# Patient Record
Sex: FEMALE | Race: BLACK OR AFRICAN AMERICAN
[De-identification: names, ages, dates, MRNs, and addresses within clinical notes are randomized per-mention and may not be internally consistent; named-entity substitution may affect disease eponyms.]

---

## 2017-01-05 ENCOUNTER — HOSPITAL ENCOUNTER (OUTPATIENT)
Dept: HOSPITAL 54 - CSC | Age: 77
Discharge: HOME | End: 2017-01-05
Attending: INTERNAL MEDICINE
Payer: MEDICARE

## 2017-01-05 VITALS — BODY MASS INDEX: 25.49 KG/M2 | WEIGHT: 153 LBS | HEIGHT: 65 IN

## 2017-01-05 VITALS — DIASTOLIC BLOOD PRESSURE: 96 MMHG | SYSTOLIC BLOOD PRESSURE: 176 MMHG

## 2017-01-05 DIAGNOSIS — E11.9: ICD-10-CM

## 2017-01-05 DIAGNOSIS — M19.042: ICD-10-CM

## 2017-01-05 DIAGNOSIS — K59.09: ICD-10-CM

## 2017-01-05 DIAGNOSIS — Z79.84: ICD-10-CM

## 2017-01-05 DIAGNOSIS — E03.9: ICD-10-CM

## 2017-01-05 DIAGNOSIS — T44.7X6A: ICD-10-CM

## 2017-01-05 DIAGNOSIS — I10: ICD-10-CM

## 2017-01-05 DIAGNOSIS — F01.50: Primary | ICD-10-CM

## 2017-01-05 DIAGNOSIS — N39.41: ICD-10-CM

## 2017-01-05 DIAGNOSIS — E83.52: ICD-10-CM

## 2017-01-05 DIAGNOSIS — M19.041: ICD-10-CM
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
## 2017-01-05 DIAGNOSIS — E78.5: ICD-10-CM

## 2017-01-05 DIAGNOSIS — F32.9: ICD-10-CM

## 2017-01-05 DIAGNOSIS — R45.3: ICD-10-CM

## 2017-01-05 PROCEDURE — G0463 HOSPITAL OUTPT CLINIC VISIT: HCPCS

## 2017-09-10 ENCOUNTER — HOSPITAL ENCOUNTER (INPATIENT)
Dept: HOSPITAL 10 - E/R | Age: 77
LOS: 9 days | Discharge: NURSING, REHAB OR LONG-TERM CARE | DRG: 57 | End: 2017-09-19
Payer: MEDICARE

## 2017-09-10 VITALS
WEIGHT: 136.47 LBS | BODY MASS INDEX: 21.93 KG/M2 | HEIGHT: 66 IN | HEIGHT: 66 IN | WEIGHT: 136.47 LBS | BODY MASS INDEX: 21.93 KG/M2

## 2017-09-10 VITALS — RESPIRATION RATE: 17 BRPM | DIASTOLIC BLOOD PRESSURE: 90 MMHG | SYSTOLIC BLOOD PRESSURE: 201 MMHG

## 2017-09-10 VITALS — HEART RATE: 58 BPM

## 2017-09-10 DIAGNOSIS — F90.9: ICD-10-CM

## 2017-09-10 DIAGNOSIS — F17.210: ICD-10-CM

## 2017-09-10 DIAGNOSIS — F03.90: ICD-10-CM

## 2017-09-10 DIAGNOSIS — E11.8: ICD-10-CM

## 2017-09-10 DIAGNOSIS — E78.00: ICD-10-CM

## 2017-09-10 DIAGNOSIS — R32: ICD-10-CM

## 2017-09-10 DIAGNOSIS — E88.81: ICD-10-CM

## 2017-09-10 DIAGNOSIS — E78.5: ICD-10-CM

## 2017-09-10 DIAGNOSIS — G91.2: Primary | ICD-10-CM

## 2017-09-10 DIAGNOSIS — F32.9: ICD-10-CM

## 2017-09-10 DIAGNOSIS — G47.33: ICD-10-CM

## 2017-09-10 LAB
ADD UMIC: NO
ANION GAP SERPL CALC-SCNC: 14 MMOL/L (ref 8–16)
APTT BLD: 29.1 SEC (ref 25–35)
BASOPHILS # BLD AUTO: 0 10^3/UL (ref 0–0.1)
BASOPHILS NFR BLD: 0.6 % (ref 0–2)
BUN SERPL-MCNC: 15 MG/DL (ref 7–20)
CALCIUM SERPL-MCNC: 11.1 MG/DL (ref 8.4–10.2)
CHLORIDE SERPL-SCNC: 102 MMOL/L (ref 97–110)
CO2 SERPL-SCNC: 29 MMOL/L (ref 21–31)
COLOR UR: YELLOW
CREAT SERPL-MCNC: 1.06 MG/DL (ref 0.44–1)
EOSINOPHIL # BLD: 0 10^3/UL (ref 0–0.5)
EOSINOPHIL NFR BLD: 0.8 % (ref 0–7)
ERYTHROCYTE [DISTWIDTH] IN BLOOD BY AUTOMATED COUNT: 13.6 % (ref 11.5–14.5)
GLUCOSE SERPL-MCNC: 138 MG/DL (ref 70–220)
GLUCOSE UR STRIP-MCNC: NEGATIVE MG/DL
HCT VFR BLD CALC: 43.6 % (ref 37–47)
HGB BLD-MCNC: 14.3 G/DL (ref 12–16)
INR PPP: 0.91
KETONES UR STRIP.AUTO-MCNC: (no result) MG/DL
LYMPHOCYTES # BLD AUTO: 1 10^3/UL (ref 0.8–2.9)
LYMPHOCYTES NFR BLD AUTO: 27.9 % (ref 15–51)
MCH RBC QN AUTO: 26.4 PG (ref 29–33)
MCHC RBC AUTO-ENTMCNC: 32.8 G/DL (ref 32–37)
MCV RBC AUTO: 80.6 FL (ref 82–101)
MONOCYTES # BLD: 0.3 10^3/UL (ref 0.3–0.9)
MONOCYTES NFR BLD: 8.4 % (ref 0–11)
NEUTROPHILS NFR BLD AUTO: 62.3 % (ref 39–77)
NITRITE UR QL STRIP.AUTO: NEGATIVE MG/DL
NRBC # BLD MANUAL: 0 10^3/UL (ref 0–0)
NRBC BLD AUTO-RTO: 0 /100WBC (ref 0–0)
PLATELET # BLD: 156 10^3/UL (ref 140–415)
PMV BLD AUTO: 11.5 FL (ref 7.4–10.4)
POTASSIUM SERPL-SCNC: 4.1 MMOL/L (ref 3.5–5.1)
PROTHROMBIN TIME: 12.3 SEC (ref 12.2–14.2)
PT RATIO: 1
RBC # BLD AUTO: 5.41 10^6/UL (ref 4.2–5.4)
RBC # UR AUTO: NEGATIVE MG/DL
SODIUM SERPL-SCNC: 141 MMOL/L (ref 135–144)
TROPONIN I SERPL-MCNC: < 0.012 NG/ML (ref 0–0.12)
UR ASCORBIC ACID: NEGATIVE MG/DL
UR BILIRUBIN (DIP): NEGATIVE MG/DL
UR CLARITY: CLEAR
UR PH (DIP): 7 (ref 5–9)
UR SPECIFIC GRAVITY (DIP): 1.01 (ref 1–1.03)
UR TOTAL PROTEIN (DIP): NEGATIVE MG/DL
UROBILINOGEN UR STRIP-ACNC: (no result) MG/DL
WBC # BLD AUTO: 3.6 10^3/UL (ref 4.8–10.8)
WBC # UR STRIP: NEGATIVE LEU/UL

## 2017-09-10 PROCEDURE — 82042 OTHER SOURCE ALBUMIN QUAN EA: CPT

## 2017-09-10 PROCEDURE — 85610 PROTHROMBIN TIME: CPT

## 2017-09-10 PROCEDURE — 82945 GLUCOSE OTHER FLUID: CPT

## 2017-09-10 PROCEDURE — 70549 MR ANGIOGRAPH NECK W/O&W/DYE: CPT

## 2017-09-10 PROCEDURE — 92523 SPEECH SOUND LANG COMPREHEN: CPT

## 2017-09-10 PROCEDURE — 97530 THERAPEUTIC ACTIVITIES: CPT

## 2017-09-10 PROCEDURE — 80061 LIPID PANEL: CPT

## 2017-09-10 PROCEDURE — 93306 TTE W/DOPPLER COMPLETE: CPT

## 2017-09-10 PROCEDURE — P9612 CATHETERIZE FOR URINE SPEC: HCPCS

## 2017-09-10 PROCEDURE — 87529 HSV DNA AMP PROBE: CPT

## 2017-09-10 PROCEDURE — 83036 HEMOGLOBIN GLYCOSYLATED A1C: CPT

## 2017-09-10 PROCEDURE — 70450 CT HEAD/BRAIN W/O DYE: CPT

## 2017-09-10 PROCEDURE — 97162 PT EVAL MOD COMPLEX 30 MIN: CPT

## 2017-09-10 PROCEDURE — 85730 THROMBOPLASTIN TIME PARTIAL: CPT

## 2017-09-10 PROCEDURE — 86592 SYPHILIS TEST NON-TREP QUAL: CPT

## 2017-09-10 PROCEDURE — 92507 TX SP LANG VOICE COMM INDIV: CPT

## 2017-09-10 PROCEDURE — 82962 GLUCOSE BLOOD TEST: CPT

## 2017-09-10 PROCEDURE — 84157 ASSAY OF PROTEIN OTHER: CPT

## 2017-09-10 PROCEDURE — 92610 EVALUATE SWALLOWING FUNCTION: CPT

## 2017-09-10 PROCEDURE — 85025 COMPLETE CBC W/AUTO DIFF WBC: CPT

## 2017-09-10 PROCEDURE — 70544 MR ANGIOGRAPHY HEAD W/O DYE: CPT

## 2017-09-10 PROCEDURE — 97167 OT EVAL HIGH COMPLEX 60 MIN: CPT

## 2017-09-10 PROCEDURE — 81003 URINALYSIS AUTO W/O SCOPE: CPT

## 2017-09-10 PROCEDURE — 82040 ASSAY OF SERUM ALBUMIN: CPT

## 2017-09-10 PROCEDURE — 84443 ASSAY THYROID STIM HORMONE: CPT

## 2017-09-10 PROCEDURE — 82784 ASSAY IGA/IGD/IGG/IGM EACH: CPT

## 2017-09-10 PROCEDURE — 87070 CULTURE OTHR SPECIMN AEROBIC: CPT

## 2017-09-10 PROCEDURE — 71010: CPT

## 2017-09-10 PROCEDURE — 89051 BODY FLUID CELL COUNT: CPT

## 2017-09-10 PROCEDURE — 70551 MRI BRAIN STEM W/O DYE: CPT

## 2017-09-10 PROCEDURE — 80053 COMPREHEN METABOLIC PANEL: CPT

## 2017-09-10 PROCEDURE — 84484 ASSAY OF TROPONIN QUANT: CPT

## 2017-09-10 PROCEDURE — 80048 BASIC METABOLIC PNL TOTAL CA: CPT

## 2017-09-10 PROCEDURE — 93005 ELECTROCARDIOGRAM TRACING: CPT

## 2017-09-10 PROCEDURE — 97116 GAIT TRAINING THERAPY: CPT

## 2017-09-10 PROCEDURE — C9113 INJ PANTOPRAZOLE SODIUM, VIA: HCPCS

## 2017-09-10 RX ADMIN — FOLIC ACID SCH MLS/HR: 5 INJECTION, SOLUTION INTRAMUSCULAR; INTRAVENOUS; SUBCUTANEOUS at 22:54

## 2017-09-10 NOTE — RADRPT
PROCEDURE:   XR Chest. 

 

CLINICAL INDICATION:   Dyspnea 

 

TECHNIQUE:   Single frontal chest x-ray. 

 

COMPARISON:   None. 

 

FINDINGS:

 

No acute infiltrate, pleural effusion or pneumothorax is identified.  Cardiomediastinal silhouette i
s within normal limits.  Aortic atherosclerotic calcification is noted. The osseous structures are r
emarkable for degenerative enthesopathy of the spine.   

 

IMPRESSION:

 

1.  No evidence of acute cardiopulmonary process.

2.  Aortic atherosclerosis.  

 

RPTAT: PP

_____________________________________________ 

.Camacho Hackett MD, MD           Date    Time 

Electronically viewed and signed by .Camacho Hackett MD, MD on 09/10/2017 15:52 

 

D:  09/10/2017 15:52  T:  09/10/2017 15:52

.R/

## 2017-09-10 NOTE — HP
Date/Time of Note


Date/Time of Note


DATE: 9/10/17 


TIME: 21:05





Assessment/Plan


VTE Prophylaxis


VTE Prophylaxis Intervention:  SCD's





Lines/Catheters


IV Catheter Type (from [facility]):  Saline Lock





Assessment/Plan


Chief Complaint/Hosp Course


This is a 77-year-old female being admitted to the telemetry floor for:





#1 left leg weakness: Rule out CVA/TIA.  CAT scan does not show any acute signs 

of hemorrhage or infarction.  Patient continues to have unsteadiness with her 

gait.  Will order MRI of the brain as well as MRA of the brain and neck without 

contrast.  Will check an echocardiogram with bubble study.  Will get a 

neurology consult.  Will check lipid level, and A1c.  PT OT and speech 

evaluation, bedside swallow eval.





#2 hypertension: We will allow for permissive hypertension for the first 24 

hours.  Then resume patient's home medication.





#3 diabetes mellitus: Once patient passes swallow study will resume diabetic 

diet, check hemoglobin A1c, insulin sliding scale





#4 dementia: Stable.





#5 DVT GI prophylaxis: SCDs, acid blocker





Further treatment strategy will be implemented as per the clinical course


Problems:  





HPI/ROS


Admit Date/Time


Admit Date/Time








Hx of Present Illness


cc: leg weakness, confusion





This 77-year-old female is brought in by her daughter via EMS for acute left 

leg weakness at the same time that she developed confusion.  Patient states she 

was walking while does happen and that she continue to try to walk with her 

daughter she could not do it because of left leg weakness.   She has had no 

fevers or chills and denies chest pain shortness of breath.  Patient was 

unsteady in her gait when the RNs were helping her to the bathroom.  The 

weakness of the left leg was felt at approximately 1315 on 9/10/1017.





allegies: nkda





meds: see mar





ROS


Const: As per HPI


Eyes : No pain discharge or redness or change in visual acuity


ENT: No pain, sore throat, congestion, congestion,  dysphagia or discharge


Respiratory: No shortness of breath, cough, sputum, wheezing, or pleuritic pain


Cardiovascular: No chest pain, palpitation, PND, or edema


GI : no change in appetite, abdominal pain, nausea, vomiting, diarrhea, 

constipation, or change in the color his stool 


Genitourinary: No dysuria, hematuria, flank pain ,  discharge or CVA tenderness


Musculoskeletal: No joint pain, back pain, neck pain, restricted range of 

motion in neck or joints


Skin: No rash, bruising or hives 


Neuro: As per HPI


Endocrine: No polyuria, polydipsia, temperature intolerance


Psych: No hallucination, depression, anxiety or suicidal ideation





PMH/Family/Social


Past Medical History


Diabetes mellitus, hypertension, dementia





Past Surgical History


Hysterectomy





Family History


Significant Family History:  no pertinent family hx





Social History


Alcohol Use:  none


Smoking Status:  Former smoker (Half pack a day 5 year, she quit many years ago

)


Drug Use:  none





Exam/Review of Systems


Vital Signs


Vitals





 Vital Signs








  Date Time  Temp Pulse Resp B/P Pulse Ox O2 Delivery O2 Flow Rate FiO2


 


9/10/17 20:05  61 13 161/64 98 Room Air  


 


9/10/17 14:49       2 


 


9/10/17 14:30 98.5       











Exam


Exam





General: Patient is well-developed well-nourished


The patient is alert oriented -3 lying comfortably in bed.


HEENT: Atraumatic, normocephalic. The pupils are equal, round and reactive. 

Extraocular motor are intact


Neck: Supple with full range of motion. No rigidity or meningismus


Chest: Nontender


Lungs: Clear to auscultation bilaterally no crackles rales or wheezing


Heart: Normal S1-S2, Regular rhythm and rate.  No overt murmurs appreciated


Abdomen: Soft , nontender,  nondistended , bowel sounds are present. No 

guarding no rebound tenderness , No masses or organomegaly. No costovertebral 

temporal angle mass


Extremities: Normal to inspection, no edema no cyanosis


Neurologic: Alert and oriented 3, cranial nerves II through XII intact, 

strength appears to be equal of the bilateral lower extremities and equal to 

bilateral upper extremities.  Did not attempt gait as patient earlier try to go 

to the bathroom with the nurse and she was unsteady.


Additional Comments


PROCEDURE:   CT Brain without contrast. 


 


CLINICAL INDICATION: Weakness and nonacute stroke


 


TECHNIQUE:   A CT of the brain was performed on a [employer] 64-slice CT 

scanner utilizing axial imaging from the skull base through the vertex without 

IV contrast.  Multiplanar reformatted images were made.  Images were reviewed 

on a PACS workstation.  The CTDIvol is 45.01 mGy and the DLP is 720.23 mGycm.  


 


One of the following 3 does reduction techniques were used during this CT 

examination:


1)  Automated exposure control


2)  Adjustment of the mA +/- kV according to patient size or


3)  Use of iterative reconstruction technique


 


COMPARISON:   None available 


 


FINDINGS:


 


There is no intracranial hemorrhage, mass effect, or midline shift.  No extra-

axial fluid collection is seen. The ventricles and sulci are age appropriate. 

Moderate diffuse volume loss is present. Decreased attenuation is present in 

the bilateral centrum semiovale and periventricular white matter compatible 

with moderate chronic microvascular ischemic disease. Mild vascular 

calcifications are present of the intracranial internal carotid arteries and 

the vertebral arteries.


 


The visualized scalp and calvarium are normal. The bilateral orbits are normal. 

The bilateral paranasal sinuses, mastoid air cells and middle ear cavities are 

clear.


 


IMPRESSION:


 


1.  No evidence of acute intracranial hemorrhage, infarcts, or acute 

intracranial pathology.


2.  Moderate chronic microvascular ischemic disease and diffuse volume loss.


3.  Mild atherosclerotic vascular disease


 


 


 


 


RPTAT: HDC


_____________________________________________ 


.Belle Rodriguez MD, MD           Date    Time 


Electronically viewed and signed by .Belle Rodriguez MD, MD on 09/10/2017 15:

43 


 


D:  09/10/2017 15:43  T:  09/10/2017 15:43


.C/





CC: JOSE LEONARD DO


PROCEDURE:   XR Chest. 


 


CLINICAL INDICATION:   Dyspnea 


 


TECHNIQUE:   Single frontal chest x-ray. 


 


COMPARISON:   None. 


 


FINDINGS:


 


No acute infiltrate, pleural effusion or pneumothorax is identified.  

Cardiomediastinal silhouette is within normal limits.  Aortic atherosclerotic 

calcification is noted. The osseous structures are remarkable for degenerative 

enthesopathy of the spine.   


 


IMPRESSION:


 


1.  No evidence of acute cardiopulmonary process.


2.  Aortic atherosclerosis.  


 


RPTAT: PP


_____________________________________________ 


.Camacho Hackett MD, MD           Date    Time 


Electronically viewed and signed by .Camacho Hackett MD, MD on 09/10/2017 15:

52 


 


D:  09/10/2017 15:52  T:  09/10/2017 15:52


.R/





CC: JOSE LEONARD DOPROCEDURE:   CT Brain without contrast. 


 


EKG interpretation: Normal sinus rhythm rate 82, normal axis, no ST or T-wave 

changes concerning for acute ischemia, normal intervals.  


As per ED physician documentation





Labs


Result Diagram:  


9/10/17 1500                                                                   

             9/10/17 1500














ALVIN LOCK Sep 10, 2017 21:15

## 2017-09-10 NOTE — RADRPT
PROCEDURE:   CT Brain without contrast. 

 

CLINICAL INDICATION: Weakness and nonacute stroke

 

TECHNIQUE:   A CT of the brain was performed on a [employer] 64-slice CT scanner utilizing axial 
imaging from the skull base through the vertex without IV contrast.  Multiplanar reformatted images 
were made.  Images were reviewed on a PACS workstation.  The CTDIvol is 45.01 mGy and the DLP is 720
.23 mGycm.  

 

One of the following 3 does reduction techniques were used during this CT examination:

1)  Automated exposure control

2)  Adjustment of the mA +/- kV according to patient size or

3)  Use of iterative reconstruction technique

 

COMPARISON:   None available 

 

FINDINGS:

 

There is no intracranial hemorrhage, mass effect, or midline shift.  No extra-axial fluid collection
 is seen. The ventricles and sulci are age appropriate. Moderate diffuse volume loss is present. Dec
reased attenuation is present in the bilateral centrum semiovale and periventricular white matter co
mpatible with moderate chronic microvascular ischemic disease. Mild vascular calcifications are pres
ent of the intracranial internal carotid arteries and the vertebral arteries.

 

The visualized scalp and calvarium are normal. The bilateral orbits are normal. The bilateral parana
sal sinuses, mastoid air cells and middle ear cavities are clear.

 

IMPRESSION:

 

1.  No evidence of acute intracranial hemorrhage, infarcts, or acute intracranial pathology.

2.  Moderate chronic microvascular ischemic disease and diffuse volume loss.

3.  Mild atherosclerotic vascular disease

 

 

 

 

RPTAT: HDC

_____________________________________________ 

.Belle Rodriguez MD, MD           Date    Time 

Electronically viewed and signed by .Belle Rodriguez MD, MD on 09/10/2017 15:43 

 

D:  09/10/2017 15:43  T:  09/10/2017 15:43

.C/

## 2017-09-10 NOTE — ERA
ER Documentation


Chief Complaint


Date/Time


DATE: 9/10/17 


TIME: 19:18


Chief Complaint


weakness to left leg  at 1315





HPI


This 77-year-old female is brought in by her daughter via EMS for acute left 

leg weakness at the same time that she developed confusion.  Daughter tried to 

get her to walk and she could not walk because of the left leg weakness.  She 

feels that his sensory resolved with the daughter still states that she appears 

somewhat confused.  She has had no fevers or chills and denies chest pain 

shortness of breath.





ROS


All systems reviewed and are negative except as per history of present illness.





Medications


Home Meds


Reported Medications


Olmesartan Medoxomil (Benicar) 40 Mg Tablet, 40 MG PO DAILY, #30 TAB


   9/10/17


Metformin Hcl* (Metformin Hcl*) 500 Mg Tablet, 500 MG PO DAILY, #30 TAB


   9/10/17





Allergies


Allergies:  


Coded Allergies:  


     No Known Allergy (Unverified , 9/10/17)





PMhx/Soc


History of Surgery:  No


Anesthesia Reaction:  No


Hx Neurological Disorder:  No


Hx Respiratory Disorders:  No


Hx Cardiac Disorders:  Yes (HTN)


Hx Psychiatric Problems:  No


Hx Miscellaneous Medical Probl:  Yes (DM)


Hx Alcohol Use:  No


Hx Substance Use:  No


Hx Tobacco Use:  No


Smoking Status:  Never smoker





Physical Exam


Vitals





Vital Signs








  Date Time  Temp Pulse Resp B/P Pulse Ox O2 Delivery O2 Flow Rate FiO2


 


9/10/17 14:49      Nasal Cannula 2 


 


9/10/17 14:30 98.5 98 20 163/78 95   








Physical Exam


Const:  []            No acute distress.


Head:   Atraumatic 


Eyes:    Normal Conjunctiva


ENT:    Normal External Ears, Nose and Mouth.


Neck:               Full range of motion..~ No meningismus.


Resp:    Clear to auscultation bilaterally


Cardio:    Regular rate and rhythm, no murmurs


Abd:    Soft, non tender, non distended. Normal bowel sounds


Skin:    No petechiae or rashes


Back:    No midline or flank tenderness


Ext:    No cyanosis, or edema, Distal pulses intact all 4 extremities


Neur:    Awake and alert and oriented 3, cranial nerves II through XII intact, 

no cerebellar deficits, normal mental status with ability to remember 3 objects

, 5 out of 5 strength all extremities with a NIH stroke scale of 0.


Psych:    Normal Mood and Affect


Result Diagram:  


9/10/17 1500                                                                   

             9/10/17 1500





Results 24 hrs





 Laboratory Tests








Test


  9/10/17


15:00


 


White Blood Count 3.610^3/ul 


 


Red Blood Count 5.4110^6/ul 


 


Hemoglobin 14.3g/dl 


 


Hematocrit 43.6% 


 


Mean Corpuscular Volume 80.6fl 


 


Mean Corpuscular Hemoglobin 26.4pg 


 


Mean Corpuscular Hemoglobin


Concent 32.8g/dl 


 


 


Red Cell Distribution Width 13.6% 


 


Platelet Count 57501^3/UL 


 


Mean Platelet Volume 11.5fl 


 


Neutrophils % 62.3% 


 


Lymphocytes % 27.9% 


 


Monocytes % 8.4% 


 


Eosinophils % 0.8% 


 


Basophils % 0.6% 


 


Nucleated Red Blood Cells % 0.0/100WBC 


 


Neutrophils # (Manual) 2.210^3/ul 


 


Lymphocytes # 1.010^3/ul 


 


Monocytes # 0.310^3/ul 


 


Eosinophils # 0.010^3/ul 


 


Basophils # 0.010^3/ul 


 


Nucleated Red Blood Cells # 0.010^3/ul 


 


Prothrombin Time 12.3Sec 


 


Prothrombin Time Ratio 1.0 


 


INR International Normalized


Ratio 0.91 


 


 


Activated Partial


Thromboplast Time 29.1Sec 


 


 


Sodium Level 141mmol/L 


 


Potassium Level 4.1mmol/L 


 


Chloride Level 102mmol/L 


 


Carbon Dioxide Level 29mmol/L 


 


Anion Gap 14 


 


Blood Urea Nitrogen 15mg/dl 


 


Creatinine 1.06mg/dl 


 


Glucose Level 138mg/dl 


 


Hemoglobin A1c 6.4% 


 


Calcium Level 11.1mg/dl 


 


Troponin I < 0.012ng/ml 








 Current Medications








 Medications


  (Trade)  Dose


 Ordered  Sig/Ramone


 Route


 PRN Reason  Start Time


 Stop Time Status Last Admin


Dose Admin


 


 Ondansetron HCl


  (Zofran Inj)  4 mg  ER BRIDGE PRN


 IV


 NAUSEA AND/OR VOMITING  9/10/17 19:30


 9/11/17 19:29   


 


 


 Acetaminophen


  (Tylenol Tab)  650 mg  ER BRIDGE PRN


 PO


 MILD PAIN/FEVER  9/10/17 19:30


 9/11/17 19:29   


 











Procedures/MDM


  Patient describing symptoms consistent with a TIA.  She is stable in the 

emergency room.  Urine is still pending and should be treated for urinary tract 

infection she has 1 otherwise is currently stable.  She will need to be 

admitted for further neurological workup including MRI which I have already 

ordered for signs of acute ischemia.  Initial CT is negative and the patient 

was given 325 mg asp.  Dr. Oneill will be admitting to telemetry further 

monitoring and workup .





EKG interpretation: Normal sinus rhythm rate 82, normal axis, no ST or T-wave 

changes concerning for acute ischemia, normal intervals.  Normal EKG except for 

low voltage


Cardiac monitor interpretation: Normal sinus rhythm without arrhythmia


Chest x-ray interpretation:I see no acute process.  I see no widened mediastinum

, pneumothorax, no infiltrates, no pulmonary edema, no fractures.


CT head interpretation: I see no acute process, see no acute hemorrhage, no mass

-effect or midline shift no skull fracture peer





Departure


Diagnosis:  


 Primary Impression:  


 TIA (transient ischemic attack)


Condition:  Stable











JOSE LEONARD DO Sep 10, 2017 19:28

## 2017-09-11 VITALS — HEART RATE: 66 BPM

## 2017-09-11 VITALS — RESPIRATION RATE: 20 BRPM | DIASTOLIC BLOOD PRESSURE: 77 MMHG | HEART RATE: 65 BPM | SYSTOLIC BLOOD PRESSURE: 165 MMHG

## 2017-09-11 VITALS — SYSTOLIC BLOOD PRESSURE: 145 MMHG | RESPIRATION RATE: 18 BRPM | DIASTOLIC BLOOD PRESSURE: 75 MMHG

## 2017-09-11 VITALS — HEART RATE: 97 BPM

## 2017-09-11 VITALS — DIASTOLIC BLOOD PRESSURE: 75 MMHG | RESPIRATION RATE: 20 BRPM | SYSTOLIC BLOOD PRESSURE: 157 MMHG

## 2017-09-11 VITALS — SYSTOLIC BLOOD PRESSURE: 179 MMHG | RESPIRATION RATE: 18 BRPM | DIASTOLIC BLOOD PRESSURE: 79 MMHG | HEART RATE: 57 BPM

## 2017-09-11 VITALS — SYSTOLIC BLOOD PRESSURE: 175 MMHG | RESPIRATION RATE: 17 BRPM | DIASTOLIC BLOOD PRESSURE: 95 MMHG

## 2017-09-11 VITALS — HEART RATE: 63 BPM

## 2017-09-11 VITALS — HEART RATE: 40 BPM

## 2017-09-11 VITALS — HEART RATE: 95 BPM

## 2017-09-11 VITALS — HEART RATE: 89 BPM

## 2017-09-11 LAB
ALBUMIN SERPL-MCNC: 3.4 G/DL (ref 3.3–4.9)
ALBUMIN/GLOB SERPL: 1.25 {RATIO}
ALP SERPL-CCNC: 50 IU/L (ref 42–121)
ALT SERPL-CCNC: 24 IU/L (ref 13–69)
ANION GAP SERPL CALC-SCNC: 8 MMOL/L (ref 8–16)
AST SERPL-CCNC: 20 IU/L (ref 15–46)
BASOPHILS # BLD AUTO: 0 10^3/UL (ref 0–0.1)
BASOPHILS NFR BLD: 0.7 % (ref 0–2)
BILIRUB DIRECT SERPL-MCNC: 0 MG/DL (ref 0–0.2)
BILIRUB SERPL-MCNC: 0.7 MG/DL (ref 0.2–1.3)
BUN SERPL-MCNC: 14 MG/DL (ref 7–20)
CALCIUM SERPL-MCNC: 10.2 MG/DL (ref 8.4–10.2)
CHLORIDE SERPL-SCNC: 104 MMOL/L (ref 97–110)
CHOLEST SERPL-MCNC: 199 MG/DL (ref 100–200)
CHOLEST/HDLC SERPL: 4.6 RATIO
CO2 SERPL-SCNC: 31 MMOL/L (ref 21–31)
CREAT SERPL-MCNC: 0.91 MG/DL (ref 0.44–1)
EOSINOPHIL # BLD: 0.1 10^3/UL (ref 0–0.5)
EOSINOPHIL NFR BLD: 3 % (ref 0–7)
ERYTHROCYTE [DISTWIDTH] IN BLOOD BY AUTOMATED COUNT: 13.5 % (ref 11.5–14.5)
GLOBULIN SER-MCNC: 2.7 G/DL (ref 1.3–3.2)
GLUCOSE SERPL-MCNC: 95 MG/DL (ref 70–220)
HCT VFR BLD CALC: 38.7 % (ref 37–47)
HDLC SERPL-MCNC: 43 MG/DL (ref 33–92)
HGB BLD-MCNC: 12.7 G/DL (ref 12–16)
LYMPHOCYTES # BLD AUTO: 1.3 10^3/UL (ref 0.8–2.9)
LYMPHOCYTES NFR BLD AUTO: 43.6 % (ref 15–51)
MCH RBC QN AUTO: 26.5 PG (ref 29–33)
MCHC RBC AUTO-ENTMCNC: 32.8 G/DL (ref 32–37)
MCV RBC AUTO: 80.6 FL (ref 82–101)
MONOCYTES # BLD: 0.3 10^3/UL (ref 0.3–0.9)
MONOCYTES NFR BLD: 8.9 % (ref 0–11)
NEUTROPHILS NFR BLD AUTO: 43.5 % (ref 39–77)
NRBC # BLD MANUAL: 0 10^3/UL (ref 0–0)
NRBC BLD AUTO-RTO: 0 /100WBC (ref 0–0)
PLATELET # BLD: 145 10^3/UL (ref 140–415)
PMV BLD AUTO: 11.5 FL (ref 7.4–10.4)
POTASSIUM SERPL-SCNC: 3.5 MMOL/L (ref 3.5–5.1)
PROT SERPL-MCNC: 6.1 G/DL (ref 6.1–8.1)
RBC # BLD AUTO: 4.8 10^6/UL (ref 4.2–5.4)
SODIUM SERPL-SCNC: 139 MMOL/L (ref 135–144)
TRIGL SERPL-MCNC: 92 MG/DL (ref 0–149)
TSH SERPL-ACNC: 1.5 MIU/L (ref 0.47–4.68)
WBC # BLD AUTO: 3.1 10^3/UL (ref 4.8–10.8)

## 2017-09-11 RX ADMIN — HYDRALAZINE HYDROCHLORIDE PRN MG: 20 INJECTION INTRAMUSCULAR; INTRAVENOUS at 15:53

## 2017-09-11 RX ADMIN — DOCUSATE SODIUM SCH MG: 100 CAPSULE, LIQUID FILLED ORAL at 21:00

## 2017-09-11 RX ADMIN — LISINOPRIL SCH MG: 10 TABLET ORAL at 16:30

## 2017-09-11 RX ADMIN — DOCUSATE SODIUM SCH MG: 100 CAPSULE, LIQUID FILLED ORAL at 08:36

## 2017-09-11 RX ADMIN — ATORVASTATIN CALCIUM SCH MG: 10 TABLET, FILM COATED ORAL at 21:59

## 2017-09-11 RX ADMIN — FOLIC ACID SCH MLS/HR: 5 INJECTION, SOLUTION INTRAMUSCULAR; INTRAVENOUS; SUBCUTANEOUS at 15:53

## 2017-09-11 NOTE — RADRPT
PROCEDURE:   MRA of the Brain. 

 

CLINICAL INDICATION:   Neurologic deficit

 

TECHNIQUE:   An MRI of the brain was performed on a 1.5 dilshad scanner utilizing the following sequen
arminda:  3-D time-of-flight images through the cerebrovascular vasculature were obtained.  Images were 
reviewed on a high-resolution PACS workstation. 

 

COMPARISON:   No prior studies are available for comparison. 

 

FINDINGS:

MRA of the BRAIN:

Exam is limited secondary to motion artifact.  The bilateral internal carotid arteries, anterior and
 middle cerebral arteries are otherwise unremarkable in course and caliber.  The anterior communicat
ing artery is intact.  Fetal origin of the right posterior cerebral artery is seen which is a normal
 variant. The distal vertebral arteries, basilar artery, basilar tip, and bilateral posterior cerebr
al arteries are otherwise unremarkable.  No evidence of an aneurysm or vascular malformation is seen
.  No hemodynamically significant stenosis or occlusion is seen. 

 

 

IMPRESSION:

 

1.  Limited examination secondary to motion artifact.

2.  Otherwise, no definite hemodynamically significant stenosis. 

 

 

RPTAT: HPNM

_____________________________________________ 

Physician Estefany           Date    Time 

Electronically viewed and signed by Physician Estefany on 09/11/2017 18:28 

 

D:  09/11/2017 18:28  T:  09/11/2017 18:28

PM/

## 2017-09-11 NOTE — RADRPT
PROCEDURE:   MRA Neck without contrast. 

 

CLINICAL INDICATION:   TIA 

 

TECHNIQUE:   An MRA of the major cervical arteries was performed on the 1.5 dilshad scanner utilizing 
axial 2D time of flight. No IV contrast was given as ordered.  Source and MIPPED images were reviewe
d. Carotid stenosis is calculated in accordance with NASCET criteria guidelines with direct measurem
ents of the narrowest segment of stenosis compared with distal luminal diameter of the normal distal
 extracranial internal carotid artery.

 

COMPARISON:   No prior studies are available for comparison. 

 

FINDINGS:

The common carotid arteries are patent and normal in caliber. The right carotid bulb appears grossly
 normal in caliber . Short 6 mm segment of irregularity along the proximal left internal carotid art
kevin bulb likely reflecting atherosclerotic plaque without evidence of a hemodynamically significant 
narrowing or stenosis. The distal internal carotid arteries appear patent and normal in caliber. The
 external carotid arteries and their branches are patent and normal in caliber. Dominant right verte
bral artery. The left vertebral artery is hypoplastic but otherwise patent. There is no evidence of 
vascular stenosis or occlusion.

 

IMPRESSION:

 

1.  No right carotid stenosis.

2.  Short segment of irregularity compatible with atherosclerotic plaque along the proximal left int
ernal carotid artery bulb without hemodynamically significant stenosis.

3.  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

 

RPTAT:AAJJ

_____________________________________________ 

Physician Sherlyn           Date    Time 

Electronically viewed and signed by Physician Sherlyn on 09/11/2017 18:46 

 

D:  09/11/2017 18:46  T:  09/11/2017 18:46

GIOVANNI/

## 2017-09-11 NOTE — CONS
Date/Time of Note


Date/Time of Note


DATE: 9/11/17 


TIME: 11:43





Assessment/Plan


Assessment/Plan


Chief Complaint/Hosp Course


78 yo female with history of HTN, DM, cognitive decline, urinary incontinence, p

/w increased LLE weakness.


Exam concerning for left arm weakness as well, further studies pending to rule 

out acute CVA.





Recommendations;


MRI Brain without contrast


MRA Head/Neck without contrast


ASA 81 mg daily


SBP <140/90


Check FLP and HBA1C


ECHO


DVT ppx


PT/OT/Speech





resume home medications


discussed w daughter at bedside- if MRI imaging concerning for NPH and no acute 

CVA


will pursue further NPH work up as inpatient


Problems:  





Consultation Date/Type/Reason


Admit Date/Time


9/11/17


Date of Consultation:  Sep 11, 2017


Type of Consultation:  Neurology


Reason for Consultation


eval for poss CVA


Referring Provider:  ALVIN LOCK





Hx of Present Illness


77 year old female with history of hypercalcemia, DM, fall a couple years ago 

with residual issues with her LLE, recent cognitive decline diagnosed with MARK, 

ADHD, major depressive disorder p/w left leg weakness and fall after coming out 

of the bathroom. Per daughter she has also had urinary incontinence over the 

past 2 months. She was seen by an outpatient neurologist an MRI Brain was 

ordered- suggestive of enlarged ventricles however clinically not felt to be 

consistent with NPH consultation was November 2016.


EEG was also done: normal.





Social History


Alcohol Use:  none


Smoking Status:  Former smoker (Half pack a day 5 year, she quit many years ago

)


Drug Use:  none





Exam/Review of Systems


Vital Signs


Vitals





 Vital Signs








  Date Time  Temp Pulse Resp B/P Pulse Ox O2 Delivery O2 Flow Rate FiO2


 


9/11/17 08:14  65      


 


9/11/17 08:14 98.1  20 165/77 98   


 


9/11/17 06:52      Room Air  


 


9/10/17 14:49       2 














 Intake and Output   


 


 9/10/17 9/10/17 9/11/17





 14:59 22:59 06:59


 


Intake Total   250 ml


 


Balance   250 ml











Exam


awake and alert


NAD


oriented to self daughter 


date says its February 9 2017


able to re orient 


masked facies flat affect





CN: PATEL, VFF EOMI no nystagmus palate upgoing uvula midline scm/trap intact


Motor: right UE and Right LE 5/5


Left UE 4/5 strength in  LLE 5-/5 





Sensory intact throughout





Coordination: right FTN intact


left is slower than right no ataxia





Reflexes 1+ throughout





Results


Result Diagram:  


9/11/17 0704                                                                   

             9/11/17 0704





Results 24 hrs





Laboratory Tests








Test


  9/10/17


15:00 9/10/17


19:15 9/11/17


07:04 9/11/17


07:50


 


White Blood Count 3.6  L  3.1  L 


 


Red Blood Count 5.41  H  4.80   


 


Hemoglobin 14.3    12.7   


 


Hematocrit 43.6    38.7   


 


Mean Corpuscular Volume 80.6  L  80.6  L 


 


Mean Corpuscular Hemoglobin 26.4  L  26.5  L 


 


Mean Corpuscular Hemoglobin


Concent 32.8  


  


  32.8  


  


 


 


Red Cell Distribution Width 13.6    13.5   


 


Platelet Count 156    145   


 


Mean Platelet Volume 11.5  H  11.5  H 


 


Neutrophils % 62.3    43.5   


 


Lymphocytes % 27.9    43.6   


 


Monocytes % 8.4    8.9   


 


Eosinophils % 0.8    3.0   


 


Basophils % 0.6    0.7   


 


Nucleated Red Blood Cells % 0.0    0.0   


 


Neutrophils # (Manual) 2.2    1.3  L 


 


Lymphocytes # 1.0    1.3   


 


Monocytes # 0.3    0.3   


 


Eosinophils # 0.0    0.1   


 


Basophils # 0.0    0.0   


 


Nucleated Red Blood Cells # 0.0    0.0   


 


Prothrombin Time 12.3     


 


Prothrombin Time Ratio 1.0     


 


INR International Normalized


Ratio 0.91  


  


  


  


 


 


Activated Partial


Thromboplast Time 29.1  


  


  


  


 


 


Sodium Level 141    139   


 


Potassium Level 4.1    3.5   


 


Chloride Level 102    104   


 


Carbon Dioxide Level 29    31   


 


Anion Gap 14    8   


 


Blood Urea Nitrogen 15    14   


 


Creatinine 1.06  H  0.91   


 


Glucose Level 138    95  # 


 


Hemoglobin A1c 6.4  H  6.5  H 


 


Calcium Level 11.1  H  10.2   


 


Troponin I < 0.012     


 


Urine Color  YELLOW    


 


Urine Clarity  CLEAR    


 


Urine pH  7.0    


 


Urine Specific Gravity  1.015    


 


Urine Ketones  1+  H  


 


Urine Nitrite  NEGATIVE    


 


Urine Bilirubin  NEGATIVE    


 


Urine Urobilinogen  2+  H  


 


Urine Leukocyte Esterase  NEGATIVE    


 


Urine Hemoglobin  NEGATIVE    


 


Urine Glucose  NEGATIVE    


 


Urine Total Protein  NEGATIVE    


 


Total Bilirubin   0.7   


 


Direct Bilirubin   0.00   


 


Indirect Bilirubin   0.7   


 


Aspartate Amino Transf


(AST/SGOT) 


  


  20  


  


 


 


Alanine Aminotransferase


(ALT/SGPT) 


  


  24  


  


 


 


Alkaline Phosphatase   50   


 


Total Protein   6.1   


 


Albumin   3.4   


 


Globulin   2.70   


 


Albumin/Globulin Ratio   1.25   


 


Triglycerides Level   92   


 


Cholesterol Level   199   


 


LDL Cholesterol, Calculated   138   


 


HDL Cholesterol   43   


 


Cholesterol/HDL Ratio   4.6   


 


Thyroid Stimulating Hormone


(TSH) 


  


  1.500  


  


 


 


Bedside Glucose    104  











Medications


Medications





 Current Medications


Sodium Chloride (NS) 1,000 ml @  60 mls/hr [id number] IV  Last administered on 9/10

/17at 22:54; Admin Dose 60 MLS/HR;  Start 9/10/17 at 21:00


Ondansetron HCl (Zofran Inj) 4 mg Q6H  PRN IV NAUSEA AND/OR VOMITING;  Start 9/

10/17 at 21:00


Acetaminophen (Tylenol Tab) 650 mg Q6H  PRN PO PAIN LEVEL 1-3 OR FEVER;  Start 9

/10/17 at 21:00


Pantoprazole (Protonix Iv) 40 mg DAILY@06 IV  Last administered on 9/11/17at 06:

09; Admin Dose 40 MG;  Start 9/11/17 at 06:00


Hydralazine HCl (Apresoline) 10 mg Q4H  PRN IV ELEVATED BLOOD PRESSURE;  Start 9 /11/17 at 01:00


Docusate Sodium (Colace) 100 mg BID PO  Last administered on 9/11/17at 08:36; 

Admin Dose 100 MG;  Start 9/11/17 at 09:00


Magnesium Hydroxide (Milk Of Mag) 30 ml DAILY  PRN PO CONSTIPATION Last 

administered on 9/11/17at 06:09; Admin Dose 30 ML;  Start 9/11/17 at 01:30


Diagnostic Test (Pha) (Accu-Chek) 1 ea 02 XX ;  Start 9/11/17 at 02:00


Diagnostic Test (Pha) (Accu-Chek) 1 ea 02 XX ;  Start 9/11/17 at 02:00


Miscellaneous Information 1 ea NOTE XX ;  Start 9/11/17 at 02:30


Glucose (Glutose) 15 gm Q15M  PRN PO DECREASED GLUCOSE;  Start 9/11/17 at 02:30


Glucose (Glutose) 22.5 gm Q15M  PRN PO DECREASED GLUCOSE;  Start 9/11/17 at 02:

30


Dextrose (D50w Syringe) 25 ml Q15M  PRN IV DECREASED GLUCOSE;  Start 9/11/17 at 

02:30


Dextrose (D50w Syringe) 50 ml Q15M  PRN IV DECREASED GLUCOSE;  Start 9/11/17 at 

02:30


Glucagon (Glucagen) 1 mg Q15M  PRN IM DECREASED GLUCOSE;  Start 9/11/17 at 02:30


Glucose (Glutose) 15 gm Q15M  PRN BUCCAL DECREASED GLUCOSE;  Start 9/11/17 at 02

:30











RENE CAUSEY MD Sep 11, 2017 11:48

## 2017-09-11 NOTE — RADRPT
PROCEDURE:   MR Brain  without contrast. 

 

CLINICAL INDICATION: Left leg weakness which has resolved with persistent confusion. 

 

TECHNIQUE:   An MRI of the brain was performed on a 1.5 dilshad scanner utilizing the following sequen
[place]: Sagittal T1 weighted, axial T2 weighted, axial FLAIR, coronal GRE, and axial diffusion weighted
 with ADC mapping. 

 

COMPARISON:   CT brain 09/10/2017 

 

FINDINGS:

 

No evidence of restricted diffusion to suggest acute or early subacute ischemic infarction.  There i
s no evidence of intracranial hemorrhage, mass effect, or midline shift. No extra-axial fluid collec
tions are seen.  

 

No hypointense signal abnormalities are seen on the GRE images to suggest the presence of blood degr
adation products. Scattered nonspecific FLAIR/T2 signal hyperintensity foci in the subcortical and p
eriventricular white matter compatible with sequelae of moderate chronic microvascular ischemic dise
ase.

 

Diffuse central cerebral and cerebellar volume loss with frontotemporal predominance asymmetric to t
he right . Marked ventriculomegaly asymmetric to the right.

 

The posterior fossa contents, brainstem, seventh - eighth cranial nerve complexes, pituitary axis, o
rbits, paranasal sinuses, and mastoid air cells are unremarkable.

 

Normal flow voids are visible in the proximal intracranial arteries and dural sinuses, indicating pa
tency. 

 

IMPRESSION:

 

 

1. No acute or early subacute ischemic infarction or intracranial hemorrhage.

2. Moderate chronic microvascular ischemic disease with marked diffuse central cerebral and cerebell
ar volume loss.

 

 

 

RPTAT:AAJJ

_____________________________________________ 

Physician Sherlyn           Date    Time 

Electronically viewed and signed by Physician Sherlyn on 09/11/2017 18:38 

 

D:  09/11/2017 18:38  T:  09/11/2017 18:38

GIOVANNI/

## 2017-09-11 NOTE — PN
Date/Time of Note


Date/Time of Note


DATE: 9/11/17 


TIME: 16:07





Assessment/Plan


VTE Prophylaxis


VTE Prophylaxis Intervention:  LMWH





Lines/Catheters


IV Catheter Type (from [facility]):  Peripheral IV


Urinary Cath still in place:  No





Assessment/Plan


Assessment/Plan


1. left leg weakness: follow up with MRI/MRA, on aspirin and lipitor


2 . hypertension: start lisinopril


3.  diabetes mellitus:  insulin sliding scale


4.  dementia: Stable.


5. DVT prophylaxis lovenox





Subjective


24 Hr Interval Summary


Free Text/Dictation


no weakness today





Exam/Review of Systems


Vital Signs


Vitals





 Vital Signs








  Date Time  Temp Pulse Resp B/P Pulse Ox O2 Delivery O2 Flow Rate FiO2


 


9/11/17 12:15  89      


 


9/11/17 12:09 98.3  20 157/75 96   


 


9/11/17 06:52      Room Air  


 


9/10/17 14:49       2 














 Intake and Output   


 


 9/10/17 9/10/17 9/11/17





 15:00 23:00 07:00


 


Intake Total   250 ml


 


Balance   250 ml











Exam


Constitutional:  alert, oriented, well developed


Psych:  nl mood/affect, no complaints


Head:  atraumatic, normocephalic


Eyes:  EOMI, PERRL, nl conjunctiva, nl lids


ENMT:  nl external ears & nose, nl lips & teeth, nl nasal mucosa & septum


Neck:  non-tender, supple


Respiratory:  clear to auscultation, normal air movement, 


   No congested cough, No crackles/rales, No diminished breath sounds, No 

intercostal retraction, No labored breathing, No other, No respirations, No 

tactile fremitus, No wheezing


Cardiovascular:  nl pulses, regular rate and rhythm, 


   No S3, No S4, No bruits, No diastolic murmur, No edema, No gallop, No 

irregular rhythm, No jugular venous distention (JVD), No murmurs/extra sounds, 

No other, No rub, No systolic murmur


Gastrointestinal:  nl liver, spleen, non-tender, soft, 


   No ascites, No bowel sounds, No distended, No firm, No hepatomegaly, No mass

, No other, No rebound or guarding, No splenomegaly, No surgical scars, No 

tender


Musculoskeletal:  nl extremities to inspection


Extremities:  normal pulses, 


   No calf tenderness, No clubbing, No cyanosis, No edema, No other, No 

palpable cord, No pitting pedal edema, No tenderness


Neurological:  CNS II-XII intact, nl mental status, nl speech, nl strength





Results


Result Diagram:  


9/11/17 0704                                                                   

             9/11/17 0704





Results 24 hrs





Laboratory Tests








Test


  9/10/17


19:15 9/11/17


07:04 9/11/17


07:50 9/11/17


11:51


 


Urine Color YELLOW     


 


Urine Clarity CLEAR     


 


Urine pH 7.0     


 


Urine Specific Gravity 1.015     


 


Urine Ketones 1+  H   


 


Urine Nitrite NEGATIVE     


 


Urine Bilirubin NEGATIVE     


 


Urine Urobilinogen 2+  H   


 


Urine Leukocyte Esterase NEGATIVE     


 


Urine Hemoglobin NEGATIVE     


 


Urine Glucose NEGATIVE     


 


Urine Total Protein NEGATIVE     


 


White Blood Count  3.1  L  


 


Red Blood Count  4.80    


 


Hemoglobin  12.7    


 


Hematocrit  38.7    


 


Mean Corpuscular Volume  80.6  L  


 


Mean Corpuscular Hemoglobin  26.5  L  


 


Mean Corpuscular Hemoglobin


Concent 


  32.8  


  


  


 


 


Red Cell Distribution Width  13.5    


 


Platelet Count  145    


 


Mean Platelet Volume  11.5  H  


 


Neutrophils %  43.5    


 


Lymphocytes %  43.6    


 


Monocytes %  8.9    


 


Eosinophils %  3.0    


 


Basophils %  0.7    


 


Nucleated Red Blood Cells %  0.0    


 


Neutrophils # (Manual)  1.3  L  


 


Lymphocytes #  1.3    


 


Monocytes #  0.3    


 


Eosinophils #  0.1    


 


Basophils #  0.0    


 


Nucleated Red Blood Cells #  0.0    


 


Sodium Level  139    


 


Potassium Level  3.5    


 


Chloride Level  104    


 


Carbon Dioxide Level  31    


 


Anion Gap  8    


 


Blood Urea Nitrogen  14    


 


Creatinine  0.91    


 


Glucose Level  95  #  


 


Hemoglobin A1c  6.5  H  


 


Calcium Level  10.2    


 


Total Bilirubin  0.7    


 


Direct Bilirubin  0.00    


 


Indirect Bilirubin  0.7    


 


Aspartate Amino Transf


(AST/SGOT) 


  20  


  


  


 


 


Alanine Aminotransferase


(ALT/SGPT) 


  24  


  


  


 


 


Alkaline Phosphatase  50    


 


Total Protein  6.1    


 


Albumin  3.4    


 


Globulin  2.70    


 


Albumin/Globulin Ratio  1.25    


 


Triglycerides Level  92    


 


Cholesterol Level  199    


 


LDL Cholesterol, Calculated  138    


 


HDL Cholesterol  43    


 


Cholesterol/HDL Ratio  4.6    


 


Thyroid Stimulating Hormone


(TSH) 


  1.500  


  


  


 


 


Bedside Glucose   104   112  











Medications


Medications





 Current Medications


Sodium Chloride (NS) 1,000 ml @  60 mls/hr [id number] IV  Last administered on 9/11 /17at 15:53; Admin Dose 60 MLS/HR;  Start 9/10/17 at 21:00


Ondansetron HCl (Zofran Inj) 4 mg Q6H  PRN IV NAUSEA AND/OR VOMITING;  Start 9/

10/17 at 21:00


Acetaminophen (Tylenol Tab) 650 mg Q6H  PRN PO PAIN LEVEL 1-3 OR FEVER;  Start 9

/10/17 at 21:00


Hydralazine HCl (Apresoline) 10 mg Q4H  PRN IV ELEVATED BLOOD PRESSURE Last 

administered on 9/11/17at 15:53; Admin Dose 10 MG;  Start 9/11/17 at 01:00


Docusate Sodium (Colace) 100 mg BID PO  Last administered on 9/11/17at 08:36; 

Admin Dose 100 MG;  Start 9/11/17 at 09:00


Magnesium Hydroxide (Milk Of Mag) 30 ml DAILY  PRN PO CONSTIPATION Last 

administered on 9/11/17at 06:09; Admin Dose 30 ML;  Start 9/11/17 at 01:30


Diagnostic Test (Pha) (Accu-Chek) 1 ea 02 XX ;  Start 9/11/17 at 02:00


Diagnostic Test (Pha) (Accu-Chek) 1 ea 02 XX ;  Start 9/11/17 at 02:00


Miscellaneous Information 1 ea NOTE XX ;  Start 9/11/17 at 02:30


Glucose (Glutose) 15 gm Q15M  PRN PO DECREASED GLUCOSE;  Start 9/11/17 at 02:30


Glucose (Glutose) 22.5 gm Q15M  PRN PO DECREASED GLUCOSE;  Start 9/11/17 at 02:

30


Dextrose (D50w Syringe) 25 ml Q15M  PRN IV DECREASED GLUCOSE;  Start 9/11/17 at 

02:30


Dextrose (D50w Syringe) 50 ml Q15M  PRN IV DECREASED GLUCOSE;  Start 9/11/17 at 

02:30


Glucagon (Glucagen) 1 mg Q15M  PRN IM DECREASED GLUCOSE;  Start 9/11/17 at 02:30


Glucose (Glutose) 15 gm Q15M  PRN BUCCAL DECREASED GLUCOSE;  Start 9/11/17 at 02

:30


Pantoprazole (Protonix Tab) 40 mg DAILY@06 PO ;  Start 9/12/17 at 06:00











PIA MADRIGAL MD Sep 11, 2017 16:14

## 2017-09-12 VITALS — HEART RATE: 69 BPM | SYSTOLIC BLOOD PRESSURE: 158 MMHG | DIASTOLIC BLOOD PRESSURE: 76 MMHG | RESPIRATION RATE: 17 BRPM

## 2017-09-12 VITALS — SYSTOLIC BLOOD PRESSURE: 161 MMHG | RESPIRATION RATE: 18 BRPM | DIASTOLIC BLOOD PRESSURE: 70 MMHG

## 2017-09-12 VITALS — SYSTOLIC BLOOD PRESSURE: 144 MMHG | RESPIRATION RATE: 18 BRPM | DIASTOLIC BLOOD PRESSURE: 77 MMHG

## 2017-09-12 VITALS — SYSTOLIC BLOOD PRESSURE: 157 MMHG | DIASTOLIC BLOOD PRESSURE: 74 MMHG | RESPIRATION RATE: 17 BRPM

## 2017-09-12 VITALS — RESPIRATION RATE: 18 BRPM | DIASTOLIC BLOOD PRESSURE: 79 MMHG | SYSTOLIC BLOOD PRESSURE: 192 MMHG

## 2017-09-12 VITALS — DIASTOLIC BLOOD PRESSURE: 85 MMHG | RESPIRATION RATE: 18 BRPM | SYSTOLIC BLOOD PRESSURE: 191 MMHG

## 2017-09-12 VITALS — HEART RATE: 75 BPM

## 2017-09-12 VITALS — HEART RATE: 80 BPM

## 2017-09-12 VITALS — HEART RATE: 52 BPM

## 2017-09-12 RX ADMIN — DOCUSATE SODIUM SCH MG: 100 CAPSULE, LIQUID FILLED ORAL at 09:00

## 2017-09-12 RX ADMIN — PANTOPRAZOLE SODIUM SCH MG: 40 TABLET, DELAYED RELEASE ORAL at 06:07

## 2017-09-12 RX ADMIN — FOLIC ACID SCH MLS/HR: 5 INJECTION, SOLUTION INTRAMUSCULAR; INTRAVENOUS; SUBCUTANEOUS at 06:20

## 2017-09-12 RX ADMIN — FOLIC ACID SCH MLS/HR: 5 INJECTION, SOLUTION INTRAMUSCULAR; INTRAVENOUS; SUBCUTANEOUS at 14:39

## 2017-09-12 RX ADMIN — ATORVASTATIN CALCIUM SCH MG: 10 TABLET, FILM COATED ORAL at 21:00

## 2017-09-12 RX ADMIN — LISINOPRIL SCH MG: 10 TABLET ORAL at 08:57

## 2017-09-12 RX ADMIN — DOCUSATE SODIUM SCH MG: 100 CAPSULE, LIQUID FILLED ORAL at 21:00

## 2017-09-12 NOTE — RADRPT
Echocardiogram Report

 

Patient Name:  NAYELY LONG     Gender:       Female

MRN:           [id number]          Accession #:  [id number]

Birth Date:    1940      Study Date:   11-Sep-2017

Sonographer:   [facility]  Location:     [id number]

 

Ref. Physician: ALVIN LOCK

Quality: Adequate

 

Procedures: Transthoracic echocardiogram with complete 2D, M-Mode, and 

doppler examination.

Indications: Rule out cva/tia.

 

2D/M Mode                          Doppler

Measurement  Value  Normal Ranges  Measurement    Value  Normal Ranges

LVIDd 2D     3.0    3.5 - 5.6 cm   AV Peak Jozef    1.2    m/sec

LVIDs 2D     1.9    2.1 - 4.1 cm   AV Peak PG     6.0    mmHg

FS 2D        38.0   %              LVOT Peak Jozef  0.7    m/sec

LVPWd 2D     1.7    0.6 - 1.1 cm   LVOT Peak PG   2.0    mmHg

IVSd 2D      1.6    0.6 - 1.1 cm   MV E Peak Jozef  0.5    m/sec

IVS/LVPW 2D  1.0                   MV A Peak Jozef  0.8    m/sec

AoR Diam 2D  2.6    2.0 - 3.7 cm   MV E/A         0.6

LA/Ao 2D     1      0 - 1          MV Decel Time  236    msec

EDV 2D       27.8   cm3            MV E/A         0.6

ESV 2D       6.6    cm3

LA Dimen 2D  2.5    2.3 - 4.0 cm

 

Findings

Left Ventricle: Normal left ventricular systolic function.  Normal left 

ventricular cavity size.  Moderate to severe concentric left ventricular 

hypertrophy.  Ejection fraction is visually estimated at 65 %.  Tissue 

Doppler/Mitral Doppler indices are consistent with impaired relaxation 

(Stage I diastolic dysfunction).

Right Ventricle: Normal right ventricular size.  Normal right 

ventricular systolic function.

Left Atrium: The left atrium is normal in size.

Right Atrium: The right atrium is normal in size.

Atrial Septum: Bubble study was performed indicating no evidence of 

intra atrial shunt.

Mitral Valve: Mitral valve leaflets appear mildly thickened.  Mild 

mitral annular calcification.  Trace mitral regurgitation.

Aortic Valve: Normal appearance of the aortic valve.  No significant 

aortic stenosis or insufficiency.

Tricuspid Valve: Normal appearance of the tricuspid valve.  Unable to 

obtain RVSP due to minimal presence of tricuspid regurgitation.  There 

is trace tricuspid regurgitation.

Pulmonic Valve: Pulmonic valve not well visualized.  There is trace 

pulmonic regurgitation.

Pericardium: Normal pericardium with no significant pericardial effusion.

Aorta: Normal aortic root.

IVC: Normal size and normal respiratory collapse consistent with normal 

right atrial pressure.

 

Conclusions

1.Normal left ventricular systolic function.  Normal left ventricular 

cavity size.  Moderate to severe concentric left ventricular 

hypertrophy.  Ejection fraction is visually estimated at 65 %.  Tissue 

Doppler/Mitral Doppler indices are consistent with impaired relaxation 

(Stage I diastolic dysfunction).

2.Normal right ventricular size.  Normal right ventricular systolic 

function.

3.The left atrium is normal in size.

4.The right atrium is normal in size.

5.No significant valvular stenosis or regurgitation seen.

6.Bubble study was performed indicating no evidence of intra atrial 

shunt.

7.Normal pericardium with no significant pericardial effusion.

 

Electronically Signed By:

Greyson Dale

12-Sep-2017 17:05:05  -0700

 

Patient Name: NAYELY LONG

MRN: [id number]

Study Date: 11-Sep-2017

 

[id number]

## 2017-09-12 NOTE — PN
Date/Time of Note


Date/Time of Note


DATE: 9/12/17 


TIME: 11:38





Assessment/Plan


VTE Prophylaxis


VTE Prophylaxis Intervention:  LMWH





Lines/Catheters


IV Catheter Type (from [facility]):  Peripheral IV


Urinary Cath still in place:  No





Assessment/Plan


Assessment/Plan


1. left leg weakness, no acute changes on MRI, on aspirin and lipitor


2 . hypertension: start lisinopril


3.  diabetes mellitus:  insulin sliding scale


4.  dementia: Stable.


5. DVT prophylaxis lovenox


6. R/o normal pressure hydrocephalus, discussed with neurologist today, talked 

with the daughter, will try LP to release 30 cc CSF





Subjective


24 Hr Interval Summary


Free Text/Dictation


weakness generally





Exam/Review of Systems


Vital Signs


Vitals





 Vital Signs








  Date Time  Temp Pulse Resp B/P Pulse Ox O2 Delivery O2 Flow Rate FiO2


 


9/12/17 11:35 98.7 79 18 161/70 97   


 


9/11/17 06:52      Room Air  


 


9/10/17 14:49       2 














 Intake and Output   


 


 9/11/17 9/11/17 9/12/17





 15:00 23:00 07:00


 


Intake Total 480 ml 400 ml 500 ml


 


Balance 480 ml 400 ml 500 ml











Exam


Constitutional:  alert, oriented, well developed


Psych:  nl mood/affect, no complaints


Head:  atraumatic, normocephalic


Eyes:  EOMI, PERRL, nl conjunctiva, nl lids


ENMT:  nl external ears & nose, nl lips & teeth, nl nasal mucosa & septum


Neck:  non-tender, supple


Respiratory:  clear to auscultation, normal air movement, 


   No congested cough, No crackles/rales, No diminished breath sounds, No 

intercostal retraction, No labored breathing, No other, No respirations, No 

tactile fremitus, No wheezing


Cardiovascular:  nl pulses, regular rate and rhythm, 


   No S3, No S4, No bruits, No diastolic murmur, No edema, No gallop, No 

irregular rhythm, No jugular venous distention (JVD), No murmurs/extra sounds, 

No other, No rub, No systolic murmur


Gastrointestinal:  nl liver, spleen, non-tender, soft, 


   No ascites, No bowel sounds, No distended, No firm, No hepatomegaly, No mass

, No other, No rebound or guarding, No splenomegaly, No surgical scars, No 

tender


Musculoskeletal:  nl extremities to inspection


Extremities:  normal pulses, 


   No calf tenderness, No clubbing, No cyanosis, No edema, No other, No 

palpable cord, No pitting pedal edema, No tenderness


Neurological:  CNS II-XII intact, nl mental status, nl speech


Skin:  nl turgor





Results


Result Diagram:  


9/11/17 0704                                                                   

             9/11/17 0704





Results 24 hrs





Laboratory Tests








Test


  9/11/17


11:51 9/11/17


18:56 9/11/17


21:58 9/12/17


08:41


 


Bedside Glucose 112   138   153   126  











Medications


Medications





 Current Medications


Sodium Chloride (NS) 1,000 ml @  60 mls/hr [id number] IV  Last administered on 9/11 /17at 15:53; Admin Dose 60 MLS/HR;  Start 9/10/17 at 21:00


Ondansetron HCl (Zofran Inj) 4 mg Q6H  PRN IV NAUSEA AND/OR VOMITING;  Start 9/

10/17 at 21:00


Acetaminophen (Tylenol Tab) 650 mg Q6H  PRN PO PAIN LEVEL 1-3 OR FEVER;  Start 9

/10/17 at 21:00


Hydralazine HCl (Apresoline) 10 mg Q4H  PRN IV ELEVATED BLOOD PRESSURE Last 

administered on 9/11/17at 15:53; Admin Dose 10 MG;  Start 9/11/17 at 01:00


Docusate Sodium (Colace) 100 mg BID PO  Last administered on 9/11/17at 08:36; 

Admin Dose 100 MG;  Start 9/11/17 at 09:00


Magnesium Hydroxide (Milk Of Mag) 30 ml DAILY  PRN PO CONSTIPATION Last 

administered on 9/11/17at 06:09; Admin Dose 30 ML;  Start 9/11/17 at 01:30


Diagnostic Test (Pha) (Accu-Chek) 1 ea 02 XX ;  Start 9/11/17 at 02:00


Diagnostic Test (Pha) (Accu-Chek) 1 ea 02 XX ;  Start 9/11/17 at 02:00


Miscellaneous Information 1 ea NOTE XX ;  Start 9/11/17 at 02:30


Glucose (Glutose) 15 gm Q15M  PRN PO DECREASED GLUCOSE;  Start 9/11/17 at 02:30


Glucose (Glutose) 22.5 gm Q15M  PRN PO DECREASED GLUCOSE;  Start 9/11/17 at 02:

30


Dextrose (D50w Syringe) 25 ml Q15M  PRN IV DECREASED GLUCOSE;  Start 9/11/17 at 

02:30


Dextrose (D50w Syringe) 50 ml Q15M  PRN IV DECREASED GLUCOSE;  Start 9/11/17 at 

02:30


Glucagon (Glucagen) 1 mg Q15M  PRN IM DECREASED GLUCOSE;  Start 9/11/17 at 02:30


Glucose (Glutose) 15 gm Q15M  PRN BUCCAL DECREASED GLUCOSE;  Start 9/11/17 at 02

:30


Pantoprazole (Protonix Tab) 40 mg DAILY@06 PO  Last administered on 9/12/17at 06

:07; Admin Dose 40 MG;  Start 9/12/17 at 06:00


Atorvastatin Calcium (Lipitor) 10 mg HS PO  Last administered on 9/11/17at 21:59

; Admin Dose 10 MG;  Start 9/11/17 at 21:00


Lisinopril (Zestril) 10 mg DAILY PO  Last administered on 9/12/17at 08:57; 

Admin Dose 10 MG;  Start 9/11/17 at 16:30











PIA MADRIGAL MD Sep 12, 2017 11:42

## 2017-09-12 NOTE — CONS
Date/Time of Note


Date/Time of Note


DATE: 9/12/17 


TIME: 11:51





Consult Date/Type/Reason


Admit Date/Time


Sep 10, 2017 at 19:04


Initial Consult Date


9/11/17


Type of Consultation:  Neurology


Reason for Consultation


r/o CVA, NPH


Ordering Provider:  ALVIN LOCK





Subjective


remains stable overnight


MRI done shoes no evidence of acute infarct





Objective





 Vital Signs








  Date Time  Temp Pulse Resp B/P Pulse Ox O2 Delivery O2 Flow Rate FiO2


 


9/12/17 11:35 98.7 79 18 161/70 97   


 


9/11/17 06:52      Room Air  


 


9/10/17 14:49       2 














 Intake and Output   


 


 9/11/17 9/11/17 9/12/17





 15:00 23:00 07:00


 


Intake Total 480 ml 400 ml 500 ml


 


Balance 480 ml 400 ml 500 ml








Exam





awake and alert


NAD


oriented to self daughter 


date says its February 9 2017


able to re orient 


masked facies flat affect





CN: PATEL, VFF EOMI no nystagmus palate upgoing uvula midline scm/trap intact


Motor: right UE and Right LE 5/5


Left UE 4/5 strength in  LLE 5-/5 





Sensory intact throughout





Coordination: right FTN intact


left is slower than right no ataxia





Reflexes 1+ throughout





Results/Medications


Result Diagram:  


9/11/17 0704                                                                   

             9/11/17 0704





Results 24 hrs





Laboratory Tests








Test


  9/11/17


18:56 9/11/17


21:58 9/12/17


08:41 9/12/17


11:46


 


Bedside Glucose 138   153   126   124  








Medications





 Current Medications


Sodium Chloride (NS) 1,000 ml @  60 mls/hr [id number] IV  Last administered on 9/11 /17at 15:53; Admin Dose 60 MLS/HR;  Start 9/10/17 at 21:00


Ondansetron HCl (Zofran Inj) 4 mg Q6H  PRN IV NAUSEA AND/OR VOMITING;  Start 9/

10/17 at 21:00


Acetaminophen (Tylenol Tab) 650 mg Q6H  PRN PO PAIN LEVEL 1-3 OR FEVER;  Start 9

/10/17 at 21:00


Hydralazine HCl (Apresoline) 10 mg Q4H  PRN IV ELEVATED BLOOD PRESSURE Last 

administered on 9/11/17at 15:53; Admin Dose 10 MG;  Start 9/11/17 at 01:00


Docusate Sodium (Colace) 100 mg BID PO  Last administered on 9/11/17at 08:36; 

Admin Dose 100 MG;  Start 9/11/17 at 09:00


Magnesium Hydroxide (Milk Of Mag) 30 ml DAILY  PRN PO CONSTIPATION Last 

administered on 9/11/17at 06:09; Admin Dose 30 ML;  Start 9/11/17 at 01:30


Diagnostic Test (Pha) (Accu-Chek) 1 ea 02 XX ;  Start 9/11/17 at 02:00


Diagnostic Test (Pha) (Accu-Chek) 1 ea 02 XX ;  Start 9/11/17 at 02:00


Miscellaneous Information 1 ea NOTE XX ;  Start 9/11/17 at 02:30


Glucose (Glutose) 15 gm Q15M  PRN PO DECREASED GLUCOSE;  Start 9/11/17 at 02:30


Glucose (Glutose) 22.5 gm Q15M  PRN PO DECREASED GLUCOSE;  Start 9/11/17 at 02:

30


Dextrose (D50w Syringe) 25 ml Q15M  PRN IV DECREASED GLUCOSE;  Start 9/11/17 at 

02:30


Dextrose (D50w Syringe) 50 ml Q15M  PRN IV DECREASED GLUCOSE;  Start 9/11/17 at 

02:30


Glucagon (Glucagen) 1 mg Q15M  PRN IM DECREASED GLUCOSE;  Start 9/11/17 at 02:30


Glucose (Glutose) 15 gm Q15M  PRN BUCCAL DECREASED GLUCOSE;  Start 9/11/17 at 02

:30


Pantoprazole (Protonix Tab) 40 mg DAILY@06 PO  Last administered on 9/12/17at 06

:07; Admin Dose 40 MG;  Start 9/12/17 at 06:00


Atorvastatin Calcium (Lipitor) 10 mg HS PO  Last administered on 9/11/17at 21:59

; Admin Dose 10 MG;  Start 9/11/17 at 21:00


Lisinopril (Zestril) 20 mg DAILY PO ;  Start 9/13/17 at 09:00





Assessment/Plan


Chief Complaint/Hosp Course


78 yo female with history of HTN, DM, cognitive decline, urinary incontinence, p

/w increased LLE weakness.


Exam concerning for left arm weakness as well.





MRI Brain: Diffuse central cerebral and cerebellar volume loss with 

frontotemporal predominance asymmetric to the right . Marked ventriculomegaly 

asymmetric to the right.


 


Recommendations;


Would recommend Diagnostic LP to evaluate for NPH- recommend LP may send Cell 

Count, Cultures, Basic labs Protein Glucose


Recommend checking pressure as well as removal of atleast 25-30 cc of fluid





ASA 81 mg daily


SBP <140/90


LDL: 138, HBA1C: 6.5% optimize risk factors for secondary stroke prevention, c/

w statin on low dose Lipitor 


DVT ppx


PT/OT/Speech





resume home medications


discussed w daughter at bedside- if MRI imaging concerning for NPH and no acute 

CVA


will pursue further NPH work up as inpatient


Problems:  











RENE CAUSEY MD Sep 12, 2017 11:55

## 2017-09-13 VITALS — SYSTOLIC BLOOD PRESSURE: 180 MMHG | DIASTOLIC BLOOD PRESSURE: 104 MMHG | RESPIRATION RATE: 17 BRPM | HEART RATE: 62 BPM

## 2017-09-13 VITALS — DIASTOLIC BLOOD PRESSURE: 89 MMHG | SYSTOLIC BLOOD PRESSURE: 182 MMHG | RESPIRATION RATE: 17 BRPM

## 2017-09-13 VITALS — HEART RATE: 46 BPM

## 2017-09-13 VITALS — RESPIRATION RATE: 18 BRPM | DIASTOLIC BLOOD PRESSURE: 79 MMHG | SYSTOLIC BLOOD PRESSURE: 189 MMHG

## 2017-09-13 VITALS — RESPIRATION RATE: 20 BRPM | DIASTOLIC BLOOD PRESSURE: 95 MMHG | SYSTOLIC BLOOD PRESSURE: 181 MMHG

## 2017-09-13 VITALS — SYSTOLIC BLOOD PRESSURE: 190 MMHG | DIASTOLIC BLOOD PRESSURE: 84 MMHG | RESPIRATION RATE: 19 BRPM

## 2017-09-13 VITALS — HEART RATE: 70 BPM

## 2017-09-13 VITALS — HEART RATE: 71 BPM

## 2017-09-13 VITALS — RESPIRATION RATE: 18 BRPM | DIASTOLIC BLOOD PRESSURE: 86 MMHG | SYSTOLIC BLOOD PRESSURE: 203 MMHG

## 2017-09-13 VITALS — HEART RATE: 55 BPM

## 2017-09-13 VITALS — SYSTOLIC BLOOD PRESSURE: 181 MMHG | RESPIRATION RATE: 18 BRPM | DIASTOLIC BLOOD PRESSURE: 81 MMHG

## 2017-09-13 VITALS — HEART RATE: 54 BPM

## 2017-09-13 RX ADMIN — PANTOPRAZOLE SODIUM SCH MG: 40 TABLET, DELAYED RELEASE ORAL at 07:05

## 2017-09-13 RX ADMIN — DOCUSATE SODIUM SCH MG: 100 CAPSULE, LIQUID FILLED ORAL at 09:57

## 2017-09-13 RX ADMIN — DOCUSATE SODIUM SCH MG: 100 CAPSULE, LIQUID FILLED ORAL at 20:55

## 2017-09-13 RX ADMIN — LISINOPRIL SCH MG: 20 TABLET ORAL at 09:57

## 2017-09-13 RX ADMIN — FOLIC ACID SCH MLS/HR: 5 INJECTION, SOLUTION INTRAMUSCULAR; INTRAVENOUS; SUBCUTANEOUS at 15:40

## 2017-09-13 RX ADMIN — ATORVASTATIN CALCIUM SCH MG: 10 TABLET, FILM COATED ORAL at 20:55

## 2017-09-13 NOTE — PN
Date/Time of Note


Date/Time of Note


DATE: 9/13/17 


TIME: 12:07





Assessment/Plan


VTE Prophylaxis


VTE Prophylaxis Intervention:  LMWH





Lines/Catheters


IV Catheter Type (from [facility]):  Peripheral IV


Urinary Cath still in place:  No





Assessment/Plan


Assessment/Plan


1. TIA with left leg weakness, no acute changes on MRI, on aspirin and lipitor


2 . hypertension: increase  lisinopril


3.  diabetes mellitus:  insulin sliding scale


4.  dementia: Stable.


5. DVT prophylaxis lovenox


6. R/o normal pressure hydrocephalus, discussed with neurologist today, talked 

with the daughter, will try LP to release 30 cc CSF





Subjective


24 Hr Interval Summary


Free Text/Dictation


no event. awaiting for LP





Exam/Review of Systems


Vital Signs


Vitals





 Vital Signs








  Date Time  Temp Pulse Resp B/P Pulse Ox O2 Delivery O2 Flow Rate FiO2


 


9/13/17 11:30 97.7 56 18 189/79 99   


 


9/11/17 06:52      Room Air  


 


9/10/17 14:49       2 














 Intake and Output   


 


 9/12/17 9/12/17 9/13/17





 15:00 23:00 07:00


 


Intake Total  300 ml 200 ml


 


Balance  300 ml 200 ml











Exam


Constitutional:  alert, well developed


Psych:  nl mood/affect, no complaints


Head:  atraumatic, normocephalic


Eyes:  EOMI, PERRL, nl conjunctiva, nl lids


ENMT:  nl external ears & nose, nl lips & teeth, nl nasal mucosa & septum


Neck:  non-tender, supple


Respiratory:  clear to auscultation, normal air movement


Cardiovascular:  nl pulses, regular rate and rhythm, 


   No S3, No S4, No bruits, No diastolic murmur, No edema, No gallop, No 

irregular rhythm, No jugular venous distention (JVD), No murmurs/extra sounds, 

No other, No rub, No systolic murmur


Gastrointestinal:  nl liver, spleen, non-tender, soft, 


   No ascites, No bowel sounds, No distended, No firm, No hepatomegaly, No mass

, No other, No rebound or guarding, No splenomegaly, No surgical scars, No 

tender


Musculoskeletal:  nl extremities to inspection


Extremities:  normal pulses, 


   No calf tenderness, No clubbing, No cyanosis, No edema, No other, No 

palpable cord, No pitting pedal edema, No tenderness


Neurological:  CNS II-XII intact, nl speech, nl strength





Results


Result Diagram:  


9/11/17 0704                                                                   

             9/11/17 0704





Results 24 hrs





Laboratory Tests








Test


  9/12/17


17:40 9/12/17


21:11 9/13/17


08:26


 


Bedside Glucose 107   152   110  











Medications


Medications





 Current Medications


Sodium Chloride (NS) 1,000 ml @  60 mls/hr [id number] IV  Last administered on 9/12 /17at 14:39; Admin Dose 60 MLS/HR;  Start 9/10/17 at 21:00


Ondansetron HCl (Zofran Inj) 4 mg Q6H  PRN IV NAUSEA AND/OR VOMITING;  Start 9/

10/17 at 21:00


Acetaminophen (Tylenol Tab) 650 mg Q6H  PRN PO PAIN LEVEL 1-3 OR FEVER;  Start 9

/10/17 at 21:00


Hydralazine HCl (Apresoline) 10 mg Q4H  PRN IV ELEVATED BLOOD PRESSURE Last 

administered on 9/11/17at 15:53; Admin Dose 10 MG;  Start 9/11/17 at 01:00


Docusate Sodium (Colace) 100 mg BID PO  Last administered on 9/13/17at 09:57; 

Admin Dose 100 MG;  Start 9/11/17 at 09:00


Magnesium Hydroxide (Milk Of Mag) 30 ml DAILY  PRN PO CONSTIPATION Last 

administered on 9/11/17at 06:09; Admin Dose 30 ML;  Start 9/11/17 at 01:30


Diagnostic Test (Pha) (Accu-Chek) 1 ea 02 XX  Last administered on 9/13/17at 02:

00; Admin Dose 1 EA;  Start 9/11/17 at 02:00


Diagnostic Test (Pha) (Accu-Chek) 1 ea 02 XX ;  Start 9/11/17 at 02:00


Miscellaneous Information 1 ea NOTE XX ;  Start 9/11/17 at 02:30


Glucose (Glutose) 15 gm Q15M  PRN PO DECREASED GLUCOSE;  Start 9/11/17 at 02:30


Glucose (Glutose) 22.5 gm Q15M  PRN PO DECREASED GLUCOSE;  Start 9/11/17 at 02:

30


Dextrose (D50w Syringe) 25 ml Q15M  PRN IV DECREASED GLUCOSE;  Start 9/11/17 at 

02:30


Dextrose (D50w Syringe) 50 ml Q15M  PRN IV DECREASED GLUCOSE;  Start 9/11/17 at 

02:30


Glucagon (Glucagen) 1 mg Q15M  PRN IM DECREASED GLUCOSE;  Start 9/11/17 at 02:30


Glucose (Glutose) 15 gm Q15M  PRN BUCCAL DECREASED GLUCOSE;  Start 9/11/17 at 02

:30


Pantoprazole (Protonix Tab) 40 mg DAILY@06 PO  Last administered on 9/13/17at 07

:05; Admin Dose 40 MG;  Start 9/12/17 at 06:00


Atorvastatin Calcium (Lipitor) 10 mg HS PO  Last administered on 9/11/17at 21:59

; Admin Dose 10 MG;  Start 9/11/17 at 21:00


Lisinopril (Zestril) 20 mg DAILY PO  Last administered on 9/13/17at 09:57; 

Admin Dose 20 MG;  Start 9/13/17 at 09:00


Alprazolam (Xanax) 0.25 mg ONCE PO ;  Start 9/13/17 at 09:30;  Stop 9/14/17 at 

09:29











PIA MADRIGAL MD Sep 13, 2017 12:11

## 2017-09-13 NOTE — CONS
Date/Time of Note


Date/Time of Note


DATE: 9/13/17 


TIME: 11:05





Consult Date/Type/Reason


Admit Date/Time


Sep 10, 2017 at 19:04


Initial Consult Date


9/11/17


Type of Consultation:  Neurology


Reason for Consultation


left sided weakness rigidity


Ordering Provider:  ALVIN LOCK





Subjective


remains stable 


fatigued after working with PT





Objective





 Vital Signs








  Date Time  Temp Pulse Resp B/P Pulse Ox O2 Delivery O2 Flow Rate FiO2


 


9/13/17 09:11  46      


 


9/13/17 07:50 97.8  18 203/86 98   


 


9/11/17 06:52      Room Air  


 


9/10/17 14:49       2 














 Intake and Output   


 


 9/12/17 9/12/17 9/13/17





 15:00 23:00 07:00


 


Intake Total  300 ml 200 ml


 


Balance  300 ml 200 ml








Exam





awake and alert


NAD


oriented to self daughter 


date says its February 9 2017


able to re orient 


masked facies flat affect





CN: PATEL, VFF EOMI no nystagmus palate upgoing uvula midline scm/trap intact


Motor: right UE and Right LE 5/5


Left UE 4/5 strength in  LLE 5-/5 


increase tone with rigidity in LUE and LLE





Sensory intact throughout





Coordination: right FTN intact


left is slower than right no ataxia





Reflexes 1+ throughout





Results/Medications


Result Diagram:  


9/11/17 0704 9/11/17 0704





Results 24 hrs





Laboratory Tests








Test


  9/12/17


11:46 9/12/17


17:40 9/12/17


21:11 9/13/17


08:26


 


Bedside Glucose 124   107   152   110  








Medications





 Current Medications


Sodium Chloride (NS) 1,000 ml @  60 mls/hr [id number] IV  Last administered on 9/12 /17at 14:39; Admin Dose 60 MLS/HR;  Start 9/10/17 at 21:00


Ondansetron HCl (Zofran Inj) 4 mg Q6H  PRN IV NAUSEA AND/OR VOMITING;  Start 9/

10/17 at 21:00


Acetaminophen (Tylenol Tab) 650 mg Q6H  PRN PO PAIN LEVEL 1-3 OR FEVER;  Start 9

/10/17 at 21:00


Hydralazine HCl (Apresoline) 10 mg Q4H  PRN IV ELEVATED BLOOD PRESSURE Last 

administered on 9/11/17at 15:53; Admin Dose 10 MG;  Start 9/11/17 at 01:00


Docusate Sodium (Colace) 100 mg BID PO  Last administered on 9/13/17at 09:57; 

Admin Dose 100 MG;  Start 9/11/17 at 09:00


Magnesium Hydroxide (Milk Of Mag) 30 ml DAILY  PRN PO CONSTIPATION Last 

administered on 9/11/17at 06:09; Admin Dose 30 ML;  Start 9/11/17 at 01:30


Diagnostic Test (Pha) (Accu-Chek) 1 ea 02 XX  Last administered on 9/13/17at 02:

00; Admin Dose 1 EA;  Start 9/11/17 at 02:00


Diagnostic Test (Pha) (Accu-Chek) 1 ea 02 XX ;  Start 9/11/17 at 02:00


Miscellaneous Information 1 ea NOTE XX ;  Start 9/11/17 at 02:30


Glucose (Glutose) 15 gm Q15M  PRN PO DECREASED GLUCOSE;  Start 9/11/17 at 02:30


Glucose (Glutose) 22.5 gm Q15M  PRN PO DECREASED GLUCOSE;  Start 9/11/17 at 02:

30


Dextrose (D50w Syringe) 25 ml Q15M  PRN IV DECREASED GLUCOSE;  Start 9/11/17 at 

02:30


Dextrose (D50w Syringe) 50 ml Q15M  PRN IV DECREASED GLUCOSE;  Start 9/11/17 at 

02:30


Glucagon (Glucagen) 1 mg Q15M  PRN IM DECREASED GLUCOSE;  Start 9/11/17 at 02:30


Glucose (Glutose) 15 gm Q15M  PRN BUCCAL DECREASED GLUCOSE;  Start 9/11/17 at 02

:30


Pantoprazole (Protonix Tab) 40 mg DAILY@06 PO  Last administered on 9/13/17at 07

:05; Admin Dose 40 MG;  Start 9/12/17 at 06:00


Atorvastatin Calcium (Lipitor) 10 mg HS PO  Last administered on 9/11/17at 21:59

; Admin Dose 10 MG;  Start 9/11/17 at 21:00


Lisinopril (Zestril) 20 mg DAILY PO  Last administered on 9/13/17at 09:57; 

Admin Dose 20 MG;  Start 9/13/17 at 09:00


Alprazolam (Xanax) 0.25 mg ONCE PO ;  Start 9/13/17 at 09:30;  Stop 9/14/17 at 

09:29





Assessment/Plan


Chief Complaint/Hosp Course


76 yo female with history of HTN, DM, cognitive decline, urinary incontinence, p

/w increased LLE weakness.


Exam concerning for left arm weakness as well.





MRI Brain: Diffuse central cerebral and cerebellar volume loss with 

frontotemporal predominance asymmetric to the right . Marked ventriculomegaly 

asymmetric to the right.


 


Recommendations;





Would recommend Diagnostic LP to evaluate for NPH- recommend LP may send Cell 

Count, Cultures, Basic labs Protein Glucose


Recommend checking pressure as well as removal of atleast 25-30 cc of fluid





ASA 81 mg daily


SBP <140/90


LDL: 138, HBA1C: 6.5% optimize risk factors for secondary stroke prevention, c/

w statin on low dose Lipitor 


DVT ppx


Continue PT/OT/Speech evaluation for rehab





LP is currently pending to evaluate for possible NPH suggest PT work with 

patient after LP to evaluate for gait improvement


however another possible differential given her exam findings and MRI findings 

of asymmetry could also be suggestive of another neurodegenerative process 

called Corticobasilar Degeneration CBG further would would involve obtaining an 

MRI PET Scan possible FDOPA PET this can be arranged as outpatient with her 

neurologist


Problems:  











RENE CAUSEY MD Sep 13, 2017 11:11

## 2017-09-14 VITALS — SYSTOLIC BLOOD PRESSURE: 167 MMHG | DIASTOLIC BLOOD PRESSURE: 103 MMHG | RESPIRATION RATE: 18 BRPM

## 2017-09-14 VITALS — SYSTOLIC BLOOD PRESSURE: 173 MMHG | DIASTOLIC BLOOD PRESSURE: 89 MMHG | RESPIRATION RATE: 18 BRPM

## 2017-09-14 VITALS — DIASTOLIC BLOOD PRESSURE: 74 MMHG | SYSTOLIC BLOOD PRESSURE: 181 MMHG | RESPIRATION RATE: 19 BRPM

## 2017-09-14 VITALS — HEART RATE: 54 BPM

## 2017-09-14 VITALS — HEART RATE: 46 BPM

## 2017-09-14 VITALS — RESPIRATION RATE: 19 BRPM | SYSTOLIC BLOOD PRESSURE: 195 MMHG | DIASTOLIC BLOOD PRESSURE: 88 MMHG

## 2017-09-14 VITALS — RESPIRATION RATE: 19 BRPM | DIASTOLIC BLOOD PRESSURE: 76 MMHG | SYSTOLIC BLOOD PRESSURE: 178 MMHG

## 2017-09-14 VITALS — SYSTOLIC BLOOD PRESSURE: 167 MMHG | DIASTOLIC BLOOD PRESSURE: 72 MMHG

## 2017-09-14 VITALS — HEART RATE: 45 BPM

## 2017-09-14 VITALS — HEART RATE: 76 BPM

## 2017-09-14 VITALS — HEART RATE: 63 BPM

## 2017-09-14 LAB
COLOR CSF: COLORLESS
CSF MN%: 100 %
CSF PMN%: 0 %
CSF#TUBE COUNT: (no result)
CSF#TUBES REC'D: 3
GLUCOSE CSF-MCNC: 69 MG/DL (ref 50–80)
SPECIMEN VOL CSF: 21 ML

## 2017-09-14 PROCEDURE — B01BZZZ FLUOROSCOPY OF SPINAL CORD: ICD-10-PCS | Performed by: RADIOLOGY

## 2017-09-14 PROCEDURE — 009U3ZX DRAINAGE OF SPINAL CANAL, PERCUTANEOUS APPROACH, DIAGNOSTIC: ICD-10-PCS | Performed by: RADIOLOGY

## 2017-09-14 RX ADMIN — ATORVASTATIN CALCIUM SCH MG: 10 TABLET, FILM COATED ORAL at 21:56

## 2017-09-14 RX ADMIN — AMLODIPINE BESYLATE SCH MG: 5 TABLET ORAL at 15:58

## 2017-09-14 RX ADMIN — DOCUSATE SODIUM SCH MG: 100 CAPSULE, LIQUID FILLED ORAL at 21:56

## 2017-09-14 RX ADMIN — DOCUSATE SODIUM SCH MG: 100 CAPSULE, LIQUID FILLED ORAL at 09:36

## 2017-09-14 RX ADMIN — FOLIC ACID SCH MLS/HR: 5 INJECTION, SOLUTION INTRAMUSCULAR; INTRAVENOUS; SUBCUTANEOUS at 07:47

## 2017-09-14 RX ADMIN — LISINOPRIL SCH MG: 20 TABLET ORAL at 09:37

## 2017-09-14 RX ADMIN — PANTOPRAZOLE SODIUM SCH MG: 40 TABLET, DELAYED RELEASE ORAL at 06:05

## 2017-09-14 NOTE — PN
Date/Time of Note


Date/Time of Note


DATE: 9/14/17 


TIME: 14:46





Assessment/Plan


VTE Prophylaxis


VTE Prophylaxis Intervention:  LMWH





Lines/Catheters


IV Catheter Type (from [facility]):  Saline Lock


Urinary Cath still in place:  No





Assessment/Plan


Assessment/Plan


1. TIA with left leg weakness, no acute changes on MRI, on aspirin and lipitor


2 . hypertension: increase  lisinopril


3.  diabetes mellitus:  insulin sliding scale


4.  dementia: Stable.


5. DVT prophylaxis lovenox


6. R/o normal pressure hydrocephalus, s/p LP today





Subjective


24 Hr Interval Summary


Free Text/Dictation


demented, alert and calm, talked to the daughter





Exam/Review of Systems


Vital Signs


Vitals





 Vital Signs








  Date Time  Temp Pulse Resp B/P Pulse Ox O2 Delivery O2 Flow Rate FiO2


 


9/14/17 13:10  45      


 


9/14/17 11:56 98.5   167/72 100   


 


9/14/17 07:58   18     


 


9/11/17 06:52      Room Air  


 


9/10/17 14:49       2 














 Intake and Output   


 


 9/13/17 9/13/17 9/14/17





 15:00 23:00 07:00


 


Intake Total  1440 ml 


 


Balance  1440 ml 











Exam


Constitutional:  alert, well developed


Head:  atraumatic, normocephalic


Eyes:  EOMI, PERRL, nl conjunctiva, nl lids, nl sclera


ENMT:  nl external ears & nose, nl lips & teeth, nl nasal mucosa & septum


Neck:  non-tender, supple


Respiratory:  clear to auscultation, normal air movement, 


   No congested cough, No crackles/rales, No diminished breath sounds, No 

intercostal retraction, No labored breathing, No other, No respirations, No 

tactile fremitus, No wheezing


Cardiovascular:  nl pulses, regular rate and rhythm, 


   No S3, No S4, No bruits, No diastolic murmur, No edema, No gallop, No 

irregular rhythm, No jugular venous distention (JVD), No murmurs/extra sounds, 

No other, No rub, No systolic murmur


Gastrointestinal:  nl liver, spleen, non-tender, soft, 


   No ascites, No bowel sounds, No distended, No firm, No hepatomegaly, No mass

, No other, No rebound or guarding, No splenomegaly, No surgical scars, No 

tender


Musculoskeletal:  nl extremities to inspection


Extremities:  normal pulses, 


   No calf tenderness, No clubbing, No cyanosis, No edema, No other, No 

palpable cord, No pitting pedal edema, No tenderness


Neurological:  CNS II-XII intact, confused


Skin:  nl turgor





Results


Result Diagram:  


9/11/17 0704                                                                   

             9/11/17 0704





Results 24 hrs





Laboratory Tests








Test


  9/13/17


17:50 9/13/17


20:41 9/14/17


02:29 9/14/17


08:04


 


Bedside Glucose 114   113   113   98  














Test


  9/14/17


11:50 


  


  


 


 


Bedside Glucose 98     











Medications


Medications





 Current Medications


Sodium Chloride (NS) 1,000 ml @  60 mls/hr [id number] IV  Last administered on 9/12 /17at 14:39; Admin Dose 60 MLS/HR;  Start 9/10/17 at 21:00


Ondansetron HCl (Zofran Inj) 4 mg Q6H  PRN IV NAUSEA AND/OR VOMITING;  Start 9/

10/17 at 21:00


Acetaminophen (Tylenol Tab) 650 mg Q6H  PRN PO PAIN LEVEL 1-3 OR FEVER;  Start 9

/10/17 at 21:00


Hydralazine HCl (Apresoline) 10 mg Q4H  PRN IV ELEVATED BLOOD PRESSURE Last 

administered on 9/11/17at 15:53; Admin Dose 10 MG;  Start 9/11/17 at 01:00


Docusate Sodium (Colace) 100 mg BID PO  Last administered on 9/14/17at 09:36; 

Admin Dose 100 MG;  Start 9/11/17 at 09:00


Magnesium Hydroxide (Milk Of Mag) 30 ml DAILY  PRN PO CONSTIPATION Last 

administered on 9/11/17at 06:09; Admin Dose 30 ML;  Start 9/11/17 at 01:30


Diagnostic Test (Pha) (Accu-Chek) 1 ea 02 XX  Last administered on 9/14/17at 02:

00; Admin Dose 1 EA;  Start 9/11/17 at 02:00


Diagnostic Test (Pha) (Accu-Chek) 1 ea 02 XX  Last administered on 9/14/17at 02:

00; Admin Dose 1 EA;  Start 9/11/17 at 02:00


Miscellaneous Information 1 ea NOTE XX ;  Start 9/11/17 at 02:30


Glucose (Glutose) 15 gm Q15M  PRN PO DECREASED GLUCOSE;  Start 9/11/17 at 02:30


Glucose (Glutose) 22.5 gm Q15M  PRN PO DECREASED GLUCOSE;  Start 9/11/17 at 02:

30


Dextrose (D50w Syringe) 25 ml Q15M  PRN IV DECREASED GLUCOSE;  Start 9/11/17 at 

02:30


Dextrose (D50w Syringe) 50 ml Q15M  PRN IV DECREASED GLUCOSE;  Start 9/11/17 at 

02:30


Glucagon (Glucagen) 1 mg Q15M  PRN IM DECREASED GLUCOSE;  Start 9/11/17 at 02:30


Glucose (Glutose) 15 gm Q15M  PRN BUCCAL DECREASED GLUCOSE;  Start 9/11/17 at 02

:30


Pantoprazole (Protonix Tab) 40 mg DAILY@06 PO  Last administered on 9/14/17at 06

:05; Admin Dose 40 MG;  Start 9/12/17 at 06:00


Atorvastatin Calcium (Lipitor) 10 mg HS PO  Last administered on 9/13/17at 20:55

; Admin Dose 10 MG;  Start 9/11/17 at 21:00


Lisinopril (Zestril) 20 mg DAILY PO  Last administered on 9/14/17at 09:37; 

Admin Dose 20 MG;  Start 9/13/17 at 09:00


Alprazolam (Xanax) 0.25 mg ONCE PO  Last administered on 9/14/17at 11:44; Admin 

Dose 0.25 MG;  Start 9/14/17 at 11:40;  Stop 9/14/17 at 15:00


Amlodipine Besylate (Norvasc) 5 mg DAILY PO ;  Start 9/14/17 at 14:30











PIA MADRIGAL MD Sep 14, 2017 14:47

## 2017-09-14 NOTE — CONS
Date/Time of Note


Date/Time of Note


DATE: 9/14/17 


TIME: 11:42





Consult Date/Type/Reason


Admit Date/Time


Sep 10, 2017 at 19:04


Initial Consult Date


9/11/17


Type of Consultation:  Neurology


Reason for Consultation


evaluation for CVA


r/o NPH


Ordering Provider:  ALVIN LOCK





Subjective


able to ambulate more today with assistance and PT


LP still pending to eval for NPH





Objective





 Vital Signs








  Date Time  Temp Pulse Resp B/P Pulse Ox O2 Delivery O2 Flow Rate FiO2


 


9/14/17 08:42  46      


 


9/14/17 07:58 98.3  18 173/89 96   


 


9/11/17 06:52      Room Air  


 


9/10/17 14:49       2 














 Intake and Output   


 


 9/13/17 9/13/17 9/14/17





 15:00 23:00 07:00


 


Intake Total  1440 ml 


 


Balance  1440 ml 








Exam





awake and alert


NAD


oriented to self daughter 


date says its February 9 2017


able to re orient 


masked facies flat affect





CN: PATEL, VFF EOMI no nystagmus palate upgoing uvula midline scm/trap intact


Motor: right UE and Right LE 5/5


Left UE 4/5 strength in  LLE 5-/5 


increase tone with rigidity in LUE and LLE





Sensory intact throughout





Coordination: right FTN intact


left is slower than right no ataxia





Reflexes 1+ throughout





Results/Medications


Result Diagram:  


9/11/17 0704                                                                   

             9/11/17 0704





Results 24 hrs





Laboratory Tests








Test


  9/13/17


12:32 9/13/17


17:50 9/13/17


20:41 9/14/17


02:29


 


Bedside Glucose 105   114   113   113  














Test


  9/14/17


08:04 


  


  


 


 


Bedside Glucose 98     








Medications





 Current Medications


Sodium Chloride (NS) 1,000 ml @  60 mls/hr [id number] IV  Last administered on 9/12 /17at 14:39; Admin Dose 60 MLS/HR;  Start 9/10/17 at 21:00


Ondansetron HCl (Zofran Inj) 4 mg Q6H  PRN IV NAUSEA AND/OR VOMITING;  Start 9/

10/17 at 21:00


Acetaminophen (Tylenol Tab) 650 mg Q6H  PRN PO PAIN LEVEL 1-3 OR FEVER;  Start 9

/10/17 at 21:00


Hydralazine HCl (Apresoline) 10 mg Q4H  PRN IV ELEVATED BLOOD PRESSURE Last 

administered on 9/11/17at 15:53; Admin Dose 10 MG;  Start 9/11/17 at 01:00


Docusate Sodium (Colace) 100 mg BID PO  Last administered on 9/14/17at 09:36; 

Admin Dose 100 MG;  Start 9/11/17 at 09:00


Magnesium Hydroxide (Milk Of Mag) 30 ml DAILY  PRN PO CONSTIPATION Last 

administered on 9/11/17at 06:09; Admin Dose 30 ML;  Start 9/11/17 at 01:30


Diagnostic Test (Pha) (Accu-Chek) 1 ea 02 XX  Last administered on 9/14/17at 02:

00; Admin Dose 1 EA;  Start 9/11/17 at 02:00


Diagnostic Test (Pha) (Accu-Chek) 1 ea 02 XX  Last administered on 9/14/17at 02:

00; Admin Dose 1 EA;  Start 9/11/17 at 02:00


Miscellaneous Information 1 ea NOTE XX ;  Start 9/11/17 at 02:30


Glucose (Glutose) 15 gm Q15M  PRN PO DECREASED GLUCOSE;  Start 9/11/17 at 02:30


Glucose (Glutose) 22.5 gm Q15M  PRN PO DECREASED GLUCOSE;  Start 9/11/17 at 02:

30


Dextrose (D50w Syringe) 25 ml Q15M  PRN IV DECREASED GLUCOSE;  Start 9/11/17 at 

02:30


Dextrose (D50w Syringe) 50 ml Q15M  PRN IV DECREASED GLUCOSE;  Start 9/11/17 at 

02:30


Glucagon (Glucagen) 1 mg Q15M  PRN IM DECREASED GLUCOSE;  Start 9/11/17 at 02:30


Glucose (Glutose) 15 gm Q15M  PRN BUCCAL DECREASED GLUCOSE;  Start 9/11/17 at 02

:30


Pantoprazole (Protonix Tab) 40 mg DAILY@06 PO  Last administered on 9/14/17at 06

:05; Admin Dose 40 MG;  Start 9/12/17 at 06:00


Atorvastatin Calcium (Lipitor) 10 mg HS PO  Last administered on 9/13/17at 20:55

; Admin Dose 10 MG;  Start 9/11/17 at 21:00


Lisinopril (Zestril) 20 mg DAILY PO  Last administered on 9/14/17at 09:37; 

Admin Dose 20 MG;  Start 9/13/17 at 09:00


Alprazolam (Xanax) 0.25 mg ONCE PO ;  Start 9/14/17 at 11:40;  Stop 9/14/17 at 

15:00





Assessment/Plan


Chief Complaint/Hosp Course


76 yo female with history of HTN, DM, cognitive decline, urinary incontinence, p

/w increased LLE weakness.


Exam concerning for left arm weakness as well.





MRI Brain: Diffuse central cerebral and cerebellar volume loss with 

frontotemporal predominance asymmetric to the right . Marked ventriculomegaly 

asymmetric to the right.


 


Recommendations;





Would recommend Diagnostic LP to evaluate for NPH- recommend LP may send Cell 

Count, Cultures, Basic labs Protein Glucose


Recommend checking pressure as well as removal of atleast 25-30 cc of fluid





ASA 81 mg daily


SBP <140/90


LDL: 138, HBA1C: 6.5% optimize risk factors for secondary stroke prevention, c/

w statin on low dose Lipitor 


DVT ppx


Continue PT/OT/Speech evaluation for rehab





LP is currently pending to evaluate for possible NPH suggest PT work with 

patient after LP to evaluate for gait improvement





however another possible differential given her exam findings and MRI findings 

of asymmetry could also be suggestive of another neurodegenerative process 

called Cortico basilar Degeneration CBG further would would involve obtaining 

an MRI PET Scan possible FDOPA PET this can be arranged as outpatient with her 

neurologist Dr. Bass in [place] I have contacted him to discuss the 

case


Problems:  











RENE CAUSEY MD Sep 14, 2017 11:44

## 2017-09-14 NOTE — RADRPT
PROCEDURE:   Fluoroscopic guided lumbar puncture. 

 

CLINICAL INDICATION:   Dizziness. There is any clinical concern for normal pressure hydrocephalus. 

 

TECHNIQUE:   Prior to the procedure, informed consent was obtained.  Risks including bleeding and in
fection were explained to the patient.  The patient understood and was willing to proceed.  A proced
ural pause was performed.  The patient's name, date of birth, and procedure to be performed were jennifer
ified. 

Using local anesthetic, sterile technique, and fluoroscopic guidance, a 22-gauge spinal needle was a
dvanced into the thecal sac at the L5-S1 level.  Opening pressure was 11 cm of water.  20 mL of josias
r cerebrospinal fluid was aspirated and sent for laboratory analysis.  The needle was removed.  A dr
essing was applied.  The patient tolerated the procedure well.

A total of 0.1 minutes of fluoroscopy time was used. 3 images were obtained with image intensifier.

 

COMPARISON:   MRI of the brain dated 09/11/2017. 

 

FINDINGS:

Images demonstrate the needle at the L5-S1 level in the thecal sac.

 

IMPRESSION:

Satisfactory fluoroscopic guided lumbar puncture.

The opening pressure was 11 cm of water.

 

RPTAT: QQ

_____________________________________________ 

.Froilan Bolden MD, MD           Date    Time 

Electronically viewed and signed by .Froilan Bolden MD, MD on 09/14/2017 15:10 

 

D:  09/14/2017 15:10  T:  09/14/2017 15:10

.R/

## 2017-09-15 VITALS — HEART RATE: 62 BPM

## 2017-09-15 VITALS — DIASTOLIC BLOOD PRESSURE: 66 MMHG | SYSTOLIC BLOOD PRESSURE: 156 MMHG

## 2017-09-15 VITALS — HEART RATE: 71 BPM

## 2017-09-15 VITALS — RESPIRATION RATE: 19 BRPM | SYSTOLIC BLOOD PRESSURE: 135 MMHG | DIASTOLIC BLOOD PRESSURE: 63 MMHG

## 2017-09-15 VITALS — RESPIRATION RATE: 16 BRPM | SYSTOLIC BLOOD PRESSURE: 176 MMHG | DIASTOLIC BLOOD PRESSURE: 77 MMHG

## 2017-09-15 VITALS — HEART RATE: 56 BPM

## 2017-09-15 VITALS — SYSTOLIC BLOOD PRESSURE: 173 MMHG | RESPIRATION RATE: 18 BRPM | DIASTOLIC BLOOD PRESSURE: 79 MMHG

## 2017-09-15 VITALS — HEART RATE: 68 BPM

## 2017-09-15 VITALS — HEART RATE: 63 BPM

## 2017-09-15 VITALS — DIASTOLIC BLOOD PRESSURE: 74 MMHG | RESPIRATION RATE: 20 BRPM | SYSTOLIC BLOOD PRESSURE: 175 MMHG

## 2017-09-15 RX ADMIN — LISINOPRIL SCH MG: 20 TABLET ORAL at 08:24

## 2017-09-15 RX ADMIN — DOCUSATE SODIUM SCH MG: 100 CAPSULE, LIQUID FILLED ORAL at 21:04

## 2017-09-15 RX ADMIN — FOLIC ACID SCH MLS/HR: 5 INJECTION, SOLUTION INTRAMUSCULAR; INTRAVENOUS; SUBCUTANEOUS at 01:00

## 2017-09-15 RX ADMIN — ATORVASTATIN CALCIUM SCH MG: 10 TABLET, FILM COATED ORAL at 21:04

## 2017-09-15 RX ADMIN — AMLODIPINE BESYLATE SCH MG: 5 TABLET ORAL at 08:24

## 2017-09-15 RX ADMIN — DOCUSATE SODIUM SCH MG: 100 CAPSULE, LIQUID FILLED ORAL at 08:24

## 2017-09-15 RX ADMIN — FOLIC ACID SCH MLS/HR: 5 INJECTION, SOLUTION INTRAMUSCULAR; INTRAVENOUS; SUBCUTANEOUS at 08:25

## 2017-09-15 RX ADMIN — PANTOPRAZOLE SODIUM SCH MG: 40 TABLET, DELAYED RELEASE ORAL at 05:36

## 2017-09-15 NOTE — PN
Date/Time of Note


Date/Time of Note


DATE: 9/15/17 


TIME: 13:40





Assessment/Plan


VTE Prophylaxis


VTE Prophylaxis Intervention:  LMWH





Lines/Catheters


IV Catheter Type (from Nrs):  Peripheral IV


Urinary Cath still in place:  No





Assessment/Plan


Assessment/Plan


1. 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 

response to LP, follow up with neurology


2. TIA with left leg weakness, no acute changes on MRI, on aspirin and lipitor


3.  diabetes mellitus:  insulin sliding scale


4. hypertension: on lisinopril


5. DVT prophylaxis lovenox





Subjective


24 Hr Interval Summary


Free Text/Dictation


better balance and metal status





Exam/Review of Systems


Vital Signs


Vitals





 Vital Signs








  Date Time  Temp Pulse Resp B/P Pulse Ox O2 Delivery O2 Flow Rate FiO2


 


9/15/17 13:19  63      


 


9/15/17 07:44 98.2  18 173/79 98   














 Intake and Output   


 


 9/14/17 9/14/17 9/15/17





 15:00 23:00 07:00


 


Intake Total   520 ml


 


Balance   520 ml











Exam


Constitutional:  alert, well developed


Head:  atraumatic, normocephalic


Eyes:  EOMI, nl conjunctiva, nl lids


ENMT:  nl external ears & nose, nl lips & teeth, nl nasal mucosa & septum


Neck:  jvd, non-tender, supple


Respiratory:  clear to auscultation, normal air movement, 


   No congested cough, No crackles/rales, No diminished breath sounds, No 

intercostal retraction, No labored breathing, No other, No respirations, No 

tactile fremitus, No wheezing


Cardiovascular:  nl pulses, regular rate and rhythm, 


   No S3, No S4, No bruits, No diastolic murmur, No edema, No gallop, No 

irregular rhythm, No jugular venous distention (JVD), No murmurs/extra sounds, 

No other, No rub, No systolic murmur


Gastrointestinal:  nl liver, spleen, non-tender, soft, 


   No ascites, No bowel sounds, No distended, No firm, No hepatomegaly, No mass

, No other, No rebound or guarding, No splenomegaly, No surgical scars, No 

tender


Musculoskeletal:  nl extremities to inspection


Extremities:  normal pulses, 


   No calf tenderness, No clubbing, No cyanosis, No edema, No other, No 

palpable cord, No pitting pedal edema, No tenderness


Neurological:  CNS II-XII intact, other (demented)





Results


Result Diagram:  


9/11/17 0704                                                                   

             9/11/17 0704





Results 24 hrs





Laboratory Tests








Test


  9/14/17


15:57 9/14/17


21:54 9/15/17


08:21 9/15/17


12:40


 


Bedside Glucose 108   147   95   125  











Medications


Medications





 Current Medications


Sodium Chloride (NS) 1,000 ml @  60 mls/hr [id number] IV  Last administered on 9/15

/17at 08:25; Admin Dose 60 MLS/HR;  Start 9/10/17 at 21:00


Ondansetron HCl (Zofran Inj) 4 mg Q6H  PRN IV NAUSEA AND/OR VOMITING;  Start 9/

10/17 at 21:00


Acetaminophen (Tylenol Tab) 650 mg Q6H  PRN PO PAIN LEVEL 1-3 OR FEVER;  Start 9

/10/17 at 21:00


Hydralazine HCl (Apresoline) 10 mg Q4H  PRN IV ELEVATED BLOOD PRESSURE Last 

administered on 9/11/17at 15:53; Admin Dose 10 MG;  Start 9/11/17 at 01:00


Docusate Sodium (Colace) 100 mg BID PO  Last administered on 9/15/17at 08:24; 

Admin Dose 100 MG;  Start 9/11/17 at 09:00


Magnesium Hydroxide (Milk Of Mag) 30 ml DAILY  PRN PO CONSTIPATION Last 

administered on 9/11/17at 06:09; Admin Dose 30 ML;  Start 9/11/17 at 01:30


Diagnostic Test (Pha) (Accu-Chek) 1 ea 02 XX  Last administered on 9/14/17at 02:

00; Admin Dose 1 EA;  Start 9/11/17 at 02:00


Diagnostic Test (Pha) (Accu-Chek) 1 ea 02 XX  Last administered on 9/14/17at 02:

00; Admin Dose 1 EA;  Start 9/11/17 at 02:00


Miscellaneous Information 1 ea NOTE XX ;  Start 9/11/17 at 02:30


Glucose (Glutose) 15 gm Q15M  PRN PO DECREASED GLUCOSE;  Start 9/11/17 at 02:30


Glucose (Glutose) 22.5 gm Q15M  PRN PO DECREASED GLUCOSE;  Start 9/11/17 at 02:

30


Dextrose (D50w Syringe) 25 ml Q15M  PRN IV DECREASED GLUCOSE;  Start 9/11/17 at 

02:30


Dextrose (D50w Syringe) 50 ml Q15M  PRN IV DECREASED GLUCOSE;  Start 9/11/17 at 

02:30


Glucagon (Glucagen) 1 mg Q15M  PRN IM DECREASED GLUCOSE;  Start 9/11/17 at 02:30


Glucose (Glutose) 15 gm Q15M  PRN BUCCAL DECREASED GLUCOSE;  Start 9/11/17 at 02

:30


Pantoprazole (Protonix Tab) 40 mg DAILY@06 PO  Last administered on 9/15/17at 05

:36; Admin Dose 40 MG;  Start 9/12/17 at 06:00


Atorvastatin Calcium (Lipitor) 10 mg HS PO  Last administered on 9/14/17at 21:56

; Admin Dose 10 MG;  Start 9/11/17 at 21:00


Lisinopril (Zestril) 20 mg DAILY PO  Last administered on 9/15/17at 08:24; 

Admin Dose 20 MG;  Start 9/13/17 at 09:00


Amlodipine Besylate (Norvasc) 5 mg DAILY PO  Last administered on 9/15/17at 08:

24; Admin Dose 5 MG;  Start 9/14/17 at 14:30











PIA MADRIGAL MD Sep 15, 2017 13:43

## 2017-09-15 NOTE — CONS
Date/Time of Note


Date/Time of Note


DATE: 9/15/17 


TIME: 14:20





Consult Date/Type/Reason


Admit Date/Time


Sep 10, 2017 at 19:04


Initial Consult Date


9/11/17


Type of Consultation:  Neurology


Ordering Provider:  ALVIN LOCK





Subjective


s/p LP yesterday, OP WNL, removed 20 ml, per daughter after the procedure pt 

started waling normally, became more lucid, staretd feeling when  has to go to 

the bathroom .





Objective





 Vital Signs








  Date Time  Temp Pulse Resp B/P Pulse Ox O2 Delivery O2 Flow Rate FiO2


 


9/15/17 13:19  63      


 


9/15/17 07:44 98.2  18 173/79 98   














 Intake and Output   


 


 9/14/17 9/14/17 9/15/17





 15:00 23:00 07:00


 


Intake Total   520 ml


 


Balance   520 ml











Results/Medications


Result Diagram:  


9/11/17 0704                                                                   

             9/11/17 0704





Results 24 hrs





Laboratory Tests








Test


  9/14/17


15:57 9/14/17


21:54 9/15/17


08:21 9/15/17


12:40


 


Bedside Glucose 108   147   95   125  








Medications





 Current Medications


Sodium Chloride (NS) 1,000 ml @  60 mls/hr [id number] IV  Last administered on 9/15

/17at 08:25; Admin Dose 60 MLS/HR;  Start 9/10/17 at 21:00


Ondansetron HCl (Zofran Inj) 4 mg Q6H  PRN IV NAUSEA AND/OR VOMITING;  Start 9/

10/17 at 21:00


Acetaminophen (Tylenol Tab) 650 mg Q6H  PRN PO PAIN LEVEL 1-3 OR FEVER;  Start 9

/10/17 at 21:00


Hydralazine HCl (Apresoline) 10 mg Q4H  PRN IV ELEVATED BLOOD PRESSURE Last 

administered on 9/11/17at 15:53; Admin Dose 10 MG;  Start 9/11/17 at 01:00


Docusate Sodium (Colace) 100 mg BID PO  Last administered on 9/15/17at 08:24; 

Admin Dose 100 MG;  Start 9/11/17 at 09:00


Magnesium Hydroxide (Milk Of Mag) 30 ml DAILY  PRN PO CONSTIPATION Last 

administered on 9/11/17at 06:09; Admin Dose 30 ML;  Start 9/11/17 at 01:30


Diagnostic Test (Pha) (Accu-Chek) 1 ea 02 XX  Last administered on 9/14/17at 02:

00; Admin Dose 1 EA;  Start 9/11/17 at 02:00


Diagnostic Test (Pha) (Accu-Chek) 1 ea 02 XX  Last administered on 9/14/17at 02:

00; Admin Dose 1 EA;  Start 9/11/17 at 02:00


Miscellaneous Information 1 ea NOTE XX ;  Start 9/11/17 at 02:30


Glucose (Glutose) 15 gm Q15M  PRN PO DECREASED GLUCOSE;  Start 9/11/17 at 02:30


Glucose (Glutose) 22.5 gm Q15M  PRN PO DECREASED GLUCOSE;  Start 9/11/17 at 02:

30


Dextrose (D50w Syringe) 25 ml Q15M  PRN IV DECREASED GLUCOSE;  Start 9/11/17 at 

02:30


Dextrose (D50w Syringe) 50 ml Q15M  PRN IV DECREASED GLUCOSE;  Start 9/11/17 at 

02:30


Glucagon (Glucagen) 1 mg Q15M  PRN IM DECREASED GLUCOSE;  Start 9/11/17 at 02:30


Glucose (Glutose) 15 gm Q15M  PRN BUCCAL DECREASED GLUCOSE;  Start 9/11/17 at 02

:30


Pantoprazole (Protonix Tab) 40 mg DAILY@06 PO  Last administered on 9/15/17at 05

:36; Admin Dose 40 MG;  Start 9/12/17 at 06:00


Atorvastatin Calcium (Lipitor) 10 mg HS PO  Last administered on 9/14/17at 21:56

; Admin Dose 10 MG;  Start 9/11/17 at 21:00


Lisinopril (Zestril) 20 mg DAILY PO  Last administered on 9/15/17at 08:24; 

Admin Dose 20 MG;  Start 9/13/17 at 09:00


Amlodipine Besylate (Norvasc) 5 mg DAILY PO  Last administered on 9/15/17at 08:

24; Admin Dose 5 MG;  Start 9/14/17 at 14:30





Assessment/Plan


Chief Complaint/Hosp Course


Exam





awake and alert


NAD


oriented x2


fluent speech


CN: PATEL, VFF EOMI no nystagmus palate upgoing uvula midline scm/trap intact


Motor:  5/5


Cogwheeling in the left UE


Sensory intact throughout


Coordination: OK


Gait was not assessed.





A/P: Probable NPH given response to LP. 


Daughter asks for rehab prior to d/c home, as multiple steps at home, falls by 

hx


Will need NS eval for possible shunt placement, in or outpt.


Problems:  











LUKE ESCALONA MD Sep 15, 2017 14:26

## 2017-09-16 VITALS — SYSTOLIC BLOOD PRESSURE: 154 MMHG | RESPIRATION RATE: 20 BRPM | DIASTOLIC BLOOD PRESSURE: 67 MMHG

## 2017-09-16 VITALS — DIASTOLIC BLOOD PRESSURE: 78 MMHG | RESPIRATION RATE: 20 BRPM | SYSTOLIC BLOOD PRESSURE: 174 MMHG

## 2017-09-16 VITALS — HEART RATE: 75 BPM

## 2017-09-16 VITALS — DIASTOLIC BLOOD PRESSURE: 79 MMHG | SYSTOLIC BLOOD PRESSURE: 173 MMHG | RESPIRATION RATE: 20 BRPM

## 2017-09-16 VITALS — RESPIRATION RATE: 20 BRPM | SYSTOLIC BLOOD PRESSURE: 170 MMHG | DIASTOLIC BLOOD PRESSURE: 74 MMHG

## 2017-09-16 VITALS — SYSTOLIC BLOOD PRESSURE: 143 MMHG | RESPIRATION RATE: 19 BRPM | DIASTOLIC BLOOD PRESSURE: 81 MMHG

## 2017-09-16 VITALS — HEART RATE: 65 BPM

## 2017-09-16 VITALS — HEART RATE: 103 BPM

## 2017-09-16 VITALS — HEART RATE: 57 BPM

## 2017-09-16 VITALS — RESPIRATION RATE: 20 BRPM | SYSTOLIC BLOOD PRESSURE: 168 MMHG | DIASTOLIC BLOOD PRESSURE: 81 MMHG

## 2017-09-16 VITALS — HEART RATE: 64 BPM

## 2017-09-16 VITALS — HEART RATE: 93 BPM

## 2017-09-16 RX ADMIN — AMLODIPINE BESYLATE SCH MG: 5 TABLET ORAL at 08:35

## 2017-09-16 RX ADMIN — PANTOPRAZOLE SODIUM SCH MG: 40 TABLET, DELAYED RELEASE ORAL at 05:41

## 2017-09-16 RX ADMIN — DOCUSATE SODIUM SCH MG: 100 CAPSULE, LIQUID FILLED ORAL at 20:30

## 2017-09-16 RX ADMIN — ATORVASTATIN CALCIUM SCH MG: 10 TABLET, FILM COATED ORAL at 20:30

## 2017-09-16 RX ADMIN — LISINOPRIL SCH MG: 20 TABLET ORAL at 08:35

## 2017-09-16 RX ADMIN — DOCUSATE SODIUM SCH MG: 100 CAPSULE, LIQUID FILLED ORAL at 08:35

## 2017-09-16 RX ADMIN — FOLIC ACID SCH MLS/HR: 5 INJECTION, SOLUTION INTRAMUSCULAR; INTRAVENOUS; SUBCUTANEOUS at 01:39

## 2017-09-16 NOTE — PN
Date/Time of Note


Date/Time of Note


DATE: 9/16/17 


TIME: 13:09





Assessment/Plan


VTE Prophylaxis


VTE Prophylaxis Intervention:  contraindicated, SCD's





Lines/Catheters


IV Catheter Type (from [facility]):  Peripheral IV


Urinary Cath still in place:  No





Assessment/Plan


Problems:  


(1) Normal pressure hydrocephalus syndrome


Status:  Chronic


Comment:  Is a new diagnosis.  Please see the notes from the neurologist on the 

response to therapeutics.  Given the dementia I will go ahead and complete the 

dementia workup even though it is extremely low likelihood that the RPR VDRL 

will add anything to what were doing here.  Neurosurgery has been called now by 

myself to come and evaluate the patient to see if we can get the process 

started for definitive treatment.





(2) Metabolic syndrome


Status:  Chronic


Comment:  Noted.  No indication for aggressive therapeutics





(3) Essential hypertension


Status:  Chronic


Comment:  I believe the patient would do better with less amlodipine and more 

ACE inhibitor.  I have taken care of writing the orders





(4) Hyperlipidemia


Status:  Chronic


Comment:  Continue with statin therapy


Qualifiers:  


   Hyperlipidemia type:  pure hypercholesterolemia  Qualified Code:  E78.00 - 

Pure hypercholesterolemia





Subjective


24 Hr Interval Summary


Free Text/Dictation


She reports she is feeling well.  She was unaware that she had had any changes 

after the spinal tap.


Constitutional:  no complaints


Respiratory:  no complaints


Cardiovascular:  no complaints





Exam/Review of Systems


Vital Signs


Vitals





 Vital Signs








  Date Time  Temp Pulse Resp B/P Pulse Ox O2 Delivery O2 Flow Rate FiO2


 


9/16/17 12:11  93      


 


9/16/17 11:57 97.7  20 154/67 100   














 Intake and Output   


 


 9/15/17 9/15/17 9/16/17





 15:00 23:00 07:00


 


Intake Total  720 ml 1060 ml


 


Balance  720 ml 1060 ml











Exam


Oriented times person, modestly about place


Constitutional:  alert


Neck:  non-tender, supple


Respiratory:  clear to auscultation, normal air movement


Cardiovascular:  nl pulses, regular rate and rhythm





Results


Results 24 hrs





Laboratory Tests








Test


  9/15/17


17:36 9/15/17


21:03 9/16/17


08:34


 


Bedside Glucose 99   110   120  











Medications


Medications





 Current Medications


Sodium Chloride (NS) 1,000 ml @  60 mls/hr [id number] IV  Last administered on 9/16 /17at 01:39; Admin Dose 60 MLS/HR;  Start 9/10/17 at 21:00


Ondansetron HCl (Zofran Inj) 4 mg Q6H  PRN IV NAUSEA AND/OR VOMITING;  Start 9/

10/17 at 21:00


Acetaminophen (Tylenol Tab) 650 mg Q6H  PRN PO PAIN LEVEL 1-3 OR FEVER;  Start 9

/10/17 at 21:00


Hydralazine HCl (Apresoline) 10 mg Q4H  PRN IV ELEVATED BLOOD PRESSURE Last 

administered on 9/11/17at 15:53; Admin Dose 10 MG;  Start 9/11/17 at 01:00


Docusate Sodium (Colace) 100 mg BID PO  Last administered on 9/16/17at 08:35; 

Admin Dose 100 MG;  Start 9/11/17 at 09:00


Magnesium Hydroxide (Milk Of Mag) 30 ml DAILY  PRN PO CONSTIPATION Last 

administered on 9/11/17at 06:09; Admin Dose 30 ML;  Start 9/11/17 at 01:30


Diagnostic Test (Pha) (Accu-Chek) 1 ea 02 XX  Last administered on 9/14/17at 02:

00; Admin Dose 1 EA;  Start 9/11/17 at 02:00


Diagnostic Test (Pha) (Accu-Chek) 1 ea 02 XX  Last administered on 9/14/17at 02:

00; Admin Dose 1 EA;  Start 9/11/17 at 02:00


Miscellaneous Information 1 ea NOTE XX ;  Start 9/11/17 at 02:30


Glucose (Glutose) 15 gm Q15M  PRN PO DECREASED GLUCOSE;  Start 9/11/17 at 02:30


Glucose (Glutose) 22.5 gm Q15M  PRN PO DECREASED GLUCOSE;  Start 9/11/17 at 02:

30


Dextrose (D50w Syringe) 25 ml Q15M  PRN IV DECREASED GLUCOSE;  Start 9/11/17 at 

02:30


Dextrose (D50w Syringe) 50 ml Q15M  PRN IV DECREASED GLUCOSE;  Start 9/11/17 at 

02:30


Glucagon (Glucagen) 1 mg Q15M  PRN IM DECREASED GLUCOSE;  Start 9/11/17 at 02:30


Glucose (Glutose) 15 gm Q15M  PRN BUCCAL DECREASED GLUCOSE;  Start 9/11/17 at 02

:30


Pantoprazole (Protonix Tab) 40 mg DAILY@06 PO  Last administered on 9/16/17at 05

:41; Admin Dose 40 MG;  Start 9/12/17 at 06:00


Atorvastatin Calcium (Lipitor) 10 mg HS PO  Last administered on 9/15/17at 21:04

; Admin Dose 10 MG;  Start 9/11/17 at 21:00


Lisinopril (Zestril) 20 mg DAILY PO  Last administered on 9/16/17at 08:35; 

Admin Dose 20 MG;  Start 9/13/17 at 09:00


Amlodipine Besylate (Norvasc) 5 mg DAILY PO  Last administered on 9/16/17at 08:

35; Admin Dose 5 MG;  Start 9/14/17 at 14:30











JOSE PELAYO MD Sep 16, 2017 13:16

## 2017-09-17 VITALS — HEART RATE: 69 BPM

## 2017-09-17 VITALS — RESPIRATION RATE: 20 BRPM | SYSTOLIC BLOOD PRESSURE: 171 MMHG | DIASTOLIC BLOOD PRESSURE: 76 MMHG

## 2017-09-17 VITALS — DIASTOLIC BLOOD PRESSURE: 79 MMHG | RESPIRATION RATE: 20 BRPM | SYSTOLIC BLOOD PRESSURE: 172 MMHG

## 2017-09-17 VITALS — RESPIRATION RATE: 19 BRPM | SYSTOLIC BLOOD PRESSURE: 145 MMHG | DIASTOLIC BLOOD PRESSURE: 70 MMHG

## 2017-09-17 VITALS — HEART RATE: 77 BPM

## 2017-09-17 VITALS — HEART RATE: 72 BPM

## 2017-09-17 VITALS — DIASTOLIC BLOOD PRESSURE: 80 MMHG | SYSTOLIC BLOOD PRESSURE: 166 MMHG | RESPIRATION RATE: 20 BRPM

## 2017-09-17 VITALS — DIASTOLIC BLOOD PRESSURE: 67 MMHG | RESPIRATION RATE: 19 BRPM | SYSTOLIC BLOOD PRESSURE: 146 MMHG

## 2017-09-17 VITALS — HEART RATE: 64 BPM

## 2017-09-17 VITALS — SYSTOLIC BLOOD PRESSURE: 118 MMHG | DIASTOLIC BLOOD PRESSURE: 63 MMHG | RESPIRATION RATE: 18 BRPM

## 2017-09-17 VITALS — HEART RATE: 61 BPM

## 2017-09-17 VITALS — HEART RATE: 78 BPM

## 2017-09-17 RX ADMIN — ATORVASTATIN CALCIUM SCH MG: 10 TABLET, FILM COATED ORAL at 21:03

## 2017-09-17 RX ADMIN — FOLIC ACID SCH MLS/HR: 5 INJECTION, SOLUTION INTRAMUSCULAR; INTRAVENOUS; SUBCUTANEOUS at 18:55

## 2017-09-17 RX ADMIN — LISINOPRIL SCH MG: 20 TABLET ORAL at 08:48

## 2017-09-17 RX ADMIN — DOCUSATE SODIUM SCH MG: 100 CAPSULE, LIQUID FILLED ORAL at 21:03

## 2017-09-17 RX ADMIN — PANTOPRAZOLE SODIUM SCH MG: 40 TABLET, DELAYED RELEASE ORAL at 06:09

## 2017-09-17 RX ADMIN — FOLIC ACID SCH MLS/HR: 5 INJECTION, SOLUTION INTRAMUSCULAR; INTRAVENOUS; SUBCUTANEOUS at 03:30

## 2017-09-17 RX ADMIN — DOCUSATE SODIUM SCH MG: 100 CAPSULE, LIQUID FILLED ORAL at 08:48

## 2017-09-17 RX ADMIN — AMLODIPINE BESYLATE SCH MG: 2.5 TABLET ORAL at 08:48

## 2017-09-17 NOTE — PN
Date/Time of Note


Date/Time of Note


DATE: 9/17/17 


TIME: 11:10





Assessment/Plan


VTE Prophylaxis


VTE Prophylaxis Intervention:  heparin





Lines/Catheters


IV Catheter Type (from [facility]):  Peripheral IV


Urinary Cath still in place:  No





Assessment/Plan


Problems:  


(1) Normal pressure hydrocephalus syndrome


Status:  Chronic


Comment:  Patient was seen by Dr. Watkins today although the notes are not yet 

in the chart.  Ultimately what needs to be done as formulate a plan about when 

to offer her treatment.  Once that is done the patient can be discharged home 

with outpatient follow-up unless we are going to go ahead and remove 

expeditiously now





(2) Metabolic syndrome


Status:  Chronic


Comment:  Adequate control





(3) Essential hypertension


Status:  Chronic


Comment:  Adequate control





(4) Hyperlipidemia


Status:  Chronic


Comment:  Adequate control


Qualifiers:  


   Hyperlipidemia type:  pure hypercholesterolemia  Qualified Code:  E78.00 - 

Pure hypercholesterolemia





Subjective


24 Hr Interval Summary


Free Text/Dictation


Patient reports she is feeling better today.


Constitutional:  no complaints


Respiratory:  no complaints


Cardiovascular:  no complaints


Gastrointestinal:  no complaints





Exam/Review of Systems


Vital Signs


Vitals





 Vital Signs








  Date Time  Temp Pulse Resp B/P Pulse Ox O2 Delivery O2 Flow Rate FiO2


 


9/17/17 08:16  78      


 


9/17/17 07:39 98.2  20 166/80 98   














 Intake and Output   


 


 9/16/17 9/16/17 9/17/17





 15:00 23:00 07:00


 


Intake Total  1350 ml 530 ml


 


Balance  1350 ml 530 ml











Exam


More alert and vibrant today.  Nursing staff reports she is actually been up 

and ambulating


Constitutional:  alert


Neck:  non-tender, supple


Respiratory:  clear to auscultation, normal air movement


Cardiovascular:  nl pulses, regular rate and rhythm


Gastrointestinal:  nl liver, spleen, non-tender, soft





Results


Results 24 hrs





Laboratory Tests








Test


  9/16/17


12:58 9/16/17


17:28 9/16/17


20:27 9/17/17


08:29


 


Bedside Glucose 140   147   105   102  











Medications


Medications





 Current Medications


Sodium Chloride (NS) 1,000 ml @  60 mls/hr [id number] IV  Last administered on 9/17 /17at 03:30; Admin Dose 60 MLS/HR;  Start 9/10/17 at 21:00


Ondansetron HCl (Zofran Inj) 4 mg Q6H  PRN IV NAUSEA AND/OR VOMITING;  Start 9/

10/17 at 21:00


Acetaminophen (Tylenol Tab) 650 mg Q6H  PRN PO PAIN LEVEL 1-3 OR FEVER;  Start 9

/10/17 at 21:00


Hydralazine HCl (Apresoline) 10 mg Q4H  PRN IV ELEVATED BLOOD PRESSURE Last 

administered on 9/11/17at 15:53; Admin Dose 10 MG;  Start 9/11/17 at 01:00


Docusate Sodium (Colace) 100 mg BID PO  Last administered on 9/17/17at 08:48; 

Admin Dose 100 MG;  Start 9/11/17 at 09:00


Magnesium Hydroxide (Milk Of Mag) 30 ml DAILY  PRN PO CONSTIPATION Last 

administered on 9/11/17at 06:09; Admin Dose 30 ML;  Start 9/11/17 at 01:30


Diagnostic Test (Pha) (Accu-Chek) 1 ea 02 XX  Last administered on 9/14/17at 02:

00; Admin Dose 1 EA;  Start 9/11/17 at 02:00


Diagnostic Test (Pha) (Accu-Chek) 1 ea 02 XX  Last administered on 9/14/17at 02:

00; Admin Dose 1 EA;  Start 9/11/17 at 02:00


Miscellaneous Information 1 ea NOTE XX ;  Start 9/11/17 at 02:30


Glucose (Glutose) 15 gm Q15M  PRN PO DECREASED GLUCOSE;  Start 9/11/17 at 02:30


Glucose (Glutose) 22.5 gm Q15M  PRN PO DECREASED GLUCOSE;  Start 9/11/17 at 02:

30


Dextrose (D50w Syringe) 25 ml Q15M  PRN IV DECREASED GLUCOSE;  Start 9/11/17 at 

02:30


Dextrose (D50w Syringe) 50 ml Q15M  PRN IV DECREASED GLUCOSE;  Start 9/11/17 at 

02:30


Glucagon (Glucagen) 1 mg Q15M  PRN IM DECREASED GLUCOSE;  Start 9/11/17 at 02:30


Glucose (Glutose) 15 gm Q15M  PRN BUCCAL DECREASED GLUCOSE;  Start 9/11/17 at 02

:30


Pantoprazole (Protonix Tab) 40 mg DAILY@06 PO  Last administered on 9/17/17at 06

:09; Admin Dose 40 MG;  Start 9/12/17 at 06:00


Atorvastatin Calcium (Lipitor) 10 mg HS PO  Last administered on 9/16/17at 20:30

; Admin Dose 10 MG;  Start 9/11/17 at 21:00


Amlodipine Besylate (Norvasc) 2.5 mg DAILY PO  Last administered on 9/17/17at 08

:48; Admin Dose 2.5 MG;  Start 9/17/17 at 09:00


Lisinopril (Zestril) 40 mg DAILY PO  Last administered on 9/17/17at 08:48; 

Admin Dose 40 MG;  Start 9/17/17 at 09:00











JOSE PELAYO MD Sep 17, 2017 11:13

## 2017-09-17 NOTE — CONS
DATE OF ADMISSION:  09/10/2017

 

DATE OF CONSULTATION:  09/17/2017

 

REQUESTING PHYSICIAN:  Jose Martin Cooper MD

 

REASON FOR CONSULTATION:  Assessment for normal-pressure

hydrocephalus.

 

HISTORY OF PRESENT ILLNESS:  The patient is a 77-year-old, right-

handed female with a history of hypertension, diabetes,

hypercalcemia, who was admitted to the hospital a week ago for

reported increased left lower extremity weakness.  The patient

reportedly also had a history of cognitive decline and urinary

incontinence per medical records.  There was some concern by the

evaluating neurologist, Dr. Rula Hannah.  The patient also had

some left upper extremity weakness.  The patient was evaluated

with an MRI of the brain without contrast as well as MRA of the

head and neck without contrast, and these studies showed no

evidence of acute infarct or intracranial hemorrhage.  There was

noted moderate chronic microvascular ischemic changes and marked

diffuse central cerebral and cerebellar volume loss.  The MRA of

the neck showed no definite hemodynamically significant stenosis.

The patient also had a CT of the brain, which again showed no

evidence of acute intracranial hemorrhage, infarction, or acute

intracranial pathology, but microvascular ischemic changes and

diffuse volume loss.  The patient ultimately underwent a high-

volume lumbar puncture, and there is reported improvement of the

patient's ambulation and cognition per the patient's family.  No

family is present at bedside, and the patient's history is

obtained from the patient, who states that she does feel that she

is somewhat cognitively improved after the lumbar puncture and

also stated that she had some improvement in ambulation.  Per the

patient, she only feels that her walking difficulties began about

3 weeks ago.  She also states that she had urinary incontinence,

but this also began 3 weeks ago.  She denies any headache,

nausea, vomiting, or dizziness.  The patient cannot give specific

details regarding her prior evaluation or workup.

 

PAST MEDICAL HISTORY:  Is significant for hypertension, diabetes,

and reported dementia.

 

PAST SURGICAL HISTORY:  Significant for hysterectomy.

 

ALLERGIES:  NO KNOWN DRUG ALLERGIES.

 

MEDICATION:  Reviewed.  See MAR.

 

FAMILY HISTORY:  The patient denies any history of easy bruising

or bleeding.

 

REVIEW OF SYSTEMS:  A 12-point review of systems performed.

Pertinent positives and negatives listed in History of Present

Illness.

CONSTITUTIONAL:  Patient denies any recent fevers, chills, or

weight loss.

HEMATOLOGIC:  Denies any history of easy bruising or bleeding.

MUSCULOSKELETAL:  Denies any neck or back pain.

 

SOCIAL HISTORY:  Patient is nonsmoker, nondrinker.  She states

she lives with her daughter.  She is a former smoker, but quit

many years ago.

 

PHYSICAL EXAMINATION:

VITAL SIGNS:  The patient's temperature 98.2, pulse 78,

respirations 20, blood pressure 166/80, saturating 98 percent on

room air.

GENERAL:  The patient is a thin elderly female, lying in a

hospital bed, eating her breakfast.

HEAD AND NECK:  The patient is normocephalic, atraumatic.  Her

neck is supple, nontender.  She has no Spurling or Lhermitte

sign.  Carotid: The patient has 2+ carotid pulses with no bruit.

CARDIAC:  Regular rate and rhythm.

CHEST:  Clear to auscultation bilaterally.

ABDOMEN:  Nontender, nondistended, soft.

EXTREMITIES:  No clubbing, cyanosis, edema.

NEUROLOGIC:  The patient is awake, alert.  She is oriented to the

place, year, month, and stated it was either the 14th or 15th,

which is only 2 days off from the actual date.  She has fluent

speech and appears to follow most commands readily and

appropriately, though she had some difficulty with more complex

exam tests.  Cranial nerves 2-12 are serially tested and are

intact.  Her motor exam is 5/5 bilaterally, upper and lower

extremities, with no pronator drift.  Her left upper extremity

appeared to have some slight decreased range of motion, and there

was some perhaps slight possible increased tone, though I did not

find any hyperreflexia or Janis sign.  The patient's lower

extremity exam appeared normal for manual testing; however, when

I attempted to have the patient stand, she moved very slowly and

had difficulty standing independently, though she stated that

felt uncomfortable putting weight on her left lower extremity.

She had no true ataxia or dysmetria with finger-to-nose or finger-

to-finger testing, though she was slow at performing these

procedures and appeared to have to think somewhat about

performing her finger-to-finger testing.

 

LABORATORY AND DIAGNOSTIC DATA:  The patient's white count on

09/11/2017 was 3.1, hemoglobin 12.9, platelets 145,000.  Her

coagulation panel is normal.  PT is 2.3, INR 0.91, APTT 29.1.

The patient had a normal UA.  Her LP, 21 mL had been removed with

a glucose of 69, slightly elevated protein of 94.  There was only

1 white cell.  Review of radiographic results: I reviewed the

patient's MRI and CT of the brain as well as the MRI/MRA of the

neck as reported in the History of Present Illness.

 

ASSESSMENT AND PLAN:  A 77-year-old female with possible normal-

pressure hydrocephalus.  I discussed the patient's signs,

symptoms, physical exam, radiographic findings with her.  I was

unable to reach the patient's daughter.  The patient may have

normal-pressure hydrocephalus.  She does have significant central

volume loss, though this appears to be rather diffuse, rather

than supratentorial, also involves intracranial posterior fossa.

A better history of the patient's symptoms is also necessary

because typically patients do not develop acute normal-pressure

hydrocephalus.  It would appear the patient has been having some

cognitive decline and symptoms with ambulation that may precede

simply the last 3 weeks.  Reportedly, the patient has been

followed as an outpatient for normal-pressure hydrocephalus since

November 2016 by a neurologist who reportedly felt that the

patient did not have normal-pressure hydrocephalus.  A high-

volume tap may be useful in assessing whether not the patient has

normal-pressure hydrocephalus, given that the patient appears to

have had a positive result, though typically objective controlled

neurologic studies pre-testing and post testing are recommended

rather than simply the observations of the family, as these may

have some bias.  Nevertheless, the patient herself admits that

she feels more lucid.  As I have not examined her prior to her

tap, I cannot state whether not the patient has actually had

significant improvement.

 

In general, normal-pressure hydrocephalus is not an emergent

issue and requires thoughtful consideration prior to treatment,

given the risk of the surgical treatment.  I believe the patient

should follow up as an outpatient and further information

obtained to assess if the patient has continued neurologic

deterioration as the effects of the lumbar puncture should wear

off over time, if there was improvement.  As well, it would

appear unusual for the patient to have a unilateral weakness from

normal-pressure hydrocephalus, and it may be the patient had some

type of transient ischemic issue.  The patient would need to be

off aspirin prior to any type of surgical procedure and otherwise

be appropriate medically.  Her low white count is concerning, and

this should also be evaluated.  Therefore, I believe the patient

should simply follow up as an outpatient in 2 weeks to assess her

condition and see if she has continued improvement duration as

well as evaluation of her overall medical status.  At this time,

I could have a thorough discussion with the patient and family

regarding the risks, benefits, and alternatives of the procedure,

as well as some continuity in assessment of the benefit of her

lumbar puncture.

 

Thank you for allowing me to assist in the care of this patient.

 

 

 

Dictated By:  Ian Noel MD

 

/fawad/noelle

Job#:  [id number]/Document#:  [id number]

D:  09/17/2017 09:12

T:  09/17/2017 10:50

JOSAFAT

## 2017-09-17 NOTE — CONS
Date/Time of Note


Date/Time of Note


DATE: 9/17/17 


TIME: 16:46





Consult Date/Type/Reason


Admit Date/Time


Sep 10, 2017 at 19:04


Initial Consult Date


9/11/17


Type of Consultation:  Neurology


Ordering Provider:  ALVIN LOCK





Subjective


no events





Objective





 Vital Signs








  Date Time  Temp Pulse Resp B/P Pulse Ox O2 Delivery O2 Flow Rate FiO2


 


9/17/17 16:10  61      


 


9/17/17 12:07 98.3  20 171/76 98   














 Intake and Output   


 


 9/16/17 9/16/17 9/17/17





 15:00 23:00 07:00


 


Intake Total  1350 ml 530 ml


 


Balance  1350 ml 530 ml











Results/Medications


Results 24 hrs





Laboratory Tests








Test


  9/16/17


17:28 9/16/17


20:27 9/17/17


08:29 9/17/17


12:23


 


Bedside Glucose 147   105   102   128  








Medications





 Current Medications


Sodium Chloride (NS) 1,000 ml @  60 mls/hr [id number] IV  Last administered on 9/17 /17at 03:30; Admin Dose 60 MLS/HR;  Start 9/10/17 at 21:00


Ondansetron HCl (Zofran Inj) 4 mg Q6H  PRN IV NAUSEA AND/OR VOMITING;  Start 9/

10/17 at 21:00


Acetaminophen (Tylenol Tab) 650 mg Q6H  PRN PO PAIN LEVEL 1-3 OR FEVER;  Start 9

/10/17 at 21:00


Hydralazine HCl (Apresoline) 10 mg Q4H  PRN IV ELEVATED BLOOD PRESSURE Last 

administered on 9/11/17at 15:53; Admin Dose 10 MG;  Start 9/11/17 at 01:00


Docusate Sodium (Colace) 100 mg BID PO  Last administered on 9/17/17at 08:48; 

Admin Dose 100 MG;  Start 9/11/17 at 09:00


Magnesium Hydroxide (Milk Of Mag) 30 ml DAILY  PRN PO CONSTIPATION Last 

administered on 9/11/17at 06:09; Admin Dose 30 ML;  Start 9/11/17 at 01:30


Diagnostic Test (Pha) (Accu-Chek) 1 ea 02 XX  Last administered on 9/14/17at 02:

00; Admin Dose 1 EA;  Start 9/11/17 at 02:00


Diagnostic Test (Pha) (Accu-Chek) 1 ea 02 XX  Last administered on 9/14/17at 02:

00; Admin Dose 1 EA;  Start 9/11/17 at 02:00


Miscellaneous Information 1 ea NOTE XX ;  Start 9/11/17 at 02:30


Glucose (Glutose) 15 gm Q15M  PRN PO DECREASED GLUCOSE;  Start 9/11/17 at 02:30


Glucose (Glutose) 22.5 gm Q15M  PRN PO DECREASED GLUCOSE;  Start 9/11/17 at 02:

30


Dextrose (D50w Syringe) 25 ml Q15M  PRN IV DECREASED GLUCOSE;  Start 9/11/17 at 

02:30


Dextrose (D50w Syringe) 50 ml Q15M  PRN IV DECREASED GLUCOSE;  Start 9/11/17 at 

02:30


Glucagon (Glucagen) 1 mg Q15M  PRN IM DECREASED GLUCOSE;  Start 9/11/17 at 02:30


Glucose (Glutose) 15 gm Q15M  PRN BUCCAL DECREASED GLUCOSE;  Start 9/11/17 at 02

:30


Pantoprazole (Protonix Tab) 40 mg DAILY@06 PO  Last administered on 9/17/17at 06

:09; Admin Dose 40 MG;  Start 9/12/17 at 06:00


Atorvastatin Calcium (Lipitor) 10 mg HS PO  Last administered on 9/16/17at 20:30

; Admin Dose 10 MG;  Start 9/11/17 at 21:00


Amlodipine Besylate (Norvasc) 2.5 mg DAILY PO  Last administered on 9/17/17at 08

:48; Admin Dose 2.5 MG;  Start 9/17/17 at 09:00


Lisinopril (Zestril) 40 mg DAILY PO  Last administered on 9/17/17at 08:48; 

Admin Dose 40 MG;  Start 9/17/17 at 09:00





Assessment/Plan


Chief Complaint/Hosp Course


Exam





awake and alert


NAD


oriented x3


fluent speech


CN: PATEL, VFF EOMI no nystagmus palate upgoing uvula midline scm/trap intact


Motor:  5/5


Cogwheeling in the left UE


Sensory intact throughout


Coordination: OK


Gait was not assessed.





A/P: Probable NPH given response to LP. Per daughter pt is still much better 

today


Daughter asks for rehab prior to d/c home, as multiple steps at home, falls by 

hx


NS saw pt, rec outpt followup.


Problems:  











VERPUKHOVSKIY,LUKE MD Sep 17, 2017 16:48

## 2017-09-18 VITALS — HEART RATE: 62 BPM

## 2017-09-18 VITALS — DIASTOLIC BLOOD PRESSURE: 76 MMHG | SYSTOLIC BLOOD PRESSURE: 157 MMHG | RESPIRATION RATE: 18 BRPM

## 2017-09-18 VITALS — HEART RATE: 66 BPM

## 2017-09-18 VITALS — HEART RATE: 100 BPM

## 2017-09-18 VITALS — DIASTOLIC BLOOD PRESSURE: 75 MMHG | RESPIRATION RATE: 19 BRPM | SYSTOLIC BLOOD PRESSURE: 158 MMHG

## 2017-09-18 VITALS — DIASTOLIC BLOOD PRESSURE: 75 MMHG | RESPIRATION RATE: 20 BRPM | SYSTOLIC BLOOD PRESSURE: 150 MMHG

## 2017-09-18 VITALS — DIASTOLIC BLOOD PRESSURE: 81 MMHG | SYSTOLIC BLOOD PRESSURE: 171 MMHG | RESPIRATION RATE: 20 BRPM

## 2017-09-18 VITALS — SYSTOLIC BLOOD PRESSURE: 162 MMHG | DIASTOLIC BLOOD PRESSURE: 76 MMHG | RESPIRATION RATE: 19 BRPM

## 2017-09-18 VITALS — SYSTOLIC BLOOD PRESSURE: 163 MMHG | DIASTOLIC BLOOD PRESSURE: 79 MMHG | RESPIRATION RATE: 20 BRPM

## 2017-09-18 VITALS — HEART RATE: 69 BPM

## 2017-09-18 VITALS — HEART RATE: 78 BPM

## 2017-09-18 VITALS — SYSTOLIC BLOOD PRESSURE: 140 MMHG | DIASTOLIC BLOOD PRESSURE: 78 MMHG | RESPIRATION RATE: 18 BRPM

## 2017-09-18 VITALS — HEART RATE: 63 BPM

## 2017-09-18 RX ADMIN — AMLODIPINE BESYLATE SCH MG: 2.5 TABLET ORAL at 08:08

## 2017-09-18 RX ADMIN — LISINOPRIL SCH MG: 20 TABLET ORAL at 08:08

## 2017-09-18 RX ADMIN — FOLIC ACID SCH MLS/HR: 5 INJECTION, SOLUTION INTRAMUSCULAR; INTRAVENOUS; SUBCUTANEOUS at 21:52

## 2017-09-18 RX ADMIN — ATORVASTATIN CALCIUM SCH MG: 10 TABLET, FILM COATED ORAL at 21:25

## 2017-09-18 RX ADMIN — FOLIC ACID SCH MLS/HR: 5 INJECTION, SOLUTION INTRAMUSCULAR; INTRAVENOUS; SUBCUTANEOUS at 00:00

## 2017-09-18 RX ADMIN — DOCUSATE SODIUM SCH MG: 100 CAPSULE, LIQUID FILLED ORAL at 08:07

## 2017-09-18 RX ADMIN — DOCUSATE SODIUM SCH MG: 100 CAPSULE, LIQUID FILLED ORAL at 21:25

## 2017-09-18 RX ADMIN — PANTOPRAZOLE SODIUM SCH MG: 40 TABLET, DELAYED RELEASE ORAL at 05:21

## 2017-09-18 NOTE — DS
Date/Time of Note


Date/Time of Note


DATE: 9/18/17 


TIME: 13:47





Discharge Summary


Admission/Discharge Info


Admit Date/Time


Sep 10, 2017 at 19:04


Discharge Date/Time


September 18, 2017


Discharge Diagnosis


1.  Debility possibly secondary to normal pressure hydrocephalus-DC to rehab


Patient's condition improved after LP


Neurosurgery consultation appreciated, no immediate plans for surgery at this 

time, neurosurgery can follow at rehab


Neurology consultation appreciated





2.  Hypertension


Continue home meds





3.  Diabetes


Controlled on metformin


A1c at 6.5





4.  Hyperlipidemia


Continue statin


Patient Condition:  Fair


Hospital Course


Patient is a 77-year-old female brought in by her daughter for acute left leg 

weakness and confusion.  Patient does have an outpatient neurologist that she 

had been seeing for her confusion, patient had been getting progressively worse 

and recently developed unstable gait and urinary incontinence.  She was seen by 

neurology in-house and after should received an LP condition did improve 

prompting a neurosurgical evaluation for NPH.  Neurosurgery recommendation was 

to monitor for outpatient follow-up with neurosurgery.  Of note brain CT and 

MRI did not show any hydrocephalus, neck and brain MRA showed no significant 

findings.  Patient was felt to be appropriate for acute rehab and on the day of 

discharge patient's vitals, labs and physical exam were stable.


Home Meds


Reported Medications


Olmesartan Medoxomil (Benicar) 40 Mg Tablet, 40 MG PO DAILY, #30 TAB


   9/10/17


Metformin Hcl* (Metformin Hcl*) 500 Mg Tablet, 500 MG PO DAILY, #30 TAB


   9/10/17


Follow-up Plan


Follow-up with physicians at inpatient rehab


Primary Care Provider


Lewis Sandoval


Time spent on discharge:   > 30 minutes











KATIE PARADA Sep 18, 2017 14:13

## 2017-09-19 ENCOUNTER — HOSPITAL ENCOUNTER (INPATIENT)
Dept: HOSPITAL 10 - VRC | Age: 77
LOS: 11 days | Discharge: HOME HEALTH | DRG: 56 | End: 2017-09-30
Admitting: PHYSICAL MEDICINE & REHABILITATION
Payer: MEDICARE

## 2017-09-19 VITALS — DIASTOLIC BLOOD PRESSURE: 82 MMHG | RESPIRATION RATE: 19 BRPM | SYSTOLIC BLOOD PRESSURE: 179 MMHG

## 2017-09-19 VITALS — RESPIRATION RATE: 16 BRPM | DIASTOLIC BLOOD PRESSURE: 79 MMHG | SYSTOLIC BLOOD PRESSURE: 184 MMHG

## 2017-09-19 VITALS — BODY MASS INDEX: 21.93 KG/M2 | HEIGHT: 66 IN | WEIGHT: 136.47 LBS

## 2017-09-19 VITALS — RESPIRATION RATE: 16 BRPM | SYSTOLIC BLOOD PRESSURE: 151 MMHG | DIASTOLIC BLOOD PRESSURE: 69 MMHG

## 2017-09-19 VITALS — RESPIRATION RATE: 16 BRPM | SYSTOLIC BLOOD PRESSURE: 149 MMHG | DIASTOLIC BLOOD PRESSURE: 69 MMHG

## 2017-09-19 VITALS — HEART RATE: 76 BPM | DIASTOLIC BLOOD PRESSURE: 78 MMHG | SYSTOLIC BLOOD PRESSURE: 172 MMHG

## 2017-09-19 VITALS — DIASTOLIC BLOOD PRESSURE: 69 MMHG | SYSTOLIC BLOOD PRESSURE: 147 MMHG | RESPIRATION RATE: 18 BRPM

## 2017-09-19 VITALS — SYSTOLIC BLOOD PRESSURE: 140 MMHG | DIASTOLIC BLOOD PRESSURE: 57 MMHG

## 2017-09-19 VITALS — HEART RATE: 78 BPM

## 2017-09-19 VITALS — HEART RATE: 81 BPM

## 2017-09-19 VITALS — HEART RATE: 72 BPM

## 2017-09-19 VITALS — HEART RATE: 58 BPM

## 2017-09-19 VITALS — HEART RATE: 69 BPM

## 2017-09-19 DIAGNOSIS — R32: ICD-10-CM

## 2017-09-19 DIAGNOSIS — D70.9: ICD-10-CM

## 2017-09-19 DIAGNOSIS — E78.5: ICD-10-CM

## 2017-09-19 DIAGNOSIS — G31.84: Primary | ICD-10-CM

## 2017-09-19 DIAGNOSIS — F06.31: ICD-10-CM

## 2017-09-19 DIAGNOSIS — E11.9: ICD-10-CM

## 2017-09-19 DIAGNOSIS — G93.40: ICD-10-CM

## 2017-09-19 DIAGNOSIS — Z74.09: ICD-10-CM

## 2017-09-19 DIAGNOSIS — G91.2: ICD-10-CM

## 2017-09-19 DIAGNOSIS — Z79.84: ICD-10-CM

## 2017-09-19 DIAGNOSIS — I10: ICD-10-CM

## 2017-09-19 PROCEDURE — 97110 THERAPEUTIC EXERCISES: CPT

## 2017-09-19 PROCEDURE — 85025 COMPLETE CBC W/AUTO DIFF WBC: CPT

## 2017-09-19 PROCEDURE — 97163 PT EVAL HIGH COMPLEX 45 MIN: CPT

## 2017-09-19 PROCEDURE — 92507 TX SP LANG VOICE COMM INDIV: CPT

## 2017-09-19 PROCEDURE — 97116 GAIT TRAINING THERAPY: CPT

## 2017-09-19 PROCEDURE — 81003 URINALYSIS AUTO W/O SCOPE: CPT

## 2017-09-19 PROCEDURE — 87081 CULTURE SCREEN ONLY: CPT

## 2017-09-19 PROCEDURE — 97530 THERAPEUTIC ACTIVITIES: CPT

## 2017-09-19 PROCEDURE — 92610 EVALUATE SWALLOWING FUNCTION: CPT

## 2017-09-19 PROCEDURE — 97535 SELF CARE MNGMENT TRAINING: CPT

## 2017-09-19 PROCEDURE — 82962 GLUCOSE BLOOD TEST: CPT

## 2017-09-19 PROCEDURE — 97150 GROUP THERAPEUTIC PROCEDURES: CPT

## 2017-09-19 PROCEDURE — 80053 COMPREHEN METABOLIC PANEL: CPT

## 2017-09-19 PROCEDURE — A4310 INSERT TRAY W/O BAG/CATH: HCPCS

## 2017-09-19 PROCEDURE — 92523 SPEECH SOUND LANG COMPREHEN: CPT

## 2017-09-19 PROCEDURE — 97112 NEUROMUSCULAR REEDUCATION: CPT

## 2017-09-19 PROCEDURE — 87086 URINE CULTURE/COLONY COUNT: CPT

## 2017-09-19 RX ADMIN — AMLODIPINE BESYLATE SCH MG: 2.5 TABLET ORAL at 08:07

## 2017-09-19 RX ADMIN — PANTOPRAZOLE SODIUM SCH MG: 40 TABLET, DELAYED RELEASE ORAL at 06:06

## 2017-09-19 RX ADMIN — DOCUSATE SODIUM SCH MG: 100 CAPSULE, LIQUID FILLED ORAL at 20:57

## 2017-09-19 RX ADMIN — DOCUSATE SODIUM SCH MG: 100 CAPSULE, LIQUID FILLED ORAL at 08:07

## 2017-09-19 RX ADMIN — LISINOPRIL SCH MG: 20 TABLET ORAL at 08:07

## 2017-09-19 RX ADMIN — HYDRALAZINE HYDROCHLORIDE PRN MG: 20 INJECTION INTRAMUSCULAR; INTRAVENOUS at 08:08

## 2017-09-19 RX ADMIN — ATORVASTATIN CALCIUM SCH MG: 10 TABLET, FILM COATED ORAL at 20:57

## 2017-09-20 VITALS — DIASTOLIC BLOOD PRESSURE: 74 MMHG | SYSTOLIC BLOOD PRESSURE: 163 MMHG | RESPIRATION RATE: 18 BRPM

## 2017-09-20 VITALS — RESPIRATION RATE: 18 BRPM | DIASTOLIC BLOOD PRESSURE: 71 MMHG | SYSTOLIC BLOOD PRESSURE: 145 MMHG

## 2017-09-20 VITALS — DIASTOLIC BLOOD PRESSURE: 68 MMHG | SYSTOLIC BLOOD PRESSURE: 138 MMHG | RESPIRATION RATE: 18 BRPM

## 2017-09-20 LAB
ADD UMIC: NO
ALBUMIN SERPL-MCNC: 3.7 G/DL (ref 3.3–4.9)
ALBUMIN/GLOB SERPL: 1.19 {RATIO}
ALP SERPL-CCNC: 54 IU/L (ref 42–121)
ALT SERPL-CCNC: 24 IU/L (ref 13–69)
ANION GAP SERPL CALC-SCNC: 11 MMOL/L (ref 8–16)
AST SERPL-CCNC: 22 IU/L (ref 15–46)
BASOPHILS # BLD AUTO: 0 10^3/UL (ref 0–0.1)
BASOPHILS NFR BLD: 1 % (ref 0–2)
BILIRUB DIRECT SERPL-MCNC: 0 MG/DL (ref 0–0.2)
BILIRUB SERPL-MCNC: 0.4 MG/DL (ref 0.2–1.3)
BUN SERPL-MCNC: 14 MG/DL (ref 7–20)
CALCIUM SERPL-MCNC: 10.5 MG/DL (ref 8.4–10.2)
CHLORIDE SERPL-SCNC: 105 MMOL/L (ref 97–110)
CO2 SERPL-SCNC: 28 MMOL/L (ref 21–31)
COLOR UR: YELLOW
CREAT SERPL-MCNC: 0.88 MG/DL (ref 0.44–1)
EOSINOPHIL # BLD: 0.1 10^3/UL (ref 0–0.5)
EOSINOPHIL NFR BLD: 1.3 % (ref 0–7)
ERYTHROCYTE [DISTWIDTH] IN BLOOD BY AUTOMATED COUNT: 13.7 % (ref 11.5–14.5)
GLOBULIN SER-MCNC: 3.1 G/DL (ref 1.3–3.2)
GLUCOSE SERPL-MCNC: 113 MG/DL (ref 70–220)
GLUCOSE UR STRIP-MCNC: NEGATIVE MG/DL
HCT VFR BLD CALC: 40.5 % (ref 37–47)
HGB BLD-MCNC: 12.9 G/DL (ref 12–16)
KETONES UR STRIP.AUTO-MCNC: NEGATIVE MG/DL
LYMPHOCYTES # BLD AUTO: 1.4 10^3/UL (ref 0.8–2.9)
LYMPHOCYTES NFR BLD AUTO: 37.2 % (ref 15–51)
MCH RBC QN AUTO: 26.3 PG (ref 29–33)
MCHC RBC AUTO-ENTMCNC: 31.9 G/DL (ref 32–37)
MCV RBC AUTO: 82.7 FL (ref 82–101)
MONOCYTES # BLD: 0.3 10^3/UL (ref 0.3–0.9)
MONOCYTES NFR BLD: 7.6 % (ref 0–11)
NEUTROPHILS # BLD: 2 10^3/UL (ref 1.6–7.5)
NEUTROPHILS NFR BLD AUTO: 52.6 % (ref 39–77)
NITRITE UR QL STRIP.AUTO: NEGATIVE MG/DL
NRBC # BLD MANUAL: 0 10^3/UL (ref 0–0)
NRBC BLD AUTO-RTO: 0 /100WBC (ref 0–0)
PLATELET # BLD: 157 10^3/UL (ref 140–415)
PMV BLD AUTO: 12.2 FL (ref 7.4–10.4)
POTASSIUM SERPL-SCNC: 3.7 MMOL/L (ref 3.5–5.1)
PROT SERPL-MCNC: 6.8 G/DL (ref 6.1–8.1)
RBC # BLD AUTO: 4.9 10^6/UL (ref 4.2–5.4)
RBC # UR AUTO: NEGATIVE MG/DL
REAGIN AB CSF QL: (no result)
SODIUM SERPL-SCNC: 140 MMOL/L (ref 135–144)
UR ASCORBIC ACID: NEGATIVE MG/DL
UR BILIRUBIN (DIP): NEGATIVE MG/DL
UR CLARITY: CLEAR
UR PH (DIP): 5 (ref 5–9)
UR SPECIFIC GRAVITY (DIP): 1.01 (ref 1–1.03)
UR TOTAL PROTEIN (DIP): NEGATIVE MG/DL
UROBILINOGEN UR STRIP-ACNC: NEGATIVE MG/DL
WBC # BLD AUTO: 3.8 10^3/UL (ref 4.8–10.8)
WBC # UR STRIP: NEGATIVE LEU/UL

## 2017-09-20 RX ADMIN — AMLODIPINE BESYLATE SCH MG: 5 TABLET ORAL at 09:03

## 2017-09-20 RX ADMIN — ATORVASTATIN CALCIUM SCH MG: 10 TABLET, FILM COATED ORAL at 20:57

## 2017-09-20 RX ADMIN — DOCUSATE SODIUM SCH MG: 100 CAPSULE, LIQUID FILLED ORAL at 09:03

## 2017-09-20 RX ADMIN — SENNOSIDES SCH TAB: 8.6 TABLET, FILM COATED ORAL at 20:57

## 2017-09-20 RX ADMIN — LISINOPRIL SCH MG: 20 TABLET ORAL at 09:02

## 2017-09-20 RX ADMIN — DOCUSATE SODIUM SCH MG: 100 CAPSULE, LIQUID FILLED ORAL at 20:57

## 2017-09-20 NOTE — CONS
DATE OF ADMISSION:  09/19/2017

 

DATE OF CONSULTATION:  09/20/2017

 

REHABILITATION POST ADMISSION PHYSICIAN EVALUATION:

 

REHABILITATION IMPAIRMENT CATEGORY:  Normal-pressure

hydrocephalus, encephalopathy.

 

ACTIVE COMORBIDITIES:

1.   Hypertension.

2.   Diabetes mellitus.

3.   Hyperlipidemia.

4.   Impairments in self-care, mobility and cognition.

 

HISTORY OF PRESENT ILLNESS:  The patient is a pleasant 77-year-

old female with a history of diabetes mellitus, hypertension and

hyperlipidemia, who was admitted to [facility]

with left-sided weakness in addition to confusion and

incontinence.  The patient reportedly had been seen by a

neurologist as an outpatient and the patient's symptoms were

noted to progressively will worsen.  Head CT was performed and

was negative for bleed.  The patient did receive a lumbar

puncture by the neurologist and felt to likely have normal-

pressure hydrocephalus.  The patient has been noted to have

significant impairments in self-care, mobility, as compared to

baseline and has been cleared to transfer to the rehabilitation

unit for comprehensive interdisciplinary rehab care.

 

FUNCTIONAL HISTORY:  Prior to recent admission, patient was

independent in self-care tasks and mobility.  Currently, patient

requires moderate assist for self-care and mobility tasks with

cues for safety.

 

I have reviewed the preadmission screen and patient's current

functional status is consistent with the preadmission screen.

 

SOCIAL HISTORY:  Patient lives at home in a home with a 22 stair

steps to entry way.  She does have family support and she hopes

to return there upon discharge.

 

PAST MEDICAL HISTORY:

1.   Hypertension.

2.   Diabetes mellitus.

3.   Hyperlipidemia.

 

CURRENT MEDICATION:

1.   Tylenol p.r.n.

2.   Lipitor 10 mg p.o. at bedtime.

3.   Colace 100 mg p.o. b.i.d.

4.   Zestril 40 mg p.o. daily.

5.   Milk of Magnesia p.r.n.

6.   Metformin 500 mg p.o. b.i.d.

7.   Norvasc 5 mg p.o. daily.

8.   Lisinopril 40 mg p.o. daily.

9.   Clonidine p.r.n.

 

ALLERGIES:  PATIENT WITH NO KNOWN DRUG ALLERGIES.

 

PHYSICAL EXAMINATION:

GENERAL APPEARANCE:  Patient is currently afebrile with stable

vital signs.

HEENT:  Extraocular motion intact.  Oropharynx clear.

NECK:  Supple.

LUNGS:  Clear anteriorly.

HEART:  S1, S2.

ABDOMEN:  Soft, nontender.  Positive bowel sounds.

NEUROLOGIC:  She is awake and alert.  She is oriented to person

and hospital.  She does have impaired short-term memory with one

out of three object recall at 5 minutes.  She can follow simple

one-step commands.  She demonstrates antigravity strength in

bilateral upper extremity and lower extremity.  She does have

impaired dynamic balance.

 

PLAN:  The patient has been admitted for comprehensive

interdisciplinary acute rehab and is anticipated to tolerate 3

hours of daily therapy in divided doses for at least 5 out of 7

days a week.  The treatment plan will include:

 

1.   Physical therapy to focus on bed mobility, transfers and

  household ambulation with goal of having patient reach a standby

  assist level.  Will also have the goal of patient being able to

  ascend and descend the 22 stair steps safely.

2.   Occupational therapy to focus on hygiene, grooming,

dressing, bathing and toileting activities with goal of having

patient reach standby assist level.

3.   Rehabilitation nursing for carry over therapeutic

interventions, the goal of continent to bowel and bladder, and

the goal of patient and family education with regards to the

aforementioned issues.

4.   Rehabilitation nursing for carry over therapeutic

interventions.

5.   Rehabilitation speech therapy with the goal of full

cognitive assessment and retraining in addition to assessment for

dysphagia with goal of having patient return to baseline

cognition.

 

ESTIMATED LENGTH OF STAY:  14 days.

 

DISPOSITION GOAL:  Home



Rehabilitation Barrier: Cognition

Intervention For Barier: Speech Therapy

 

I acknowledge I performed a full physical examination on this

patient within 24 hours of admission to the rehabilitation unit

and believe the patient is a good candidate for comprehensive

interdisciplinary rehab care and is anticipated to make

reasonable goals in a reasonable period of time as outlined

above.

 

 

 

Dictated By:  Jw Nina MD

 

/fawad/mony

Job#:  [id number]/Document#:  [id number]

D:  09/20/2017 11:23

T:  09/20/2017 12:55

JOSAFAT

## 2017-09-20 NOTE — CONS
DATE OF ADMISSION:  09/19/2017

 

DATE OF CONSULTATION:  09/20/2017

 

TYPE OF CONSULTATION:  Psychological.

 

REFERRING PHYSICIAN:  Jw Nina MD

 

CONSULTING PSYCHOLOGIST:  Simeon Gamble, PhD

 

REASON FOR CONSULTATION:  This consultation was requested by Dr. Huber Nina in order to evaluate the cognitive and emotional

function of this patient related to her present medical

condition.

 

HISTORY OF PRESENT ILLNESS:  The patient is a 77-year-old female.

The patient does have a history of essential hypertension, type 2

diabetes, hyperlipidemia, and was admitted to [facility] secondary to acute left leg weakness and confusion.  The

patient did have a neurological evaluation and it was felt that

she has a probable normal pressure hydrocephalus.  Patient was

cleared medically and then transferred to the acute

rehabilitation unit for acute multidisciplinary rehabilitation.

The patient is motivated to get better and does want to return to

her previous level of functioning.

 

SOCIAL HISTORY:  Patient lives in a home in [place] with her

daughter.  The patient does want to return there after discharge.

 

MEDICATION:  The patient is currently not on any psychotropic

medication.

 

SUBSTANCE USE:  The patient reports that she does not smoke.  The

patient reports that she does have an occasional glass of wine.

 

MENTAL STATUS EXAMINATION:

APPEARANCE:  The patient was seen in bed.  She is of average

height and weight.  The patient is right handed.

BEHAVIOR:  The patient was cooperative during the consultation.

The patient did attempt to answer all questions presented to by

the interviewer.

MOOD AND AFFECT:  The patient's mood appears to be somewhat

depressed.  Affect does appear to be slightly anxious.

PERCEPTION:  The patient reports no hallucinations or delusions.

The patient was of average height and weight.  The patient was

alert to person, place, situation, and time.

MEMORY AND COGNITION:  The patient's memory and cognition appear

to be slightly impaired.  She was able to say the name of the

hospital.  She was able to say the month and the year.  The

patient was able to say who the governor of the state [facility] is and who the mayor of [place] is but did say the

President of [place] was Lou and then when asked again did

come up with Lou again as the answer.  The patient was unable to

spell world backwards, and she was able to do only 1 serial 7

subtraction from 100 and not go any further.  It is likely that

this is some dysfunction that relates to her normal-pressure

hydrocephalus and probably will clear as she gets better.

INTELLIGENCE:  Intelligence appears to fall in the average range

when she is functioning well.

INSIGHT:  Fair.

JUDGMENT:  Fair.

THOUGHT CONTENT:  The patient is concerned about her present

medical condition.  The patient does want to return home and be

as independent and functional as she can be.

 

DISCUSSION:  The patient can likely benefit from some

cognitive/behavioral psychotherapy while she is on the unit.

Psychotherapy would focus on her underlying level of depression

related to all her medical problems.

 

DIAGNOSTIC IMPRESSION:

1.   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

  depressive features.

2.   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

 

Thank you very much, Dr. Huber Nina, for referring this

individual.  Please do not hesitate to call if you have

additional questions.

 

 

 

Dictated By:  Simeon Gamble, PHD

 

/fawad/bernardo

Job#:  [id number]/Document#:  [id number]

D:  09/20/2017 18:06

T:  09/20/2017 19:55

MTDD

## 2017-09-20 NOTE — CONS
Date/Time of Note


Date/Time of Note


DATE: 9/20/17 


TIME: 11:05





Assessment/Plan


Assessment/Plan


Chief Complaint/Hosp Course





78 yo female with NPH admitted to ARU for further rehabilitation 2/2 debility.





1.  Debility secondary to possible  normal pressure hydrocephalus. Status post 

LP


-Neurology/Neurosurgery consultation appreciated, no immediate plans for 

surgery at this time-Needs outpt NS follow-up.


--PT/OT eval & treatment





2.  Hypertension


-Continue Lisinopril/Amlodipine.





3.  DMII-Controlled.


-Continue metformin





4.  Hyperlipidemia


-Continue statin





5. Neutropenia,Mild- Stable


-Monitor 





Patient was seen in collaboration with .


Problems:  





Consultation Date/Type/Reason


Admit Date/Time


Sep 19, 2017 at 23:18


Reason for Consultation


Internal Medicine





Hx of Present Illness


This is a 77-year-old female with a past medical history of essential 

hypertension, type 2 diabetes, hyperlipidemia, who was admitted at [facility] secondary to progressive left leg weakness and confusion. 

She had neurology evaluation and had LP with probable normal pressure 

hydrocephalus.  Patient was evaluated by by neurosurgery and recommendation was 

outpatient follow-up.  However, patient was found with high risk for fall and 

with progressive debility requiring further rehabilitation.  Therefore, patient 

was accepted to acute rehab for further complex interdisciplinary 

rehabilitation.





Currently, patient denies any chest pain, palpitation, shortness of breath, 

nausea, vomiting, abdominal pain, confusion, dizziness or any focal deficit.  

Her labs are grossly unremarkable except for mild neutropenia.  Vital signs 

within acceptable range.


A 1 2point ROS was assessed and is negative other than what is mentioned in the 

HPI.





Past Medical History


See HPI





Past Surgical History


See HPI.





Social History


No history of alcohol/smoking or illicit drug use.


Smoking Status:  Never smoker





Exam/Review of Systems


Vital Signs


Vitals





 Vital Signs








  Date Time  Temp Pulse Resp B/P Pulse Ox O2 Delivery O2 Flow Rate FiO2


 


9/20/17 02:00 98.7 74 18 138/68 95   














 Intake and Output   


 


 9/19/17 9/19/17 9/20/17





 14:59 22:59 06:59


 


Intake Total   500 ml


 


Balance   500 ml











Exam


General:Elderly thin looking female, not in any acute distress .


HEENT: Normocephalic, Atraumatic, No laceration or hematoma; Eyes: PEERL, 

Conjunctiva clear, Anicteric sclera Neck: Supple without any lymphadenopathy, 

nontender, no JVD, no carotid bruits, trachea midline, no thyromegaly


Cardiac: S1, S2 auscultated, regular rhythm and rate, no mumurs or gallop


Pulmonary: Normal respiratory effort. Chest clear to auscultation bilaterally, 

no adventitious breath sounds


GI: Abdomen normal to inspection. Soft,  non- distended, no masses, no rebound 

tenderness or guarding. Bowel sounds active on all four quadrants


Genitourinary: Deferred


Extremities:Weakness LLE+ No cyanosis, clubbing, or edema. Pulses [2+] 

bilaterally. Full ROM on all four extremities. No focal weakness appreciated.


Neurologic: Mild cognitive impairment+. Alert to person, place, time, and 

situation. Affect appropriate, intact sensation.


Skin: Clean,dry, and intact. No ecchymosis, no rashes, or lesions





Results


Result Diagram:  


9/20/17 0649                                                                   

             9/20/17 0649





Results 24 hrs





Laboratory Tests








Test


  9/20/17


06:20 9/20/17


06:49


 


Urine Color YELLOW   


 


Urine Clarity CLEAR   


 


Urine pH 5.0   


 


Urine Specific Gravity 1.015   


 


Urine Ketones NEGATIVE   


 


Urine Nitrite NEGATIVE   


 


Urine Bilirubin NEGATIVE   


 


Urine Urobilinogen NEGATIVE   


 


Urine Leukocyte Esterase NEGATIVE   


 


Urine Hemoglobin NEGATIVE   


 


Urine Glucose NEGATIVE   


 


Urine Total Protein NEGATIVE   


 


White Blood Count  3.8  #L


 


Red Blood Count  4.90  


 


Hemoglobin  12.9  


 


Hematocrit  40.5  


 


Mean Corpuscular Volume  82.7  


 


Mean Corpuscular Hemoglobin  26.3  L


 


Mean Corpuscular Hemoglobin


Concent 


  31.9  L


 


 


Red Cell Distribution Width  13.7  


 


Platelet Count  157  


 


Mean Platelet Volume  12.2  H


 


Neutrophils %  52.6  


 


Lymphocytes %  37.2  


 


Monocytes %  7.6  


 


Eosinophils %  1.3  


 


Basophils %  1.0  


 


Nucleated Red Blood Cells %  0.0  


 


Neutrophils #  2.0  


 


Lymphocytes #  1.4  


 


Monocytes #  0.3  


 


Eosinophils #  0.1  


 


Basophils #  0.0  


 


Nucleated Red Blood Cells #  0.0  


 


Sodium Level  140  


 


Potassium Level  3.7  


 


Chloride Level  105  


 


Carbon Dioxide Level  28  


 


Anion Gap  11  


 


Blood Urea Nitrogen  14  


 


Creatinine  0.88  


 


Glucose Level  113  


 


Calcium Level  10.5  H


 


Total Bilirubin  0.4  


 


Direct Bilirubin  0.00  


 


Indirect Bilirubin  0.4  


 


Aspartate Amino Transf


(AST/SGOT) 


  22  


 


 


Alanine Aminotransferase


(ALT/SGPT) 


  24  


 


 


Alkaline Phosphatase  54  


 


Total Protein  6.8  


 


Albumin  3.7  


 


Globulin  3.10  


 


Albumin/Globulin Ratio  1.19  











Medications


Medications





 Current Medications


Amlodipine Besylate (Norvasc) 5 mg DAILY PO  Last administered on 9/20/17at 09:

03; Admin Dose 5 MG;  Start 9/20/17 at 09:00


Lisinopril (Zestril) 40 mg DAILY PO  Last administered on 9/20/17at 09:02; 

Admin Dose 40 MG;  Start 9/20/17 at 09:00


Clonidine (Catapres) 0.2 mg Q8H  PRN PO SBP ABOVE 170mmHg;  Start 9/19/17 at 23:

45


Magnesium Hydroxide (Milk Of Mag) 30 ml DAILY  PRN PO CONSTIPATION;  Start 9/19/ 17 at 23:45


Miscellaneous Information 1 ea NOTE XX ;  Start 9/19/17 at 23:45


Glucose (Glutose) 15 gm Q15M  PRN PO DECREASED GLUCOSE;  Start 9/19/17 at 23:45


Glucose (Glutose) 22.5 gm Q15M  PRN PO DECREASED GLUCOSE;  Start 9/19/17 at 23:

45


Dextrose (D50w Syringe) 25 ml Q15M  PRN IV DECREASED GLUCOSE;  Start 9/19/17 at 

23:45


Dextrose (D50w Syringe) 50 ml Q15M  PRN IV DECREASED GLUCOSE;  Start 9/19/17 at 

23:45


Glucagon (Glucagen) 1 mg Q15M  PRN IM DECREASED GLUCOSE;  Start 9/19/17 at 23:45


Glucose (Glutose) 15 gm Q15M  PRN BUCCAL DECREASED GLUCOSE;  Start 9/19/17 at 23

:45


Acetaminophen (Tylenol Tab) 650 mg Q6H  PRN PO PAIN AND OR ELEVATED TEMP;  

Start 9/19/17 at 23:45


Atorvastatin Calcium (Lipitor) 10 mg DAILY@21 PO ;  Start 9/20/17 at 21:00


Docusate Sodium (Colace) 100 mg BID PO  Last administered on 9/20/17at 09:03; 

Admin Dose 100 MG;  Start 9/20/17 at 09:00


Senna (Senokot) 1 tab HS PO ;  Start 9/20/17 at 21:00


Magnesium Hydroxide (Milk Of Mag) 30 ml BID  PRN PO CONSTIPATION;  Start 9/20/ 17 at 00:00


Lactulose (Enulose) 20 gm DAILY  PRN PO CONSTIPATION;  Start 9/20/17 at 00:00


Bisacodyl (Dulcolax Supp) 10 mg DAILY  PRN [place] CONSTIPATION;  Start 9/20/17 at 00

:00


Acetaminophen (Tylenol Tab) 650 mg Q4H  PRN PO PAIN;  Start 9/20/17 at 00:00











JESS BARONE NP Sep 20, 2017 11:11

## 2017-09-21 VITALS — SYSTOLIC BLOOD PRESSURE: 143 MMHG | DIASTOLIC BLOOD PRESSURE: 78 MMHG | RESPIRATION RATE: 18 BRPM

## 2017-09-21 RX ADMIN — LISINOPRIL SCH MG: 20 TABLET ORAL at 09:25

## 2017-09-21 RX ADMIN — DOCUSATE SODIUM SCH MG: 100 CAPSULE, LIQUID FILLED ORAL at 09:27

## 2017-09-21 RX ADMIN — ATORVASTATIN CALCIUM SCH MG: 10 TABLET, FILM COATED ORAL at 20:40

## 2017-09-21 RX ADMIN — SENNOSIDES SCH TAB: 8.6 TABLET, FILM COATED ORAL at 20:40

## 2017-09-21 RX ADMIN — DOCUSATE SODIUM SCH MG: 100 CAPSULE, LIQUID FILLED ORAL at 20:40

## 2017-09-21 RX ADMIN — AMLODIPINE BESYLATE SCH MG: 5 TABLET ORAL at 09:27

## 2017-09-21 NOTE — CONS
Date/Time of Note


Date/Time of Note


DATE: 9/21/17 


TIME: 12:30





Consult Date/Type/Reason


Admit Date/Time


Sep 19, 2017 at 23:18


Initial Consult Date








Subjective


Comfortable





Objective


pulm-cta


mod/min


 Vital Signs








  Date Time  Temp Pulse Resp B/P Pulse Ox O2 Delivery O2 Flow Rate FiO2


 


9/21/17 07:30 98.3 70 18 143/78 96   














 Intake and Output   


 


 9/20/17 9/20/17 9/21/17





 15:00 23:00 07:00


 


Intake Total 240 ml 800 ml 360 ml


 


Output Total  600 ml 


 


Balance 240 ml 200 ml 360 ml











Results/Medications


Result Diagram:  


9/20/17 0649                                                                   

             9/20/17 0649





Results 24 hrs





Laboratory Tests








Test


  9/21/17


08:33 9/21/17


12:24


 


Bedside Glucose 116   89  








Medications





 Current Medications


Amlodipine Besylate (Norvasc) 5 mg DAILY PO  Last administered on 9/21/17at 09:

27; Admin Dose 5 MG;  Start 9/20/17 at 09:00


Lisinopril (Zestril) 40 mg DAILY PO  Last administered on 9/21/17at 09:25; 

Admin Dose 40 MG;  Start 9/20/17 at 09:00


Clonidine (Catapres) 0.2 mg Q8H  PRN PO SBP ABOVE 170mmHg;  Start 9/19/17 at 23:

45


Magnesium Hydroxide (Milk Of Mag) 30 ml DAILY  PRN PO CONSTIPATION;  Start 9/19/ 17 at 23:45


Miscellaneous Information 1 ea NOTE XX ;  Start 9/19/17 at 23:45


Glucose (Glutose) 15 gm Q15M  PRN PO DECREASED GLUCOSE;  Start 9/19/17 at 23:45


Glucose (Glutose) 22.5 gm Q15M  PRN PO DECREASED GLUCOSE;  Start 9/19/17 at 23:

45


Dextrose (D50w Syringe) 25 ml Q15M  PRN IV DECREASED GLUCOSE;  Start 9/19/17 at 

23:45


Dextrose (D50w Syringe) 50 ml Q15M  PRN IV DECREASED GLUCOSE;  Start 9/19/17 at 

23:45


Glucagon (Glucagen) 1 mg Q15M  PRN IM DECREASED GLUCOSE;  Start 9/19/17 at 23:45


Glucose (Glutose) 15 gm Q15M  PRN BUCCAL DECREASED GLUCOSE;  Start 9/19/17 at 23

:45


Acetaminophen (Tylenol Tab) 650 mg Q6H  PRN PO PAIN AND OR ELEVATED TEMP;  

Start 9/19/17 at 23:45


Atorvastatin Calcium (Lipitor) 10 mg DAILY@21 PO  Last administered on 9/20/ 17at 20:57; Admin Dose 10 MG;  Start 9/20/17 at 21:00


Docusate Sodium (Colace) 100 mg BID PO  Last administered on 9/21/17at 09:27; 

Admin Dose 100 MG;  Start 9/20/17 at 09:00


Senna (Senokot) 1 tab HS PO  Last administered on 9/20/17at 20:57; Admin Dose 1 

TAB;  Start 9/20/17 at 21:00


Magnesium Hydroxide (Milk Of Mag) 30 ml BID  PRN PO CONSTIPATION;  Start 9/20/ 17 at 00:00


Lactulose (Enulose) 20 gm DAILY  PRN PO CONSTIPATION;  Start 9/20/17 at 00:00


Bisacodyl (Dulcolax Supp) 10 mg DAILY  PRN [place] CONSTIPATION;  Start 9/20/17 at 00

:00


Acetaminophen (Tylenol Tab) 650 mg Q4H  PRN PO PAIN;  Start 9/20/17 at 00:00





Assessment/Plan


Additional Assessment/Plan


Rehab- Normal-pressure hydrocephalus, encephalopathy.


    Tolerating rehab program well


Hypertension.


Diabetes mellitus.


Hyperlipidemia.











ANTHONY ZHANG MD Sep 21, 2017 12:32

## 2017-09-21 NOTE — CONS
Date/Time of Note


Date/Time of Note


DATE: 9/21/17 


TIME: 13:09





Assessment/Plan


Assessment/Plan


Chief Complaint/Hosp Course





76 yo female with NPH admitted to ARU for further rehabilitation 2/2 debility.





1.  Debility secondary to possible  normal pressure hydrocephalus. Status post 

LP


-Neurology/Neurosurgery consultation appreciated, no immediate plans for 

surgery at this time-Needs outpt NS follow-up.


--PT/OT eval & treatment





2.  Hypertension


-On Lisinopril/Amlodipine.





3.  DMII-Controlled.


-On metformin





4.  Hyperlipidemia


-On statin





5. Neutropenia,Mild- Stable


-Monitor 





Patient was seen in collaboration with .


Problems:  





Consultation Date/Type/Reason


Admit Date/Time


Sep 19, 2017 at 23:18


Initial Consult Date








24 HR Interval Summary


Free Text/Dictation


No acute distress. participates in PT.





Exam/Review of Systems


Vital Signs


Vitals





 Vital Signs








  Date Time  Temp Pulse Resp B/P Pulse Ox O2 Delivery O2 Flow Rate FiO2


 


9/21/17 07:30 98.3 70 18 143/78 96   














 Intake and Output   


 


 9/20/17 9/20/17 9/21/17





 15:00 23:00 07:00


 


Intake Total 240 ml 800 ml 360 ml


 


Output Total  600 ml 


 


Balance 240 ml 200 ml 360 ml











Exam


General:Elderly thin looking female, not in any acute distress .


HEENT: Normocephalic, Atraumatic, No laceration or hematoma; Eyes: PEERL, 

Conjunctiva clear, Anicteric sclera Neck: Supple without any lymphadenopathy, 

nontender, no JVD, no carotid bruits, trachea midline, no thyromegaly


Cardiac: S1, S2 auscultated, regular rhythm and rate, no mumurs or gallop


Pulmonary: Normal respiratory effort. Chest clear to auscultation bilaterally, 

no adventitious breath sounds


GI: Abdomen normal to inspection. Soft,  non- distended, no masses, no rebound 

tenderness or guarding. Bowel sounds active on all four quadrants


Genitourinary: Deferred


Extremities:Weakness LLE+ No cyanosis, clubbing, or edema. Pulses [2+] 

bilaterally. Full ROM on all four extremities. No focal weakness appreciated.


Neurologic: Mild cognitive impairment+. Alert to person, place, time, and 

situation. Affect appropriate, intact sensation.


Skin: Clean,dry, and intact. No ecchymosis, no rashes, or lesions





Results


Result Diagram:  


9/20/17 0649                                                                   

             9/20/17 0649





Results 24 hrs





Laboratory Tests








Test


  9/21/17


08:33 9/21/17


12:24


 


Bedside Glucose 116   89  











Medications


Medications





 Current Medications


Amlodipine Besylate (Norvasc) 5 mg DAILY PO  Last administered on 9/21/17at 09:

27; Admin Dose 5 MG;  Start 9/20/17 at 09:00


Lisinopril (Zestril) 40 mg DAILY PO  Last administered on 9/21/17at 09:25; 

Admin Dose 40 MG;  Start 9/20/17 at 09:00


Clonidine (Catapres) 0.2 mg Q8H  PRN PO SBP ABOVE 170mmHg;  Start 9/19/17 at 23:

45


Magnesium Hydroxide (Milk Of Mag) 30 ml DAILY  PRN PO CONSTIPATION;  Start 9/19/ 17 at 23:45


Miscellaneous Information 1 ea NOTE XX ;  Start 9/19/17 at 23:45


Glucose (Glutose) 15 gm Q15M  PRN PO DECREASED GLUCOSE;  Start 9/19/17 at 23:45


Glucose (Glutose) 22.5 gm Q15M  PRN PO DECREASED GLUCOSE;  Start 9/19/17 at 23:

45


Dextrose (D50w Syringe) 25 ml Q15M  PRN IV DECREASED GLUCOSE;  Start 9/19/17 at 

23:45


Dextrose (D50w Syringe) 50 ml Q15M  PRN IV DECREASED GLUCOSE;  Start 9/19/17 at 

23:45


Glucagon (Glucagen) 1 mg Q15M  PRN IM DECREASED GLUCOSE;  Start 9/19/17 at 23:45


Glucose (Glutose) 15 gm Q15M  PRN BUCCAL DECREASED GLUCOSE;  Start 9/19/17 at 23

:45


Acetaminophen (Tylenol Tab) 650 mg Q6H  PRN PO PAIN AND OR ELEVATED TEMP;  

Start 9/19/17 at 23:45


Atorvastatin Calcium (Lipitor) 10 mg DAILY@21 PO  Last administered on 9/20/ 17at 20:57; Admin Dose 10 MG;  Start 9/20/17 at 21:00


Docusate Sodium (Colace) 100 mg BID PO  Last administered on 9/21/17at 09:27; 

Admin Dose 100 MG;  Start 9/20/17 at 09:00


Senna (Senokot) 1 tab HS PO  Last administered on 9/20/17at 20:57; Admin Dose 1 

TAB;  Start 9/20/17 at 21:00


Magnesium Hydroxide (Milk Of Mag) 30 ml BID  PRN PO CONSTIPATION;  Start 9/20/ 17 at 00:00


Lactulose (Enulose) 20 gm DAILY  PRN PO CONSTIPATION;  Start 9/20/17 at 00:00


Bisacodyl (Dulcolax Supp) 10 mg DAILY  PRN [place] CONSTIPATION;  Start 9/20/17 at 00

:00


Acetaminophen (Tylenol Tab) 650 mg Q4H  PRN PO PAIN;  Start 9/20/17 at 00:00











JESS BARONE NP Sep 21, 2017 13:10

## 2017-09-22 VITALS — RESPIRATION RATE: 18 BRPM | SYSTOLIC BLOOD PRESSURE: 168 MMHG | DIASTOLIC BLOOD PRESSURE: 77 MMHG

## 2017-09-22 VITALS — DIASTOLIC BLOOD PRESSURE: 75 MMHG | RESPIRATION RATE: 18 BRPM | SYSTOLIC BLOOD PRESSURE: 161 MMHG

## 2017-09-22 VITALS — RESPIRATION RATE: 18 BRPM | DIASTOLIC BLOOD PRESSURE: 62 MMHG | SYSTOLIC BLOOD PRESSURE: 138 MMHG

## 2017-09-22 LAB
HERPES SIMPLEX 1 DNA: NOT DETECTED
HERPES SIMPLEX 2 DNA: NOT DETECTED
HERPES SIMPLEX PCR SOURCE: (no result)

## 2017-09-22 RX ADMIN — LACTULOSE PRN GM: 10 SOLUTION ORAL at 09:01

## 2017-09-22 RX ADMIN — SENNOSIDES SCH TAB: 8.6 TABLET, FILM COATED ORAL at 20:34

## 2017-09-22 RX ADMIN — DOCUSATE SODIUM SCH MG: 100 CAPSULE, LIQUID FILLED ORAL at 20:34

## 2017-09-22 RX ADMIN — LISINOPRIL SCH MG: 20 TABLET ORAL at 09:01

## 2017-09-22 RX ADMIN — DOCUSATE SODIUM SCH MG: 100 CAPSULE, LIQUID FILLED ORAL at 09:01

## 2017-09-22 RX ADMIN — ATORVASTATIN CALCIUM SCH MG: 10 TABLET, FILM COATED ORAL at 20:34

## 2017-09-22 RX ADMIN — AMLODIPINE BESYLATE SCH MG: 5 TABLET ORAL at 09:01

## 2017-09-22 NOTE — CONS
Date/Time of Note


Date/Time of Note


DATE: 9/22/17 


TIME: 12:09





Consult Date/Type/Reason


Admit Date/Time


Sep 19, 2017 at 23:18





Subjective


Comfortable





Objective


pulm-cta


min assist transfer and ambulation


 Vital Signs








  Date Time  Temp Pulse Resp B/P Pulse Ox O2 Delivery O2 Flow Rate FiO2


 


9/22/17 02:22 98.5 73 18 138/62 93   














 Intake and Output   


 


 9/21/17 9/21/17 9/22/17





 15:00 23:00 07:00


 


Intake Total  720 ml 


 


Balance  720 ml 











Results/Medications


Result Diagram:  


9/20/17 0649                                                                   

             9/20/17 0649





Results 24 hrs





Laboratory Tests








Test


  9/21/17


12:24 9/22/17


07:57


 


Bedside Glucose 89   113  








Medications





 Current Medications


Amlodipine Besylate (Norvasc) 5 mg DAILY PO  Last administered on 9/22/17at 09:

01; Admin Dose 5 MG;  Start 9/20/17 at 09:00


Lisinopril (Zestril) 40 mg DAILY PO  Last administered on 9/22/17at 09:01; 

Admin Dose 40 MG;  Start 9/20/17 at 09:00


Clonidine (Catapres) 0.2 mg Q8H  PRN PO SBP ABOVE 170mmHg;  Start 9/19/17 at 23:

45


Magnesium Hydroxide (Milk Of Mag) 30 ml DAILY  PRN PO CONSTIPATION;  Start 9/19/ 17 at 23:45


Miscellaneous Information 1 ea NOTE XX ;  Start 9/19/17 at 23:45


Glucose (Glutose) 15 gm Q15M  PRN PO DECREASED GLUCOSE;  Start 9/19/17 at 23:45


Glucose (Glutose) 22.5 gm Q15M  PRN PO DECREASED GLUCOSE;  Start 9/19/17 at 23:

45


Dextrose (D50w Syringe) 25 ml Q15M  PRN IV DECREASED GLUCOSE;  Start 9/19/17 at 

23:45


Dextrose (D50w Syringe) 50 ml Q15M  PRN IV DECREASED GLUCOSE;  Start 9/19/17 at 

23:45


Glucagon (Glucagen) 1 mg Q15M  PRN IM DECREASED GLUCOSE;  Start 9/19/17 at 23:45


Glucose (Glutose) 15 gm Q15M  PRN BUCCAL DECREASED GLUCOSE;  Start 9/19/17 at 23

:45


Acetaminophen (Tylenol Tab) 650 mg Q6H  PRN PO PAIN AND OR ELEVATED TEMP;  

Start 9/19/17 at 23:45


Atorvastatin Calcium (Lipitor) 10 mg DAILY@21 PO  Last administered on 9/21/ 17at 20:40; Admin Dose 10 MG;  Start 9/20/17 at 21:00


Docusate Sodium (Colace) 100 mg BID PO  Last administered on 9/22/17at 09:01; 

Admin Dose 100 MG;  Start 9/20/17 at 09:00


Senna (Senokot) 1 tab HS PO  Last administered on 9/21/17at 20:40; Admin Dose 1 

TAB;  Start 9/20/17 at 21:00


Magnesium Hydroxide (Milk Of Mag) 30 ml BID  PRN PO CONSTIPATION;  Start 9/20/ 17 at 00:00


Lactulose (Enulose) 20 gm DAILY  PRN PO CONSTIPATION Last administered on 9/22/ 17at 09:01; Admin Dose 20 GM;  Start 9/20/17 at 00:00


Bisacodyl (Dulcolax Supp) 10 mg DAILY  PRN [place] CONSTIPATION;  Start 9/20/17 at 00

:00


Acetaminophen (Tylenol Tab) 650 mg Q4H  PRN PO PAIN;  Start 9/20/17 at 00:00





Assessment/Plan


Additional Assessment/Plan


Rehab- Normal-pressure hydrocephalus, encephalopathy.


    Continue rehab program


Hypertension.


Diabetes mellitus.


Hyperlipidemia.











ANTHONY ZHANG MD Sep 22, 2017 12:09

## 2017-09-22 NOTE — CONS
Date/Time of Note


Date/Time of Note


DATE: 9/22/17 


TIME: 11:17





Assessment/Plan


Assessment/Plan


Chief Complaint/Hosp Course





76 yo female with NPH admitted to ARU for further rehabilitation 2/2 debility.





1.  Debility secondary to possible  normal pressure hydrocephalus. Status post 

LP


-Post Neurology/Neurosurgery eval, no immediate plans for surgery at this time-

Needs outpt NS follow-up.


--PT/OT eval & treatment





2.  Hypertension


-On Lisinopril/Amlodipine.





3.  DMII-Controlled.


-On metformin





4.  Hyperlipidemia


-On statin





5. Neutropenia,Mild- Stable


-Monitor 





Patient was seen in collaboration with .


Problems:  





Consultation Date/Type/Reason


Admit Date/Time


Sep 19, 2017 at 23:18





24 HR Interval Summary


Free Text/Dictation


No acute changes.





Exam/Review of Systems


Vital Signs


Vitals





 Vital Signs








  Date Time  Temp Pulse Resp B/P Pulse Ox O2 Delivery O2 Flow Rate FiO2


 


9/22/17 02:22 98.5 73 18 138/62 93   














 Intake and Output   


 


 9/21/17 9/21/17 9/22/17





 15:00 23:00 07:00


 


Intake Total  720 ml 


 


Balance  720 ml 











Exam


General:Elderly thin looking female, not in any acute distress .


HEENT: Normocephalic, Atraumatic, No laceration or hematoma; Eyes: PEERL, 

Conjunctiva clear, Anicteric sclera Neck: Supple without any lymphadenopathy, 

nontender, no JVD, no carotid bruits, trachea midline, no thyromegaly


Cardiac: S1, S2 auscultated, regular rhythm and rate, no mumurs or gallop


Pulmonary: Normal respiratory effort. Chest clear to auscultation bilaterally, 

no adventitious breath sounds


GI: Abdomen normal to inspection. Soft,  non- distended, no masses, no rebound 

tenderness or guarding. Bowel sounds active on all four quadrants


Genitourinary: Deferred


Extremities:Weakness LLE+ No cyanosis, clubbing, or edema. Pulses [2+] 

bilaterally. Full ROM on all four extremities. No focal weakness appreciated.


Neurologic: Mild cognitive impairment+. Alert to person, place, time, and 

situation. Affect appropriate, intact sensation.


Skin: Clean,dry, and intact. No ecchymosis, no rashes, or lesions





Results


Result Diagram:  


9/20/17 0649                                                                   

             9/20/17 0649





Results 24 hrs





Laboratory Tests








Test


  9/21/17


12:24 9/22/17


07:57


 


Bedside Glucose 89   113  











Medications


Medications





 Current Medications


Amlodipine Besylate (Norvasc) 5 mg DAILY PO  Last administered on 9/22/17at 09:

01; Admin Dose 5 MG;  Start 9/20/17 at 09:00


Lisinopril (Zestril) 40 mg DAILY PO  Last administered on 9/22/17at 09:01; 

Admin Dose 40 MG;  Start 9/20/17 at 09:00


Clonidine (Catapres) 0.2 mg Q8H  PRN PO SBP ABOVE 170mmHg;  Start 9/19/17 at 23:

45


Magnesium Hydroxide (Milk Of Mag) 30 ml DAILY  PRN PO CONSTIPATION;  Start 9/19/ 17 at 23:45


Miscellaneous Information 1 ea NOTE XX ;  Start 9/19/17 at 23:45


Glucose (Glutose) 15 gm Q15M  PRN PO DECREASED GLUCOSE;  Start 9/19/17 at 23:45


Glucose (Glutose) 22.5 gm Q15M  PRN PO DECREASED GLUCOSE;  Start 9/19/17 at 23:

45


Dextrose (D50w Syringe) 25 ml Q15M  PRN IV DECREASED GLUCOSE;  Start 9/19/17 at 

23:45


Dextrose (D50w Syringe) 50 ml Q15M  PRN IV DECREASED GLUCOSE;  Start 9/19/17 at 

23:45


Glucagon (Glucagen) 1 mg Q15M  PRN IM DECREASED GLUCOSE;  Start 9/19/17 at 23:45


Glucose (Glutose) 15 gm Q15M  PRN BUCCAL DECREASED GLUCOSE;  Start 9/19/17 at 23

:45


Acetaminophen (Tylenol Tab) 650 mg Q6H  PRN PO PAIN AND OR ELEVATED TEMP;  

Start 9/19/17 at 23:45


Atorvastatin Calcium (Lipitor) 10 mg DAILY@21 PO  Last administered on 9/21/ 17at 20:40; Admin Dose 10 MG;  Start 9/20/17 at 21:00


Docusate Sodium (Colace) 100 mg BID PO  Last administered on 9/22/17at 09:01; 

Admin Dose 100 MG;  Start 9/20/17 at 09:00


Senna (Senokot) 1 tab HS PO  Last administered on 9/21/17at 20:40; Admin Dose 1 

TAB;  Start 9/20/17 at 21:00


Magnesium Hydroxide (Milk Of Mag) 30 ml BID  PRN PO CONSTIPATION;  Start 9/20/ 17 at 00:00


Lactulose (Enulose) 20 gm DAILY  PRN PO CONSTIPATION Last administered on 9/22/ 17at 09:01; Admin Dose 20 GM;  Start 9/20/17 at 00:00


Bisacodyl (Dulcolax Supp) 10 mg DAILY  PRN [place] CONSTIPATION;  Start 9/20/17 at 00

:00


Acetaminophen (Tylenol Tab) 650 mg Q4H  PRN PO PAIN;  Start 9/20/17 at 00:00











JESS BARONE NP Sep 22, 2017 11:18

## 2017-09-23 VITALS — DIASTOLIC BLOOD PRESSURE: 73 MMHG | RESPIRATION RATE: 18 BRPM | SYSTOLIC BLOOD PRESSURE: 140 MMHG

## 2017-09-23 VITALS — RESPIRATION RATE: 18 BRPM | SYSTOLIC BLOOD PRESSURE: 147 MMHG | DIASTOLIC BLOOD PRESSURE: 69 MMHG

## 2017-09-23 VITALS — SYSTOLIC BLOOD PRESSURE: 151 MMHG | DIASTOLIC BLOOD PRESSURE: 70 MMHG | RESPIRATION RATE: 20 BRPM

## 2017-09-23 RX ADMIN — DOCUSATE SODIUM SCH MG: 100 CAPSULE, LIQUID FILLED ORAL at 20:41

## 2017-09-23 RX ADMIN — SENNOSIDES SCH TAB: 8.6 TABLET, FILM COATED ORAL at 20:41

## 2017-09-23 RX ADMIN — AMLODIPINE BESYLATE SCH MG: 5 TABLET ORAL at 08:45

## 2017-09-23 RX ADMIN — ATORVASTATIN CALCIUM SCH MG: 10 TABLET, FILM COATED ORAL at 20:40

## 2017-09-23 RX ADMIN — LISINOPRIL SCH MG: 20 TABLET ORAL at 08:45

## 2017-09-23 RX ADMIN — DOCUSATE SODIUM SCH MG: 100 CAPSULE, LIQUID FILLED ORAL at 08:45

## 2017-09-23 NOTE — CONS
Date/Time of Note


Date/Time of Note


DATE: 9/23/17 


TIME: 13:19





Assessment/Plan


Assessment/Plan


Additional Assessment/Plan


Assessment and plan.


1.  Patient admitted for rehab after undergoing lumbar puncture for normal 

pressure hydrocephalus doing very well.  





Continue rehab.





Consultation Date/Type/Reason


Admit Date/Time


Sep 19, 2017 at 23:18


Initial Consult Date





Type of Consultation:  Internal medicine





24 HR Interval Summary


Free Text/Dictation


Patient condition stable.  Remains awake alert.  Has remained hemodynamically 

stable.  





General exam; elderly woman, awake alert, currently in no distress.





Exam/Review of Systems


Vital Signs


Vitals





 Vital Signs








  Date Time  Temp Pulse Resp B/P Pulse Ox O2 Delivery O2 Flow Rate FiO2


 


9/23/17 02:00 98.3 71 18 140/73 97   














 Intake and Output   


 


 9/22/17 9/22/17 9/23/17





 14:59 22:59 06:59


 


Intake Total  600 ml 550 ml


 


Output Total  200 ml 


 


Balance  400 ml 550 ml











Exam


HEENT exam; supple neck, no JVD.  No lymphadenopathy.  Midline trachea.  No 

thyromegaly.





Chest exam; clear to auscultation.  S1-S2 audible, no murmurs.  Regular rhythm.





Abdomen exam; soft, nontender.  No organomegaly.  Bowel sounds audible.





Extremity exam; no edema.





CNS exam; no deficit.





Results


Result Diagram:  


9/20/17 0649                                                                   

             9/20/17 0649





Results 24 hrs





Laboratory Tests








Test


  9/23/17


12:02


 


Bedside Glucose 93  











Medications


Medications





 Current Medications


Amlodipine Besylate (Norvasc) 5 mg DAILY PO  Last administered on 9/23/17at 08:

45; Admin Dose 5 MG;  Start 9/20/17 at 09:00


Lisinopril (Zestril) 40 mg DAILY PO  Last administered on 9/23/17at 08:45; 

Admin Dose 40 MG;  Start 9/20/17 at 09:00


Clonidine (Catapres) 0.2 mg Q8H  PRN PO SBP ABOVE 170mmHg;  Start 9/19/17 at 23:

45


Magnesium Hydroxide (Milk Of Mag) 30 ml DAILY  PRN PO CONSTIPATION;  Start 9/19/ 17 at 23:45


Miscellaneous Information 1 ea NOTE XX ;  Start 9/19/17 at 23:45


Glucose (Glutose) 15 gm Q15M  PRN PO DECREASED GLUCOSE;  Start 9/19/17 at 23:45


Glucose (Glutose) 22.5 gm Q15M  PRN PO DECREASED GLUCOSE;  Start 9/19/17 at 23:

45


Dextrose (D50w Syringe) 25 ml Q15M  PRN IV DECREASED GLUCOSE;  Start 9/19/17 at 

23:45


Dextrose (D50w Syringe) 50 ml Q15M  PRN IV DECREASED GLUCOSE;  Start 9/19/17 at 

23:45


Glucagon (Glucagen) 1 mg Q15M  PRN IM DECREASED GLUCOSE;  Start 9/19/17 at 23:45


Glucose (Glutose) 15 gm Q15M  PRN BUCCAL DECREASED GLUCOSE;  Start 9/19/17 at 23

:45


Acetaminophen (Tylenol Tab) 650 mg Q6H  PRN PO PAIN AND OR ELEVATED TEMP;  

Start 9/19/17 at 23:45


Atorvastatin Calcium (Lipitor) 10 mg DAILY@21 PO  Last administered on 9/22/ 17at 20:34; Admin Dose 10 MG;  Start 9/20/17 at 21:00


Docusate Sodium (Colace) 100 mg BID PO  Last administered on 9/23/17at 08:45; 

Admin Dose 100 MG;  Start 9/20/17 at 09:00


Senna (Senokot) 1 tab HS PO  Last administered on 9/22/17at 20:34; Admin Dose 1 

TAB;  Start 9/20/17 at 21:00


Magnesium Hydroxide (Milk Of Mag) 30 ml BID  PRN PO CONSTIPATION;  Start 9/20/ 17 at 00:00


Lactulose (Enulose) 20 gm DAILY  PRN PO CONSTIPATION Last administered on 9/22/ 17at 09:01; Admin Dose 20 GM;  Start 9/20/17 at 00:00


Bisacodyl (Dulcolax Supp) 10 mg DAILY  PRN [place] CONSTIPATION;  Start 9/20/17 at 00

:00


Acetaminophen (Tylenol Tab) 650 mg Q4H  PRN PO PAIN;  Start 9/20/17 at 00:00











MEENA CASTILLO Sep 23, 2017 13:21

## 2017-09-23 NOTE — CONS
Date/Time of Note


Date/Time of Note


DATE: 9/23/17 


TIME: 11:30





Consult Date/Type/Reason


Admit Date/Time


Sep 19, 2017 at 23:18





Subjective


Comfortable





Objective


pulm-cta


min assist 30 feet


 Vital Signs








  Date Time  Temp Pulse Resp B/P Pulse Ox O2 Delivery O2 Flow Rate FiO2


 


9/23/17 02:00 98.3 71 18 140/73 97   














 Intake and Output   


 


 9/22/17 9/22/17 9/23/17





 15:00 23:00 07:00


 


Intake Total  600 ml 550 ml


 


Output Total  200 ml 


 


Balance  400 ml 550 ml











Results/Medications


Result Diagram:  


9/20/17 0649                                                                   

             9/20/17 0649





Medications





 Current Medications


Amlodipine Besylate (Norvasc) 5 mg DAILY PO  Last administered on 9/23/17at 08:

45; Admin Dose 5 MG;  Start 9/20/17 at 09:00


Lisinopril (Zestril) 40 mg DAILY PO  Last administered on 9/23/17at 08:45; 

Admin Dose 40 MG;  Start 9/20/17 at 09:00


Clonidine (Catapres) 0.2 mg Q8H  PRN PO SBP ABOVE 170mmHg;  Start 9/19/17 at 23:

45


Magnesium Hydroxide (Milk Of Mag) 30 ml DAILY  PRN PO CONSTIPATION;  Start 9/19/ 17 at 23:45


Miscellaneous Information 1 ea NOTE XX ;  Start 9/19/17 at 23:45


Glucose (Glutose) 15 gm Q15M  PRN PO DECREASED GLUCOSE;  Start 9/19/17 at 23:45


Glucose (Glutose) 22.5 gm Q15M  PRN PO DECREASED GLUCOSE;  Start 9/19/17 at 23:

45


Dextrose (D50w Syringe) 25 ml Q15M  PRN IV DECREASED GLUCOSE;  Start 9/19/17 at 

23:45


Dextrose (D50w Syringe) 50 ml Q15M  PRN IV DECREASED GLUCOSE;  Start 9/19/17 at 

23:45


Glucagon (Glucagen) 1 mg Q15M  PRN IM DECREASED GLUCOSE;  Start 9/19/17 at 23:45


Glucose (Glutose) 15 gm Q15M  PRN BUCCAL DECREASED GLUCOSE;  Start 9/19/17 at 23

:45


Acetaminophen (Tylenol Tab) 650 mg Q6H  PRN PO PAIN AND OR ELEVATED TEMP;  

Start 9/19/17 at 23:45


Atorvastatin Calcium (Lipitor) 10 mg DAILY@21 PO  Last administered on 9/22/ 17at 20:34; Admin Dose 10 MG;  Start 9/20/17 at 21:00


Docusate Sodium (Colace) 100 mg BID PO  Last administered on 9/23/17at 08:45; 

Admin Dose 100 MG;  Start 9/20/17 at 09:00


Senna (Senokot) 1 tab HS PO  Last administered on 9/22/17at 20:34; Admin Dose 1 

TAB;  Start 9/20/17 at 21:00


Magnesium Hydroxide (Milk Of Mag) 30 ml BID  PRN PO CONSTIPATION;  Start 9/20/ 17 at 00:00


Lactulose (Enulose) 20 gm DAILY  PRN PO CONSTIPATION Last administered on 9/22/ 17at 09:01; Admin Dose 20 GM;  Start 9/20/17 at 00:00


Bisacodyl (Dulcolax Supp) 10 mg DAILY  PRN [place] CONSTIPATION;  Start 9/20/17 at 00

:00


Acetaminophen (Tylenol Tab) 650 mg Q4H  PRN PO PAIN;  Start 9/20/17 at 00:00





Assessment/Plan


Additional Assessment/Plan


Rehab- Normal-pressure hydrocephalus, encephalopathy.


    Continue rehab plan


Hypertension.


Diabetes mellitus.


Hyperlipidemia.











ANTHONY ZHANG MD Sep 23, 2017 11:31

## 2017-09-24 VITALS — HEART RATE: 55 BPM | SYSTOLIC BLOOD PRESSURE: 184 MMHG | DIASTOLIC BLOOD PRESSURE: 74 MMHG | RESPIRATION RATE: 18 BRPM

## 2017-09-24 VITALS — SYSTOLIC BLOOD PRESSURE: 145 MMHG | RESPIRATION RATE: 18 BRPM | DIASTOLIC BLOOD PRESSURE: 70 MMHG

## 2017-09-24 VITALS — SYSTOLIC BLOOD PRESSURE: 140 MMHG | DIASTOLIC BLOOD PRESSURE: 80 MMHG

## 2017-09-24 VITALS — DIASTOLIC BLOOD PRESSURE: 72 MMHG | SYSTOLIC BLOOD PRESSURE: 140 MMHG | HEART RATE: 68 BPM | RESPIRATION RATE: 16 BRPM

## 2017-09-24 VITALS — SYSTOLIC BLOOD PRESSURE: 184 MMHG | DIASTOLIC BLOOD PRESSURE: 74 MMHG | RESPIRATION RATE: 18 BRPM

## 2017-09-24 RX ADMIN — LISINOPRIL SCH MG: 20 TABLET ORAL at 09:39

## 2017-09-24 RX ADMIN — ATORVASTATIN CALCIUM SCH MG: 10 TABLET, FILM COATED ORAL at 20:41

## 2017-09-24 RX ADMIN — DOCUSATE SODIUM SCH MG: 100 CAPSULE, LIQUID FILLED ORAL at 09:39

## 2017-09-24 RX ADMIN — LACTULOSE PRN GM: 10 SOLUTION ORAL at 14:48

## 2017-09-24 RX ADMIN — SENNOSIDES SCH TAB: 8.6 TABLET, FILM COATED ORAL at 20:41

## 2017-09-24 RX ADMIN — AMLODIPINE BESYLATE SCH MG: 5 TABLET ORAL at 09:38

## 2017-09-24 RX ADMIN — DOCUSATE SODIUM SCH MG: 100 CAPSULE, LIQUID FILLED ORAL at 20:41

## 2017-09-25 VITALS — SYSTOLIC BLOOD PRESSURE: 154 MMHG | RESPIRATION RATE: 18 BRPM | DIASTOLIC BLOOD PRESSURE: 67 MMHG

## 2017-09-25 VITALS — DIASTOLIC BLOOD PRESSURE: 71 MMHG | RESPIRATION RATE: 20 BRPM | SYSTOLIC BLOOD PRESSURE: 148 MMHG

## 2017-09-25 VITALS — DIASTOLIC BLOOD PRESSURE: 73 MMHG | SYSTOLIC BLOOD PRESSURE: 135 MMHG | RESPIRATION RATE: 18 BRPM

## 2017-09-25 RX ADMIN — ATORVASTATIN CALCIUM SCH MG: 10 TABLET, FILM COATED ORAL at 20:56

## 2017-09-25 RX ADMIN — SENNOSIDES SCH TAB: 8.6 TABLET, FILM COATED ORAL at 20:56

## 2017-09-25 RX ADMIN — DOCUSATE SODIUM SCH MG: 100 CAPSULE, LIQUID FILLED ORAL at 09:29

## 2017-09-25 RX ADMIN — AMLODIPINE BESYLATE SCH MG: 5 TABLET ORAL at 09:29

## 2017-09-25 RX ADMIN — DOCUSATE SODIUM SCH MG: 100 CAPSULE, LIQUID FILLED ORAL at 20:56

## 2017-09-25 RX ADMIN — LISINOPRIL SCH MG: 20 TABLET ORAL at 09:30

## 2017-09-25 NOTE — CONS
Date/Time of Note


Date/Time of Note


DATE: 9/25/17 


TIME: 09:37





Consult Date/Type/Reason


Admit Date/Time


Sep 19, 2017 at 23:18


Type of Consultation:  Internal medicine





Objective





 Vital Signs








  Date Time  Temp Pulse Resp B/P Pulse Ox O2 Delivery O2 Flow Rate FiO2


 


9/25/17 02:00 98.4 65 18 135/73 95   


 


9/24/17 07:30      Room Air  














 Intake and Output   


 


 9/24/17 9/24/17 9/25/17





 15:00 23:00 07:00


 


Intake Total  800 ml 


 


Output Total  600 ml 


 


Balance  200 ml 





INTERDISCIPLINARY TEAM CONFERENCE





BOWEL- Cont


BLADDER-Cont 


SKIN- intact











OT-     DRESSING-mod


   BATHING-mod


   TOILETING-mod





PT-      BED MOBILITY-min


   TRANSFERS-min


   AMBULATION-min 70 








SPEECH- 


COGNITION-mod


   DYPHAGIA








A/P-


Interdisciplinary team conference held today. Please see interdisciplinary 

sheet.  Working toward d.c. on 9/29 with post discharge follow up of physical 

therapy, occupational therapy.





Results/Medications


Results 24 hrs





Laboratory Tests








Test


  9/25/17


07:55


 


Bedside Glucose 101  








Medications





 Current Medications


Amlodipine Besylate (Norvasc) 5 mg DAILY PO  Last administered on 9/25/17at 09:

29; Admin Dose 5 MG;  Start 9/20/17 at 09:00


Lisinopril (Zestril) 40 mg DAILY PO  Last administered on 9/25/17at 09:30; 

Admin Dose 40 MG;  Start 9/20/17 at 09:00


Clonidine (Catapres) 0.2 mg Q8H  PRN PO SBP ABOVE 170mmHg;  Start 9/19/17 at 23:

45


Magnesium Hydroxide (Milk Of Mag) 30 ml DAILY  PRN PO CONSTIPATION Last 

administered on 9/23/17at 17:17; Admin Dose 30 ML;  Start 9/19/17 at 23:45


Miscellaneous Information 1 ea NOTE XX ;  Start 9/19/17 at 23:45


Glucose (Glutose) 15 gm Q15M  PRN PO DECREASED GLUCOSE;  Start 9/19/17 at 23:45


Glucose (Glutose) 22.5 gm Q15M  PRN PO DECREASED GLUCOSE;  Start 9/19/17 at 23:

45


Dextrose (D50w Syringe) 25 ml Q15M  PRN IV DECREASED GLUCOSE;  Start 9/19/17 at 

23:45


Dextrose (D50w Syringe) 50 ml Q15M  PRN IV DECREASED GLUCOSE;  Start 9/19/17 at 

23:45


Glucagon (Glucagen) 1 mg Q15M  PRN IM DECREASED GLUCOSE;  Start 9/19/17 at 23:45


Glucose (Glutose) 15 gm Q15M  PRN BUCCAL DECREASED GLUCOSE;  Start 9/19/17 at 23

:45


Acetaminophen (Tylenol Tab) 650 mg Q6H  PRN PO PAIN AND OR ELEVATED TEMP;  

Start 9/19/17 at 23:45


Atorvastatin Calcium (Lipitor) 10 mg DAILY@21 PO  Last administered on 9/24/ 17at 20:41; Admin Dose 10 MG;  Start 9/20/17 at 21:00


Docusate Sodium (Colace) 100 mg BID PO  Last administered on 9/25/17at 09:29; 

Admin Dose 100 MG;  Start 9/20/17 at 09:00


Senna (Senokot) 1 tab HS PO  Last administered on 9/24/17at 20:41; Admin Dose 1 

TAB;  Start 9/20/17 at 21:00


Magnesium Hydroxide (Milk Of Mag) 30 ml BID  PRN PO CONSTIPATION;  Start 9/20/ 17 at 00:00


Lactulose (Enulose) 20 gm DAILY  PRN PO CONSTIPATION Last administered on 9/24/ 17at 14:48; Admin Dose 20 GM;  Start 9/20/17 at 00:00


Bisacodyl (Dulcolax Supp) 10 mg DAILY  PRN [place] CONSTIPATION;  Start 9/20/17 at 00

:00


Acetaminophen (Tylenol Tab) 650 mg Q4H  PRN PO PAIN;  Start 9/20/17 at 00:00











ANTHONY ZHANG MD Sep 25, 2017 09:38

## 2017-09-25 NOTE — CONS
Date/Time of Note


Date/Time of Note


DATE: 9/25/17 


TIME: 16:48





Consult Date/Type/Reason


Admit Date/Time


Sep 19, 2017 at 23:18


Initial Consult Date





Type of Consultation:  Neurology





Subjective


received LP for possible NPH


reported improvement in her gait previously


no new changes





Objective





 Vital Signs








  Date Time  Temp Pulse Resp B/P Pulse Ox O2 Delivery O2 Flow Rate FiO2


 


9/25/17 07:30 98.0 56 20 148/71 98   


 


9/24/17 07:30      Room Air  














 Intake and Output   


 


 9/24/17 9/24/17 9/25/17





 15:00 23:00 07:00


 


Intake Total  800 ml 


 


Output Total  600 ml 


 


Balance  200 ml 








Exam


awake and alert


oriented to self hospital


recognizes examiner 


follows commands 


masked facies





CN: II-XII intact


Motor: mild slowing on left compared to right FTN testing 5-/5 strength on left


Reflexes 1+ throughout toes down


Coord no ataxia





Results/Medications


Results 24 hrs





Laboratory Tests








Test


  9/25/17


07:55


 


Bedside Glucose 101  








Medications





 Current Medications


Amlodipine Besylate (Norvasc) 5 mg DAILY PO  Last administered on 9/25/17at 09:

29; Admin Dose 5 MG;  Start 9/20/17 at 09:00


Lisinopril (Zestril) 40 mg DAILY PO  Last administered on 9/25/17at 09:30; 

Admin Dose 40 MG;  Start 9/20/17 at 09:00


Clonidine (Catapres) 0.2 mg Q8H  PRN PO SBP ABOVE 170mmHg;  Start 9/19/17 at 23:

45


Magnesium Hydroxide (Milk Of Mag) 30 ml DAILY  PRN PO CONSTIPATION Last 

administered on 9/23/17at 17:17; Admin Dose 30 ML;  Start 9/19/17 at 23:45


Miscellaneous Information 1 ea NOTE XX ;  Start 9/19/17 at 23:45


Glucose (Glutose) 15 gm Q15M  PRN PO DECREASED GLUCOSE;  Start 9/19/17 at 23:45


Glucose (Glutose) 22.5 gm Q15M  PRN PO DECREASED GLUCOSE;  Start 9/19/17 at 23:

45


Dextrose (D50w Syringe) 25 ml Q15M  PRN IV DECREASED GLUCOSE;  Start 9/19/17 at 

23:45


Dextrose (D50w Syringe) 50 ml Q15M  PRN IV DECREASED GLUCOSE;  Start 9/19/17 at 

23:45


Glucagon (Glucagen) 1 mg Q15M  PRN IM DECREASED GLUCOSE;  Start 9/19/17 at 23:45


Glucose (Glutose) 15 gm Q15M  PRN BUCCAL DECREASED GLUCOSE;  Start 9/19/17 at 23

:45


Acetaminophen (Tylenol Tab) 650 mg Q6H  PRN PO PAIN AND OR ELEVATED TEMP;  

Start 9/19/17 at 23:45


Atorvastatin Calcium (Lipitor) 10 mg DAILY@21 PO  Last administered on 9/24/ 17at 20:41; Admin Dose 10 MG;  Start 9/20/17 at 21:00


Docusate Sodium (Colace) 100 mg BID PO  Last administered on 9/25/17at 09:29; 

Admin Dose 100 MG;  Start 9/20/17 at 09:00


Senna (Senokot) 1 tab HS PO  Last administered on 9/24/17at 20:41; Admin Dose 1 

TAB;  Start 9/20/17 at 21:00


Magnesium Hydroxide (Milk Of Mag) 30 ml BID  PRN PO CONSTIPATION;  Start 9/20/ 17 at 00:00


Lactulose (Enulose) 20 gm DAILY  PRN PO CONSTIPATION Last administered on 9/24/ 17at 14:48; Admin Dose 20 GM;  Start 9/20/17 at 00:00


Bisacodyl (Dulcolax Supp) 10 mg DAILY  PRN [place] CONSTIPATION;  Start 9/20/17 at 00

:00


Acetaminophen (Tylenol Tab) 650 mg Q4H  PRN PO PAIN;  Start 9/20/17 at 00:00





Assessment/Plan


Chief Complaint/Hosp Course


77 year old female with cognitive decline seen during hospitalization for left 

sided weakness with enlarged ventricles received large volume tap with reported 

gait improvement.


symptoms are now stable no further worsening admitted to AR.





Recommend:


follow up outpatient with neurology neurosurgery to consider shunt placement


further outpatient cognitive w/u


will contact daughter to discuss


Problems:  











RENE CAUSEY MD Sep 25, 2017 16:52

## 2017-09-25 NOTE — PN
Date/Time of Note


Date/Time of Note


DATE: 9/25/17 


TIME: 15:37





Assessment/Plan


VTE Prophylaxis


VTE Prophylaxis Intervention:  ambulation





Lines/Catheters


IV Catheter Type (from [facility]):  Saline Lock


Urinary Cath still in place:  No





Assessment/Plan


Chief Complaint/Hosp Course





78 yo female with NPH admitted to ARU for further rehabilitation 2/2 debility.





1.  Debility secondary to possible  normal pressure hydrocephalus. Status post 

LP


-Post Neurology/Neurosurgery eval, no immediate plans for surgery at this time-

Needs outpt NS follow-up.


--PT/OT eval & treatment





2.  Hypertension


-On Lisinopril/Amlodipine.





3.  DMII-Controlled.


-On metformin





4.  Hyperlipidemia


-On statin





5. Neutropenia,Mild- Stable


-Monitor 





Patient was seen in collaboration with .


Problems:  





Subjective


24 Hr Interval Summary


Free Text/Dictation


No acute distress. Doing well,participates with PT.





Exam/Review of Systems


Vital Signs


Vitals





 Vital Signs








  Date Time  Temp Pulse Resp B/P Pulse Ox O2 Delivery O2 Flow Rate FiO2


 


9/25/17 07:30 98.0 56 20 148/71 98   


 


9/24/17 07:30      Room Air  














 Intake and Output   


 


 9/24/17 9/24/17 9/25/17





 15:00 23:00 07:00


 


Intake Total  800 ml 


 


Output Total  600 ml 


 


Balance  200 ml 











Exam


General:Elderly thin looking female, not in any acute distress .


HEENT: Normocephalic, Atraumatic, No laceration or hematoma; Eyes: PEERL, 

Conjunctiva clear, Anicteric sclera Neck: Supple without any lymphadenopathy, 

nontender, no JVD, no carotid bruits, trachea midline, no thyromegaly


Cardiac: S1, S2 auscultated, regular rhythm and rate, no mumurs or gallop


Pulmonary: Normal respiratory effort. Chest clear to auscultation bilaterally, 

no adventitious breath sounds


GI: Abdomen normal to inspection. Soft,  non- distended, no masses, no rebound 

tenderness or guarding. Bowel sounds active on all four quadrants


Genitourinary: Deferred


Extremities:Weakness LLE+ No cyanosis, clubbing, or edema. Pulses [2+] 

bilaterally. Full ROM on all four extremities. No focal weakness appreciated.


Neurologic: Mild cognitive impairment+. Alert to person, place, time, and 

situation. Affect appropriate, intact sensation.


Skin: Clean,dry, and intact. No ecchymosis, no rashes, or lesions





Results


Results 24 hrs





Laboratory Tests








Test


  9/25/17


07:55


 


Bedside Glucose 101  











Medications


Medications





 Current Medications


Amlodipine Besylate (Norvasc) 5 mg DAILY PO  Last administered on 9/25/17at 09:

29; Admin Dose 5 MG;  Start 9/20/17 at 09:00


Lisinopril (Zestril) 40 mg DAILY PO  Last administered on 9/25/17at 09:30; 

Admin Dose 40 MG;  Start 9/20/17 at 09:00


Clonidine (Catapres) 0.2 mg Q8H  PRN PO SBP ABOVE 170mmHg;  Start 9/19/17 at 23:

45


Magnesium Hydroxide (Milk Of Mag) 30 ml DAILY  PRN PO CONSTIPATION Last 

administered on 9/23/17at 17:17; Admin Dose 30 ML;  Start 9/19/17 at 23:45


Miscellaneous Information 1 ea NOTE XX ;  Start 9/19/17 at 23:45


Glucose (Glutose) 15 gm Q15M  PRN PO DECREASED GLUCOSE;  Start 9/19/17 at 23:45


Glucose (Glutose) 22.5 gm Q15M  PRN PO DECREASED GLUCOSE;  Start 9/19/17 at 23:

45


Dextrose (D50w Syringe) 25 ml Q15M  PRN IV DECREASED GLUCOSE;  Start 9/19/17 at 

23:45


Dextrose (D50w Syringe) 50 ml Q15M  PRN IV DECREASED GLUCOSE;  Start 9/19/17 at 

23:45


Glucagon (Glucagen) 1 mg Q15M  PRN IM DECREASED GLUCOSE;  Start 9/19/17 at 23:45


Glucose (Glutose) 15 gm Q15M  PRN BUCCAL DECREASED GLUCOSE;  Start 9/19/17 at 23

:45


Acetaminophen (Tylenol Tab) 650 mg Q6H  PRN PO PAIN AND OR ELEVATED TEMP;  

Start 9/19/17 at 23:45


Atorvastatin Calcium (Lipitor) 10 mg DAILY@21 PO  Last administered on 9/24/ 17at 20:41; Admin Dose 10 MG;  Start 9/20/17 at 21:00


Docusate Sodium (Colace) 100 mg BID PO  Last administered on 9/25/17at 09:29; 

Admin Dose 100 MG;  Start 9/20/17 at 09:00


Senna (Senokot) 1 tab HS PO  Last administered on 9/24/17at 20:41; Admin Dose 1 

TAB;  Start 9/20/17 at 21:00


Magnesium Hydroxide (Milk Of Mag) 30 ml BID  PRN PO CONSTIPATION;  Start 9/20/ 17 at 00:00


Lactulose (Enulose) 20 gm DAILY  PRN PO CONSTIPATION Last administered on 9/24/ 17at 14:48; Admin Dose 20 GM;  Start 9/20/17 at 00:00


Bisacodyl (Dulcolax Supp) 10 mg DAILY  PRN [place] CONSTIPATION;  Start 9/20/17 at 00

:00


Acetaminophen (Tylenol Tab) 650 mg Q4H  PRN PO PAIN;  Start 9/20/17 at 00:00











JESS BARONE NP Sep 25, 2017 15:38

## 2017-09-26 VITALS — SYSTOLIC BLOOD PRESSURE: 162 MMHG | RESPIRATION RATE: 18 BRPM | DIASTOLIC BLOOD PRESSURE: 74 MMHG

## 2017-09-26 VITALS — DIASTOLIC BLOOD PRESSURE: 70 MMHG | SYSTOLIC BLOOD PRESSURE: 149 MMHG | RESPIRATION RATE: 18 BRPM

## 2017-09-26 VITALS — SYSTOLIC BLOOD PRESSURE: 136 MMHG | DIASTOLIC BLOOD PRESSURE: 73 MMHG | RESPIRATION RATE: 18 BRPM

## 2017-09-26 VITALS — SYSTOLIC BLOOD PRESSURE: 149 MMHG | DIASTOLIC BLOOD PRESSURE: 70 MMHG | HEART RATE: 85 BPM | RESPIRATION RATE: 20 BRPM

## 2017-09-26 RX ADMIN — ATORVASTATIN CALCIUM SCH MG: 10 TABLET, FILM COATED ORAL at 20:13

## 2017-09-26 RX ADMIN — DOCUSATE SODIUM SCH MG: 100 CAPSULE, LIQUID FILLED ORAL at 20:13

## 2017-09-26 RX ADMIN — DOCUSATE SODIUM SCH MG: 100 CAPSULE, LIQUID FILLED ORAL at 08:34

## 2017-09-26 RX ADMIN — LISINOPRIL SCH MG: 20 TABLET ORAL at 08:35

## 2017-09-26 RX ADMIN — SENNOSIDES SCH TAB: 8.6 TABLET, FILM COATED ORAL at 20:13

## 2017-09-26 RX ADMIN — AMLODIPINE BESYLATE SCH MG: 5 TABLET ORAL at 08:44

## 2017-09-26 NOTE — CONS
Date/Time of Note


Date/Time of Note


DATE: 9/26/17 


TIME: 14:35





Assessment/Plan


Assessment/Plan


Chief Complaint/Hosp Course





78 yo female with NPH admitted to ARU for further rehabilitation 2/2 debility.





1.  Debility secondary to possible  normal pressure hydrocephalus. Status post 

LP


-Post Neurology/Neurosurgery eval, no immediate plans for surgery at this time-

Needs outpt NS follow-up.


--PT/OT eval & treatment





2.  Hypertension


-On Lisinopril/Amlodipine.





3.  DMII-Controlled on diet.  A1c 6.5.


-Recommend outpatient repeat A1c in 2 months.





4.  Hyperlipidemia


-On statin





5. Neutropenia,Mild- Stable


-Monitor 





Patient was seen in collaboration with .


Problems:  





Consultation Date/Type/Reason


Admit Date/Time


Sep 19, 2017 at 23:18


Type of Consultation:  Neurology





24 HR Interval Summary


Free Text/Dictation


No acute distress. Sitting up in wheelchair.





Exam/Review of Systems


Vital Signs


Vitals





 Vital Signs








  Date Time  Temp Pulse Resp B/P Pulse Ox O2 Delivery O2 Flow Rate FiO2


 


9/26/17 08:00 98.3 85 20 149/70 96 Room Air  











Exam


General:Elderly thin looking female, not in any acute distress .


HEENT: Normocephalic, Atraumatic, No laceration or hematoma; Eyes: PEERL, 

Conjunctiva clear, Anicteric sclera Neck: Supple without any lymphadenopathy, 

nontender, no JVD, no carotid bruits, trachea midline, no thyromegaly


Cardiac: S1, S2 auscultated, regular rhythm and rate, no mumurs or gallop


Pulmonary: Normal respiratory effort. Chest clear to auscultation bilaterally, 

no adventitious breath sounds


GI: Abdomen normal to inspection. Soft,  non- distended, no masses, no rebound 

tenderness or guarding. Bowel sounds active on all four quadrants


Genitourinary: Deferred


Extremities:Weakness LLE+ No cyanosis, clubbing, or edema. Pulses [2+] 

bilaterally. Full ROM on all four extremities. No focal weakness appreciated.


Neurologic: Mild cognitive impairment+. Alert to person, place, time, and 

situation. Affect appropriate, intact sensation.


Skin: Clean,dry, and intact. No ecchymosis, no rashes, or lesions





Medications


Medications





 Current Medications


Amlodipine Besylate (Norvasc) 5 mg DAILY PO  Last administered on 9/26/17at 08:

44; Admin Dose 5 MG;  Start 9/20/17 at 09:00


Lisinopril (Zestril) 40 mg DAILY PO  Last administered on 9/26/17at 08:35; 

Admin Dose 40 MG;  Start 9/20/17 at 09:00


Clonidine (Catapres) 0.2 mg Q8H  PRN PO SBP ABOVE 170mmHg;  Start 9/19/17 at 23:

45


Magnesium Hydroxide (Milk Of Mag) 30 ml DAILY  PRN PO CONSTIPATION Last 

administered on 9/23/17at 17:17; Admin Dose 30 ML;  Start 9/19/17 at 23:45


Miscellaneous Information 1 ea NOTE XX ;  Start 9/19/17 at 23:45


Glucose (Glutose) 15 gm Q15M  PRN PO DECREASED GLUCOSE;  Start 9/19/17 at 23:45


Glucose (Glutose) 22.5 gm Q15M  PRN PO DECREASED GLUCOSE;  Start 9/19/17 at 23:

45


Dextrose (D50w Syringe) 25 ml Q15M  PRN IV DECREASED GLUCOSE;  Start 9/19/17 at 

23:45


Dextrose (D50w Syringe) 50 ml Q15M  PRN IV DECREASED GLUCOSE;  Start 9/19/17 at 

23:45


Glucagon (Glucagen) 1 mg Q15M  PRN IM DECREASED GLUCOSE;  Start 9/19/17 at 23:45


Glucose (Glutose) 15 gm Q15M  PRN BUCCAL DECREASED GLUCOSE;  Start 9/19/17 at 23

:45


Acetaminophen (Tylenol Tab) 650 mg Q6H  PRN PO PAIN AND OR ELEVATED TEMP;  

Start 9/19/17 at 23:45


Atorvastatin Calcium (Lipitor) 10 mg DAILY@21 PO  Last administered on 9/25/ 17at 20:56; Admin Dose 10 MG;  Start 9/20/17 at 21:00


Docusate Sodium (Colace) 100 mg BID PO  Last administered on 9/26/17at 08:34; 

Admin Dose 100 MG;  Start 9/20/17 at 09:00


Senna (Senokot) 1 tab HS PO  Last administered on 9/25/17at 20:56; Admin Dose 1 

TAB;  Start 9/20/17 at 21:00


Magnesium Hydroxide (Milk Of Mag) 30 ml BID  PRN PO CONSTIPATION;  Start 9/20/ 17 at 00:00


Lactulose (Enulose) 20 gm DAILY  PRN PO CONSTIPATION Last administered on 9/24/ 17at 14:48; Admin Dose 20 GM;  Start 9/20/17 at 00:00


Bisacodyl (Dulcolax Supp) 10 mg DAILY  PRN [place] CONSTIPATION;  Start 9/20/17 at 00

:00


Acetaminophen (Tylenol Tab) 650 mg Q4H  PRN PO PAIN;  Start 9/20/17 at 00:00











JESS BARONE NP Sep 26, 2017 14:37


JESS BARONE NP Sep 26, 2017 14:37

## 2017-09-26 NOTE — CONS
Date/Time of Note


Date/Time of Note


DATE: 9/26/17 


TIME: 11:20





Consult Date/Type/Reason


Admit Date/Time


Sep 19, 2017 at 23:18


Type of Consultation:  Neurology





Subjective


Comfortable





Objective


[id number]


min 80 feet





 Vital Signs








  Date Time  Temp Pulse Resp B/P Pulse Ox O2 Delivery O2 Flow Rate FiO2


 


9/26/17 08:00 98.3 85 20 149/70 96 Room Air  











Results/Medications


Medications





 Current Medications


Amlodipine Besylate (Norvasc) 5 mg DAILY PO  Last administered on 9/26/17at 08:

44; Admin Dose 5 MG;  Start 9/20/17 at 09:00


Lisinopril (Zestril) 40 mg DAILY PO  Last administered on 9/26/17at 08:35; 

Admin Dose 40 MG;  Start 9/20/17 at 09:00


Clonidine (Catapres) 0.2 mg Q8H  PRN PO SBP ABOVE 170mmHg;  Start 9/19/17 at 23:

45


Magnesium Hydroxide (Milk Of Mag) 30 ml DAILY  PRN PO CONSTIPATION Last 

administered on 9/23/17at 17:17; Admin Dose 30 ML;  Start 9/19/17 at 23:45


Miscellaneous Information 1 ea NOTE XX ;  Start 9/19/17 at 23:45


Glucose (Glutose) 15 gm Q15M  PRN PO DECREASED GLUCOSE;  Start 9/19/17 at 23:45


Glucose (Glutose) 22.5 gm Q15M  PRN PO DECREASED GLUCOSE;  Start 9/19/17 at 23:

45


Dextrose (D50w Syringe) 25 ml Q15M  PRN IV DECREASED GLUCOSE;  Start 9/19/17 at 

23:45


Dextrose (D50w Syringe) 50 ml Q15M  PRN IV DECREASED GLUCOSE;  Start 9/19/17 at 

23:45


Glucagon (Glucagen) 1 mg Q15M  PRN IM DECREASED GLUCOSE;  Start 9/19/17 at 23:45


Glucose (Glutose) 15 gm Q15M  PRN BUCCAL DECREASED GLUCOSE;  Start 9/19/17 at 23

:45


Acetaminophen (Tylenol Tab) 650 mg Q6H  PRN PO PAIN AND OR ELEVATED TEMP;  

Start 9/19/17 at 23:45


Atorvastatin Calcium (Lipitor) 10 mg DAILY@21 PO  Last administered on 9/25/ 17at 20:56; Admin Dose 10 MG;  Start 9/20/17 at 21:00


Docusate Sodium (Colace) 100 mg BID PO  Last administered on 9/26/17at 08:34; 

Admin Dose 100 MG;  Start 9/20/17 at 09:00


Senna (Senokot) 1 tab HS PO  Last administered on 9/25/17at 20:56; Admin Dose 1 

TAB;  Start 9/20/17 at 21:00


Magnesium Hydroxide (Milk Of Mag) 30 ml BID  PRN PO CONSTIPATION;  Start 9/20/ 17 at 00:00


Lactulose (Enulose) 20 gm DAILY  PRN PO CONSTIPATION Last administered on 9/24/ 17at 14:48; Admin Dose 20 GM;  Start 9/20/17 at 00:00


Bisacodyl (Dulcolax Supp) 10 mg DAILY  PRN [place] CONSTIPATION;  Start 9/20/17 at 00

:00


Acetaminophen (Tylenol Tab) 650 mg Q4H  PRN PO PAIN;  Start 9/20/17 at 00:00





Assessment/Plan


Additional Assessment/Plan


Rehab- Normal-pressure hydrocephalus, encephalopathy.


    Continue increased rehab activities as tolerated


Hypertension.


Diabetes mellitus.


Hyperlipidemia.











ANTHONY ZHANG MD Sep 26, 2017 11:23

## 2017-09-27 VITALS — SYSTOLIC BLOOD PRESSURE: 150 MMHG | RESPIRATION RATE: 18 BRPM | DIASTOLIC BLOOD PRESSURE: 74 MMHG

## 2017-09-27 VITALS — RESPIRATION RATE: 18 BRPM | SYSTOLIC BLOOD PRESSURE: 157 MMHG | DIASTOLIC BLOOD PRESSURE: 81 MMHG

## 2017-09-27 RX ADMIN — LISINOPRIL SCH MG: 20 TABLET ORAL at 09:36

## 2017-09-27 RX ADMIN — DOCUSATE SODIUM SCH MG: 100 CAPSULE, LIQUID FILLED ORAL at 20:28

## 2017-09-27 RX ADMIN — SENNOSIDES SCH TAB: 8.6 TABLET, FILM COATED ORAL at 20:28

## 2017-09-27 RX ADMIN — DOCUSATE SODIUM SCH MG: 100 CAPSULE, LIQUID FILLED ORAL at 09:35

## 2017-09-27 RX ADMIN — ATORVASTATIN CALCIUM SCH MG: 10 TABLET, FILM COATED ORAL at 20:28

## 2017-09-27 RX ADMIN — AMLODIPINE BESYLATE SCH MG: 5 TABLET ORAL at 09:37

## 2017-09-27 NOTE — PN
DATE:  09/27/2017

 

PSYCHOLOGY -- INDIVIDUAL SESSION -- [id number]:  This is a followup on

a patient who was seen last week.  The patient was seen in bed.

The patient still is very withdrawn and depressed.  The patient

does feel lethargic and gets tired very easily.  The patient is

having difficulty coping but does feel like she is making

progress while she is in the program.  Worked with the patient to

encourage her to continue to work on her physical and emotional

health issues.

 

 

 

Dictated By:  Simeon Gamble, PHD

 

/[email or web address]

Job#:  [id number]/Document#:  [id number]

D:  09/27/2017 17:05

T:  09/27/2017 18:48

## 2017-09-28 VITALS — RESPIRATION RATE: 18 BRPM | DIASTOLIC BLOOD PRESSURE: 66 MMHG | SYSTOLIC BLOOD PRESSURE: 139 MMHG

## 2017-09-28 VITALS — SYSTOLIC BLOOD PRESSURE: 158 MMHG | DIASTOLIC BLOOD PRESSURE: 71 MMHG | RESPIRATION RATE: 18 BRPM

## 2017-09-28 VITALS — DIASTOLIC BLOOD PRESSURE: 69 MMHG | SYSTOLIC BLOOD PRESSURE: 140 MMHG | RESPIRATION RATE: 18 BRPM

## 2017-09-28 VITALS — RESPIRATION RATE: 18 BRPM | DIASTOLIC BLOOD PRESSURE: 70 MMHG | SYSTOLIC BLOOD PRESSURE: 138 MMHG

## 2017-09-28 RX ADMIN — AMLODIPINE BESYLATE SCH MG: 5 TABLET ORAL at 09:09

## 2017-09-28 RX ADMIN — DOCUSATE SODIUM SCH MG: 100 CAPSULE, LIQUID FILLED ORAL at 09:09

## 2017-09-28 RX ADMIN — DOCUSATE SODIUM SCH MG: 100 CAPSULE, LIQUID FILLED ORAL at 20:51

## 2017-09-28 RX ADMIN — LISINOPRIL SCH MG: 20 TABLET ORAL at 09:09

## 2017-09-28 RX ADMIN — SENNOSIDES SCH TAB: 8.6 TABLET, FILM COATED ORAL at 20:51

## 2017-09-28 RX ADMIN — ATORVASTATIN CALCIUM SCH MG: 10 TABLET, FILM COATED ORAL at 20:51

## 2017-09-28 NOTE — CONS
Date/Time of Note


Date/Time of Note


DATE: 9/28/17 


TIME: 13:22





Assessment/Plan


Assessment/Plan


Chief Complaint/Hosp Course





76 yo female with NPH admitted to ARU for further rehabilitation 2/2 debility.





1.  Debility secondary to possible  normal pressure hydrocephalus. Status post 

LP


-Post Neurology/Neurosurgery eval, no immediate plans for surgery at this time-

Needs outpt NS follow-up.


--PT/OT eval & treatment





2.  Hypertension


-On Lisinopril/Amlodipine.





3.  DMII-Controlled on diet.  A1c 6.5.


-Recommend outpatient repeat A1c in 2 months.





4.  Hyperlipidemia


-On statin





5. Neutropenia,Mild- Stable


-Monitor 





Patient was seen in collaboration with .


Problems:  





Consultation Date/Type/Reason


Admit Date/Time


Sep 19, 2017 at 23:18


Type of Consultation:  Neurology





24 HR Interval Summary


Free Text/Dictation


no acute changes.Participates with PT





Exam/Review of Systems


Vital Signs


Vitals





 Vital Signs








  Date Time  Temp Pulse Resp B/P Pulse Ox O2 Delivery O2 Flow Rate FiO2


 


9/28/17 07:30 97.8 63 18 140/69 96   


 


9/26/17 08:00      Room Air  














 Intake and Output   


 


 9/27/17 9/27/17 9/28/17





 15:00 23:00 07:00


 


Intake Total  360 ml 350 ml


 


Balance  360 ml 350 ml











Exam


General:Elderly thin looking female, not in any acute distress .


HEENT: Normocephalic, Atraumatic, No laceration or hematoma; Eyes: PEERL, 

Conjunctiva clear, Anicteric sclera Neck: Supple without any lymphadenopathy, 

nontender, no JVD, no carotid bruits, trachea midline, no thyromegaly


Cardiac: S1, S2 auscultated, regular rhythm and rate, no mumurs or gallop


Pulmonary: Normal respiratory effort. Chest clear to auscultation bilaterally, 

no adventitious breath sounds


GI: Abdomen normal to inspection. Soft,  non- distended, no masses, no rebound 

tenderness or guarding. Bowel sounds active on all four quadrants


Genitourinary: Deferred


Extremities:Weakness LLE+ No cyanosis, clubbing, or edema. Pulses [2+] 

bilaterally. Full ROM on all four extremities. No focal weakness appreciated.


Neurologic: Mild cognitive impairment+. Alert to person, place, time, and 

situation. Affect appropriate, intact sensation.


Skin: Clean,dry, and intact. No ecchymosis, no rashes, or lesions





Medications


Medications





 Current Medications


Amlodipine Besylate (Norvasc) 5 mg DAILY PO  Last administered on 9/28/17at 09:

09; Admin Dose 5 MG;  Start 9/20/17 at 09:00


Lisinopril (Zestril) 40 mg DAILY PO  Last administered on 9/28/17at 09:09; 

Admin Dose 40 MG;  Start 9/20/17 at 09:00


Clonidine (Catapres) 0.2 mg Q8H  PRN PO SBP ABOVE 170mmHg;  Start 9/19/17 at 23:

45


Magnesium Hydroxide (Milk Of Mag) 30 ml DAILY  PRN PO CONSTIPATION Last 

administered on 9/23/17at 17:17; Admin Dose 30 ML;  Start 9/19/17 at 23:45


Miscellaneous Information 1 ea NOTE XX ;  Start 9/19/17 at 23:45


Glucose (Glutose) 15 gm Q15M  PRN PO DECREASED GLUCOSE;  Start 9/19/17 at 23:45


Glucose (Glutose) 22.5 gm Q15M  PRN PO DECREASED GLUCOSE;  Start 9/19/17 at 23:

45


Dextrose (D50w Syringe) 25 ml Q15M  PRN IV DECREASED GLUCOSE;  Start 9/19/17 at 

23:45


Dextrose (D50w Syringe) 50 ml Q15M  PRN IV DECREASED GLUCOSE;  Start 9/19/17 at 

23:45


Glucagon (Glucagen) 1 mg Q15M  PRN IM DECREASED GLUCOSE;  Start 9/19/17 at 23:45


Glucose (Glutose) 15 gm Q15M  PRN BUCCAL DECREASED GLUCOSE;  Start 9/19/17 at 23

:45


Acetaminophen (Tylenol Tab) 650 mg Q6H  PRN PO PAIN AND OR ELEVATED TEMP;  

Start 9/19/17 at 23:45


Atorvastatin Calcium (Lipitor) 10 mg DAILY@21 PO  Last administered on 9/27/ 17at 20:28; Admin Dose 10 MG;  Start 9/20/17 at 21:00


Docusate Sodium (Colace) 100 mg BID PO  Last administered on 9/28/17at 09:09; 

Admin Dose 100 MG;  Start 9/20/17 at 09:00


Senna (Senokot) 1 tab HS PO  Last administered on 9/27/17at 20:28; Admin Dose 1 

TAB;  Start 9/20/17 at 21:00


Magnesium Hydroxide (Milk Of Mag) 30 ml BID  PRN PO CONSTIPATION;  Start 9/20/ 17 at 00:00


Lactulose (Enulose) 20 gm DAILY  PRN PO CONSTIPATION Last administered on 9/24/ 17at 14:48; Admin Dose 20 GM;  Start 9/20/17 at 00:00


Bisacodyl (Dulcolax Supp) 10 mg DAILY  PRN [place] CONSTIPATION;  Start 9/20/17 at 00

:00


Acetaminophen (Tylenol Tab) 650 mg Q4H  PRN PO PAIN;  Start 9/20/17 at 00:00











JESS BARONE NP Sep 28, 2017 13:23

## 2017-09-28 NOTE — CONS
Date/Time of Note


Date/Time of Note


DATE: 9/28/17 


TIME: 11:40





Consult Date/Type/Reason


Admit Date/Time


Sep 19, 2017 at 23:18


Type of Consultation:  Neurology





Subjective


Comfortable





Objective


pulm-cta


abd-soft


cga ambulation


 Vital Signs








  Date Time  Temp Pulse Resp B/P Pulse Ox O2 Delivery O2 Flow Rate FiO2


 


9/28/17 07:30 97.8 63 18 140/69 96   


 


9/26/17 08:00      Room Air  














 Intake and Output   


 


 9/27/17 9/27/17 9/28/17





 15:00 23:00 07:00


 


Intake Total  360 ml 350 ml


 


Balance  360 ml 350 ml











Results/Medications


Medications





 Current Medications


Amlodipine Besylate (Norvasc) 5 mg DAILY PO  Last administered on 9/28/17at 09:

09; Admin Dose 5 MG;  Start 9/20/17 at 09:00


Lisinopril (Zestril) 40 mg DAILY PO  Last administered on 9/28/17at 09:09; 

Admin Dose 40 MG;  Start 9/20/17 at 09:00


Clonidine (Catapres) 0.2 mg Q8H  PRN PO SBP ABOVE 170mmHg;  Start 9/19/17 at 23:

45


Magnesium Hydroxide (Milk Of Mag) 30 ml DAILY  PRN PO CONSTIPATION Last 

administered on 9/23/17at 17:17; Admin Dose 30 ML;  Start 9/19/17 at 23:45


Miscellaneous Information 1 ea NOTE XX ;  Start 9/19/17 at 23:45


Glucose (Glutose) 15 gm Q15M  PRN PO DECREASED GLUCOSE;  Start 9/19/17 at 23:45


Glucose (Glutose) 22.5 gm Q15M  PRN PO DECREASED GLUCOSE;  Start 9/19/17 at 23:

45


Dextrose (D50w Syringe) 25 ml Q15M  PRN IV DECREASED GLUCOSE;  Start 9/19/17 at 

23:45


Dextrose (D50w Syringe) 50 ml Q15M  PRN IV DECREASED GLUCOSE;  Start 9/19/17 at 

23:45


Glucagon (Glucagen) 1 mg Q15M  PRN IM DECREASED GLUCOSE;  Start 9/19/17 at 23:45


Glucose (Glutose) 15 gm Q15M  PRN BUCCAL DECREASED GLUCOSE;  Start 9/19/17 at 23

:45


Acetaminophen (Tylenol Tab) 650 mg Q6H  PRN PO PAIN AND OR ELEVATED TEMP;  

Start 9/19/17 at 23:45


Atorvastatin Calcium (Lipitor) 10 mg DAILY@21 PO  Last administered on 9/27/ 17at 20:28; Admin Dose 10 MG;  Start 9/20/17 at 21:00


Docusate Sodium (Colace) 100 mg BID PO  Last administered on 9/28/17at 09:09; 

Admin Dose 100 MG;  Start 9/20/17 at 09:00


Senna (Senokot) 1 tab HS PO  Last administered on 9/27/17at 20:28; Admin Dose 1 

TAB;  Start 9/20/17 at 21:00


Magnesium Hydroxide (Milk Of Mag) 30 ml BID  PRN PO CONSTIPATION;  Start 9/20/ 17 at 00:00


Lactulose (Enulose) 20 gm DAILY  PRN PO CONSTIPATION Last administered on 9/24/ 17at 14:48; Admin Dose 20 GM;  Start 9/20/17 at 00:00


Bisacodyl (Dulcolax Supp) 10 mg DAILY  PRN [place] CONSTIPATION;  Start 9/20/17 at 00

:00


Acetaminophen (Tylenol Tab) 650 mg Q4H  PRN PO PAIN;  Start 9/20/17 at 00:00





Assessment/Plan


Additional Assessment/Plan


Rehab- Normal-pressure hydrocephalus, encephalopathy.


    Continue rehab activities


Hypertension.


Diabetes mellitus.


Hyperlipidemia.











ANTHONY ZHANG MD Sep 28, 2017 11:45

## 2017-09-29 VITALS — DIASTOLIC BLOOD PRESSURE: 69 MMHG | RESPIRATION RATE: 18 BRPM | SYSTOLIC BLOOD PRESSURE: 114 MMHG

## 2017-09-29 VITALS — SYSTOLIC BLOOD PRESSURE: 128 MMHG | RESPIRATION RATE: 18 BRPM | DIASTOLIC BLOOD PRESSURE: 67 MMHG

## 2017-09-29 VITALS — RESPIRATION RATE: 18 BRPM | DIASTOLIC BLOOD PRESSURE: 65 MMHG | SYSTOLIC BLOOD PRESSURE: 129 MMHG

## 2017-09-29 RX ADMIN — LISINOPRIL SCH MG: 20 TABLET ORAL at 09:04

## 2017-09-29 RX ADMIN — SENNOSIDES SCH TAB: 8.6 TABLET, FILM COATED ORAL at 20:41

## 2017-09-29 RX ADMIN — DOCUSATE SODIUM SCH MG: 100 CAPSULE, LIQUID FILLED ORAL at 20:41

## 2017-09-29 RX ADMIN — LACTULOSE PRN GM: 10 SOLUTION ORAL at 06:26

## 2017-09-29 RX ADMIN — AMLODIPINE BESYLATE SCH MG: 5 TABLET ORAL at 09:04

## 2017-09-29 RX ADMIN — ATORVASTATIN CALCIUM SCH MG: 10 TABLET, FILM COATED ORAL at 20:40

## 2017-09-29 RX ADMIN — DOCUSATE SODIUM SCH MG: 100 CAPSULE, LIQUID FILLED ORAL at 09:03

## 2017-09-29 NOTE — CONS
Date/Time of Note


Date/Time of Note


DATE: 9/29/17 


TIME: 14:09





Assessment/Plan


Assessment/Plan


Chief Complaint/Hosp Course


78 yo female with NPH admitted to ARU for further rehabilitation 2/2 debility.





1.  Debility secondary to possible  normal pressure hydrocephalus. Status post 

LP


-Post Neurology/Neurosurgery eval, no immediate plans for surgery at this time-

Needs outpt NS follow-up.


--PT/OT eval & treatment





2.  Hypertension


-On Lisinopril/Amlodipine.





3.  DMII-Controlled on diet.  A1c 6.5.


-Recommend outpatient repeat A1c in 2 months.





4.  Hyperlipidemia


-On statin





5. Neutropenia,Mild- Stable


-Monitor 





Patient was seen in collaboration with .


Problems:  





Consultation Date/Type/Reason


Admit Date/Time


Sep 19, 2017 at 23:18


Type of Consultation:  Neurology





24 HR Interval Summary


Free Text/Dictation


No acute distress. Doing well.





Exam/Review of Systems


Vital Signs


Vitals





 Vital Signs








  Date Time  Temp Pulse Resp B/P Pulse Ox O2 Delivery O2 Flow Rate FiO2


 


9/29/17 07:54 97.9 66 18 129/65 96   


 


9/26/17 08:00      Room Air  














 Intake and Output   


 


 9/28/17 9/28/17 9/29/17





 15:00 23:00 07:00


 


Intake Total  1100 ml 


 


Balance  1100 ml 











Exam





General:Elderly thin looking female, not in any acute distress .


HEENT: Normocephalic, Atraumatic, No laceration or hematoma; Eyes: PEERL, 

Conjunctiva clear, Anicteric sclera Neck: Supple without any lymphadenopathy, 

nontender, no JVD, no carotid bruits, trachea midline, no thyromegaly


Cardiac: S1, S2 auscultated, regular rhythm and rate, no mumurs or gallop


Pulmonary: Normal respiratory effort. Chest clear to auscultation bilaterally, 

no adventitious breath sounds


GI: Abdomen normal to inspection. Soft,  non- distended, no masses, no rebound 

tenderness or guarding. Bowel sounds active on all four quadrants


Genitourinary: Deferred


Extremities:Weakness LLE+ No cyanosis, clubbing, or edema. Pulses [2+] 

bilaterally. Full ROM on all four extremities. No focal weakness appreciated.


Neurologic: Mild cognitive impairment+. Alert to person, place, time, and 

situation. Affect appropriate, intact sensation.


Skin: Clean,dry, and intact. No ecchymosis, no rashes, or lesions





Medications


Medications





 Current Medications


Amlodipine Besylate (Norvasc) 5 mg DAILY PO  Last administered on 9/29/17at 09:

04; Admin Dose 5 MG;  Start 9/20/17 at 09:00


Lisinopril (Zestril) 40 mg DAILY PO  Last administered on 9/29/17at 09:04; 

Admin Dose 40 MG;  Start 9/20/17 at 09:00


Clonidine (Catapres) 0.2 mg Q8H  PRN PO SBP ABOVE 170mmHg;  Start 9/19/17 at 23:

45


Magnesium Hydroxide (Milk Of Mag) 30 ml DAILY  PRN PO CONSTIPATION Last 

administered on 9/29/17at 09:04; Admin Dose 30 ML;  Start 9/19/17 at 23:45


Miscellaneous Information 1 ea NOTE XX ;  Start 9/19/17 at 23:45


Glucose (Glutose) 15 gm Q15M  PRN PO DECREASED GLUCOSE;  Start 9/19/17 at 23:45


Glucose (Glutose) 22.5 gm Q15M  PRN PO DECREASED GLUCOSE;  Start 9/19/17 at 23:

45


Dextrose (D50w Syringe) 25 ml Q15M  PRN IV DECREASED GLUCOSE;  Start 9/19/17 at 

23:45


Dextrose (D50w Syringe) 50 ml Q15M  PRN IV DECREASED GLUCOSE;  Start 9/19/17 at 

23:45


Glucagon (Glucagen) 1 mg Q15M  PRN IM DECREASED GLUCOSE;  Start 9/19/17 at 23:45


Glucose (Glutose) 15 gm Q15M  PRN BUCCAL DECREASED GLUCOSE;  Start 9/19/17 at 23

:45


Acetaminophen (Tylenol Tab) 650 mg Q6H  PRN PO PAIN AND OR ELEVATED TEMP;  

Start 9/19/17 at 23:45


Atorvastatin Calcium (Lipitor) 10 mg DAILY@21 PO  Last administered on 9/28/ 17at 20:51; Admin Dose 10 MG;  Start 9/20/17 at 21:00


Docusate Sodium (Colace) 100 mg BID PO  Last administered on 9/29/17at 09:03; 

Admin Dose 100 MG;  Start 9/20/17 at 09:00


Senna (Senokot) 1 tab HS PO  Last administered on 9/28/17at 20:51; Admin Dose 1 

TAB;  Start 9/20/17 at 21:00


Magnesium Hydroxide (Milk Of Mag) 30 ml BID  PRN PO CONSTIPATION;  Start 9/20/ 17 at 00:00


Lactulose (Enulose) 20 gm DAILY  PRN PO CONSTIPATION Last administered on 9/29/ 17at 06:26; Admin Dose 20 GM;  Start 9/20/17 at 00:00


Bisacodyl (Dulcolax Supp) 10 mg DAILY  PRN [place] CONSTIPATION;  Start 9/20/17 at 00

:00


Acetaminophen (Tylenol Tab) 650 mg Q4H  PRN PO PAIN;  Start 9/20/17 at 00:00











JESS BARONE NP Sep 29, 2017 14:10

## 2017-09-29 NOTE — CONS
Date/Time of Note


Date/Time of Note


DATE: 9/29/17 


TIME: 13:41





Consult Date/Type/Reason


Admit Date/Time


Sep 19, 2017 at 23:18


Type of Consultation:  Neurology





Subjective


Comfortable





Objective


cga ambulation


 Vital Signs








  Date Time  Temp Pulse Resp B/P Pulse Ox O2 Delivery O2 Flow Rate FiO2


 


9/29/17 07:54 97.9 66 18 129/65 96   


 


9/26/17 08:00      Room Air  














 Intake and Output   


 


 9/28/17 9/28/17 9/29/17





 15:00 23:00 07:00


 


Intake Total  1100 ml 


 


Balance  1100 ml 











Results/Medications


Medications





 Current Medications


Amlodipine Besylate (Norvasc) 5 mg DAILY PO  Last administered on 9/29/17at 09:

04; Admin Dose 5 MG;  Start 9/20/17 at 09:00


Lisinopril (Zestril) 40 mg DAILY PO  Last administered on 9/29/17at 09:04; 

Admin Dose 40 MG;  Start 9/20/17 at 09:00


Clonidine (Catapres) 0.2 mg Q8H  PRN PO SBP ABOVE 170mmHg;  Start 9/19/17 at 23:

45


Magnesium Hydroxide (Milk Of Mag) 30 ml DAILY  PRN PO CONSTIPATION Last 

administered on 9/29/17at 09:04; Admin Dose 30 ML;  Start 9/19/17 at 23:45


Miscellaneous Information 1 ea NOTE XX ;  Start 9/19/17 at 23:45


Glucose (Glutose) 15 gm Q15M  PRN PO DECREASED GLUCOSE;  Start 9/19/17 at 23:45


Glucose (Glutose) 22.5 gm Q15M  PRN PO DECREASED GLUCOSE;  Start 9/19/17 at 23:

45


Dextrose (D50w Syringe) 25 ml Q15M  PRN IV DECREASED GLUCOSE;  Start 9/19/17 at 

23:45


Dextrose (D50w Syringe) 50 ml Q15M  PRN IV DECREASED GLUCOSE;  Start 9/19/17 at 

23:45


Glucagon (Glucagen) 1 mg Q15M  PRN IM DECREASED GLUCOSE;  Start 9/19/17 at 23:45


Glucose (Glutose) 15 gm Q15M  PRN BUCCAL DECREASED GLUCOSE;  Start 9/19/17 at 23

:45


Acetaminophen (Tylenol Tab) 650 mg Q6H  PRN PO PAIN AND OR ELEVATED TEMP;  

Start 9/19/17 at 23:45


Atorvastatin Calcium (Lipitor) 10 mg DAILY@21 PO  Last administered on 9/28/ 17at 20:51; Admin Dose 10 MG;  Start 9/20/17 at 21:00


Docusate Sodium (Colace) 100 mg BID PO  Last administered on 9/29/17at 09:03; 

Admin Dose 100 MG;  Start 9/20/17 at 09:00


Senna (Senokot) 1 tab HS PO  Last administered on 9/28/17at 20:51; Admin Dose 1 

TAB;  Start 9/20/17 at 21:00


Magnesium Hydroxide (Milk Of Mag) 30 ml BID  PRN PO CONSTIPATION;  Start 9/20/ 17 at 00:00


Lactulose (Enulose) 20 gm DAILY  PRN PO CONSTIPATION Last administered on 9/29/ 17at 06:26; Admin Dose 20 GM;  Start 9/20/17 at 00:00


Bisacodyl (Dulcolax Supp) 10 mg DAILY  PRN [place] CONSTIPATION;  Start 9/20/17 at 00

:00


Acetaminophen (Tylenol Tab) 650 mg Q4H  PRN PO PAIN;  Start 9/20/17 at 00:00





Assessment/Plan


Additional Assessment/Plan


Rehab- Normal-pressure hydrocephalus, encephalopathy.


    Continue rehab program


Hypertension.


Diabetes mellitus.


Hyperlipidemia.











ANTHONY ZHANG MD Sep 29, 2017 13:42

## 2017-09-30 VITALS — SYSTOLIC BLOOD PRESSURE: 154 MMHG | DIASTOLIC BLOOD PRESSURE: 69 MMHG | RESPIRATION RATE: 20 BRPM

## 2017-09-30 VITALS — SYSTOLIC BLOOD PRESSURE: 120 MMHG | RESPIRATION RATE: 18 BRPM | DIASTOLIC BLOOD PRESSURE: 73 MMHG

## 2017-09-30 RX ADMIN — DOCUSATE SODIUM SCH MG: 100 CAPSULE, LIQUID FILLED ORAL at 09:44

## 2017-09-30 RX ADMIN — AMLODIPINE BESYLATE SCH MG: 5 TABLET ORAL at 09:45

## 2017-09-30 RX ADMIN — LISINOPRIL SCH MG: 20 TABLET ORAL at 09:45

## 2017-09-30 NOTE — DS
Date/Time of Note


Date/Time of Note


DATE: 9/30/17 


TIME: 09:49





Discharge Summary


Admission/Discharge Info


Admit Date/Time


Sep 19, 2017 at 23:18


Discharge Date/Time





Discharge Diagnosis


1. Normal-pressure hydrocephalus, encephalopathy.


2.   Diabetes mellitus.


3.   Hyperlipidemia.


4. Hypertension


5.   Improvements in self-care, mobility and cognition.


Patient Condition:  Good


Hospital Course





DISCHARGE


Patient was admitted for comprehensive interdisciplinary acute rehabilitation. 

Patient made steady functional gains and improved from a mod level to a SBA 

level for self care and mobility, including ambulating over 150 feet with the 

use of a front wheeled walker.  She did require cues for safety


Patient is being discharged home with recommendations for home health PT, OT 

and speech follow up. 


DME recommendations: [facility]; [facility]; Shower [facility]


Patient will follow up with PMD upon DC.


Home Meds


Reported Medications


Olmesartan Medoxomil (Benicar) 40 Mg Tablet, 40 MG PO DAILY, #30 TAB


   9/10/17


Metformin Hcl* (Metformin Hcl*) 500 Mg Tablet, 500 MG PO DAILY, #30 TAB


   9/10/17


Primary Care Provider


ANTHONY Anderson MD Sep 30, 2017 09:50











ANTHONY ZHANG MD Sep 30, 2017 09:50

## 2019-04-01 ENCOUNTER — HOSPITAL ENCOUNTER (INPATIENT)
Dept: HOSPITAL 10 - E/R | Age: 79
LOS: 8 days | Discharge: NURSING, REHAB OR LONG-TERM CARE | DRG: 71 | End: 2019-04-09
Attending: INTERNAL MEDICINE
Payer: MEDICARE

## 2019-04-01 ENCOUNTER — HOSPITAL ENCOUNTER (INPATIENT)
Dept: HOSPITAL 91 - E/R | Age: 79
LOS: 8 days | Discharge: NURSING, REHAB OR LONG-TERM CARE | DRG: 71 | End: 2019-04-09
Payer: MEDICARE

## 2019-04-01 VITALS
BODY MASS INDEX: 19.77 KG/M2 | WEIGHT: 123.02 LBS | BODY MASS INDEX: 19.77 KG/M2 | WEIGHT: 123.02 LBS | HEIGHT: 66 IN | HEIGHT: 66 IN

## 2019-04-01 DIAGNOSIS — Z86.73: ICD-10-CM

## 2019-04-01 DIAGNOSIS — E44.0: ICD-10-CM

## 2019-04-01 DIAGNOSIS — G91.2: ICD-10-CM

## 2019-04-01 DIAGNOSIS — E86.0: ICD-10-CM

## 2019-04-01 DIAGNOSIS — E87.3: ICD-10-CM

## 2019-04-01 DIAGNOSIS — E07.81: ICD-10-CM

## 2019-04-01 DIAGNOSIS — G93.49: Primary | ICD-10-CM

## 2019-04-01 DIAGNOSIS — G91.9: ICD-10-CM

## 2019-04-01 DIAGNOSIS — Z98.2: ICD-10-CM

## 2019-04-01 DIAGNOSIS — E87.2: ICD-10-CM

## 2019-04-01 DIAGNOSIS — E87.6: ICD-10-CM

## 2019-04-01 DIAGNOSIS — E11.65: ICD-10-CM

## 2019-04-01 DIAGNOSIS — G81.94: ICD-10-CM

## 2019-04-01 DIAGNOSIS — F03.90: ICD-10-CM

## 2019-04-01 DIAGNOSIS — I10: ICD-10-CM

## 2019-04-01 PROCEDURE — 80061 LIPID PANEL: CPT

## 2019-04-01 PROCEDURE — 97165 OT EVAL LOW COMPLEX 30 MIN: CPT

## 2019-04-01 PROCEDURE — 70450 CT HEAD/BRAIN W/O DYE: CPT

## 2019-04-01 PROCEDURE — 80307 DRUG TEST PRSMV CHEM ANLYZR: CPT

## 2019-04-01 PROCEDURE — 84481 FREE ASSAY (FT-3): CPT

## 2019-04-01 PROCEDURE — 84484 ASSAY OF TROPONIN QUANT: CPT

## 2019-04-01 PROCEDURE — 36600 WITHDRAWAL OF ARTERIAL BLOOD: CPT

## 2019-04-01 PROCEDURE — 82140 ASSAY OF AMMONIA: CPT

## 2019-04-01 PROCEDURE — 84443 ASSAY THYROID STIM HORMONE: CPT

## 2019-04-01 PROCEDURE — 85730 THROMBOPLASTIN TIME PARTIAL: CPT

## 2019-04-01 PROCEDURE — 97535 SELF CARE MNGMENT TRAINING: CPT

## 2019-04-01 PROCEDURE — 97110 THERAPEUTIC EXERCISES: CPT

## 2019-04-01 PROCEDURE — 36415 COLL VENOUS BLD VENIPUNCTURE: CPT

## 2019-04-01 PROCEDURE — 82803 BLOOD GASES ANY COMBINATION: CPT

## 2019-04-01 PROCEDURE — 84436 ASSAY OF TOTAL THYROXINE: CPT

## 2019-04-01 PROCEDURE — 97530 THERAPEUTIC ACTIVITIES: CPT

## 2019-04-01 PROCEDURE — 70260 X-RAY EXAM OF SKULL: CPT

## 2019-04-01 PROCEDURE — 71045 X-RAY EXAM CHEST 1 VIEW: CPT

## 2019-04-01 PROCEDURE — 82962 GLUCOSE BLOOD TEST: CPT

## 2019-04-01 PROCEDURE — 83036 HEMOGLOBIN GLYCOSYLATED A1C: CPT

## 2019-04-01 PROCEDURE — 82533 TOTAL CORTISOL: CPT

## 2019-04-01 PROCEDURE — 85610 PROTHROMBIN TIME: CPT

## 2019-04-01 PROCEDURE — 80053 COMPREHEN METABOLIC PANEL: CPT

## 2019-04-01 PROCEDURE — 70250 X-RAY EXAM OF SKULL: CPT

## 2019-04-01 PROCEDURE — 87086 URINE CULTURE/COLONY COUNT: CPT

## 2019-04-01 PROCEDURE — 95819 EEG AWAKE AND ASLEEP: CPT

## 2019-04-01 PROCEDURE — 96374 THER/PROPH/DIAG INJ IV PUSH: CPT

## 2019-04-01 PROCEDURE — 92526 ORAL FUNCTION THERAPY: CPT

## 2019-04-01 PROCEDURE — 80048 BASIC METABOLIC PNL TOTAL CA: CPT

## 2019-04-01 PROCEDURE — 87040 BLOOD CULTURE FOR BACTERIA: CPT

## 2019-04-01 PROCEDURE — 84100 ASSAY OF PHOSPHORUS: CPT

## 2019-04-01 PROCEDURE — 83735 ASSAY OF MAGNESIUM: CPT

## 2019-04-01 PROCEDURE — 81003 URINALYSIS AUTO W/O SCOPE: CPT

## 2019-04-01 PROCEDURE — 82607 VITAMIN B-12: CPT

## 2019-04-01 PROCEDURE — 97162 PT EVAL MOD COMPLEX 30 MIN: CPT

## 2019-04-01 PROCEDURE — 93971 EXTREMITY STUDY: CPT

## 2019-04-01 PROCEDURE — 85025 COMPLETE CBC W/AUTO DIFF WBC: CPT

## 2019-04-01 PROCEDURE — 84480 ASSAY TRIIODOTHYRONINE (T3): CPT

## 2019-04-01 PROCEDURE — 99285 EMERGENCY DEPT VISIT HI MDM: CPT

## 2019-04-01 PROCEDURE — 92610 EVALUATE SWALLOWING FUNCTION: CPT

## 2019-04-01 PROCEDURE — 83605 ASSAY OF LACTIC ACID: CPT

## 2019-04-01 PROCEDURE — 81001 URINALYSIS AUTO W/SCOPE: CPT

## 2019-04-01 PROCEDURE — 93005 ELECTROCARDIOGRAM TRACING: CPT

## 2019-04-01 PROCEDURE — 76856 US EXAM PELVIC COMPLETE: CPT

## 2019-04-01 PROCEDURE — 84439 ASSAY OF FREE THYROXINE: CPT

## 2019-04-01 PROCEDURE — 70553 MRI BRAIN STEM W/O & W/DYE: CPT

## 2019-04-02 VITALS — SYSTOLIC BLOOD PRESSURE: 180 MMHG | DIASTOLIC BLOOD PRESSURE: 81 MMHG | HEART RATE: 68 BPM | RESPIRATION RATE: 17 BRPM

## 2019-04-02 VITALS — HEART RATE: 62 BPM

## 2019-04-02 VITALS — HEART RATE: 58 BPM

## 2019-04-02 VITALS — SYSTOLIC BLOOD PRESSURE: 175 MMHG | RESPIRATION RATE: 17 BRPM | HEART RATE: 78 BPM | DIASTOLIC BLOOD PRESSURE: 77 MMHG

## 2019-04-02 VITALS — HEART RATE: 87 BPM

## 2019-04-02 VITALS — DIASTOLIC BLOOD PRESSURE: 76 MMHG | HEART RATE: 98 BPM | RESPIRATION RATE: 18 BRPM | SYSTOLIC BLOOD PRESSURE: 168 MMHG

## 2019-04-02 VITALS — RESPIRATION RATE: 17 BRPM | HEART RATE: 96 BPM | DIASTOLIC BLOOD PRESSURE: 66 MMHG | SYSTOLIC BLOOD PRESSURE: 152 MMHG

## 2019-04-02 VITALS — HEART RATE: 83 BPM

## 2019-04-02 LAB
ABNORMAL IP MESSAGE: 1
ACETAMINOPHEN: < 10 UG/ML (ref 10–30)
ADD MAN DIFF?: NO
ADD UMIC: YES
ALANINE AMINOTRANSFERASE: 37 IU/L (ref 13–69)
ALANINE AMINOTRANSFERASE: 42 IU/L (ref 13–69)
ALBUMIN/GLOBULIN RATIO: 1.33
ALBUMIN/GLOBULIN RATIO: 1.38
ALBUMIN: 2.9 G/DL (ref 3.3–4.9)
ALBUMIN: 4 G/DL (ref 3.3–4.9)
ALKALINE PHOSPHATASE: 102 IU/L (ref 42–121)
ALKALINE PHOSPHATASE: 71 IU/L (ref 42–121)
ALLEN TEST: (no result)
AMMONIA: 12 UMOL/L (ref 9–30)
ANION GAP: 3 (ref 5–13)
ANION GAP: 6 (ref 5–13)
ANION GAP: 8 (ref 5–13)
ARTERIAL BASE EXCESS: 13.2 MMOL/L (ref -3–3)
ARTERIAL BLOOD GAS OXYGEN SAT: 94.8 MMHG (ref 95–100)
ARTERIAL COHB: 0.2 % (ref 0–3)
ARTERIAL FRACTION OF OXYHGB: 94.3 % (ref 93–99)
ARTERIAL HCO3: 38.4 MMOL/L (ref 22–26)
ARTERIAL METHB: 0.3 % (ref 0–1.5)
ARTERIAL PCO2: 50.8 MMHG (ref 35–45)
ASPARTATE AMINO TRANSFERASE: 11 IU/L (ref 15–46)
ASPARTATE AMINO TRANSFERASE: 16 IU/L (ref 15–46)
BASOPHIL #: 0 10^3/UL (ref 0–0.1)
BASOPHILS %: 0.3 % (ref 0–2)
BILIRUBIN,DIRECT: 0 MG/DL (ref 0–0.2)
BILIRUBIN,DIRECT: 0 MG/DL (ref 0–0.2)
BILIRUBIN,TOTAL: 0.4 MG/DL (ref 0.2–1.3)
BILIRUBIN,TOTAL: 0.5 MG/DL (ref 0.2–1.3)
BLOOD UREA NITROGEN: 22 MG/DL (ref 7–20)
BLOOD UREA NITROGEN: 22 MG/DL (ref 7–20)
BLOOD UREA NITROGEN: 25 MG/DL (ref 7–20)
CALCIUM: 10.9 MG/DL (ref 8.4–10.2)
CALCIUM: 9.6 MG/DL (ref 8.4–10.2)
CALCIUM: 9.7 MG/DL (ref 8.4–10.2)
CARBON DIOXIDE: 36 MMOL/L (ref 21–31)
CARBON DIOXIDE: 43 MMOL/L (ref 21–31)
CARBON DIOXIDE: 45 MMOL/L (ref 21–31)
CHLORIDE: 100 MMOL/L (ref 97–110)
CHLORIDE: 100 MMOL/L (ref 97–110)
CHLORIDE: 92 MMOL/L (ref 97–110)
CREATININE: 0.49 MG/DL (ref 0.44–1)
CREATININE: 0.51 MG/DL (ref 0.44–1)
CREATININE: 0.6 MG/DL (ref 0.44–1)
EOSINOPHILS #: 0 10^3/UL (ref 0–0.5)
EOSINOPHILS %: 0.2 % (ref 0–7)
ETHANOL: < 10 MG/DL (ref 0–0)
FIO2: 21 %
GLOBULIN: 2.1 G/DL (ref 1.3–3.2)
GLOBULIN: 3 G/DL (ref 1.3–3.2)
GLUCOSE: 275 MG/DL (ref 70–220)
GLUCOSE: 305 MG/DL (ref 70–220)
GLUCOSE: 352 MG/DL (ref 70–220)
HEMATOCRIT: 47.6 % (ref 37–47)
HEMOGLOBIN A1C: 9.1 % (ref 0–5.9)
HEMOGLOBIN: 15.3 G/DL (ref 12–16)
IMMATURE GRANS #M: 0.09 10^3/UL (ref 0–0.03)
IMMATURE GRANS % (M): 1.5 % (ref 0–0.43)
INR: 0.85
INR: 0.97
LACTIC ACID: 0.9 MMOL/L (ref 0.5–2)
LACTIC ACID: 1.4 MMOL/L (ref 0.5–2)
LACTIC ACID: 2.2 MMOL/L (ref 0.5–2)
LYMPHOCYTES #: 0.4 10^3/UL (ref 0.8–2.9)
LYMPHOCYTES %: 5.8 % (ref 15–51)
MAGNESIUM: 1.8 MG/DL (ref 1.7–2.5)
MEAN CORPUSCULAR HEMOGLOBIN: 27.5 PG (ref 29–33)
MEAN CORPUSCULAR HGB CONC: 32.1 G/DL (ref 32–37)
MEAN CORPUSCULAR VOLUME: 85.5 FL (ref 82–101)
MEAN PLATELET VOLUME: 11.3 FL (ref 7.4–10.4)
MODE: (no result)
MONOCYTE #: 0.3 10^3/UL (ref 0.3–0.9)
MONOCYTES %: 4.5 % (ref 0–11)
NEUTROPHIL #: 5.4 10^3/UL (ref 1.6–7.5)
NEUTROPHILS %: 87.7 % (ref 39–77)
NUCLEATED RED BLOOD CELLS #: 0 10^3/UL (ref 0–0)
NUCLEATED RED BLOOD CELLS%: 0 /100WBC (ref 0–0)
O2 A-A PPRESDIFF RESPIRATORY: 15.9 MMHG (ref 7–24)
PARTIAL THROMBOPLASTIN TIME: 22.2 SEC (ref 23–35)
PARTIAL THROMBOPLASTIN TIME: 24.1 SEC (ref 23–35)
PHOSPHORUS: 2.3 MG/DL (ref 2.5–4.9)
PLATELET COUNT: 140 10^3/UL (ref 140–415)
POSITIVE DIFF: (no result)
POTASSIUM: 2.5 MMOL/L (ref 3.5–5.1)
POTASSIUM: 3.1 MMOL/L (ref 3.5–5.1)
POTASSIUM: 3.5 MMOL/L (ref 3.5–5.1)
PROTIME: 11.7 SEC (ref 11.9–14.9)
PROTIME: 13 SEC (ref 11.9–14.9)
PT RATIO: 0.9
PT RATIO: 1
RED BLOOD COUNT: 5.57 10^6/UL (ref 4.2–5.4)
RED CELL DISTRIBUTION WIDTH: 13.7 % (ref 11.5–14.5)
SALICYLATE: < 1 MG/DL (ref 5–30)
SODIUM: 142 MMOL/L (ref 135–144)
SODIUM: 145 MMOL/L (ref 135–144)
SODIUM: 146 MMOL/L (ref 135–144)
THYROID STIMULATING HORMONE: 0.3 MIU/L (ref 0.47–4.68)
TOTAL PROTEIN: 5 G/DL (ref 6.1–8.1)
TOTAL PROTEIN: 7 G/DL (ref 6.1–8.1)
TROPONIN-I: 0.03 NG/ML (ref 0–0.12)
UR ASCORBIC ACID: NEGATIVE MG/DL
UR BACTERIA: (no result) /HPF
UR BILIRUBIN (DIP): NEGATIVE MG/DL
UR BLOOD (DIP): NEGATIVE MG/DL
UR CLARITY: (no result)
UR COLOR: YELLOW
UR GLUCOSE (DIP): (no result) MG/DL
UR KETONES (DIP): NEGATIVE MG/DL
UR LEUKOCYTE ESTERASE (DIP): NEGATIVE LEU/UL
UR NITRITE (DIP): NEGATIVE MG/DL
UR PH (DIP): 9 (ref 5–9)
UR RBC: 1 /HPF (ref 0–5)
UR SPECIFIC GRAVITY (DIP): 1.02 (ref 1–1.03)
UR TOTAL PROTEIN (DIP): (no result) MG/DL
UR UROBILINOGEN (DIP): NEGATIVE MG/DL
UR WBC: 4 /HPF (ref 0–5)
URINE PH (DIP) POC: >=9 (ref 5–8.5)
URINE TOTAL PROTEIN POC: (no result)
VITAMIN B12: > 1000 PG/ML (ref 239–931)
WHITE BLOOD COUNT: 6.2 10^3/UL (ref 4.8–10.8)

## 2019-04-02 RX ADMIN — HYDRALAZINE HYDROCHLORIDE PRN MG: 20 INJECTION INTRAMUSCULAR; INTRAVENOUS at 15:13

## 2019-04-02 RX ADMIN — CEFEPIME HYDROCHLORIDE 1 MLS/HR: 2 INJECTION, POWDER, FOR SOLUTION INTRAVENOUS at 02:26

## 2019-04-02 RX ADMIN — ACETAMINOPHEN SCH MG: 400 TABLET ORAL at 09:00

## 2019-04-02 RX ADMIN — VANCOMYCIN HYDROCHLORIDE 1 MLS/HR: 1 INJECTION, POWDER, LYOPHILIZED, FOR SOLUTION INTRAVENOUS at 04:07

## 2019-04-02 RX ADMIN — SODIUM CHLORIDE AND POTASSIUM CHLORIDE 1 MLS/HR: 4.5; 1.49 INJECTION, SOLUTION INTRAVENOUS at 10:45

## 2019-04-02 RX ADMIN — INSULIN ASPART 1 UNIT: 100 INJECTION, SOLUTION INTRAVENOUS; SUBCUTANEOUS at 09:11

## 2019-04-02 RX ADMIN — SODIUM CHLORIDE AND POTASSIUM CHLORIDE 1 MLS/HR: 4.5; 1.49 INJECTION, SOLUTION INTRAVENOUS at 17:00

## 2019-04-02 RX ADMIN — INSULIN ASPART 1 UNIT: 100 INJECTION, SOLUTION INTRAVENOUS; SUBCUTANEOUS at 11:48

## 2019-04-02 RX ADMIN — SODIUM CHLORIDE AND POTASSIUM CHLORIDE SCH MLS/HR: 4.5; 1.49 INJECTION, SOLUTION INTRAVENOUS at 17:00

## 2019-04-02 RX ADMIN — POTASSIUM CHLORIDE SCH MLS/HR: 200 INJECTION, SOLUTION INTRAVENOUS at 15:13

## 2019-04-02 RX ADMIN — INSULIN ASPART 1 UNIT: 100 INJECTION, SOLUTION INTRAVENOUS; SUBCUTANEOUS at 20:54

## 2019-04-02 RX ADMIN — FOLIC ACID TAB 400 MCG 1 MG: 400 TAB at 09:00

## 2019-04-02 RX ADMIN — POTASSIUM CHLORIDE 1 MLS/HR: 200 INJECTION, SOLUTION INTRAVENOUS at 13:36

## 2019-04-02 RX ADMIN — LOSARTAN POTASSIUM 1 MG: 50 TABLET ORAL at 09:00

## 2019-04-02 RX ADMIN — AMLODIPINE BESYLATE 1 MG: 10 TABLET ORAL at 10:45

## 2019-04-02 RX ADMIN — POTASSIUM CHLORIDE 1 MLS/HR: 200 INJECTION, SOLUTION INTRAVENOUS at 15:13

## 2019-04-02 RX ADMIN — POTASSIUM CHLORIDE 1 MLS/HR: 200 INJECTION, SOLUTION INTRAVENOUS at 11:45

## 2019-04-02 RX ADMIN — HYDRALAZINE HYDROCHLORIDE 1 MG: 20 INJECTION INTRAMUSCULAR; INTRAVENOUS at 15:13

## 2019-04-02 RX ADMIN — THIAMINE HYDROCHLORIDE 1 ML: 100 INJECTION, SOLUTION INTRAMUSCULAR; INTRAVENOUS at 02:27

## 2019-04-02 RX ADMIN — SODIUM CHLORIDE AND POTASSIUM CHLORIDE SCH MLS/HR: 4.5; 1.49 INJECTION, SOLUTION INTRAVENOUS at 10:45

## 2019-04-02 RX ADMIN — POTASSIUM CHLORIDE SCH MLS/HR: 200 INJECTION, SOLUTION INTRAVENOUS at 13:36

## 2019-04-02 RX ADMIN — INSULIN ASPART 1 UNIT: 100 INJECTION, SOLUTION INTRAVENOUS; SUBCUTANEOUS at 17:52

## 2019-04-02 RX ADMIN — AMLODIPINE BESYLATE SCH MG: 10 TABLET ORAL at 10:45

## 2019-04-02 RX ADMIN — POTASSIUM CHLORIDE SCH MLS/HR: 200 INJECTION, SOLUTION INTRAVENOUS at 11:45

## 2019-04-02 RX ADMIN — LIDOCAINE HYDROCHLORIDE 1 MLS/HR: 10 INJECTION, SOLUTION EPIDURAL; INFILTRATION; INTRACAUDAL; PERINEURAL at 00:15

## 2019-04-02 NOTE — CONS
Assessment/Plan


Assessment/Plan


Hospital Course


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 


presents for evaluation of ams x 1 week.





The clinical picture could be consistent w/ an acute encephalopathy.





Stroke is not yet excluded.





Seizure is less likely.





Head CT is without obvious acute intracranial pathology.








P:


MRI brain for further characterization


Add FT4


Other medical management per primary


[place] as necessary


Limit sedating medications where possible


PT/OT/ST as necessary


Will follow clinically





Consultation Date/Type/Reason


Admit Date/Time


Apr 2, 2019 at 03:41


Type of Consult


Neurology


Reason for Consultation


ams


Requesting Provider:  ALVIN LOCK


Date/Time of Note


DATE: 4/2/19 


TIME: 15:52





Hx of Present Illness


The pt is unable to contribute a hx. 





It is elsewhere noted:


Hx of Present Illness


Chief complaint: Altered mental status times 1 week 





The following history was obtained from the daughter as patient was not able to 


provide history given clinical condition.





this is a 78-year-old female with a past medical history of CVA, normal pressure


hydrocephalus status post  shunt who presents today with her daughter after 


having altered mental status times 1 week.As per the daughter the patient was 


her normal self approximately 1 week ago when she started noticing that her 


mother was starting to talk less and not move around as much.  At her baseline 


the patient does use her walker and she converses normally.  She does smoke 


marijuana and uses marijuana Gummies on occasion.  Daughter reported that she 


was concerned that maybe she had a urinary tract infection so she did receive 


antibiotics from her primary care doctor and she did give her antibiotics for 


urinary tract infection however her symptoms did not resolve.  Daughter denies 


any fevers.  She did notice a vaginal discharge.  At baseline patient is able to


eat her food normally.  The daughter has also noticed that her blood pressures 


and her blood sugars have been elevated recently.


Subjective hx not possible:  pt non-verbal





Exam/Review of Systems


Exam


Vitals





Vital Signs


  Date      Temp  Pulse  Resp  B/P (MAP)   Pulse Ox  O2          O2 Flow    FiO2


Time                                                 Delivery    Rate


    4/2/19  98.6     96    17      152/66       100


     15:33                           (94)


    4/2/19                                           Room Air


     01:26





Exam


PE:


Gen Appearance: No Apparent Distress


HEENT: Normocephalic


Cardiovascular: Regular rate


Abdomen: Soft


Extremities: Dry





NE:


The patient was awake, alert, though nonverbal. Able to track. Unable to follow 


any commands. 





Cranial nerve examination was limited by mental status. Pupils were equal and 


reactive to light. There was no afferent pupillary defect. Funduscopic 


examination was limited. Face was grossly symmetric, w/ present corneal and 


cough reflexes.





Tone was increased in the BUE. Muscle bulk was diminished. I did not see 


fasciculations. The patient withdrew to noxious stimulation x 4.





Coordination and gait testing was limited by mental status.





Arm and leg reflexes were within normal limits and symmetric. Villar's sign was


absent. Plantar responses were flexor.





Results


Result Diagram:  


4/2/19 0055                                                                     


          4/2/19 0600





Results 24hrs





Laboratory Tests


Test
                   4/2/19
00:55  4/2/19
01:39  4/2/19
02:20    4/2/19
02:25


White Blood Count             6.2  #


Red Blood Count              5.57  H


Hemoglobin                    15.3


Hematocrit                   47.6  H


Mean Corpuscular              85.5


Volume


Mean Corpuscular             27.5  L


Hemoglobin


Mean Corpuscular             32.1  
  
             
             



Hemoglobin
Concen


t


Red Cell                      13.7


Distribution


Width


Platelet Count                 140


Mean Platelet                11.3  H


Volume


Immature                    1.500  H


Granulocytes %


Neutrophils %                87.7  H


Lymphocytes %                 5.8  L


Monocytes %                    4.5


Eosinophils %                  0.2


Basophils %                    0.3


Nucleated Red                  0.0


Blood Cells %


Immature                    0.090  H


Granulocytes #


Neutrophils #                  5.4


Lymphocytes #                 0.4  L


Monocytes #                    0.3


Eosinophils #                  0.0


Basophils #                    0.0


Nucleated Red                  0.0


Blood Cells #


Prothrombin Time             11.7  L


Prothrombin Time               0.9


Ratio


INR International            0.85  
  
             
             



Normalized
Ratio


Activated                   22.2  L
  
             
             



Partial
Thrombopl


ast Time


Sodium Level                  145  H


Potassium Level               3.1  L


Chloride Level                 92  L


Carbon Dioxide                45  *H


Level


Anion Gap                        8


Blood Urea                     25  H


Nitrogen


Creatinine                    0.60


Est Glomerular       
                
             
             



Filtrat


Rate
mL/min


Glucose Level                 352  H


Lactic Acid Level            2.2  *H


Calcium Level                10.9  H


Total Bilirubin                0.5


Direct Bilirubin              0.00


Indirect                       0.5


Bilirubin


Aspartate Amino                16  
  
             
             



Transf
(AST/SGOT)


Alanine                        37  
  
             
             



Aminotransferase



(ALT/SGPT)


Alkaline                       102


Phosphatase


Troponin I                   0.026


Total Protein                  7.0


Albumin                        4.0


Globulin                      3.00


Albumin/Globulin              1.33


Ratio


Salicylates Level  < 1.0  L


Acetaminophen      < 10.0  L


Level


Ethyl Alcohol      < 10.0  H


Level


Bedside Glucose                             320  H


Urine Color                                         YELLOW


Urine Clarity                                       CLOUDY  A


Urine pH                                                   9.0


Urine Specific                                           1.021


Gravity


Urine Ketones                                       NEGATIVE


Urine Nitrite                                       NEGATIVE


Urine Bilirubin                                     NEGATIVE


Urine                                               NEGATIVE


Urobilinogen


Urine Leukocyte                                     NEGATIVE


Esterase


Urine Microscopic                                            1


RBC


Urine Microscopic                                            4


WBC


Urine Bacteria                                      FEW  A


Urine Hemoglobin                                    NEGATIVE


Urine Glucose                                              3+  H


Urine Total                                                2+  H


Protein


Urine Opiates                                       Negative


Screen


Urine                                               Negative


Barbiturates


Urine                                               Negative


Amphetamines


Screen


Urine                                               Negative


Benzodiazepines


Screen


Urine Cocaine                                       Negative


Screen


Urine                                               Positive


Cannabinoids


Bedside Urine pH                                                  >=9.0


(LAB)


Bedside Urine                                                              2+  H


Protein (LAB)


Bedside Urine                                                           0.50%  H


Glucose (UA)


Bedside Urine                                                     Negative


Ketones (LAB)


Bedside Urine                                                     Trace-lysed  H


Blood


Bedside Urine                                                     Negative


Nitrite (LAB)


Bedside Urine      
                  
             
             Negative  



Leukocyte
Esteras


e (L


Test
                   4/2/19
04:01  4/2/19
06:00  4/2/19
06:02    4/2/19
08:17


Lactic Acid Level              1.4                         0.9


Sodium Level                                146  H


Potassium Level                            2.5  *L


Chloride Level                               100


Carbon Dioxide                              43  *H


Level


Anion Gap                                     3  L


Blood Urea                                   22  H


Nitrogen


Creatinine                                  0.51


Est Glomerular     
                    
           
             



Filtrat


Rate
mL/min


Glucose Level                               275  H


Calcium Level                                9.7


Total Bilirubin                              0.4


Direct Bilirubin                            0.00


Indirect                                     0.4


Bilirubin


Aspartate Amino    
                        11  L
  
             



Transf
(AST/SGOT)


Alanine            
                         42  
  
             



Aminotransferase



(ALT/SGPT)


Alkaline                                      71


Phosphatase


Total Protein                              5.0  #L


Albumin                                    2.9  #L


Globulin                                    2.10


Albumin/Globulin                            1.38


Ratio


Prothrombin Time                                          13.0


Prothrombin Time                                           1.0


Ratio


INR International  
                  
                  0.97  
  



Normalized
Ratio


Activated          
                  
                  24.1  
  



Partial
Thrombopl


ast Time


Hemoglobin A1c                                            9.1  H


Bedside Glucose                                                           255  H


Test
                   4/2/19
09:59  4/2/19
11:44  4/2/19
14:23  



Blood Gas          Blood arterial
    
             
             



Specimen Source



Arterial Blood     4/2/2019
10:14:48  
             
             



Date Drawn
                       AM


Arterial Blood pH          7.496  H
  
             
             



(Temp
corrected)


Arterial Blood              50.8  H
  
             
             



pCO2


(Temp
correct)


Arterial Blood              73.0  L
  
             
             



pO2


(Temp
corrected)


Arterial Blood               38.4  H


HCO3


Arterial Blood               13.2  H


Base Excess


Arterial Blood              94.8  L
  
             
             



Oxygen
Saturation


Andrea Test         ACCEPTAB


Arterial Blood     Left Radial  
     
             
             



Gas Puncture
Site


Arterial                      0.2  
  
             
             



Blood
Carboxyhemo


globin


Arterial Blood                 0.3


Methemoglobin


Blood Gas A-a O2              15.9


Differential


Oxyhemoglobin                 94.3


Percent


Blood Gas                     37.0


Temperature


Blood Gas          ROOM AIR


Modality


FiO2                          21.0


Blood Gas          TM


Notified Whom


Blood Gas          4/2/2019
10:25:23  
             
             



Notified Time
                    AM


Bedside Glucose                             262  H


Phosphorus Level                                          2.3  L


Magnesium Level                                            1.8


Ammonia                                                     12


Vitamin B12 Level                                   Pending


Thyroid            
                  
             Pending  
    



Stimulating


Hormone
(TSH)








Medications


Medication





Current Medications


Potassium Chloride/Sodium Chloride 1,000 ml @  80 mls/hr [id number] IV  Last 


administered on 4/2/19at 10:45; Admin Dose 80 MLS/HR;  Start 4/2/19 at 04:30;  


Stop 4/3/19 at 04:29


Diagnostic Test (Pha) (Accu-Chek) 1 ea 02 XX ;  Start 4/3/19 at 02:00


Insulin Aspart (Novolog Insulin Pen) NOVOLOG *MILD* ALGORITHM WITH MEALS  


BEDTIME SC  Last administered on 4/2/19at 11:48; Admin Dose 4 UNIT;  Start 


4/2/19 at 08:00


Hydralazine HCl (Apresoline) 10 mg Q6H  PRN IV ELEVATED BLOOD PRESSURE Last 


administered on 4/2/19at 15:13; Admin Dose 10 MG;  Start 4/2/19 at 04:30


Losartan Potassium (Cozaar) 100 mg DAILY PO ;  Start 4/2/19 at 09:00


Miscellaneous Information 1 ea NOTE XX ;  Start 4/2/19 at 04:30


Glucose (Glutose) 15 gm Q15M  PRN PO DECREASED GLUCOSE;  Start 4/2/19 at 04:30


Glucose (Glutose) 22.5 gm Q15M  PRN PO DECREASED GLUCOSE;  Start 4/2/19 at 04:30


Dextrose (D50w Syringe) 25 ml Q15M  PRN IV DECREASED GLUCOSE;  Start 4/2/19 at 


04:30


Dextrose (D50w Syringe) 50 ml Q15M  PRN IV DECREASED GLUCOSE;  Start 4/2/19 at 


04:30


Glucagon (Glucagen) 1 mg Q15M  PRN IM DECREASED GLUCOSE;  Start 4/2/19 at 04:30


Glucose (Glutose) 15 gm Q15M  PRN BUCCAL DECREASED GLUCOSE;  Start 4/2/19 at 


04:30


IV Flush (NS 3 ml) 3 ml PER PROTOCOL IV ;  Start 4/2/19 at 06:30


Acetaminophen (Tylenol Tab) 650 mg Q6H  PRN PO .PAIN 1-3 OR TEMP;  Start 4/2/19 


at 06:30


Amlodipine Besylate (Norvasc) 10 mg DAILY PO  Last administered on 4/2/19at 


10:45; Admin Dose 10 MG;  Start 4/2/19 at 09:00


Folic Acid (Folic Acid) 0.8 mg DAILY PO ;  Start 4/2/19 at 09:00


Potassium Chloride 100 ml @  50 mls/hr Q2H IVPB  Last administered on 4/2/19at 


15:13; Admin Dose 50 MLS/HR;  Start 4/2/19 at 11:00;  Stop 4/2/19 at 16:59





Past Medical History


reviewed


Home Meds


Reported Medications


Cyanocobalamin (Vitamin B12) Unknown Strength Tab, PO, TAB


   4/2/19


Cholecalciferol (Vitamin D3) (Vitamin D3) 500 Unit/5 Ml Liquid, 2000 UNIT PO 


DAILY


   4/2/19


Acetaminophen* (Acetaminophen*) 500 MG Extra Strength Tablet, 500 MG PO Q4H PRN 


for PAIN AND OR ELEVATED TEMP, TAB


   4/2/19


Multivitamins* (Theragran*) 1 Tab Tab, 1 TAB PO DAILY, TAB


   4/2/19


Folic Acid* (Folic Acid*) 0.8 Mg Tablet, 0.8 MG PO DAILY, TAB


   4/2/19


Lisinopril* (Lisinopril*) 40 Mg Tablet, 40 MG PO DAILY, #30 TAB


   4/2/19


Amlodipine Besylate* (Amlodipine Besylate*) 10 Mg Tablet, 10 MG PO DAILY, #30 


TAB


   4/2/19


Metformin Hcl* (Metformin Hcl*) 500 Mg Tablet, 500 MG PO DAILY, #30 TAB


   9/10/17


Discontinued Reported Medications


Olmesartan Medoxomil (Benicar) 40 Mg Tablet, 40 MG PO DAILY, #30 TAB


   9/10/17


Medications





Current Medications


Potassium Chloride/Sodium Chloride 1,000 ml @  80 mls/hr [id number] IV  Last 


administered on 4/2/19at 10:45; Admin Dose 80 MLS/HR;  Start 4/2/19 at 04:30;  


Stop 4/3/19 at 04:29


Diagnostic Test (Pha) (Accu-Chek) 1 ea 02 XX ;  Start 4/3/19 at 02:00


Insulin Aspart (Novolog Insulin Pen) NOVOLOG *MILD* ALGORITHM WITH MEALS  


BEDTIME SC  Last administered on 4/2/19at 11:48; Admin Dose 4 UNIT;  Start 


4/2/19 at 08:00


Hydralazine HCl (Apresoline) 10 mg Q6H  PRN IV ELEVATED BLOOD PRESSURE Last 


administered on 4/2/19at 15:13; Admin Dose 10 MG;  Start 4/2/19 at 04:30


Losartan Potassium (Cozaar) 100 mg DAILY PO ;  Start 4/2/19 at 09:00


Miscellaneous Information 1 ea NOTE XX ;  Start 4/2/19 at 04:30


Glucose (Glutose) 15 gm Q15M  PRN PO DECREASED GLUCOSE;  Start 4/2/19 at 04:30


Glucose (Glutose) 22.5 gm Q15M  PRN PO DECREASED GLUCOSE;  Start 4/2/19 at 04:30


Dextrose (D50w Syringe) 25 ml Q15M  PRN IV DECREASED GLUCOSE;  Start 4/2/19 at 


04:30


Dextrose (D50w Syringe) 50 ml Q15M  PRN IV DECREASED GLUCOSE;  Start 4/2/19 at 


04:30


Glucagon (Glucagen) 1 mg Q15M  PRN IM DECREASED GLUCOSE;  Start 4/2/19 at 04:30


Glucose (Glutose) 15 gm Q15M  PRN BUCCAL DECREASED GLUCOSE;  Start 4/2/19 at 


04:30


IV Flush (NS 3 ml) 3 ml PER PROTOCOL IV ;  Start 4/2/19 at 06:30


Acetaminophen (Tylenol Tab) 650 mg Q6H  PRN PO .PAIN 1-3 OR TEMP;  Start 4/2/19 


at 06:30


Amlodipine Besylate (Norvasc) 10 mg DAILY PO  Last administered on 4/2/19at 


10:45; Admin Dose 10 MG;  Start 4/2/19 at 09:00


Folic Acid (Folic Acid) 0.8 mg DAILY PO ;  Start 4/2/19 at 09:00


Potassium Chloride 100 ml @  50 mls/hr Q2H IVPB  Last administered on 4/2/19at 


15:13; Admin Dose 50 MLS/HR;  Start 4/2/19 at 11:00;  Stop 4/2/19 at 16:59


Allergies:  


Coded Allergies:  


     No Known Allergy (Unverified , 4/2/19)





Past Surgical History


reviewed





Social History


reviewed


Alcohol Use:  none


Smoking Status:  Never smoker


Drug Use:  marijuana











ASPEN BARCENAS NP           Apr 2, 2019 15:52


FROY MA                  Apr 2, 2019 17:15

## 2019-04-02 NOTE — ERD
ER Documentation


Chief Complaint


Chief Complaint





pt has a shunt, not acting like herself





HPI


This is a 78-year female who is not been acting normal per the daughter who 


takes care of her other daily basis.  According to the doing over the course of 


the last 3-4 weeks she has progressively talked less and less to the point where


now she does not talk at all.  There was no single event but just a progressive 


decline.  Patient has a history of shunt placed in the past.  Patient was placed


for normal pressure hydrocephalus treatment.  No fevers or chills.  Diagnosed 


with UTI a few weeks ago.  Denies any other current issues.





ROS


All systems reviewed and are negative except as per history of present illness.





Medications


Home Meds


Reported Medications


Olmesartan Medoxomil (Benicar) 40 Mg Tablet, 40 MG PO DAILY, #30 TAB


   9/10/17


Metformin Hcl* (Metformin Hcl*) 500 Mg Tablet, 500 MG PO DAILY, #30 TAB


   9/10/17





Allergies


Allergies:  


Coded Allergies:  


     No Known Allergy (Unverified , 9/10/17)





PMhx/Soc


History of Surgery:  No


Anesthesia Reaction:  No


Hx Neurological Disorder:  Yes (Dementia)


Hx Respiratory Disorders:  Yes (sleep apnea - on cpap started on June)


Hx Cardiac Disorders:  Yes (HTN)


Hx Psychiatric Problems:  No


Hx Miscellaneous Medical Probl:  Yes (DM, HTN)


Hx Alcohol Use:  No


Hx Substance Use:  No


Hx Tobacco Use:  No


Smoking Status:  Never smoker





Physical Exam


Vitals





Vital Signs


  Date      Temp  Pulse  Resp  B/P (MAP)   Pulse Ox  O2          O2 Flow    FiO2


Time                                                 Delivery    Rate


    4/2/19           69    14      163/71        96  Room Air


     01:26                          (101)


    4/1/19  98.2     86    16      176/80        98


     22:25                          (112)





Physical Exam


Const:   No acute distress


Head:   Atraumatic 


Eyes:    Normal Conjunctiva


ENT:    Normal External Ears, Nose and Mouth.


Neck:               Full range of motion. No meningismus.


Resp:   Clear to auscultation bilaterally


Cardio:   Regular rate and rhythm, no murmurs


Abd:    Soft, non tender, non distended. Normal bowel sounds


Skin:   No petechiae or rashes


Back:   No midline or flank tenderness


Ext:    No cyanosis, or edema


Neur:   Awake and alert


Psych:    Normal Mood and Affect


Result Diagram:  


4/2/19 0055                                                                     


          4/2/19 0055





Results 24 hrs





Laboratory Tests


Test
                  4/2/19
00:55  4/2/19
01:39     4/2/19
02:20  4/2/19
02:25


White Blood Count      6.2 10^3/ul


Red Blood Count       5.57 10^6/ul


Hemoglobin               15.3 g/dl


Hematocrit                  47.6 %


Mean Corpuscular           85.5 fl


Volume


Mean Corpuscular           27.5 pg


Hemoglobin


Mean Corpuscular        32.1 g/dl 
  
             
                



Hemoglobin
Concent


Red Cell                    13.7 %


Distribution Width


Platelet Count         140 10^3/UL


Mean Platelet              11.3 fl


Volume


Immature                   1.500 %


Granulocytes %


Neutrophils %               87.7 %


Lymphocytes %                5.8 %


Monocytes %                  4.5 %


Eosinophils %                0.2 %


Basophils %                  0.3 %


Nucleated Red Blood    0.0 /100WBC


Cells %


Immature             0.090 10^3/ul


Granulocytes #


Neutrophils #          5.4 10^3/ul


Lymphocytes #          0.4 10^3/ul


Monocytes #            0.3 10^3/ul


Eosinophils #          0.0 10^3/ul


Basophils #            0.0 10^3/ul


Nucleated Red Blood    0.0 10^3/ul


Cells #


Prothrombin Time          11.7 Sec


Prothrombin Time               0.9


Ratio


[employer]R [employer]            0.85 
  
             
                



Normalized
Ratio


Activated                22.2 Sec 
  
             
                



Partial
Thromboplas


t Time


Sodium Level            145 mmol/L


Potassium Level         3.1 mmol/L


Chloride Level           92 mmol/L


Carbon Dioxide           45 mmol/L


Level


Anion Gap                        8


Blood Urea Nitrogen       25 mg/dl


Creatinine              0.60 mg/dl


Est Glomerular        mL/min 
       
             
                



Filtrat Rate
mL/min


Glucose Level            352 mg/dl


Lactic Acid Level       2.2 mmol/L


Calcium Level           10.9 mg/dl


Total Bilirubin          0.5 mg/dl


Direct Bilirubin        0.00 mg/dl


Indirect Bilirubin       0.5 mg/dl


Aspartate Amino           16 IU/L 
  
             
                



Transf
(AST/SGOT)


Alanine                   37 IU/L 
  
             
                



Aminotransferase
(A


LT/SGPT)


Alkaline                  102 IU/L


Phosphatase


Troponin I             0.026 ng/ml


Total Protein             7.0 g/dl


Albumin                   4.0 g/dl


Globulin                 3.00 g/dl


Albumin/Globulin              1.33


Ratio


Salicylates Level    < 1.0 mg/dl


Acetaminophen Level  < 10.0 ug/ml


Ethyl Alcohol Level  < 10.0 mg/dl


Bedside Glucose                        320 mg/dL


Urine Color                                        YELLOW


Urine Clarity                                      CLOUDY


Urine pH                                                      9.0


Urine Specific                                              1.021


Gravity


Urine Ketones                                      NEGATIVE mg/dL


Urine Nitrite                                      NEGATIVE mg/dL


Urine Bilirubin                                    NEGATIVE mg/dL


Urine Urobilinogen                                 NEGATIVE mg/dL


Urine Leukocyte      
               
             NEGATIVE
Magdalene/ul  



Esterase



Urine Microscopic                                          1 /HPF


RBC


Urine Microscopic                                          4 /HPF


WBC


Urine Bacteria                                     FEW /HPF


Urine Hemoglobin                                   NEGATIVE mg/dL


Urine Glucose                                            3+ mg/dL


Urine Total Protein                                      2+ mg/dl


Urine Opiates                                      Negative


Screen


Urine Barbiturates                                 Negative


Urine Amphetamines                                 Negative


Screen


Urine                                              Negative


Benzodiazepines


Screen


Urine Cocaine                                      Negative


Screen


Urine Cannabinoids                                 Positive


Bedside Urine pH                                                    >=9.0


(LAB)


Bedside Urine                                                                2+


Protein (LAB)


Bedside Urine                                                             0.50%


Glucose (UA)


Bedside Urine                                                       Negative


Ketones (LAB)


Bedside Urine Blood                                                 Trace-lysed


Bedside Urine                                                       Negative


Nitrite (LAB)


Bedside Urine        
               
             
                Negative 



Leukocyte
Esterase


(L





Current Medications


 Medications
   Dose
          Sig/Ramone
       Start Time
   Status  Last


 (Trade)       Ordered        Route
 PRN     Stop Time              Admin
Dose


                              Reason                                Admin


 Sodium         500 ml @ 
     Q1H STAT
      4/2/19        DC            4/2/19


Chloride       500 mls/hr     IV
            00:23
 4/2/19                00:15



                                             01:22


 Sodium         1,800 ml       BOLUS OVER 2   4/2/19        DC            4/2/19


Chloride
                     HOURS STAT
    01:44
 4/2/19                02:27



(NS)                          IV*
           01:45


 Cefepime HCl   50 ml @ 
      ONCE  STAT
    4/2/19        DC            4/2/19


               100 mls/hr     IVPB
          01:44
 4/2/19                02:26



                                             02:13


 Vancomycin     250 ml @ 
     ONCE  ONCE
    4/2/19                 



HCl            125 mls/hr     IVPB
          02:00
 4/2/19


                                             03:59








Procedures/MDM


Patient's infectious symptoms have not stabilized and the patient is at risk of 


rapid decompensation.  The patient will be admitted for careful hydration, 


antibiotic therapy, and infectious source control.





Severe Sepsis Assessment:


Infectious Source:   Unknown


End organ damage indicated by:


[Lactate > 2.0 mmol/L








Severe Sepsis Managment: Blood Cultures X 2 before broad spectrum antibiotics 


initiated at 1:40 AM once sepsis was first recognized


   


30 ml/kg NS bolus   Completed


Initial Lactate:           2.2


Repeat Lactate        pending





Critical Care:


   Time:          45 minutes


   Treatments/Evaluations:    Emergent fluid management, while maintaining close


 respiratory support.  Immediate broad spectrum antibiotic therapy.  


Simultaneous assessment for possible sources in order to direct therapy.  


Consideration for invasive and chemical support to prevent respiratory or 


cardiac collapse.


                                                           


Septic Shock Assessment (1 hour post 30 ml/kg fluid bolus):


Hypotension (SBP < 90 or 40 mmHg drop, MAP < 65):        [No]


Lactic acid > 4.0                                                              


   [No]





Perfusion Reassessment for Septic Shock:


Temp 90.6, pulse 88, respiratory rate 14, blood pressure 117/76


Heart Exam:             [Tachycardic]


Lung Exam:              [No Crackles]


Capillary Refill:          [Delayed]


Peripheral Pulses:   [Radially present]


Skin:                         [Mottled, pale] 





Accepting Care Team:


Current data and ongoing care discussed.


Time:                      345


Primary Provider:      Dr. Oneill


Consulting:                  [XOXOXO]


Outstanding Data:       none














EKG: 


Rate/Rhythm:             [Normal Sinus Rhythm], regular rate


QRS, ST, T-waves:    [No changes consistent w/ acute ischemia], normal int


ervals, normal axis


Impression:      [No evidence of ischemia or arrhythmia]





Chest X-ray 1V Interpreted by me:


Soft Tissue:                                               No acute 


abnormalities


Bones:                                                    No acute abnormalities


Mediastinum/Cardiac Silhouette/Lungs:     [No acute abnormalities]





Departure


Diagnosis:  


   Primary Impression:  


   Altered level of consciousness


Condition:  Serious











RAMY SOUSA              Apr 2, 2019 04:02

## 2019-04-02 NOTE — PN
Date/Time of Note


Date/Time of Note


DATE: 4/2/19 


TIME: 16:34





Assessment/Plan


VTE Prophylaxis


SCD applied (from Nsg):  Yes


Pharmacological prophylaxis:  heparin





Lines/Catheters


IV Catheter Type (from Nrsg):  Peripheral IV


Urinary Cath still in place:  No





Assessment/Plan


Hospital Course


- unclear etiology of metabolic alkalosis with repsiratoyr acidosis, supporitve 


care


- DMII: basal/bolus


- obtain further collateral


- Neurology consult pending


- Replete electrolytes


Result Diagram:  


4/2/19 0055                                                                     


          4/2/19 0600





Results 24hrs





Laboratory Tests


Test
                   4/2/19
00:55  4/2/19
01:39  4/2/19
02:20    4/2/19
02:25


White Blood Count             6.2  #


Red Blood Count              5.57  H


Hemoglobin                    15.3


Hematocrit                   47.6  H


Mean Corpuscular              85.5


Volume


Mean Corpuscular             27.5  L


Hemoglobin


Mean Corpuscular             32.1  
  
             
             



Hemoglobin
Concen


t


Red Cell                      13.7


Distribution


Width


Platelet Count                 140


Mean Platelet                11.3  H


Volume


Immature                    1.500  H


Granulocytes %


Neutrophils %                87.7  H


Lymphocytes %                 5.8  L


Monocytes %                    4.5


Eosinophils %                  0.2


Basophils %                    0.3


Nucleated Red                  0.0


Blood Cells %


Immature                    0.090  H


Granulocytes #


Neutrophils #                  5.4


Lymphocytes #                 0.4  L


Monocytes #                    0.3


Eosinophils #                  0.0


Basophils #                    0.0


Nucleated Red                  0.0


Blood Cells #


Prothrombin Time             11.7  L


Prothrombin Time               0.9


Ratio


INR International            0.85  
  
             
             



Normalized
Ratio


Activated                   22.2  L
  
             
             



Partial
Thrombopl


ast Time


Sodium Level                  145  H


Potassium Level               3.1  L


Chloride Level                 92  L


Carbon Dioxide                45  *H


Level


Anion Gap                        8


Blood Urea                     25  H


Nitrogen


Creatinine                    0.60


Est Glomerular       
                
             
             



Filtrat


Rate
mL/min


Glucose Level                 352  H


Lactic Acid Level            2.2  *H


Calcium Level                10.9  H


Total Bilirubin                0.5


Direct Bilirubin              0.00


Indirect                       0.5


Bilirubin


Aspartate Amino                16  
  
             
             



Transf
(AST/SGOT)


Alanine                        37  
  
             
             



Aminotransferase



(ALT/SGPT)


Alkaline                       102


Phosphatase


Troponin I                   0.026


Total Protein                  7.0


Albumin                        4.0


Globulin                      3.00


Albumin/Globulin              1.33


Ratio


Salicylates Level  < 1.0  L


Acetaminophen      < 10.0  L


Level


Ethyl Alcohol      < 10.0  H


Level


Bedside Glucose                             320  H


Urine Color                                         YELLOW


Urine Clarity                                       CLOUDY  A


Urine pH                                                   9.0


Urine Specific                                           1.021


Gravity


Urine Ketones                                       NEGATIVE


Urine Nitrite                                       NEGATIVE


Urine Bilirubin                                     NEGATIVE


Urine                                               NEGATIVE


Urobilinogen


Urine Leukocyte                                     NEGATIVE


Esterase


Urine Microscopic                                            1


RBC


Urine Microscopic                                            4


WBC


Urine Bacteria                                      FEW  A


Urine Hemoglobin                                    NEGATIVE


Urine Glucose                                              3+  H


Urine Total                                                2+  H


Protein


Urine Opiates                                       Negative


Screen


Urine                                               Negative


Barbiturates


Urine                                               Negative


Amphetamines


Screen


Urine                                               Negative


Benzodiazepines


Screen


Urine Cocaine                                       Negative


Screen


Urine                                               Positive


Cannabinoids


Bedside Urine pH                                                  >=9.0


(LAB)


Bedside Urine                                                              2+  H


Protein (LAB)


Bedside Urine                                                           0.50%  H


Glucose (UA)


Bedside Urine                                                     Negative


Ketones (LAB)


Bedside Urine                                                     Trace-lysed  H


Blood


Bedside Urine                                                     Negative


Nitrite (LAB)


Bedside Urine      
                  
             
             Negative  



Leukocyte
Esteras


e (L


Test
                   4/2/19
04:01  4/2/19
06:00  4/2/19
06:02    4/2/19
08:17


Lactic Acid Level              1.4                         0.9


Sodium Level                                146  H


Potassium Level                            2.5  *L


Chloride Level                               100


Carbon Dioxide                              43  *H


Level


Anion Gap                                     3  L


Blood Urea                                   22  H


Nitrogen


Creatinine                                  0.51


Est Glomerular     
                    
           
             



Filtrat


Rate
mL/min


Glucose Level                               275  H


Calcium Level                                9.7


Total Bilirubin                              0.4


Direct Bilirubin                            0.00


Indirect                                     0.4


Bilirubin


Aspartate Amino    
                        11  L
  
             



Transf
(AST/SGOT)


Alanine            
                         42  
  
             



Aminotransferase



(ALT/SGPT)


Alkaline                                      71


Phosphatase


Total Protein                              5.0  #L


Albumin                                    2.9  #L


Globulin                                    2.10


Albumin/Globulin                            1.38


Ratio


Prothrombin Time                                          13.0


Prothrombin Time                                           1.0


Ratio


INR International  
                  
                  0.97  
  



Normalized
Ratio


Activated          
                  
                  24.1  
  



Partial
Thrombopl


ast Time


Hemoglobin A1c                                            9.1  H


Bedside Glucose                                                           255  H


Test
                   4/2/19
09:59  4/2/19
11:44  4/2/19
14:23  



Blood Gas          Blood arterial
    
             
             



Specimen Source



Arterial Blood     4/2/2019
10:14:48  
             
             



Date Drawn
                       AM


Arterial Blood pH          7.496  H
  
             
             



(Temp
corrected)


Arterial Blood              50.8  H
  
             
             



pCO2


(Temp
correct)


Arterial Blood              73.0  L
  
             
             



pO2


(Temp
corrected)


Arterial Blood               38.4  H


HCO3


Arterial Blood               13.2  H


Base Excess


Arterial Blood              94.8  L
  
             
             



Oxygen
Saturation


Andrea Test         ACCEPTAB


Arterial Blood     Left Radial  
     
             
             



Gas Puncture
Site


Arterial                      0.2  
  
             
             



Blood
Carboxyhemo


globin


Arterial Blood                 0.3


Methemoglobin


Blood Gas A-a O2              15.9


Differential


Oxyhemoglobin                 94.3


Percent


Blood Gas                     37.0


Temperature


Blood Gas          ROOM AIR


Modality


FiO2                          21.0


Blood Gas          TM


Notified Whom


Blood Gas          4/2/2019
10:25:23  
             
             



Notified Time
                    AM


Bedside Glucose                             262  H


Phosphorus Level                                          2.3  L


Magnesium Level                                            1.8


Ammonia                                                     12


Vitamin B12 Level                                   > 1000  H


Thyroid            
                  
                0.301  L
  



Stimulating


Hormone
(TSH)








Subjective


24 Hr Interval Summary


Free Text/Dictation


Calm comfortable


nonverbal





Exam/Review of Systems


Exam


Vitals





Vital Signs


  Date      Temp  Pulse  Resp  B/P (MAP)   Pulse Ox  O2          O2 Flow    FiO2


Time                                                 Delivery    Rate


    4/2/19  98.6     96    17      152/66       100


     15:33                           (94)


    4/2/19                                           Room Air


     01:26





Constitutional:  alert, oriented, well developed


Psych:  no complaints, nl mood/affect


Head:  normocephalic, atraumatic


Eyes:  nl conjunctiva, EOMI, nl lids, nl sclera, PERRL


ENMT:  nl external ears & nose, nl lips & teeth, nl nasal mucosa & septum


Neck:  supple, non-tender


Respiratory:  clear to auscultation, normal air movement


Cardiovascular:  regular rate and rhythm, nl pulses


Gastrointestinal:  soft, nl liver, spleen, non-tender


Musculoskeletal:  nl extremities to inspection, nl gait and stance


Extremities:  normal pulses


Neurological:  CNS II-XII intact, nl mental status, nl speech, nl strength


Skin:  nl turgor; 


   No rash or lesions


Lymph:  nl lymph nodes





Results


Results 24hrs





Laboratory Tests


Test
                   4/2/19
00:55  4/2/19
01:39  4/2/19
02:20    4/2/19
02:25


White Blood Count             6.2  #


Red Blood Count              5.57  H


Hemoglobin                    15.3


Hematocrit                   47.6  H


Mean Corpuscular              85.5


Volume


Mean Corpuscular             27.5  L


Hemoglobin


Mean Corpuscular             32.1  
  
             
             



Hemoglobin
Concen


t


Red Cell                      13.7


Distribution


Width


Platelet Count                 140


Mean Platelet                11.3  H


Volume


Immature                    1.500  H


Granulocytes %


Neutrophils %                87.7  H


Lymphocytes %                 5.8  L


Monocytes %                    4.5


Eosinophils %                  0.2


Basophils %                    0.3


Nucleated Red                  0.0


Blood Cells %


Immature                    0.090  H


Granulocytes #


Neutrophils #                  5.4


Lymphocytes #                 0.4  L


Monocytes #                    0.3


Eosinophils #                  0.0


Basophils #                    0.0


Nucleated Red                  0.0


Blood Cells #


Prothrombin Time             11.7  L


Prothrombin Time               0.9


Ratio


INR International            0.85  
  
             
             



Normalized
Ratio


Activated                   22.2  L
  
             
             



Partial
Thrombopl


ast Time


Sodium Level                  145  H


Potassium Level               3.1  L


Chloride Level                 92  L


Carbon Dioxide                45  *H


Level


Anion Gap                        8


Blood Urea                     25  H


Nitrogen


Creatinine                    0.60


Est Glomerular       
                
             
             



Filtrat


Rate
mL/min


Glucose Level                 352  H


Lactic Acid Level            2.2  *H


Calcium Level                10.9  H


Total Bilirubin                0.5


Direct Bilirubin              0.00


Indirect                       0.5


Bilirubin


Aspartate Amino                16  
  
             
             



Transf
(AST/SGOT)


Alanine                        37  
  
             
             



Aminotransferase



(ALT/SGPT)


Alkaline                       102


Phosphatase


Troponin I                   0.026


Total Protein                  7.0


Albumin                        4.0


Globulin                      3.00


Albumin/Globulin              1.33


Ratio


Salicylates Level  < 1.0  L


Acetaminophen      < 10.0  L


Level


Ethyl Alcohol      < 10.0  H


Level


Bedside Glucose                             320  H


Urine Color                                         YELLOW


Urine Clarity                                       CLOUDY  A


Urine pH                                                   9.0


Urine Specific                                           1.021


Gravity


Urine Ketones                                       NEGATIVE


Urine Nitrite                                       NEGATIVE


Urine Bilirubin                                     NEGATIVE


Urine                                               NEGATIVE


Urobilinogen


Urine Leukocyte                                     NEGATIVE


Esterase


Urine Microscopic                                            1


RBC


Urine Microscopic                                            4


WBC


Urine Bacteria                                      FEW  A


Urine Hemoglobin                                    NEGATIVE


Urine Glucose                                              3+  H


Urine Total                                                2+  H


Protein


Urine Opiates                                       Negative


Screen


Urine                                               Negative


Barbiturates


Urine                                               Negative


Amphetamines


Screen


Urine                                               Negative


Benzodiazepines


Screen


Urine Cocaine                                       Negative


Screen


Urine                                               Positive


Cannabinoids


Bedside Urine pH                                                  >=9.0


(LAB)


Bedside Urine                                                              2+  H


Protein (LAB)


Bedside Urine                                                           0.50%  H


Glucose (UA)


Bedside Urine                                                     Negative


Ketones (LAB)


Bedside Urine                                                     Trace-lysed  H


Blood


Bedside Urine                                                     Negative


Nitrite (LAB)


Bedside Urine      
                  
             
             Negative  



Leukocyte
Esteras


e (L


Test
                   4/2/19
04:01  4/2/19
06:00  4/2/19
06:02    4/2/19
08:17


Lactic Acid Level              1.4                         0.9


Sodium Level                                146  H


Potassium Level                            2.5  *L


Chloride Level                               100


Carbon Dioxide                              43  *H


Level


Anion Gap                                     3  L


Blood Urea                                   22  H


Nitrogen


Creatinine                                  0.51


Est Glomerular     
                    
           
             



Filtrat


Rate
mL/min


Glucose Level                               275  H


Calcium Level                                9.7


Total Bilirubin                              0.4


Direct Bilirubin                            0.00


Indirect                                     0.4


Bilirubin


Aspartate Amino    
                        11  L
  
             



Transf
(AST/SGOT)


Alanine            
                         42  
  
             



Aminotransferase



(ALT/SGPT)


Alkaline                                      71


Phosphatase


Total Protein                              5.0  #L


Albumin                                    2.9  #L


Globulin                                    2.10


Albumin/Globulin                            1.38


Ratio


Prothrombin Time                                          13.0


Prothrombin Time                                           1.0


Ratio


INR International  
                  
                  0.97  
  



Normalized
Ratio


Activated          
                  
                  24.1  
  



Partial
Thrombopl


ast Time


Hemoglobin A1c                                            9.1  H


Bedside Glucose                                                           255  H


Test
                   4/2/19
09:59  4/2/19
11:44  4/2/19
14:23  



Blood Gas          Blood arterial
    
             
             



Specimen Source



Arterial Blood     4/2/2019
10:14:48  
             
             



Date Drawn
                       AM


Arterial Blood pH          7.496  H
  
             
             



(Temp
corrected)


Arterial Blood              50.8  H
  
             
             



pCO2


(Temp
correct)


Arterial Blood              73.0  L
  
             
             



pO2


(Temp
corrected)


Arterial Blood               38.4  H


HCO3


Arterial Blood               13.2  H


Base Excess


Arterial Blood              94.8  L
  
             
             



Oxygen
Saturation


Andrea Test         ACCEPTAB


Arterial Blood     Left Radial  
     
             
             



Gas Puncture
Site


Arterial                      0.2  
  
             
             



Blood
Carboxyhemo


globin


Arterial Blood                 0.3


Methemoglobin


Blood Gas A-a O2              15.9


Differential


Oxyhemoglobin                 94.3


Percent


Blood Gas                     37.0


Temperature


Blood Gas          ROOM AIR


Modality


FiO2                          21.0


Blood Gas          TM


Notified Whom


Blood Gas          4/2/2019
10:25:23  
             
             



Notified Time
                    AM


Bedside Glucose                             262  H


Phosphorus Level                                          2.3  L


Magnesium Level                                            1.8


Ammonia                                                     12


Vitamin B12 Level                                   > 1000  H


Thyroid            
                  
                0.301  L
  



Stimulating


Hormone
(TSH)








Medications


Medication





Current Medications


Potassium Chloride/Sodium Chloride 1,000 ml @  80 mls/hr [id number] IV  Last 


administered on 4/2/19at 10:45; Admin Dose 80 MLS/HR;  Start 4/2/19 at 04:30;  


Stop 4/3/19 at 04:29


Diagnostic Test (Pha) (Accu-Chek) 1 ea 02 XX ;  Start 4/3/19 at 02:00


Insulin Aspart (Novolog Insulin Pen) NOVOLOG *MILD* ALGORITHM WITH MEALS  


BEDTIME SC  Last administered on 4/2/19at 11:48; Admin Dose 4 UNIT;  Start 


4/2/19 at 08:00


Hydralazine HCl (Apresoline) 10 mg Q6H  PRN IV ELEVATED BLOOD PRESSURE Last 


administered on 4/2/19at 15:13; Admin Dose 10 MG;  Start 4/2/19 at 04:30


Losartan Potassium (Cozaar) 100 mg DAILY PO ;  Start 4/2/19 at 09:00


Miscellaneous Information 1 ea NOTE XX ;  Start 4/2/19 at 04:30


Glucose (Glutose) 15 gm Q15M  PRN PO DECREASED GLUCOSE;  Start 4/2/19 at 04:30


Glucose (Glutose) 22.5 gm Q15M  PRN PO DECREASED GLUCOSE;  Start 4/2/19 at 04:30


Dextrose (D50w Syringe) 25 ml Q15M  PRN IV DECREASED GLUCOSE;  Start 4/2/19 at 


04:30


Dextrose (D50w Syringe) 50 ml Q15M  PRN IV DECREASED GLUCOSE;  Start 4/2/19 at 


04:30


Glucagon (Glucagen) 1 mg Q15M  PRN IM DECREASED GLUCOSE;  Start 4/2/19 at 04:30


Glucose (Glutose) 15 gm Q15M  PRN BUCCAL DECREASED GLUCOSE;  Start 4/2/19 at 


04:30


IV Flush (NS 3 ml) 3 ml PER PROTOCOL IV ;  Start 4/2/19 at 06:30


Acetaminophen (Tylenol Tab) 650 mg Q6H  PRN PO .PAIN 1-3 OR TEMP;  Start 4/2/19 


at 06:30


Amlodipine Besylate (Norvasc) 10 mg DAILY PO  Last administered on 4/2/19at 


10:45; Admin Dose 10 MG;  Start 4/2/19 at 09:00


Folic Acid (Folic Acid) 0.8 mg DAILY PO ;  Start 4/2/19 at 09:00


Potassium Chloride 100 ml @  50 mls/hr Q2H IVPB  Last administered on 4/2/19at 


15:13; Admin Dose 50 MLS/HR;  Start 4/2/19 at 11:00;  Stop 4/2/19 at 16:59











CHERRY JANE MD           Apr 2, 2019 16:35 K 2.8

## 2019-04-02 NOTE — HP
Date/Time of Note


Date/Time of Note


DATE: 4/2/19 


TIME: 07:58





Assessment/Plan


VTE Prophylaxis


SCD applied (from Nsg):  Yes


Pharmacological prophylaxis:  NA/contraindicated


Pharm contraindication:  low risk/ambulating





Lines/Catheters


IV Catheter Type (from Nrsg):  Saline Lock


Urinary Cath still in place:  No





Assessment/Plan


Hospital Course


  This is a 78-year-old female being admitted to the telemetry floor for:





#1 acute encephalopathy: Toxic metabolic versus neurologic versus other.  At the


current time there does not appear to be any overt signs of infection.  Patient 


also was noted to have a positive marijuana urine drug screen chest x-ray does 


not show any signs of pneumonia UA does not appear to show any signs of 


infections as well.  Patient is afebrile.  Nonetheless we will obtain blood 


cultures and urine cultures.  Will obtain an MRI of the brain to further 


evaluate.  Will consult neurology .  PT OT evaluation.  CT scan of the 


brain does not show any acute abnormalities.





#2 dehydration with contraction alkalosis: Patient does appear to be volume down


she did receive fluids in the ER.  We will repeat CBC CMP in the a.m.  And 


adjust fluids accordingly.





# 3lactic acidosis: 2.2 possibly secondary to dehydration. Afebrile. Will trend.


cultures ordered. 





#4 normal pressure hydrocephalus: Patient is status post  shunt.  Will obtain 


an MRI of the brain to further evaluate.  Consult neurology.





#5 history of CVA: Patient at baseline uses a walker and converses and eats 


normally.





#6 diabetes mellitus: Patient's blood sugars are noted to be elevated in the 


300s.  Will check hemoglobin A1c.  We will need to optimize patient's blood 


sugars.





#7 hypertension: Patient blood pressure also noted to be elevated, will need to 


optimize blood pressure regimen





#8  Dementia: will need assess baseline with daughter





#9 dvt and gi proph: scds, no gi prophylaxis indicated





further treatment strategy will be implemented as per the clinical course.


Result Diagram:  


4/2/19 0055                                                                     


          4/2/19 0055





Results 24hrs





Laboratory Tests


Test
                   4/2/19
00:55  4/2/19
01:39  4/2/19
02:20    4/2/19
02:25


White Blood Count             6.2  #


Red Blood Count              5.57  H


Hemoglobin                    15.3


Hematocrit                   47.6  H


Mean Corpuscular              85.5


Volume


Mean Corpuscular             27.5  L


Hemoglobin


Mean Corpuscular             32.1  
  
             
             



Hemoglobin
Concent


Red Cell Distribution         13.7


Width


Platelet Count                 140


Mean Platelet Volume         11.3  H


Immature Granulocytes       1.500  H


%


Neutrophils %                87.7  H


Lymphocytes %                 5.8  L


Monocytes %                    4.5


Eosinophils %                  0.2


Basophils %                    0.3


Nucleated Red Blood            0.0


Cells %


Immature Granulocytes       0.090  H


#


Neutrophils #                  5.4


Lymphocytes #                 0.4  L


Monocytes #                    0.3


Eosinophils #                  0.0


Basophils #                    0.0


Nucleated Red Blood            0.0


Cells #


Prothrombin Time             11.7  L


Prothrombin Time Ratio         0.9


INR International            0.85  
  
             
             



Normalized
Ratio


Activated                   22.2  L
  
             
             



Partial
Thromboplast


Time


Sodium Level                  145  H


Potassium Level               3.1  L


Chloride Level                 92  L


Carbon Dioxide Level          45  *H


Anion Gap                        8


Blood Urea Nitrogen            25  H


Creatinine                    0.60


Est Glomerular Filtrat    
           
             
             



Rate
mL/min


Glucose Level                 352  H


Lactic Acid Level            2.2  *H


Calcium Level                10.9  H


Total Bilirubin                0.5


Direct Bilirubin              0.00


Indirect Bilirubin             0.5


Aspartate Amino                16  
  
             
             



Transf
(AST/SGOT)


Alanine                        37  
  
             
             



Aminotransferase
(ALT/


SGPT)


Alkaline Phosphatase           102


Troponin I                   0.026


Total Protein                  7.0


Albumin                        4.0


Globulin                      3.00


Albumin/Globulin Ratio        1.33


Salicylates Level       < 1.0  L


Acetaminophen Level     < 10.0  L


Ethyl Alcohol Level     < 10.0  H


Bedside Glucose                             320  H


Urine Color                                         YELLOW


Urine Clarity                                       CLOUDY  A


Urine pH                                                   9.0


Urine Specific Gravity                                   1.021


Urine Ketones                                       NEGATIVE


Urine Nitrite                                       NEGATIVE


Urine Bilirubin                                     NEGATIVE


Urine Urobilinogen                                  NEGATIVE


Urine Leukocyte                                     NEGATIVE


Esterase


Urine Microscopic RBC                                        1


Urine Microscopic WBC                                        4


Urine Bacteria                                      FEW  A


Urine Hemoglobin                                    NEGATIVE


Urine Glucose                                              3+  H


Urine Total Protein                                        2+  H


Urine Opiates Screen                                Negative


Urine Barbiturates                                  Negative


Urine Amphetamines                                  Negative


Screen


Urine Benzodiazepines                               Negative


Screen


Urine Cocaine Screen                                Negative


Urine Cannabinoids                                  Positive


Bedside Urine pH (LAB)                                            >=9.0


Bedside Urine Protein                                                      2+  H


(LAB)


Bedside Urine Glucose                                                   0.50%  H


(UA)


Bedside Urine Ketones                                             Negative


(LAB)


Bedside Urine Blood                                               Trace-lysed  H


Bedside Urine Nitrite                                             Negative


(LAB)


Bedside Urine           
             
             
             Negative  



Leukocyte
Esterase (L


Test
                   4/2/19
04:01  4/2/19
06:02  
             



Lactic Acid Level              1.4           0.9


Prothrombin Time                            13.0


Prothrombin Time Ratio                       1.0


INR International       
                  0.97  
  
             



Normalized
Ratio


Activated               
                  24.1  
  
             



Partial
Thromboplast


Time


Hemoglobin A1c                              9.1  H








HPI/ROS


Admit Date/Time


Admit Date/Time


Apr 2, 2019 at 03:41





Hx of Present Illness


Chief complaint: Altered mental status times 1 week 





The following history was obtained from the daughter as patient was not able to 


provide history given clinical condition.





this is a 78-year-old female with a past medical history of CVA, normal pressure


hydrocephalus status post  shunt who presents today with her daughter after 


having altered mental status times 1 week.As per the daughter the patient was 


her normal self approximately 1 week ago when she started noticing that her 


mother was starting to talk less and not move around as much.  At her baseline 


the patient does use her walker and she converses normally.  She does smoke 


marijuana and uses marijuana Gummies on occasion.  Daughter reported that she 


was concerned that maybe she had a urinary tract infection so she did receive 


antibiotics from her primary care doctor and she did give her antibiotics for 


urinary tract infection however her symptoms did not resolve.  Daughter denies 


any fevers.  She did notice a vaginal discharge.  At baseline patient is able to


eat her food normally.  The daughter has also noticed that her blood pressures 


and her blood sugars have been elevated recently.





Allergies: NKDA





Medications: See MAR





ROS


Subjective hx not possible:  pt non-verbal





PMH/Family/Social


Past Medical History


Diabetes mellitus, hypertension, dementia, normal pressure hydrocephalus status 


post  shunt


Medications





Current Medications


Potassium Chloride/Sodium Chloride 1,000 ml @  80 mls/hr [id number] IV ;  Start 


4/2/19 at 04:30;  Stop 4/3/19 at 04:29


Diagnostic Test (Pha) (Accu-Chek) 1 ea 02 XX ;  Start 4/3/19 at 02:00


Insulin Aspart (Novolog Insulin Pen) NOVOLOG *MILD* ALGORITHM WITH MEALS  


BEDTIME SC ;  Start 4/2/19 at 08:00


Hydralazine HCl (Apresoline) 10 mg Q6H  PRN IV ELEVATED BLOOD PRESSURE;  Start 


4/2/19 at 04:30


Losartan Potassium (Cozaar) 100 mg DAILY PO ;  Start 4/2/19 at 09:00


Miscellaneous Information 1 ea NOTE XX ;  Start 4/2/19 at 04:30


Glucose (Glutose) 15 gm Q15M  PRN PO DECREASED GLUCOSE;  Start 4/2/19 at 04:30


Glucose (Glutose) 22.5 gm Q15M  PRN PO DECREASED GLUCOSE;  Start 4/2/19 at 04:30


Dextrose (D50w Syringe) 25 ml Q15M  PRN IV DECREASED GLUCOSE;  Start 4/2/19 at 


04:30


Dextrose (D50w Syringe) 50 ml Q15M  PRN IV DECREASED GLUCOSE;  Start 4/2/19 at 


04:30


Glucagon (Glucagen) 1 mg Q15M  PRN IM DECREASED GLUCOSE;  Start 4/2/19 at 04:30


Glucose (Glutose) 15 gm Q15M  PRN BUCCAL DECREASED GLUCOSE;  Start 4/2/19 at 


04:30


IV Flush (NS 3 ml) 3 ml PER PROTOCOL IV ;  Start 4/2/19 at 06:30


Acetaminophen (Tylenol Tab) 650 mg Q6H  PRN PO .PAIN 1-3 OR TEMP;  Start 4/2/19 


at 06:30


Coded Allergies:  


     No Known Allergy (Unverified , 4/2/19)





Past Surgical History


VPS





Family History


Significant Family History:  no pertinent family hx





Social History


Alcohol Use:  none


Smoking Status:  Never smoker


Drug Use:  marijuana





Exam/Review of Systems


Vital Signs


Vitals





Vital Signs


  Date      Temp  Pulse  Resp  B/P (MAP)   Pulse Ox  O2          O2 Flow    FiO2


Time                                                 Delivery    Rate


    4/2/19  97.4     78    17      175/77        94


     07:38                          (109)


    4/2/19                                           Room Air


     01:26








Exam


Exam





General: Patient is currently lying in bed she appears awake but she is not 


responding to questions or conversing.  She appears to have a masked facies.  He


does appear tired.


HEENT: Atraumatic, normocephalic. The pupils are equal, round and reactive. E


xtraocular motor are intact


Neck: Supple with full range of motion. No rigidity or meningismus


Chest: Nontender


Lungs: Clear to auscultation bilaterally no crackles rales or wheezing


Heart: Normal S1-S2, Regular rhythm and rate.


Abdomen: Soft , nontender,  nondistended , bowel sounds are present. No guarding


no rebound tenderness , No masses or organomegaly. No costovertebral temporal 


angle mass


Extremities: Normal to inspection, no edema no cyanosis


Neurologic: Awake and looking around but not responsive to any verbal commands 


or converted but not conversing.  Further neurological exam is limited secondary


to her clinical condition.


Additional Comments


PROCEDURE:   CT Brain without contrast. 


 


CLINICAL INDICATION:   Altered mental status


 


TECHNIQUE:   CT scan of the brain was performed on a multidetector high-


resolution CT scan. Axial imaging was obtained of the brain without contrast 


administration.  Coronal and sagittal reformatted images were obtained from the 


axial source images. Standard CT scan of the head without contrast protocols 


were performed. 


 


The total exam CTDI equals 39.64 mGy and the total exam DLP equals 713.51 mGy-


cm. 


 


One or more of the following dose reduction techniques were used:


- Automated exposure control.


- Adjustment of the mA and/or kV according to patient size.


Use of iterative reconstruction technique.


Dicom images are available


 


COMPARISON:   CT head without contrast 09/10/2017 and MRI of the brain 


09/11/2017 


 


FINDINGS:   


 


Right parietal ventriculostomy with tip terminating along the anterior aspect of


the anterior horn of the right lateral ventricle. No change in the diffuse 


ventriculomegaly without midline shift consistent with nonobstructing 


hydrocephalus. The peripheral CSF spaces are prominent consistent with moderate 


generalized cerebral and cerebellar volume loss. Moderate periventricular and 


subcortical deep white matter changes consistent with chronic microvascular 


ischemic disease. Encephalomalacia adjacent to the right ventriculostomy 


catheter. No evidence of intracranial masses hemorrhages or midline shift. The 


remainder of the gray-white matter differentiation is unremarkable. The bones of


the calvarium are otherwise unremarkable. Atherosclerotic vascular disease of 


the distal vertebral arteries and cavernous carotid arteries. Minimal chronic 


right maxillary and bilateral ethmoid sinus disease. Remainder the paranasal 


sinuses and mastoids are unremarkable.


 


IMPRESSION:


 


1.  Right parietal ventriculostomy terminating along the anterior aspect of the 


anterior horn right lateral ventricle. Encephalomalacia adjacent to the 


ventriculostomy catheter.


2.  Diffuse ventriculomegaly consistent with nonobstructing hydrocephalus.


3.  Moderate generalized cerebral and cerebellar volume loss and nonspecific 


chronic microvascular ischemic disease.


4.  No evidence of intracranial masses hemorrhages or midline shift.


 


 


RPTAT:AAJJ


_____________________________________________ 


Physician Bassem           Date    Time 


Electronically viewed and signed by Physician Bassem on 04/02/2019 03:02 


 


D:  04/02/2019 03:02  T:  04/02/2019 03:02


BM/





CC: RAMY SOUSA





[id number]


PROCEDURE:   Single view chest. 


 


CLINICAL INDICATION:  Altered mental status


 


TECHNIQUE:   Single view of the chest was obtained 


 


COMPARISON:  CR CHEST 9/10/2017


 


FINDINGS:


   


Length of tubing extending over the right thorax appears intact. The lungs are 


clear. There is no evidence of an effusion or pneumothorax. Atherosclerotic 


calcifications at the aortic arch, cardiac silhouette and mediastinal contours 


otherwise unremarkable. Degenerate marginal spurring throughout the thoracic 


spine and mild osteoarthritis of the right shoulder.


 


IMPRESSION:


 


1. No acute cardiopulmonary abnormality. 


2. Aortic atherosclerosis.


 


 


RPTAT: [id number]


_____________________________________________ 


Physician Ilda           Date    Time 


Electronically viewed and signed by Physician Ilda on 04/02/2019 


03:02 


 


D:  04/02/2019 03:02  T:  04/02/2019 03:02


xB/





CC: RAMY SOUSA





[id number]











ALVIN LOCK                  Apr 2, 2019 08:08

## 2019-04-03 VITALS — RESPIRATION RATE: 18 BRPM | DIASTOLIC BLOOD PRESSURE: 78 MMHG | HEART RATE: 77 BPM | SYSTOLIC BLOOD PRESSURE: 160 MMHG

## 2019-04-03 VITALS — DIASTOLIC BLOOD PRESSURE: 80 MMHG | HEART RATE: 72 BPM | SYSTOLIC BLOOD PRESSURE: 178 MMHG | RESPIRATION RATE: 19 BRPM

## 2019-04-03 VITALS — SYSTOLIC BLOOD PRESSURE: 177 MMHG | RESPIRATION RATE: 17 BRPM | HEART RATE: 60 BPM | DIASTOLIC BLOOD PRESSURE: 75 MMHG

## 2019-04-03 VITALS — DIASTOLIC BLOOD PRESSURE: 74 MMHG | SYSTOLIC BLOOD PRESSURE: 167 MMHG | HEART RATE: 75 BPM

## 2019-04-03 VITALS — HEART RATE: 66 BPM

## 2019-04-03 VITALS — HEART RATE: 76 BPM | DIASTOLIC BLOOD PRESSURE: 72 MMHG | RESPIRATION RATE: 17 BRPM | SYSTOLIC BLOOD PRESSURE: 161 MMHG

## 2019-04-03 VITALS — HEART RATE: 53 BPM

## 2019-04-03 VITALS — HEART RATE: 56 BPM | SYSTOLIC BLOOD PRESSURE: 166 MMHG | RESPIRATION RATE: 18 BRPM | DIASTOLIC BLOOD PRESSURE: 72 MMHG

## 2019-04-03 VITALS — HEART RATE: 70 BPM | DIASTOLIC BLOOD PRESSURE: 75 MMHG | RESPIRATION RATE: 18 BRPM | SYSTOLIC BLOOD PRESSURE: 167 MMHG

## 2019-04-03 VITALS — HEART RATE: 77 BPM

## 2019-04-03 LAB
ABNORMAL IP MESSAGE: 1
ADD MAN DIFF?: NO
ALANINE AMINOTRANSFERASE: 40 IU/L (ref 13–69)
ALBUMIN/GLOBULIN RATIO: 1.27
ALBUMIN: 2.8 G/DL (ref 3.3–4.9)
ALKALINE PHOSPHATASE: 61 IU/L (ref 42–121)
ANION GAP: 2 (ref 5–13)
ANION GAP: 4 (ref 5–13)
ANION GAP: 4 (ref 5–13)
ASPARTATE AMINO TRANSFERASE: 15 IU/L (ref 15–46)
BASOPHIL #: 0 10^3/UL (ref 0–0.1)
BASOPHILS %: 0.1 % (ref 0–2)
BILIRUBIN,DIRECT: 0 MG/DL (ref 0–0.2)
BILIRUBIN,TOTAL: 0.5 MG/DL (ref 0.2–1.3)
BLOOD UREA NITROGEN: 19 MG/DL (ref 7–20)
BLOOD UREA NITROGEN: 21 MG/DL (ref 7–20)
BLOOD UREA NITROGEN: 22 MG/DL (ref 7–20)
CALCIUM: 8.5 MG/DL (ref 8.4–10.2)
CALCIUM: 9.1 MG/DL (ref 8.4–10.2)
CALCIUM: 9.9 MG/DL (ref 8.4–10.2)
CARBON DIOXIDE: 34 MMOL/L (ref 21–31)
CARBON DIOXIDE: 37 MMOL/L (ref 21–31)
CARBON DIOXIDE: 38 MMOL/L (ref 21–31)
CHLORIDE: 100 MMOL/L (ref 97–110)
CHLORIDE: 98 MMOL/L (ref 97–110)
CHLORIDE: 99 MMOL/L (ref 97–110)
CHOL/HDL RATIO: 3.2 RATIO
CHOLESTEROL: 146 MG/DL (ref 100–200)
CREATININE: 0.41 MG/DL (ref 0.44–1)
CREATININE: 0.5 MG/DL (ref 0.44–1)
CREATININE: 0.53 MG/DL (ref 0.44–1)
EOSINOPHILS #: 0 10^3/UL (ref 0–0.5)
EOSINOPHILS %: 0 % (ref 0–7)
FREE T3: 1.83 PG/ML (ref 2.77–5.27)
FREE T4 (FREE THYROXINE): 0.54 NG/DL (ref 0.85–1.93)
GLOBULIN: 2.2 G/DL (ref 1.3–3.2)
GLUCOSE: 213 MG/DL (ref 70–220)
GLUCOSE: 225 MG/DL (ref 70–220)
GLUCOSE: 291 MG/DL (ref 70–220)
HDL CHOLESTEROL: 45 MG/DL (ref 33–92)
HEMATOCRIT: 36.6 % (ref 37–47)
HEMOGLOBIN: 11.9 G/DL (ref 12–16)
IMMATURE GRANS #M: 0.1 10^3/UL (ref 0–0.03)
IMMATURE GRANS % (M): 1.4 % (ref 0–0.43)
LDL CHOLESTEROL,CALCULATED: 73 MG/DL
LYMPHOCYTES #: 0.4 10^3/UL (ref 0.8–2.9)
LYMPHOCYTES %: 5 % (ref 15–51)
MAGNESIUM: 1.7 MG/DL (ref 1.7–2.5)
MEAN CORPUSCULAR HEMOGLOBIN: 27.2 PG (ref 29–33)
MEAN CORPUSCULAR HGB CONC: 32.5 G/DL (ref 32–37)
MEAN CORPUSCULAR VOLUME: 83.6 FL (ref 82–101)
MEAN PLATELET VOLUME: 11.3 FL (ref 7.4–10.4)
MONOCYTE #: 0.3 10^3/UL (ref 0.3–0.9)
MONOCYTES %: 4.5 % (ref 0–11)
NEUTROPHIL #: 6.5 10^3/UL (ref 1.6–7.5)
NEUTROPHILS %: 89 % (ref 39–77)
NUCLEATED RED BLOOD CELLS #: 0 10^3/UL (ref 0–0)
NUCLEATED RED BLOOD CELLS%: 0 /100WBC (ref 0–0)
PLATELET COUNT: 121 10^3/UL (ref 140–415)
POSITIVE DIFF: (no result)
POTASSIUM: 2.8 MMOL/L (ref 3.5–5.1)
POTASSIUM: 3.6 MMOL/L (ref 3.5–5.1)
POTASSIUM: 3.7 MMOL/L (ref 3.5–5.1)
RED BLOOD COUNT: 4.38 10^6/UL (ref 4.2–5.4)
RED CELL DISTRIBUTION WIDTH: 13.9 % (ref 11.5–14.5)
SODIUM: 136 MMOL/L (ref 135–144)
SODIUM: 139 MMOL/L (ref 135–144)
SODIUM: 141 MMOL/L (ref 135–144)
THYROID STIMULATING HORMONE: 0.38 MIU/L (ref 0.47–4.68)
TOTAL PROTEIN: 5 G/DL (ref 6.1–8.1)
TRIGLYCERIDES: 138 MG/DL (ref 0–149)
WHITE BLOOD COUNT: 7.3 10^3/UL (ref 4.8–10.8)

## 2019-04-03 RX ADMIN — ACETAMINOPHEN SCH MG: 400 TABLET ORAL at 08:44

## 2019-04-03 RX ADMIN — POTASSIUM CHLORIDE SCH MLS/HR: 200 INJECTION, SOLUTION INTRAVENOUS at 08:44

## 2019-04-03 RX ADMIN — POTASSIUM CHLORIDE 1 MLS/HR: 200 INJECTION, SOLUTION INTRAVENOUS at 08:44

## 2019-04-03 RX ADMIN — AMLODIPINE BESYLATE 1 MG: 10 TABLET ORAL at 08:49

## 2019-04-03 RX ADMIN — INSULIN ASPART 1 UNIT: 100 INJECTION, SOLUTION INTRAVENOUS; SUBCUTANEOUS at 12:22

## 2019-04-03 RX ADMIN — INSULIN ASPART 1 UNIT: 100 INJECTION, SOLUTION INTRAVENOUS; SUBCUTANEOUS at 17:42

## 2019-04-03 RX ADMIN — LISINOPRIL SCH MG: 20 TABLET ORAL at 08:50

## 2019-04-03 RX ADMIN — FOLIC ACID TAB 400 MCG 1 MG: 400 TAB at 08:44

## 2019-04-03 RX ADMIN — SODIUM CHLORIDE AND POTASSIUM CHLORIDE SCH MLS/HR: 4.5; 1.49 INJECTION, SOLUTION INTRAVENOUS at 01:27

## 2019-04-03 RX ADMIN — INSULIN ASPART 1 UNIT: 100 INJECTION, SOLUTION INTRAVENOUS; SUBCUTANEOUS at 08:15

## 2019-04-03 RX ADMIN — LISINOPRIL 1 MG: 20 TABLET ORAL at 08:50

## 2019-04-03 RX ADMIN — INSULIN GLARGINE 1 UNITS: 100 INJECTION, SOLUTION SUBCUTANEOUS at 12:22

## 2019-04-03 RX ADMIN — POTASSIUM CHLORIDE 1 MLS/HR: 200 INJECTION, SOLUTION INTRAVENOUS at 14:01

## 2019-04-03 RX ADMIN — INSULIN ASPART 1 UNIT: 100 INJECTION, SOLUTION INTRAVENOUS; SUBCUTANEOUS at 21:00

## 2019-04-03 RX ADMIN — HYDRALAZINE HYDROCHLORIDE 1 MG: 20 INJECTION INTRAMUSCULAR; INTRAVENOUS at 00:16

## 2019-04-03 RX ADMIN — MORPHINE SULFATE 1 MG: 2 INJECTION, SOLUTION INTRAMUSCULAR; INTRAVENOUS at 05:46

## 2019-04-03 RX ADMIN — POTASSIUM CHLORIDE 1 MLS/HR: 200 INJECTION, SOLUTION INTRAVENOUS at 11:24

## 2019-04-03 RX ADMIN — SODIUM CHLORIDE AND POTASSIUM CHLORIDE 1 MLS/HR: 4.5; 1.49 INJECTION, SOLUTION INTRAVENOUS at 01:27

## 2019-04-03 RX ADMIN — INSULIN GLARGINE SCH UNITS: 100 INJECTION, SOLUTION SUBCUTANEOUS at 12:22

## 2019-04-03 RX ADMIN — HYDRALAZINE HYDROCHLORIDE PRN MG: 20 INJECTION INTRAMUSCULAR; INTRAVENOUS at 00:16

## 2019-04-03 RX ADMIN — AMLODIPINE BESYLATE SCH MG: 10 TABLET ORAL at 08:49

## 2019-04-03 RX ADMIN — POTASSIUM CHLORIDE SCH MLS/HR: 200 INJECTION, SOLUTION INTRAVENOUS at 14:01

## 2019-04-03 RX ADMIN — POTASSIUM CHLORIDE SCH MLS/HR: 200 INJECTION, SOLUTION INTRAVENOUS at 11:24

## 2019-04-03 NOTE — PN
Date/Time of Note


Date/Time of Note


DATE: 4/3/19 


TIME: 17:00





Assessment/Plan


VTE Prophylaxis


Risk score (from Ns)>0 risk:  5


SCD applied (from Ns):  Yes


Pharmacological prophylaxis:  heparin





Lines/Catheters


IV Catheter Type (from [facility]):  Peripheral IV


Urinary Cath still in place:  No





Assessment/Plan


Hospital Course


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


phalopathy 


- MRI pending


- Management per neurology





Respiratory acidodisos with metabolic alkalosis


- unclear etiology of metabolic alkalosis with repsiratoyr acidosis, supporitve 


care





DMII: titrate basal/bolus insulin





Hypokalemia


- Replete electrolytes





Hypertension:


- Continue current meds


Result Diagram:  


4/3/19 0532                                                                     


          4/3/19 1030





Results 24hrs





Laboratory Tests


Test
                     4/2/19
17:22  4/2/19
17:45  4/2/19
20:46  4/3/19
01:40


Sodium Level                     142


Potassium Level                  3.5


Chloride Level                   100


Carbon Dioxide Level             36  H


Anion Gap                          6


Blood Urea Nitrogen              22  H


Creatinine                      0.49


Est Glomerular Filtrat      
           
             
             



Rate
mL/min


Glucose Level                   305  H


Calcium Level                    9.6


Bedside Glucose                               320  H        295  H        225  H


Test
                     4/3/19
05:32  4/3/19
08:10  4/3/19
10:30  4/3/19
12:07


White Blood Count                7.3


Red Blood Count                4.38  #


Hemoglobin                    11.9  #L


Hematocrit                    36.6  #L


Mean Corpuscular Volume         83.6


Mean Corpuscular               27.2  L


Hemoglobin


Mean Corpuscular               32.5  
  
             
             



Hemoglobin
Concent


Red Cell Distribution           13.9


Width


Platelet Count                  121  L


Mean Platelet Volume           11.3  H


Immature Granulocytes %       1.400  H


Neutrophils %                  89.0  H


Lymphocytes %                   5.0  L


Monocytes %                      4.5


Eosinophils %                    0.0


Basophils %                      0.1


Nucleated Red Blood              0.0


Cells %


Immature Granulocytes #       0.100  H


Neutrophils #                    6.5


Lymphocytes #                   0.4  L


Monocytes #                      0.3


Eosinophils #                    0.0


Basophils #                      0.0


Nucleated Red Blood              0.0


Cells #


Sodium Level                     141                         136


Potassium Level                2.8  *L                       3.7


Chloride Level                    99                          98


Carbon Dioxide Level             38  H                       34  H


Anion Gap                         4  L                        4  L


Blood Urea Nitrogen               19                         22  H


Creatinine                      0.50                       0.41  L


Est Glomerular Filtrat      
           
               
           



Rate
mL/min


Glucose Level                    213                        291  H


Calcium Level                    9.1                         8.5


Magnesium Level                  1.7


Total Bilirubin                  0.5


Direct Bilirubin                0.00


Indirect Bilirubin               0.5


Aspartate Amino                  15  
  
             
             



Transf
(AST/SGOT)


Alanine                          40  
  
             
             



Aminotransferase
(ALT/SG


PT)


Alkaline Phosphatase              61


Total Protein                   5.0  L


Albumin                         2.8  L


Globulin                        2.20


Albumin/Globulin Ratio          1.27


Triglycerides Level              138


Cholesterol Level                146


LDL Cholesterol,                  73


Calculated


HDL Cholesterol                   45


Cholesterol/HDL Ratio            3.2


Thyroid Stimulating          0.384  L
  
             
             



Hormone
(TSH)


Free Thyroxine                 0.54  L


Free Triiodothyronine         1.83  L
  
             
             



(T3)
pg/mL


Bedside Glucose                                210                        277  H








Subjective


24 Hr Interval Summary


Free Text/Dictation


Remains alert


Minimally interactive


No change to status.  Awaiting MRI





Exam/Review of Systems


Exam


Vitals





Vital Signs


  Date      Temp  Pulse  Resp  B/P (MAP)   Pulse Ox  O2          O2 Flow    FiO2


Time                                                 Delivery    Rate


    4/3/19  98.1     76    17      161/72        95


     15:24                          (101)


    4/2/19                                           Room Air


     01:26








Intake and Output





4/2/19


4/2/19


4/3/19





1515:00


23:00


07:00





IntakeIntake Total


500 ml


1800 ml





BalanceBalance


500 ml


1800 ml











Exam


Alert


Nonverbal


Does not respond to my commands


L sided hemiplegia


R side very weak


RRR


CTAB


Resting comfortably





Results


Results 24hrs





Laboratory Tests


Test
                     4/2/19
17:22  4/2/19
17:45  4/2/19
20:46  4/3/19
01:40


Sodium Level                     142


Potassium Level                  3.5


Chloride Level                   100


Carbon Dioxide Level             36  H


Anion Gap                          6


Blood Urea Nitrogen              22  H


Creatinine                      0.49


Est Glomerular Filtrat      
           
             
             



Rate
mL/min


Glucose Level                   305  H


Calcium Level                    9.6


Bedside Glucose                               320  H        295  H        225  H


Test
                     4/3/19
05:32  4/3/19
08:10  4/3/19
10:30  4/3/19
12:07


White Blood Count                7.3


Red Blood Count                4.38  #


Hemoglobin                    11.9  #L


Hematocrit                    36.6  #L


Mean Corpuscular Volume         83.6


Mean Corpuscular               27.2  L


Hemoglobin


Mean Corpuscular               32.5  
  
             
             



Hemoglobin
Concent


Red Cell Distribution           13.9


Width


Platelet Count                  121  L


Mean Platelet Volume           11.3  H


Immature Granulocytes %       1.400  H


Neutrophils %                  89.0  H


Lymphocytes %                   5.0  L


Monocytes %                      4.5


Eosinophils %                    0.0


Basophils %                      0.1


Nucleated Red Blood              0.0


Cells %


Immature Granulocytes #       0.100  H


Neutrophils #                    6.5


Lymphocytes #                   0.4  L


Monocytes #                      0.3


Eosinophils #                    0.0


Basophils #                      0.0


Nucleated Red Blood              0.0


Cells #


Sodium Level                     141                         136


Potassium Level                2.8  *L                       3.7


Chloride Level                    99                          98


Carbon Dioxide Level             38  H                       34  H


Anion Gap                         4  L                        4  L


Blood Urea Nitrogen               19                         22  H


Creatinine                      0.50                       0.41  L


Est Glomerular Filtrat      
           
               
           



Rate
mL/min


Glucose Level                    213                        291  H


Calcium Level                    9.1                         8.5


Magnesium Level                  1.7


Total Bilirubin                  0.5


Direct Bilirubin                0.00


Indirect Bilirubin               0.5


Aspartate Amino                  15  
  
             
             



Transf
(AST/SGOT)


Alanine                          40  
  
             
             



Aminotransferase
(ALT/SG


PT)


Alkaline Phosphatase              61


Total Protein                   5.0  L


Albumin                         2.8  L


Globulin                        2.20


Albumin/Globulin Ratio          1.27


Triglycerides Level              138


Cholesterol Level                146


LDL Cholesterol,                  73


Calculated


HDL Cholesterol                   45


Cholesterol/HDL Ratio            3.2


Thyroid Stimulating          0.384  L
  
             
             



Hormone
(TSH)


Free Thyroxine                 0.54  L


Free Triiodothyronine         1.83  L
  
             
             



(T3)
pg/mL


Bedside Glucose                                210                        277  H








Medications


Medication





Current Medications


Insulin Aspart (Novolog Insulin Pen) NOVOLOG *MILD* ALGORITHM WITH MEALS  


BEDTIME SC  Last administered on 4/3/19at 12:22; Admin Dose 4 UNIT;  Start 


4/2/19 at 08:00


Hydralazine HCl (Apresoline) 10 mg Q6H  PRN IV ELEVATED BLOOD PRESSURE Last 


administered on 4/3/19at 00:16; Admin Dose 10 MG;  Start 4/2/19 at 04:30


Miscellaneous Information 1 ea NOTE XX ;  Start 4/2/19 at 04:30


Glucose (Glutose) 15 gm Q15M  PRN PO DECREASED GLUCOSE;  Start 4/2/19 at 04:30


Glucose (Glutose) 22.5 gm Q15M  PRN PO DECREASED GLUCOSE;  Start 4/2/19 at 04:30


Dextrose (D50w Syringe) 25 ml Q15M  PRN IV DECREASED GLUCOSE;  Start 4/2/19 at 


04:30


Dextrose (D50w Syringe) 50 ml Q15M  PRN IV DECREASED GLUCOSE;  Start 4/2/19 at 


04:30


Glucagon (Glucagen) 1 mg Q15M  PRN IM DECREASED GLUCOSE;  Start 4/2/19 at 04:30


Glucose (Glutose) 15 gm Q15M  PRN BUCCAL DECREASED GLUCOSE;  Start 4/2/19 at 


04:30


IV Flush (NS 3 ml) 3 ml PER PROTOCOL IV ;  Start 4/2/19 at 06:30


Acetaminophen (Tylenol Tab) 650 mg Q6H  PRN PO .PAIN 1-3 OR TEMP;  Start 4/2/19 


at 06:30


Amlodipine Besylate (Norvasc) 10 mg DAILY PO  Last administered on 4/3/19at 


08:49; Admin Dose 10 MG;  Start 4/2/19 at 09:00


Folic Acid (Folic Acid) 0.8 mg DAILY PO  Last administered on 4/3/19at 08:44; 


Admin Dose 0.8 MG;  Start 4/2/19 at 09:00


Lisinopril (Zestril) 40 mg DAILY PO  Last administered on 4/3/19at 08:50; Admin 


Dose 40 MG;  Start 4/3/19 at 09:00


Insulin Glargine (Lantus) 8 units DAILY@0800 SC  Last administered on 4/3/19at 


12:22; Admin Dose 8 UNITS;  Start 4/3/19 at 11:00


Insulin Aspart (Novolog Insulin Pen) 4 unit WITH  MEALS SC  Last administered on


4/3/19at 12:22; Admin Dose 4 UNIT;  Start 4/3/19 at 12:00











CHERRY JANE MD           Apr 3, 2019 17:03

## 2019-04-03 NOTE — CONS
Assessment/Plan


Assessment/Plan


Hospital Course


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 


presents for evaluation of ams x 1 week.





The clinical picture could be consistent w/ an acute encephalopathy.





Stroke is not yet excluded.





Seizure is less likely.





Head CT is without obvious acute intracranial pathology.


TSH, FT4, and FT3 are low








P:


Await MRI brain for further characterization


Thyroid and other medical management per primary


[place] as necessary


Limit sedating medications where possible


PT/OT/ST as necessary


Will follow clinically





Consultation Date/Type/Reason


Admit Date/Time


Apr 2, 2019 at 03:41


Type of Consult


Neurology


Reason for Consultation


ams


Requesting Provider:  ALVIN LOCK


Date/Time of Note


DATE: 4/3/19 


TIME: 07:18





24 HR Interval Summary


Free Text/Dictation


Continues acute care





Exam


Vital Signs


Vitals





Vital Signs


  Date      Temp  Pulse  Resp  B/P (MAP)   Pulse Ox  O2          O2 Flow    FiO2


Time                                                 Delivery    Rate


    4/3/19  99.5     70    18      167/75        97


     04:00                          (105)


    4/2/19                                           Room Air


     01:26








Intake and Output





4/2/19


4/2/19


4/3/19





1515:00


23:00


07:00





IntakeIntake Total


400 ml


1000 ml





BalanceBalance


400 ml


1000 ml














Exam


PE:


Gen Appearance: No Apparent Distress


HEENT: Normocephalic


Cardiovascular: Regular rate


Abdomen: Soft


Extremities: Dry





NE:


The patient was alert though nonverbal.





Pupils were equal and reactive to light. There was no afferent pupillary defect.


Visual fields were normal. Funduscopic examination was limited. Extra-ocular 


movements were full. Ptosis was absent. There was no nystagmus. Facial sensation


was normal. Face was symmetric with normal strength. Hearing was intact. Palate 


movements were normal. Neck strength was normal. There was normal tongue bulk 


and speed of movement.





Tone was normal. Muscle bulk was normal. I did not see fasciculations. Arms and 


legs were symmetric.





Vibration sensation was normal. Temperature and pinprick sensation was normal.





Rapid alternating movements were normal. There was no dysmetria. There was no 


intention tremor. Gait was deferred due to bedrest.





Arm and leg reflexes were symmetric. Villar's sign was absent. Plantar 


responses were flexor.











FROY MA                  Apr 3, 2019 07:20

## 2019-04-04 VITALS — HEART RATE: 64 BPM

## 2019-04-04 VITALS — DIASTOLIC BLOOD PRESSURE: 77 MMHG | HEART RATE: 78 BPM | RESPIRATION RATE: 17 BRPM | SYSTOLIC BLOOD PRESSURE: 171 MMHG

## 2019-04-04 VITALS — RESPIRATION RATE: 17 BRPM | DIASTOLIC BLOOD PRESSURE: 69 MMHG | HEART RATE: 75 BPM | SYSTOLIC BLOOD PRESSURE: 158 MMHG

## 2019-04-04 VITALS — SYSTOLIC BLOOD PRESSURE: 164 MMHG | RESPIRATION RATE: 18 BRPM | DIASTOLIC BLOOD PRESSURE: 69 MMHG | HEART RATE: 72 BPM

## 2019-04-04 VITALS — HEART RATE: 70 BPM | SYSTOLIC BLOOD PRESSURE: 164 MMHG | RESPIRATION RATE: 17 BRPM | DIASTOLIC BLOOD PRESSURE: 82 MMHG

## 2019-04-04 VITALS — RESPIRATION RATE: 16 BRPM | DIASTOLIC BLOOD PRESSURE: 74 MMHG | SYSTOLIC BLOOD PRESSURE: 156 MMHG | HEART RATE: 50 BPM

## 2019-04-04 VITALS — DIASTOLIC BLOOD PRESSURE: 81 MMHG | HEART RATE: 70 BPM | RESPIRATION RATE: 17 BRPM | SYSTOLIC BLOOD PRESSURE: 180 MMHG

## 2019-04-04 VITALS — HEART RATE: 83 BPM | DIASTOLIC BLOOD PRESSURE: 83 MMHG | RESPIRATION RATE: 19 BRPM | SYSTOLIC BLOOD PRESSURE: 145 MMHG

## 2019-04-04 VITALS — HEART RATE: 73 BPM

## 2019-04-04 VITALS — HEART RATE: 66 BPM

## 2019-04-04 VITALS — HEART RATE: 65 BPM

## 2019-04-04 LAB
ABNORMAL IP MESSAGE: 1
ADD MAN DIFF?: NO
ALANINE AMINOTRANSFERASE: 29 IU/L (ref 13–69)
ALBUMIN/GLOBULIN RATIO: 1.31
ALBUMIN: 2.9 G/DL (ref 3.3–4.9)
ALKALINE PHOSPHATASE: 59 IU/L (ref 42–121)
ANION GAP: 2 (ref 5–13)
ANION GAP: 7 (ref 5–13)
ASPARTATE AMINO TRANSFERASE: 17 IU/L (ref 15–46)
BASOPHIL #: 0 10^3/UL (ref 0–0.1)
BASOPHILS %: 0.1 % (ref 0–2)
BILIRUBIN,DIRECT: 0 MG/DL (ref 0–0.2)
BILIRUBIN,TOTAL: 0.6 MG/DL (ref 0.2–1.3)
BLOOD UREA NITROGEN: 20 MG/DL (ref 7–20)
BLOOD UREA NITROGEN: 21 MG/DL (ref 7–20)
CALCIUM: 9.2 MG/DL (ref 8.4–10.2)
CALCIUM: 9.9 MG/DL (ref 8.4–10.2)
CARBON DIOXIDE: 34 MMOL/L (ref 21–31)
CARBON DIOXIDE: 37 MMOL/L (ref 21–31)
CHLORIDE: 100 MMOL/L (ref 97–110)
CHLORIDE: 98 MMOL/L (ref 97–110)
CREATININE: 0.42 MG/DL (ref 0.44–1)
CREATININE: 0.46 MG/DL (ref 0.44–1)
EOSINOPHILS #: 0 10^3/UL (ref 0–0.5)
EOSINOPHILS %: 0 % (ref 0–7)
GLOBULIN: 2.2 G/DL (ref 1.3–3.2)
GLUCOSE: 136 MG/DL (ref 70–220)
GLUCOSE: 242 MG/DL (ref 70–220)
HEMATOCRIT: 37.5 % (ref 37–47)
HEMOGLOBIN: 12.4 G/DL (ref 12–16)
IMMATURE GRANS #M: 0.1 10^3/UL (ref 0–0.03)
IMMATURE GRANS % (M): 1.4 % (ref 0–0.43)
LYMPHOCYTES #: 0.4 10^3/UL (ref 0.8–2.9)
LYMPHOCYTES %: 6.2 % (ref 15–51)
MAGNESIUM: 1.9 MG/DL (ref 1.7–2.5)
MEAN CORPUSCULAR HEMOGLOBIN: 27.3 PG (ref 29–33)
MEAN CORPUSCULAR HGB CONC: 33.1 G/DL (ref 32–37)
MEAN CORPUSCULAR VOLUME: 82.6 FL (ref 82–101)
MEAN PLATELET VOLUME: 11.2 FL (ref 7.4–10.4)
MONOCYTE #: 0.3 10^3/UL (ref 0.3–0.9)
MONOCYTES %: 4.7 % (ref 0–11)
NEUTROPHIL #: 6.2 10^3/UL (ref 1.6–7.5)
NEUTROPHILS %: 87.6 % (ref 39–77)
NUCLEATED RED BLOOD CELLS #: 0 10^3/UL (ref 0–0)
NUCLEATED RED BLOOD CELLS%: 0 /100WBC (ref 0–0)
PLATELET COUNT: 121 10^3/UL (ref 140–415)
POTASSIUM: 2.9 MMOL/L (ref 3.5–5.1)
POTASSIUM: 4 MMOL/L (ref 3.5–5.1)
RED BLOOD COUNT: 4.54 10^6/UL (ref 4.2–5.4)
RED CELL DISTRIBUTION WIDTH: 13.8 % (ref 11.5–14.5)
SODIUM: 139 MMOL/L (ref 135–144)
SODIUM: 139 MMOL/L (ref 135–144)
TOTAL PROTEIN: 5.1 G/DL (ref 6.1–8.1)
WHITE BLOOD COUNT: 7.1 10^3/UL (ref 4.8–10.8)

## 2019-04-04 RX ADMIN — LISINOPRIL SCH MG: 20 TABLET ORAL at 08:14

## 2019-04-04 RX ADMIN — LISINOPRIL 1 MG: 20 TABLET ORAL at 08:14

## 2019-04-04 RX ADMIN — INSULIN ASPART 1 UNIT: 100 INJECTION, SOLUTION INTRAVENOUS; SUBCUTANEOUS at 12:17

## 2019-04-04 RX ADMIN — INSULIN GLARGINE SCH UNITS: 100 INJECTION, SOLUTION SUBCUTANEOUS at 07:53

## 2019-04-04 RX ADMIN — INSULIN GLARGINE 1 UNITS: 100 INJECTION, SOLUTION SUBCUTANEOUS at 07:53

## 2019-04-04 RX ADMIN — FOLIC ACID TAB 400 MCG 1 MG: 400 TAB at 08:13

## 2019-04-04 RX ADMIN — HYDRALAZINE HYDROCHLORIDE 1 MG: 20 INJECTION INTRAMUSCULAR; INTRAVENOUS at 22:49

## 2019-04-04 RX ADMIN — INSULIN ASPART 1 UNIT: 100 INJECTION, SOLUTION INTRAVENOUS; SUBCUTANEOUS at 17:12

## 2019-04-04 RX ADMIN — HYDRALAZINE HYDROCHLORIDE PRN MG: 20 INJECTION INTRAMUSCULAR; INTRAVENOUS at 22:49

## 2019-04-04 RX ADMIN — INSULIN ASPART 1 UNIT: 100 INJECTION, SOLUTION INTRAVENOUS; SUBCUTANEOUS at 07:48

## 2019-04-04 RX ADMIN — INSULIN ASPART 1 UNIT: 100 INJECTION, SOLUTION INTRAVENOUS; SUBCUTANEOUS at 07:52

## 2019-04-04 RX ADMIN — ACETAMINOPHEN SCH MG: 400 TABLET ORAL at 08:13

## 2019-04-04 RX ADMIN — POTASSIUM CHLORIDE 1 MEQ: 1.5 POWDER, FOR SOLUTION ORAL at 12:12

## 2019-04-04 RX ADMIN — POTASSIUM CHLORIDE 1 MLS/HR: 200 INJECTION, SOLUTION INTRAVENOUS at 09:20

## 2019-04-04 RX ADMIN — AMLODIPINE BESYLATE SCH MG: 10 TABLET ORAL at 08:14

## 2019-04-04 RX ADMIN — AMLODIPINE BESYLATE 1 MG: 10 TABLET ORAL at 08:14

## 2019-04-04 NOTE — CONS
Assessment/Plan


Assessment/Plan


Hospital Course


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 


presents for evaluation of ams x 1 week.





The clinical picture could be consistent w/ an acute encephalopathy.





Stroke is not yet excluded.





Focal motor seizure is possible, though less likely.





Head CT is without obvious acute intracranial pathology.


TSH, FT4, and FT3 are low








P:


Await MRI brain for further characterization


Add EEG to evaluate for focal abnl


Thyroid and other medical management per primary


[place] as necessary


Limit sedating medications where possible


PT/OT/ST as necessary


Will follow clinically





Consultation Date/Type/Reason


Admit Date/Time


Apr 2, 2019 at 03:41


Type of Consult


Neurology


Reason for Consultation


ams


Requesting Provider:  ALVIN LOCK


Date/Time of Note


DATE: 4/4/19 


TIME: 07:03





24 HR Interval Summary


Free Text/Dictation


MRI brain deferred due to programmable VPS..





Exam


Vital Signs


Vitals





Vital Signs


  Date      Temp  Pulse  Resp  B/P (MAP)   Pulse Ox  O2          O2 Flow    FiO2


Time                                                 Delivery    Rate


    4/4/19           50


     04:00


    4/4/19  97.2           16      156/74        97


     04:00                          (101)


    4/2/19                                           Room Air


     01:26








Intake and Output





4/3/19


4/3/19


4/4/19





1515:00


23:00


07:00





IntakeIntake Total


1400 ml





BalanceBalance


1400 ml














Exam


PE:


Gen Appearance: No Apparent Distress


HEENT: Normocephalic


Cardiovascular: Regular rate


Abdomen: Soft


Extremities: Dry





NE:


The patient was alert though nonverbal. Able to fix and follow. Able to follow 


simple appendicular commands. 





Pupils were equal and reactive to light. There was no afferent pupillary defect.


Visual fields were normal. Funduscopic examination was limited. Extra-ocular 


movements were full. Ptosis was absent. There was no nystagmus. Facial sensation


was normal. Face was symmetric with normal strength. Hearing was intact. Palate 


movements were normal. Neck strength was normal. There was normal tongue bulk 


and speed of movement.





Tone was normal. Muscle bulk was normal. I did not see fasciculations. Arms and 


legs were symmetric.





Vibration sensation was normal. Temperature and pinprick sensation was normal.





Rapid alternating movements were normal. There was no dysmetria. There was no 


intention tremor. Gait was deferred due to bedrest.





Arm and leg reflexes were symmetric. Villar's sign was absent. Plantar 


responses were flexor.











FROY MA                  Apr 4, 2019 07:03


ASPEN BARCENAS NP           Apr 4, 2019 15:00

## 2019-04-04 NOTE — PN
Date/Time of Note


Date/Time of Note


DATE: 4/4/19 


TIME: 15:08





Assessment/Plan


VTE Prophylaxis


Risk score (from Nsg)>0 risk:  6


SCD applied (from Nsg):  Yes


Pharmacological prophylaxis:  heparin





Lines/Catheters


IV Catheter Type (from Nrsg):  Peripheral IV


Urinary Cath still in place:  No





Assessment/Plan


Hospital Course


79 yo female with NPH s/p  shunt and CVA presents with worsening ence


phalopathy 


- MRI pending.  Dr Hahn to reset  shunt following study


- Management per neurology





Respiratory acidodisos with metabolic alkalosis


- unclear etiology of metabolic alkalosis with respiratory acidosis, supportive 


care





DMII: titrate basal/bolus insulin





Hypokalemia


- Replete electrolytes





Hypertension:


- Continue current meds


Result Diagram:  


4/4/19 0530                                                                     


          4/4/19 1326





Results 24hrs





Laboratory Tests


Test
                     4/3/19
17:28  4/3/19
17:40  4/3/19
20:57  4/4/19
02:45


Bedside Glucose                 221  H                      238  H         139


Sodium Level                                   139


Potassium Level                                3.6


Chloride Level                                 100


Carbon Dioxide Level                           37  H


Anion Gap                                       2  L


Blood Urea Nitrogen                            21  H


Creatinine                                    0.53


Est Glomerular Filtrat    
               
           
             



Rate
mL/min


Glucose Level                                 225  H


Calcium Level                                  9.9


Test
                     4/4/19
05:30  4/4/19
07:47  4/4/19
12:11  4/4/19
13:26


White Blood Count                7.1


Red Blood Count                 4.54


Hemoglobin                      12.4


Hematocrit                      37.5


Mean Corpuscular Volume         82.6


Mean Corpuscular               27.3  L


Hemoglobin


Mean Corpuscular               33.1  
  
             
             



Hemoglobin
Concent


Red Cell Distribution           13.8


Width


Platelet Count                  121  L


Mean Platelet Volume           11.2  H


Immature Granulocytes %       1.400  H


Neutrophils %                  87.6  H


Lymphocytes %                   6.2  L


Monocytes %                      4.7


Eosinophils %                    0.0


Basophils %                      0.1


Nucleated Red Blood              0.0


Cells %


Immature Granulocytes #       0.100  H


Neutrophils #                    6.2


Lymphocytes #                   0.4  L


Monocytes #                      0.3


Eosinophils #                    0.0


Basophils #                      0.0


Nucleated Red Blood              0.0


Cells #


Sodium Level                     139                                       139


Potassium Level                2.9  *L                                     4.0


Chloride Level                   100                                        98


Carbon Dioxide Level             37  H                                     34  H


Anion Gap                         2  L                                       7


Blood Urea Nitrogen               20                                       21  H


Creatinine                     0.42  L                                    0.46


Est Glomerular Filtrat      
           
             
               



Rate
mL/min


Glucose Level                   136  #                                   242  #H


Calcium Level                    9.2                                       9.9


Magnesium Level                  1.9


Total Bilirubin                  0.6


Direct Bilirubin                0.00


Indirect Bilirubin               0.6


Aspartate Amino                  17  
  
             
             



Transf
(AST/SGOT)


Alanine                          29  
  
             
             



Aminotransferase
(ALT/SG


PT)


Alkaline Phosphatase              59


Total Protein                   5.1  L


Albumin                         2.9  L


Globulin                        2.20


Albumin/Globulin Ratio          1.31


Bedside Glucose                                136          264  H








Subjective


24 Hr Interval Summary


Free Text/Dictation


No change to clinical status


Awaiting MRI.  Spoke to her neurosurgeon Dr Hoover who recommended we have our 


neurosurgeon re-set the device following MRI. Dr Hahn has agreed to do so





Exam/Review of Systems


Exam


Vitals





Vital Signs


  Date      Temp  Pulse  Resp  B/P (MAP)   Pulse Ox  O2          O2 Flow    FiO2


Time                                                 Delivery    Rate


    4/4/19           65


     12:37


    4/4/19  97.9           17      158/69        96


     11:12                           (98)


    4/2/19                                           Room Air


     01:26








Intake and Output





4/3/19


4/3/19


4/4/19





1515:00


23:00


07:00





IntakeIntake Total


1400 ml


500 ml





BalanceBalance


1400 ml


500 ml











Constitutional:  alert, oriented, well developed


Psych:  no complaints, nl mood/affect


Head:  normocephalic, atraumatic


Eyes:  nl conjunctiva, EOMI, nl lids, nl sclera, PERRL


ENMT:  nl external ears & nose, nl lips & teeth, nl nasal mucosa & septum


Neck:  supple, non-tender


Respiratory:  clear to auscultation, normal air movement


Cardiovascular:  regular rate and rhythm, nl pulses


Gastrointestinal:  soft, nl liver, spleen, non-tender


Musculoskeletal:  nl extremities to inspection, nl gait and stance


Extremities:  normal pulses


Neurological:  CNS II-XII intact, nl mental status, nl speech, nl strength


Skin:  nl turgor; 


   No rash or lesions


Lymph:  nl lymph nodes





Results


Results 24hrs





Laboratory Tests


Test
                     4/3/19
17:28  4/3/19
17:40  4/3/19
20:57  4/4/19
02:45


Bedside Glucose                 221  H                      238  H         139


Sodium Level                                   139


Potassium Level                                3.6


Chloride Level                                 100


Carbon Dioxide Level                           37  H


Anion Gap                                       2  L


Blood Urea Nitrogen                            21  H


Creatinine                                    0.53


Est Glomerular Filtrat    
               
           
             



Rate
mL/min


Glucose Level                                 225  H


Calcium Level                                  9.9


Test
                     4/4/19
05:30  4/4/19
07:47  4/4/19
12:11  4/4/19
13:26


White Blood Count                7.1


Red Blood Count                 4.54


Hemoglobin                      12.4


Hematocrit                      37.5


Mean Corpuscular Volume         82.6


Mean Corpuscular               27.3  L


Hemoglobin


Mean Corpuscular               33.1  
  
             
             



Hemoglobin
Concent


Red Cell Distribution           13.8


Width


Platelet Count                  121  L


Mean Platelet Volume           11.2  H


Immature Granulocytes %       1.400  H


Neutrophils %                  87.6  H


Lymphocytes %                   6.2  L


Monocytes %                      4.7


Eosinophils %                    0.0


Basophils %                      0.1


Nucleated Red Blood              0.0


Cells %


Immature Granulocytes #       0.100  H


Neutrophils #                    6.2


Lymphocytes #                   0.4  L


Monocytes #                      0.3


Eosinophils #                    0.0


Basophils #                      0.0


Nucleated Red Blood              0.0


Cells #


Sodium Level                     139                                       139


Potassium Level                2.9  *L                                     4.0


Chloride Level                   100                                        98


Carbon Dioxide Level             37  H                                     34  H


Anion Gap                         2  L                                       7


Blood Urea Nitrogen               20                                       21  H


Creatinine                     0.42  L                                    0.46


Est Glomerular Filtrat      
           
             
               



Rate
mL/min


Glucose Level                   136  #                                   242  #H


Calcium Level                    9.2                                       9.9


Magnesium Level                  1.9


Total Bilirubin                  0.6


Direct Bilirubin                0.00


Indirect Bilirubin               0.6


Aspartate Amino                  17  
  
             
             



Transf
(AST/SGOT)


Alanine                          29  
  
             
             



Aminotransferase
(ALT/SG


PT)


Alkaline Phosphatase              59


Total Protein                   5.1  L


Albumin                         2.9  L


Globulin                        2.20


Albumin/Globulin Ratio          1.31


Bedside Glucose                                136          264  H








Medications


Medication





Current Medications


Insulin Aspart (Novolog Insulin Pen) NOVOLOG *MILD* ALGORITHM WITH MEALS  


BEDTIME SC  Last administered on 4/4/19at 12:17; Admin Dose 4 UNIT;  Start 


4/2/19 at 08:00


Hydralazine HCl (Apresoline) 10 mg Q6H  PRN IV ELEVATED BLOOD PRESSURE Last 


administered on 4/3/19at 00:16; Admin Dose 10 MG;  Start 4/2/19 at 04:30


Miscellaneous Information 1 ea NOTE XX ;  Start 4/2/19 at 04:30


Glucose (Glutose) 15 gm Q15M  PRN PO DECREASED GLUCOSE;  Start 4/2/19 at 04:30


Glucose (Glutose) 22.5 gm Q15M  PRN PO DECREASED GLUCOSE;  Start 4/2/19 at 04:30


Dextrose (D50w Syringe) 25 ml Q15M  PRN IV DECREASED GLUCOSE;  Start 4/2/19 at 


04:30


Dextrose (D50w Syringe) 50 ml Q15M  PRN IV DECREASED GLUCOSE;  Start 4/2/19 at 


04:30


Glucagon (Glucagen) 1 mg Q15M  PRN IM DECREASED GLUCOSE;  Start 4/2/19 at 04:30


Glucose (Glutose) 15 gm Q15M  PRN BUCCAL DECREASED GLUCOSE;  Start 4/2/19 at 


04:30


IV Flush (NS 3 ml) 3 ml PER PROTOCOL IV ;  Start 4/2/19 at 06:30


Acetaminophen (Tylenol Tab) 650 mg Q6H  PRN PO .PAIN 1-3 OR TEMP;  Start 4/2/19 


at 06:30


Amlodipine Besylate (Norvasc) 10 mg DAILY PO  Last administered on 4/4/19at 


08:14; Admin Dose 10 MG;  Start 4/2/19 at 09:00


Folic Acid (Folic Acid) 0.8 mg DAILY PO  Last administered on 4/4/19at 08:13; 


Admin Dose 0.8 MG;  Start 4/2/19 at 09:00


Lisinopril (Zestril) 40 mg DAILY PO  Last administered on 4/4/19at 08:14; Admin 


Dose 40 MG;  Start 4/3/19 at 09:00


Insulin Glargine (Lantus) 8 units DAILY@0800 SC  Last administered on 4/4/19at 


07:53; Admin Dose 8 UNITS;  Start 4/3/19 at 11:00


Insulin Aspart (Novolog Insulin Pen) 4 unit WITH  MEALS SC  Last administered on


4/4/19at 12:17; Admin Dose 4 UNIT;  Start 4/3/19 at 12:00











CHERRY JANE MD           Apr 4, 2019 15:09

## 2019-04-05 VITALS — SYSTOLIC BLOOD PRESSURE: 151 MMHG | DIASTOLIC BLOOD PRESSURE: 77 MMHG | RESPIRATION RATE: 22 BRPM | HEART RATE: 81 BPM

## 2019-04-05 VITALS — RESPIRATION RATE: 18 BRPM | DIASTOLIC BLOOD PRESSURE: 71 MMHG | SYSTOLIC BLOOD PRESSURE: 168 MMHG | HEART RATE: 91 BPM

## 2019-04-05 VITALS — HEART RATE: 81 BPM

## 2019-04-05 VITALS — HEART RATE: 78 BPM | RESPIRATION RATE: 20 BRPM | SYSTOLIC BLOOD PRESSURE: 193 MMHG | DIASTOLIC BLOOD PRESSURE: 88 MMHG

## 2019-04-05 VITALS — HEART RATE: 88 BPM | RESPIRATION RATE: 18 BRPM | DIASTOLIC BLOOD PRESSURE: 74 MMHG | SYSTOLIC BLOOD PRESSURE: 174 MMHG

## 2019-04-05 VITALS — SYSTOLIC BLOOD PRESSURE: 151 MMHG | DIASTOLIC BLOOD PRESSURE: 70 MMHG

## 2019-04-05 VITALS — SYSTOLIC BLOOD PRESSURE: 154 MMHG | HEART RATE: 80 BPM | RESPIRATION RATE: 20 BRPM | DIASTOLIC BLOOD PRESSURE: 68 MMHG

## 2019-04-05 VITALS — HEART RATE: 85 BPM | RESPIRATION RATE: 20 BRPM | SYSTOLIC BLOOD PRESSURE: 154 MMHG | DIASTOLIC BLOOD PRESSURE: 69 MMHG

## 2019-04-05 VITALS — HEART RATE: 73 BPM

## 2019-04-05 VITALS — HEART RATE: 65 BPM

## 2019-04-05 VITALS — HEART RATE: 80 BPM

## 2019-04-05 VITALS — HEART RATE: 68 BPM

## 2019-04-05 VITALS — HEART RATE: 78 BPM

## 2019-04-05 LAB
ABNORMAL IP MESSAGE: 1
ADD MAN DIFF?: NO
ALANINE AMINOTRANSFERASE: 38 IU/L (ref 13–69)
ALBUMIN/GLOBULIN RATIO: 1.16
ALBUMIN: 2.9 G/DL (ref 3.3–4.9)
ALKALINE PHOSPHATASE: 44 IU/L (ref 42–121)
ANION GAP: 2 (ref 5–13)
ASPARTATE AMINO TRANSFERASE: 32 IU/L (ref 15–46)
BASOPHIL #: 0 10^3/UL (ref 0–0.1)
BASOPHILS %: 0.1 % (ref 0–2)
BILIRUBIN,DIRECT: 0 MG/DL (ref 0–0.2)
BILIRUBIN,TOTAL: 0.5 MG/DL (ref 0.2–1.3)
BLOOD UREA NITROGEN: 21 MG/DL (ref 7–20)
CALCIUM: 9.5 MG/DL (ref 8.4–10.2)
CARBON DIOXIDE: 36 MMOL/L (ref 21–31)
CHLORIDE: 99 MMOL/L (ref 97–110)
CREATININE: 0.44 MG/DL (ref 0.44–1)
EOSINOPHILS #: 0 10^3/UL (ref 0–0.5)
EOSINOPHILS %: 0 % (ref 0–7)
GLOBULIN: 2.5 G/DL (ref 1.3–3.2)
GLUCOSE: 163 MG/DL (ref 70–220)
HEMATOCRIT: 36.4 % (ref 37–47)
HEMOGLOBIN: 12.3 G/DL (ref 12–16)
IMMATURE GRANS #M: 0.08 10^3/UL (ref 0–0.03)
IMMATURE GRANS % (M): 1.1 % (ref 0–0.43)
LYMPHOCYTES #: 0.4 10^3/UL (ref 0.8–2.9)
LYMPHOCYTES %: 6 % (ref 15–51)
MEAN CORPUSCULAR HEMOGLOBIN: 27.6 PG (ref 29–33)
MEAN CORPUSCULAR HGB CONC: 33.8 G/DL (ref 32–37)
MEAN CORPUSCULAR VOLUME: 81.8 FL (ref 82–101)
MEAN PLATELET VOLUME: 12.8 FL (ref 7.4–10.4)
MONOCYTE #: 0.3 10^3/UL (ref 0.3–0.9)
MONOCYTES %: 4.3 % (ref 0–11)
NEUTROPHIL #: 6.5 10^3/UL (ref 1.6–7.5)
NEUTROPHILS %: 88.5 % (ref 39–77)
NUCLEATED RED BLOOD CELLS #: 0 10^3/UL (ref 0–0)
NUCLEATED RED BLOOD CELLS%: 0 /100WBC (ref 0–0)
PLATELET COUNT: 95 10^3/UL (ref 140–415)
POSITIVE DIFF: (no result)
POTASSIUM: 3.7 MMOL/L (ref 3.5–5.1)
RED BLOOD COUNT: 4.45 10^6/UL (ref 4.2–5.4)
RED CELL DISTRIBUTION WIDTH: 13.8 % (ref 11.5–14.5)
SODIUM: 137 MMOL/L (ref 135–144)
TOTAL PROTEIN: 5.4 G/DL (ref 6.1–8.1)
WHITE BLOOD COUNT: 7.3 10^3/UL (ref 4.8–10.8)

## 2019-04-05 RX ADMIN — INSULIN GLARGINE 1 UNITS: 100 INJECTION, SOLUTION SUBCUTANEOUS at 07:55

## 2019-04-05 RX ADMIN — FOLIC ACID TAB 400 MCG 1 MG: 400 TAB at 08:48

## 2019-04-05 RX ADMIN — LISINOPRIL SCH MG: 20 TABLET ORAL at 08:49

## 2019-04-05 RX ADMIN — ACETAMINOPHEN SCH MG: 400 TABLET ORAL at 08:48

## 2019-04-05 RX ADMIN — AMLODIPINE BESYLATE SCH MG: 10 TABLET ORAL at 08:49

## 2019-04-05 RX ADMIN — INSULIN GLARGINE SCH UNITS: 100 INJECTION, SOLUTION SUBCUTANEOUS at 07:55

## 2019-04-05 RX ADMIN — INSULIN ASPART 1 UNIT: 100 INJECTION, SOLUTION INTRAVENOUS; SUBCUTANEOUS at 21:44

## 2019-04-05 RX ADMIN — INSULIN ASPART 1 UNIT: 100 INJECTION, SOLUTION INTRAVENOUS; SUBCUTANEOUS at 17:43

## 2019-04-05 RX ADMIN — INSULIN ASPART 1 UNIT: 100 INJECTION, SOLUTION INTRAVENOUS; SUBCUTANEOUS at 12:45

## 2019-04-05 RX ADMIN — INSULIN ASPART 1 UNIT: 100 INJECTION, SOLUTION INTRAVENOUS; SUBCUTANEOUS at 07:43

## 2019-04-05 RX ADMIN — INSULIN ASPART 1 UNIT: 100 INJECTION, SOLUTION INTRAVENOUS; SUBCUTANEOUS at 08:47

## 2019-04-05 RX ADMIN — INSULIN ASPART 1 UNIT: 100 INJECTION, SOLUTION INTRAVENOUS; SUBCUTANEOUS at 12:19

## 2019-04-05 RX ADMIN — HYDRALAZINE HYDROCHLORIDE PRN MG: 20 INJECTION INTRAMUSCULAR; INTRAVENOUS at 20:36

## 2019-04-05 RX ADMIN — HYDRALAZINE HYDROCHLORIDE 1 MG: 20 INJECTION INTRAMUSCULAR; INTRAVENOUS at 07:46

## 2019-04-05 RX ADMIN — HYDRALAZINE HYDROCHLORIDE PRN MG: 20 INJECTION INTRAMUSCULAR; INTRAVENOUS at 07:46

## 2019-04-05 RX ADMIN — HYDRALAZINE HYDROCHLORIDE 1 MG: 20 INJECTION INTRAMUSCULAR; INTRAVENOUS at 20:36

## 2019-04-05 RX ADMIN — INSULIN ASPART 1 UNIT: 100 INJECTION, SOLUTION INTRAVENOUS; SUBCUTANEOUS at 20:40

## 2019-04-05 RX ADMIN — AMLODIPINE BESYLATE 1 MG: 10 TABLET ORAL at 08:49

## 2019-04-05 RX ADMIN — INSULIN ASPART 1 UNIT: 100 INJECTION, SOLUTION INTRAVENOUS; SUBCUTANEOUS at 02:04

## 2019-04-05 RX ADMIN — LISINOPRIL 1 MG: 20 TABLET ORAL at 08:49

## 2019-04-05 NOTE — CONS
Assessment/Plan


Assessment/Plan


Hospital Course (Demo Recall)


78-year-old female with diet controlled type 2 DM, CVA, HTN and  normal pressure


hydrocephalus status post  shunt  that was placed 3/2017 was brought in by 


daughter for further evaluation for inability to talk. As per the daughter the 


patient was her normal self approximately until late February when she started 


noticing that her mother was starting to tAt her baseline the patient walks with


a walker, feeds herself, converses normally and has assistance with shower. 


Endocrine consulted for management of abnormal thyroid test results. Daughter 


denied any personal or family history of thyroid disease. 





Abnormal thyroid function test


-given patient's clinical picture along with low TSH (but not suppressed) and 


low free T4, it is most likely due to nonthyroidal illness


-very unlikely to be central hypothyroidism, but  measurement of serum cortisol 


can be helpful as it would be elevated in critical illness and low (or 


inappropriately normal) in patients with true central hypothyroidism


-check serum cortisol, total T4 and T3





Thank you for allowing the participation in Ms Mcneill's care, will follow with 


you





Consultation Date/Type/Reason


Admit Date/Time


Apr 2, 2019 at 03:41


Date/Time of Note


DATE: 4/5/19 


TIME: 13:42





Hx of Present Illness


History obtained from daughter at bedside. 78-year-old female with diet 


controlled type 2 DM, CVA, HTN and  normal pressure hydrocephalus status post 


shunt  that was placed 3/2017 was brought in by daughter for further evaluation 


for inability to talk. As per the daughter the patient was her normal self 


approximately until late February when she started noticing that her mother was 


starting to talk less and not move around as much and now a week prior she is 


completely nonverbal. She understands and is able to follow commands at bedside 


today and at home.  At her baseline the patient walks with a walker, feeds 


herself, converses normally and has assistance with shower. Endocrine consulted 


for management of abnormal thyroid test results. Daughter denied any personal or


family history of thyroid disease.


unable to obtain due to clinical condition


Subjective hx not possible:  pt non-verbal





Past Medical History


Medical History:  diabetes, hypertension, other (Normal pressure hydrocephalus)


Home Meds


Reported Medications


Cyanocobalamin (Vitamin B12) Unknown Strength Tab, PO, TAB


   4/2/19


Cholecalciferol (Vitamin D3) (Vitamin D3) 500 Unit/5 Ml Liquid, 2000 UNIT PO 


DAILY


   4/2/19


Acetaminophen* (Acetaminophen*) 500 MG Extra Strength Tablet, 500 MG PO Q4H PRN 


for PAIN AND OR ELEVATED TEMP, TAB


   4/2/19


Multivitamins* (Theragran*) 1 Tab Tab, 1 TAB PO DAILY, TAB


   4/2/19


Folic Acid* (Folic Acid*) 0.8 Mg Tablet, 0.8 MG PO DAILY, TAB


   4/2/19


Lisinopril* (Lisinopril*) 40 Mg Tablet, 40 MG PO DAILY, #30 TAB


   4/2/19


Amlodipine Besylate* (Amlodipine Besylate*) 10 Mg Tablet, 10 MG PO DAILY, #30 


TAB


   4/2/19


Metformin Hcl* (Metformin Hcl*) 500 Mg Tablet, 500 MG PO DAILY, #30 TAB


   9/10/17


Discontinued Reported Medications


Olmesartan Medoxomil (Benicar) 40 Mg Tablet, 40 MG PO DAILY, #30 TAB


   9/10/17


Medications





Current Medications


Insulin Aspart (Novolog Insulin Pen) NOVOLOG *MILD* ALGORITHM WITH MEALS  


BEDTIME SC  Last administered on 4/5/19at 02:04; Admin Dose 1 UNIT;  Start 


4/2/19 at 08:00


Hydralazine HCl (Apresoline) 10 mg Q6H  PRN IV ELEVATED BLOOD PRESSURE Last 


administered on 4/5/19at 07:46; Admin Dose 10 MG;  Start 4/2/19 at 04:30


Miscellaneous Information 1 ea NOTE XX ;  Start 4/2/19 at 04:30


Glucose (Glutose) 15 gm Q15M  PRN PO DECREASED GLUCOSE;  Start 4/2/19 at 04:30


Glucose (Glutose) 22.5 gm Q15M  PRN PO DECREASED GLUCOSE;  Start 4/2/19 at 04:30


Dextrose (D50w Syringe) 25 ml Q15M  PRN IV DECREASED GLUCOSE;  Start 4/2/19 at 


04:30


Dextrose (D50w Syringe) 50 ml Q15M  PRN IV DECREASED GLUCOSE;  Start 4/2/19 at 


04:30


Glucagon (Glucagen) 1 mg Q15M  PRN IM DECREASED GLUCOSE;  Start 4/2/19 at 04:30


Glucose (Glutose) 15 gm Q15M  PRN BUCCAL DECREASED GLUCOSE;  Start 4/2/19 at 


04:30


IV Flush (NS 3 ml) 3 ml PER PROTOCOL IV ;  Start 4/2/19 at 06:30


Acetaminophen (Tylenol Tab) 650 mg Q6H  PRN PO .PAIN 1-3 OR TEMP;  Start 4/2/19 


at 06:30


Amlodipine Besylate (Norvasc) 10 mg DAILY PO  Last administered on 4/5/19at 


08:49; Admin Dose 10 MG;  Start 4/2/19 at 09:00


Folic Acid (Folic Acid) 0.8 mg DAILY PO  Last administered on 4/5/19at 08:48; 


Admin Dose 0.8 MG;  Start 4/2/19 at 09:00


Lisinopril (Zestril) 40 mg DAILY PO  Last administered on 4/5/19at 08:49; Admin 


Dose 40 MG;  Start 4/3/19 at 09:00


Insulin Glargine (Lantus) 8 units DAILY@0800 SC  Last administered on 4/5/19at 


07:55; Admin Dose 8 UNITS;  Start 4/3/19 at 11:00


Insulin Aspart (Novolog Insulin Pen) 4 unit WITH  MEALS SC  Last administered on


4/5/19at 08:47; Admin Dose 4 UNIT;  Start 4/3/19 at 12:00


Allergies:  


Coded Allergies:  


     No Known Allergy (Unverified , 4/2/19)





Past Surgical History


Past Surgical Hx:  other (hysterectomy 1992 for fibroids;  shunt placement 


3/2017)





Family History


Significant Family History:  no pertinent family hx





Social History


Alcohol Use:  none


Smoking Status:  Never smoker


Drug Use:  marijuana





Exam/Review of Systems


Exam


Vitals





Vital Signs


  Date      Temp  Pulse  Resp  B/P (MAP)   Pulse Ox  O2          O2 Flow    FiO2


Time                                                 Delivery    Rate


    4/5/19           91    18      168/71


     09:29                          (103)


    4/5/19  97.6                                 97  Room Air


     07:13








Intake and Output





4/4/19 4/4/19 4/5/19





1515:00


23:00


07:00





IntakeIntake Total


120 ml


100 ml


480 ml





BalanceBalance


120 ml


100 ml


480 ml











Exam


General:  Comfortable in appearance, not in acute distress.  Skin appropriate 


for ethnicity





Eye:  Extraocular movements are intact, Normal conjunctiva. 





HENT:  Normocephalic, atraumatic. 





Respiratory:  Respirations are non-labored, Breath sounds are equal, Symmetrical


chest wall expansion. 





Cardiovascular:  S1, S2. No murmur. No LE edema





Gastrointestinal:  Soft, Non-distended, Normal bowel sounds. 





Integumentary:  Warm to touch.





Neurologic:  Alert and awake, follows command but nonverbal





Results


Result Diagram:  


4/5/19 0437                                                                     


          4/5/19 0437





Results 24hrs





Laboratory Tests


Test
                     4/4/19
17:07  4/5/19
01:54  4/5/19
04:37  4/5/19
07:43


Bedside Glucose                 243  H         216                         140


White Blood Count                                            7.3


Red Blood Count                                             4.45


Hemoglobin                                                  12.3


Hematocrit                                                 36.4  L


Mean Corpuscular Volume                                    81.8  L


Mean Corpuscular                                           27.6  L


Hemoglobin


Mean Corpuscular          
             
                  33.8  
  



Hemoglobin
Concent


Red Cell Distribution                                       13.8


Width


Platelet Count                                              95  #L


Mean Platelet Volume                                       12.8  H


Immature Granulocytes %                                   1.100  H


Neutrophils %                                              88.5  H


Lymphocytes %                                               6.0  L


Monocytes %                                                  4.3


Eosinophils %                                                0.0


Basophils %                                                  0.1


Nucleated Red Blood                                          0.0


Cells %


Immature Granulocytes #                                   0.080  H


Neutrophils #                                                6.5


Lymphocytes #                                               0.4  L


Monocytes #                                                  0.3


Eosinophils #                                                0.0


Basophils #                                                  0.0


Nucleated Red Blood                                          0.0


Cells #


Sodium Level                                                 137


Potassium Level                                              3.7


Chloride Level                                                99


Carbon Dioxide Level                                         36  H


Anion Gap                                                     2  L


Blood Urea Nitrogen                                          21  H


Creatinine                                                  0.44


Est Glomerular Filtrat    
             
               
           



Rate
mL/min


Glucose Level                                                163


Calcium Level                                                9.5


Total Bilirubin                                              0.5


Direct Bilirubin                                            0.00


Indirect Bilirubin                                           0.5


Aspartate Amino           
             
                    32  
  



Transf
(AST/SGOT)


Alanine                   
             
                    38  
  



Aminotransferase
(ALT/SG


PT)


Alkaline Phosphatase                                          44


Total Protein                                               5.4  L


Albumin                                                     2.9  L


Globulin                                                    2.50


Albumin/Globulin Ratio                                      1.16


Test
                     4/5/19
11:56  
             
             



Bedside Glucose                 256  H








Medications


Medication





Current Medications


Insulin Aspart (Novolog Insulin Pen) NOVOLOG *MILD* ALGORITHM WITH MEALS  


BEDTIME SC  Last administered on 4/5/19at 02:04; Admin Dose 1 UNIT;  Start 


4/2/19 at 08:00


Hydralazine HCl (Apresoline) 10 mg Q6H  PRN IV ELEVATED BLOOD PRESSURE Last 


administered on 4/5/19at 07:46; Admin Dose 10 MG;  Start 4/2/19 at 04:30


Miscellaneous Information 1 ea NOTE XX ;  Start 4/2/19 at 04:30


Glucose (Glutose) 15 gm Q15M  PRN PO DECREASED GLUCOSE;  Start 4/2/19 at 04:30


Glucose (Glutose) 22.5 gm Q15M  PRN PO DECREASED GLUCOSE;  Start 4/2/19 at 04:30


Dextrose (D50w Syringe) 25 ml Q15M  PRN IV DECREASED GLUCOSE;  Start 4/2/19 at 


04:30


Dextrose (D50w Syringe) 50 ml Q15M  PRN IV DECREASED GLUCOSE;  Start 4/2/19 at 


04:30


Glucagon (Glucagen) 1 mg Q15M  PRN IM DECREASED GLUCOSE;  Start 4/2/19 at 04:30


Glucose (Glutose) 15 gm Q15M  PRN BUCCAL DECREASED GLUCOSE;  Start 4/2/19 at 


04:30


IV Flush (NS 3 ml) 3 ml PER PROTOCOL IV ;  Start 4/2/19 at 06:30


Acetaminophen (Tylenol Tab) 650 mg Q6H  PRN PO .PAIN 1-3 OR TEMP;  Start 4/2/19 


at 06:30


Amlodipine Besylate (Norvasc) 10 mg DAILY PO  Last administered on 4/5/19at 


08:49; Admin Dose 10 MG;  Start 4/2/19 at 09:00


Folic Acid (Folic Acid) 0.8 mg DAILY PO  Last administered on 4/5/19at 08:48; 


Admin Dose 0.8 MG;  Start 4/2/19 at 09:00


Lisinopril (Zestril) 40 mg DAILY PO  Last administered on 4/5/19at 08:49; Admin 


Dose 40 MG;  Start 4/3/19 at 09:00


Insulin Glargine (Lantus) 8 units DAILY@0800 SC  Last administered on 4/5/19at 


07:55; Admin Dose 8 UNITS;  Start 4/3/19 at 11:00


Insulin Aspart (Novolog Insulin Pen) 4 unit WITH  MEALS SC  Last administered on


4/5/19at 08:47; Admin Dose 4 UNIT;  Start 4/3/19 at 12:00











CASEY MADRIGAL MD                Apr 5, 2019 13:54

## 2019-04-05 NOTE — CONS
Assessment/Plan


Assessment/Plan


Hospital Course


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 


presents for evaluation of ams x 1 week.





The clinical picture could be consistent w/ an acute on chronic encephalopathy.





Stroke is not yet excluded.





Focal motor seizure was additionally considered; however, EEG was normal..





Head CT is without obvious acute intracranial pathology.


TSH, FT4, and FT3 are low








P:


Await MRI brain for further characterization


Endocrine evaluation when able


[place] as necessary


Limit sedating medications where possible


PT/OT/ST as necessary


Other medical management and supportive care per primary


Will follow clinically





Consultation Date/Type/Reason


Admit Date/Time


Apr 2, 2019 at 03:41


Type of Consult


Neurology


Reason for Consultation


ams


Requesting Provider:  ALVIN LOCK


Date/Time of Note


DATE: 4/5/19 


TIME: 07:56





24 HR Interval Summary


Free Text/Dictation


Continues acute care. S/p EEG. Pt remains nonverbal at this time.





Exam


Vital Signs


Vitals





Vital Signs


  Date      Temp  Pulse  Resp  B/P (MAP)   Pulse Ox  O2          O2 Flow    FiO2


Time                                                 Delivery    Rate


    4/5/19  97.6     78    20      193/88        97  Room Air


     07:13                          (123)








Intake and Output





4/4/19 4/4/19 4/5/19





1515:00


23:00


07:00





IntakeIntake Total


120 ml


100 ml


480 ml





BalanceBalance


120 ml


100 ml


480 ml














Exam


PE:


Gen Appearance: No Apparent Distress


HEENT: Normocephalic


Cardiovascular: Regular rate


Abdomen: Soft


Extremities: Dry





NE:


The patient was alert though nonverbal. Able to fix and follow. Able to follow 


simple appendicular commands. 





Pupils were equal and reactive to light. There was no afferent pupillary defect.


Visual fields were normal. Funduscopic examination was limited. Extra-ocular 


movements were full. Ptosis was absent. There was no nystagmus. Facial sensation


was normal. Face was symmetric with normal strength. Hearing was intact. Palate 


movements were normal. Neck strength was normal. There was normal tongue bulk 


and speed of movement.





Tone was normal. Muscle bulk was normal. I did not see fasciculations. Arms and 


legs were symmetric.





Vibration sensation was normal. Temperature and pinprick sensation was normal.





Rapid alternating movements were normal. There was no dysmetria. There was no 


intention tremor. Gait was deferred due to bedrest.





Arm and leg reflexes were symmetric. Villar's sign was absent. Plantar 


responses were flexor.











FROY MA                  Apr 5, 2019 07:57


ASPEN BARCENAS NP           Apr 5, 2019 13:16

## 2019-04-05 NOTE — PN
Date/Time of Note


Date/Time of Note


DATE: 4/5/19 


TIME: 12:33





Assessment/Plan


VTE Prophylaxis


Risk score (from Nsg)>0 risk:  6


SCD applied (from Nsg):  Yes


Pharmacological prophylaxis:  heparin





Lines/Catheters


IV Catheter Type (from Nrsg):  Peripheral IV


Urinary Cath still in place:  No





Assessment/Plan


Hospital Course


Alert, nonverbal


No distress


Able to follow commands intermittently


RRR


CTAB


L hemiplegia


R side 4/5 sternght





79 yo female with NPH s/p  shunt and CVA presents with worsening 


encephalopathy 


- MRI pending.  Dr Hahn to reset  shunt following study


- Management per neurology





Respiratory acidodisos with metabolic alkalosis


- unclear etiology of metabolic alkalosis with respiratory acidosis, supportive 


care





DMII: titrate basal/bolus insulin





Hypokalemia


- Replete electrolytes





Hypertension:


- Continue current meds


Result Diagram:  


4/5/19 0437                                                                     


          4/5/19 0437





Results 24hrs





Laboratory Tests


Test
                     4/4/19
13:26  4/4/19
17:07  4/5/19
01:54  4/5/19
04:37


Sodium Level                     139                                       137


Potassium Level                  4.0                                       3.7


Chloride Level                    98                                        99


Carbon Dioxide Level             34  H                                     36  H


Anion Gap                          7                                        2  L


Blood Urea Nitrogen              21  H                                     21  H


Creatinine                      0.46                                      0.44


Est Glomerular Filtrat      
           
             
               



Rate
mL/min


Glucose Level                  242  #H                                     163


Calcium Level                    9.9                                       9.5


Bedside Glucose                               243  H         216


White Blood Count                                                          7.3


Red Blood Count                                                           4.45


Hemoglobin                                                                12.3


Hematocrit                                                               36.4  L


Mean Corpuscular Volume                                                  81.8  L


Mean Corpuscular                                                         27.6  L


Hemoglobin


Mean Corpuscular          
             
             
                  33.8  



Hemoglobin
Concent


Red Cell Distribution                                                     13.8


Width


Platelet Count                                                            95  #L


Mean Platelet Volume                                                     12.8  H


Immature Granulocytes %                                                 1.100  H


Neutrophils %                                                            88.5  H


Lymphocytes %                                                             6.0  L


Monocytes %                                                                4.3


Eosinophils %                                                              0.0


Basophils %                                                                0.1


Nucleated Red Blood                                                        0.0


Cells %


Immature Granulocytes #                                                 0.080  H


Neutrophils #                                                              6.5


Lymphocytes #                                                             0.4  L


Monocytes #                                                                0.3


Eosinophils #                                                              0.0


Basophils #                                                                0.0


Nucleated Red Blood                                                        0.0


Cells #


Total Bilirubin                                                            0.5


Direct Bilirubin                                                          0.00


Indirect Bilirubin                                                         0.5


Aspartate Amino           
             
             
                    32  



Transf
(AST/SGOT)


Alanine                   
             
             
                    38  



Aminotransferase
(ALT/SG


PT)


Alkaline Phosphatase                                                        44


Total Protein                                                             5.4  L


Albumin                                                                   2.9  L


Globulin                                                                  2.50


Albumin/Globulin Ratio                                                    1.16


Test
                     4/5/19
07:43  4/5/19
11:56  
             



Bedside Glucose                  140          256  H








Subjective


24 Hr Interval Summary


Free Text/Dictation


No change to clinical status


Awaiting MRI


Remains encephelopathic





Exam/Review of Systems


Exam


Vitals





Vital Signs


  Date      Temp  Pulse  Resp  B/P (MAP)   Pulse Ox  O2          O2 Flow    FiO2


Time                                                 Delivery    Rate


    4/5/19  97.9     85    20      154/69        98  Room Air


     11:11                           (97)








Intake and Output





4/4/19 4/4/19 4/5/19





1515:00


23:00


07:00





IntakeIntake Total


120 ml


100 ml


480 ml





BalanceBalance


120 ml


100 ml


480 ml














Results


Results 24hrs





Laboratory Tests


Test
                     4/4/19
13:26  4/4/19
17:07  4/5/19
01:54  4/5/19
04:37


Sodium Level                     139                                       137


Potassium Level                  4.0                                       3.7


Chloride Level                    98                                        99


Carbon Dioxide Level             34  H                                     36  H


Anion Gap                          7                                        2  L


Blood Urea Nitrogen              21  H                                     21  H


Creatinine                      0.46                                      0.44


Est Glomerular Filtrat      
           
             
               



Rate
mL/min


Glucose Level                  242  #H                                     163


Calcium Level                    9.9                                       9.5


Bedside Glucose                               243  H         216


White Blood Count                                                          7.3


Red Blood Count                                                           4.45


Hemoglobin                                                                12.3


Hematocrit                                                               36.4  L


Mean Corpuscular Volume                                                  81.8  L


Mean Corpuscular                                                         27.6  L


Hemoglobin


Mean Corpuscular          
             
             
                  33.8  



Hemoglobin
Concent


Red Cell Distribution                                                     13.8


Width


Platelet Count                                                            95  #L


Mean Platelet Volume                                                     12.8  H


Immature Granulocytes %                                                 1.100  H


Neutrophils %                                                            88.5  H


Lymphocytes %                                                             6.0  L


Monocytes %                                                                4.3


Eosinophils %                                                              0.0


Basophils %                                                                0.1


Nucleated Red Blood                                                        0.0


Cells %


Immature Granulocytes #                                                 0.080  H


Neutrophils #                                                              6.5


Lymphocytes #                                                             0.4  L


Monocytes #                                                                0.3


Eosinophils #                                                              0.0


Basophils #                                                                0.0


Nucleated Red Blood                                                        0.0


Cells #


Total Bilirubin                                                            0.5


Direct Bilirubin                                                          0.00


Indirect Bilirubin                                                         0.5


Aspartate Amino           
             
             
                    32  



Transf
(AST/SGOT)


Alanine                   
             
             
                    38  



Aminotransferase
(ALT/SG


PT)


Alkaline Phosphatase                                                        44


Total Protein                                                             5.4  L


Albumin                                                                   2.9  L


Globulin                                                                  2.50


Albumin/Globulin Ratio                                                    1.16


Test
                     4/5/19
07:43  4/5/19
11:56  
             



Bedside Glucose                  140          256  H








Medications


Medication





Current Medications


Insulin Aspart (Novolog Insulin Pen) NOVOLOG *MILD* ALGORITHM WITH MEALS  


BEDTIME SC  Last administered on 4/5/19at 02:04; Admin Dose 1 UNIT;  Start 


4/2/19 at 08:00


Hydralazine HCl (Apresoline) 10 mg Q6H  PRN IV ELEVATED BLOOD PRESSURE Last 


administered on 4/5/19at 07:46; Admin Dose 10 MG;  Start 4/2/19 at 04:30


Miscellaneous Information 1 ea NOTE XX ;  Start 4/2/19 at 04:30


Glucose (Glutose) 15 gm Q15M  PRN PO DECREASED GLUCOSE;  Start 4/2/19 at 04:30


Glucose (Glutose) 22.5 gm Q15M  PRN PO DECREASED GLUCOSE;  Start 4/2/19 at 04:30


Dextrose (D50w Syringe) 25 ml Q15M  PRN IV DECREASED GLUCOSE;  Start 4/2/19 at 


04:30


Dextrose (D50w Syringe) 50 ml Q15M  PRN IV DECREASED GLUCOSE;  Start 4/2/19 at 


04:30


Glucagon (Glucagen) 1 mg Q15M  PRN IM DECREASED GLUCOSE;  Start 4/2/19 at 04:30


Glucose (Glutose) 15 gm Q15M  PRN BUCCAL DECREASED GLUCOSE;  Start 4/2/19 at 


04:30


IV Flush (NS 3 ml) 3 ml PER PROTOCOL IV ;  Start 4/2/19 at 06:30


Acetaminophen (Tylenol Tab) 650 mg Q6H  PRN PO .PAIN 1-3 OR TEMP;  Start 4/2/19 


at 06:30


Amlodipine Besylate (Norvasc) 10 mg DAILY PO  Last administered on 4/5/19at 


08:49; Admin Dose 10 MG;  Start 4/2/19 at 09:00


Folic Acid (Folic Acid) 0.8 mg DAILY PO  Last administered on 4/5/19at 08:48; 


Admin Dose 0.8 MG;  Start 4/2/19 at 09:00


Lisinopril (Zestril) 40 mg DAILY PO  Last administered on 4/5/19at 08:49; Admin 


Dose 40 MG;  Start 4/3/19 at 09:00


Insulin Glargine (Lantus) 8 units DAILY@0800 SC  Last administered on 4/5/19at 


07:55; Admin Dose 8 UNITS;  Start 4/3/19 at 11:00


Insulin Aspart (Novolog Insulin Pen) 4 unit WITH  MEALS SC  Last administered on


4/5/19at 12:19; Admin Dose 4 UNIT;  Start 4/3/19 at 12:00











CHERRY JANE MD           Apr 5, 2019 12:34

## 2019-04-05 NOTE — EEG
EEG NOTE


Report Details


DATE OF TEST: 4/4/19





HISTORY:


The patient is a 78-year-old F who presents with altered mental status.





This EEG is requested to evaluate for seizures.





SEDATION:


None.





CONDITIONS OF RECORDING:


This EEG was recorded digitally on the [employer] machine, using the 


International 10-20 System of electrodes plus anterior temporals and Nz.





STATES SAMPLED:


Wakefulness and drowsiness.





FINDINGS:


There is high frequency artifact anteriorly.





During wakefulness, there is an 8 Hz posterior dominant rhythm.





There is a normal anterior-to-posterior frequency-amplitude gradient.





The remainder of the awake background is normal.





Photic stimulation does not elicit any definite driving responses or 


epileptiform discharges.





Hyperventilation was not performed.





The patient became drowsy but did not pass into sleep.





No asymmetries, focal abnormalities or epileptiform discharges were seen.











IMPRESSION:


Normal electroencephalogram during wakefulness and drowsiness.











FROY MA                  Apr 5, 2019 08:20

## 2019-04-06 VITALS — SYSTOLIC BLOOD PRESSURE: 121 MMHG | HEART RATE: 117 BPM | RESPIRATION RATE: 19 BRPM | DIASTOLIC BLOOD PRESSURE: 77 MMHG

## 2019-04-06 VITALS — HEART RATE: 66 BPM

## 2019-04-06 VITALS — HEART RATE: 96 BPM

## 2019-04-06 VITALS — SYSTOLIC BLOOD PRESSURE: 176 MMHG | RESPIRATION RATE: 19 BRPM | DIASTOLIC BLOOD PRESSURE: 79 MMHG | HEART RATE: 68 BPM

## 2019-04-06 VITALS — HEART RATE: 63 BPM

## 2019-04-06 VITALS — HEART RATE: 93 BPM | RESPIRATION RATE: 19 BRPM | DIASTOLIC BLOOD PRESSURE: 74 MMHG | SYSTOLIC BLOOD PRESSURE: 152 MMHG

## 2019-04-06 VITALS — RESPIRATION RATE: 19 BRPM | HEART RATE: 84 BPM | SYSTOLIC BLOOD PRESSURE: 123 MMHG | DIASTOLIC BLOOD PRESSURE: 82 MMHG

## 2019-04-06 VITALS — RESPIRATION RATE: 21 BRPM | DIASTOLIC BLOOD PRESSURE: 77 MMHG | HEART RATE: 93 BPM | SYSTOLIC BLOOD PRESSURE: 151 MMHG

## 2019-04-06 VITALS — DIASTOLIC BLOOD PRESSURE: 68 MMHG | RESPIRATION RATE: 19 BRPM | SYSTOLIC BLOOD PRESSURE: 159 MMHG

## 2019-04-06 VITALS — HEART RATE: 81 BPM | RESPIRATION RATE: 18 BRPM | SYSTOLIC BLOOD PRESSURE: 157 MMHG | DIASTOLIC BLOOD PRESSURE: 73 MMHG

## 2019-04-06 VITALS — HEART RATE: 93 BPM

## 2019-04-06 VITALS — HEART RATE: 78 BPM

## 2019-04-06 VITALS — HEART RATE: 81 BPM

## 2019-04-06 LAB
ABNORMAL IP MESSAGE: 1
ADD MAN DIFF?: NO
ALANINE AMINOTRANSFERASE: 39 IU/L (ref 13–69)
ALBUMIN/GLOBULIN RATIO: 1.22
ALBUMIN: 2.7 G/DL (ref 3.3–4.9)
ALKALINE PHOSPHATASE: 67 IU/L (ref 42–121)
ANION GAP: 1 (ref 5–13)
ASPARTATE AMINO TRANSFERASE: 22 IU/L (ref 15–46)
BASOPHIL #: 0 10^3/UL (ref 0–0.1)
BASOPHILS %: 0.1 % (ref 0–2)
BILIRUBIN,DIRECT: 0 MG/DL (ref 0–0.2)
BILIRUBIN,TOTAL: 0.4 MG/DL (ref 0.2–1.3)
BLOOD UREA NITROGEN: 22 MG/DL (ref 7–20)
CALCIUM: 9.6 MG/DL (ref 8.4–10.2)
CARBON DIOXIDE: 38 MMOL/L (ref 21–31)
CHLORIDE: 100 MMOL/L (ref 97–110)
CREATININE: 0.58 MG/DL (ref 0.44–1)
EOSINOPHILS #: 0 10^3/UL (ref 0–0.5)
EOSINOPHILS %: 0 % (ref 0–7)
GLOBULIN: 2.2 G/DL (ref 1.3–3.2)
GLUCOSE: 183 MG/DL (ref 70–220)
HEMATOCRIT: 34.9 % (ref 37–47)
HEMOGLOBIN: 11.7 G/DL (ref 12–16)
IMMATURE GRANS #M: 0.09 10^3/UL (ref 0–0.03)
IMMATURE GRANS % (M): 1.3 % (ref 0–0.43)
LYMPHOCYTES #: 0.3 10^3/UL (ref 0.8–2.9)
LYMPHOCYTES %: 5.1 % (ref 15–51)
MEAN CORPUSCULAR HEMOGLOBIN: 27.5 PG (ref 29–33)
MEAN CORPUSCULAR HGB CONC: 33.5 G/DL (ref 32–37)
MEAN CORPUSCULAR VOLUME: 81.9 FL (ref 82–101)
MEAN PLATELET VOLUME: 11.4 FL (ref 7.4–10.4)
MONOCYTE #: 0.4 10^3/UL (ref 0.3–0.9)
MONOCYTES %: 5.2 % (ref 0–11)
NEUTROPHIL #: 5.9 10^3/UL (ref 1.6–7.5)
NEUTROPHILS %: 88.3 % (ref 39–77)
NUCLEATED RED BLOOD CELLS #: 0 10^3/UL (ref 0–0)
NUCLEATED RED BLOOD CELLS%: 0 /100WBC (ref 0–0)
PLATELET COUNT: 113 10^3/UL (ref 140–415)
POSITIVE DIFF: (no result)
POTASSIUM: 3.1 MMOL/L (ref 3.5–5.1)
RED BLOOD COUNT: 4.26 10^6/UL (ref 4.2–5.4)
RED CELL DISTRIBUTION WIDTH: 13.8 % (ref 11.5–14.5)
SODIUM: 139 MMOL/L (ref 135–144)
T4 (THYROXINE): 2.7 UG/DL (ref 5.5–11)
TOTAL PROTEIN: 4.9 G/DL (ref 6.1–8.1)
TRIIODOTHYRONINE: 0.42 NG/ML (ref 0.97–1.69)
WHITE BLOOD COUNT: 6.7 10^3/UL (ref 4.8–10.8)

## 2019-04-06 RX ADMIN — INSULIN ASPART 1 UNIT: 100 INJECTION, SOLUTION INTRAVENOUS; SUBCUTANEOUS at 17:05

## 2019-04-06 RX ADMIN — INSULIN GLARGINE 1 UNITS: 100 INJECTION, SOLUTION SUBCUTANEOUS at 08:01

## 2019-04-06 RX ADMIN — INSULIN ASPART 1 UNIT: 100 INJECTION, SOLUTION INTRAVENOUS; SUBCUTANEOUS at 21:00

## 2019-04-06 RX ADMIN — LISINOPRIL 1 MG: 20 TABLET ORAL at 08:30

## 2019-04-06 RX ADMIN — LINAGLIPTIN 1 MG: 5 TABLET, FILM COATED ORAL at 09:25

## 2019-04-06 RX ADMIN — CASTOR OIL AND BALSAM, PERU 1 APPLIC: 788; 87 OINTMENT TOPICAL at 21:11

## 2019-04-06 RX ADMIN — LINAGLIPTIN SCH MG: 5 TABLET, FILM COATED ORAL at 09:25

## 2019-04-06 RX ADMIN — CASTOR OIL AND BALSAM, PERU 1 APPLIC: 788; 87 OINTMENT TOPICAL at 14:24

## 2019-04-06 RX ADMIN — INSULIN ASPART 1 UNIT: 100 INJECTION, SOLUTION INTRAVENOUS; SUBCUTANEOUS at 08:02

## 2019-04-06 RX ADMIN — INSULIN GLARGINE SCH UNITS: 100 INJECTION, SOLUTION SUBCUTANEOUS at 08:01

## 2019-04-06 RX ADMIN — INSULIN ASPART 1 UNIT: 100 INJECTION, SOLUTION INTRAVENOUS; SUBCUTANEOUS at 11:56

## 2019-04-06 RX ADMIN — LISINOPRIL SCH MG: 20 TABLET ORAL at 08:30

## 2019-04-06 RX ADMIN — FOLIC ACID TAB 400 MCG 1 MG: 400 TAB at 08:30

## 2019-04-06 RX ADMIN — POTASSIUM CHLORIDE 1 MEQ: 1.5 POWDER, FOR SOLUTION ORAL at 17:12

## 2019-04-06 RX ADMIN — ACETAMINOPHEN SCH MG: 400 TABLET ORAL at 08:30

## 2019-04-06 RX ADMIN — POTASSIUM CHLORIDE 1 MEQ: 1.5 POWDER, FOR SOLUTION ORAL at 14:24

## 2019-04-06 RX ADMIN — AMLODIPINE BESYLATE 1 MG: 10 TABLET ORAL at 08:30

## 2019-04-06 RX ADMIN — AMLODIPINE BESYLATE SCH MG: 10 TABLET ORAL at 08:30

## 2019-04-06 NOTE — CONS
Assessment/Plan


Assessment/Plan


Problems:  


(1) Abnormal results of thyroid function studies


Status:  Acute


Comment:  Strongly suspect sick euthyroid syndrome.  TSH, FT4, FT3, TT4, TT3 all


low.  This could be c/w central hypothyroidism but w/ cortisol significantly 


elevated this would be highly unlikely.  No need for thyroid hormone treatment 


at this time.  Rec. recheck TFT's in 6 weeks when pt. stable.  





(2) Type 2 diabetes mellitus with hyperglycemia


Status:  Chronic


Comment:  Glucose is OOC.  Increasing lantus from 8 to 12 units this am is not 


likely to fix this.  Will start Novolog 6 units qac and add metformin 500 mg bid


and linagliptin 5 mg daily.  Monitor glucose values and adjust meds accordingly.


 








Consultation Date/Type/Reason


Admit Date/Time


Apr 2, 2019 at 03:41


Initial Consult Date


4/5/19


Type of Consult


Endocrinology


Reason for Consultation


Abnormal thyroid function studies


Requesting Provider:  ALVIN LOCK


Date/Time of Note


DATE: 4/6/19 


TIME: 09:48





24 HR Interval Summary


Subjective hx not possible:  pt non-verbal (daughter denies any other 


complaints)





Exam/Review of Systems


Exam


Vitals





                          VS - Last 72 Hours, by Label


  Date      Temp  Pulse  Resp  B/P (MAP)   Pulse Ox  O2          O2 Flow    FiO2


Time                                                 Delivery    Rate


    4/6/19           63


     08:01


    4/6/19  98.1     68    19      176/79        96


     07:21                          (111)


    4/6/19           66


     04:00


    4/6/19  98.2     81    18      157/73        98


     03:48                          (101)


    4/6/19  98.3           19      159/68        96


     01:07                           (98)


    4/6/19           81


     00:00


    4/5/19                         151/70


     22:52                           (97)


    4/5/19           80


     20:00


    4/5/19  98.3     88    18      174/74        93


     19:34                          (107)


    4/5/19           78


     16:00


    4/5/19  98.1     80    20      154/68        97  Room Air


     15:40                           (96)


    4/5/19           68


     12:00


    4/5/19  97.9     85    20      154/69        98  Room Air


     11:11                           (97)


    4/5/19           91    18      168/71


     09:29                          (103)


    4/5/19           81


     08:00


    4/5/19  97.6     78    20      193/88        97  Room Air


     07:13                          (123)


    4/5/19           65


     04:21


    4/5/19  98.3     81    22      151/77        97


     04:01                          (101)


    4/5/19           73


     00:13


    4/4/19  98.1     83    19      145/83       100


     23:55                          (103)


    4/4/19           66


     20:12


    4/4/19  98.3     78    17      171/77       100


     20:07                          (108)


    4/4/19           73


     16:23


    4/4/19  98.2     72    18      164/69       100


     15:34                          (100)


    4/4/19           65


     12:37


    4/4/19  97.9     75    17      158/69        96


     11:12                           (98)


    4/4/19           64


     08:26


    4/4/19  97.8     70    17      180/81        98


     07:53                          (114)


    4/4/19           50


     04:00


    4/4/19  97.2           16      156/74        97


     04:00                          (101)


    4/4/19  97.2     70    17      164/82       100


     00:14                          (109)


    4/4/19           64


     00:00


    4/3/19  98.3     76    18      160/78       100


     20:00                          (105)


    4/3/19           77


     20:00


    4/3/19           77


     16:00


    4/3/19  98.1     76    17      161/72        95


     15:24                          (101)


    4/3/19           66


     12:00


    4/3/19  98.1     56    18      166/72       100


     11:17                          (103)





Vital Signs


  Date      Temp  Pulse  Resp  B/P (MAP)   Pulse Ox  O2          O2 Flow    FiO2


Time                                                 Delivery    Rate


    4/6/19           63


     08:01


    4/6/19  98.1           19      176/79        96


     07:21                          (111)


    4/5/19                                           Room Air


     15:40








Intake and Output





4/5/19 4/5/19 4/6/19





1515:00


23:00


07:00





IntakeIntake Total


720 ml


550 ml





BalanceBalance


720 ml


550 ml











Constitutional:  alert, non-verbal (aphasic), frail


Respiratory:  clear to auscultation, normal air movement


Cardiovascular:  regular rate and rhythm, nl pulses; 


   No edema, No murmurs/extra sounds, No rub


Gastrointestinal:  soft, nl liver, spleen, non-tender, bowel sounds; 


   No mass, No rebound or guarding


Musculoskeletal:  nl extremities to inspection


Extremities:  normal pulses; 


   No cyanosis, No clubbing, No edema


Neurological:  CNS II-XII intact, nl mental status, nl strength; 


   No nl speech


Additional Comments





Bedside Glucose - 72 Hours


Test
               4/3/19
12:07    4/3/19
17:28    4/3/19
20:57    4/4/19
02:45


Bedside                      277             221             238             139


Glucose
         mg/dL
()   mg/dL
()


                               H               H               H


Test
               4/4/19
07:47    4/4/19
12:11    4/4/19
17:07    4/5/19
01:54


Bedside                      136             264             243             216


Glucose
          mg/dL
()  mg/dL
()


                                               H               H


Test
               4/5/19
07:43    4/5/19
11:56    4/5/19
17:30    4/5/19
20:23


Bedside                      140             256             269             380


Glucose
          mg/dL
()  mg/dL
()


                                               H               H               H


Test
               4/6/19
02:00    4/6/19
07:27  
               



Bedside                      248             179  
               



Glucose
         mg/dL
()   mg/dL
()


                               H








Results


Result Diagram:  


4/6/19 0538                                                                     


          4/6/19 0538





Results 24hrs





Laboratory Tests


Test
                     4/5/19
11:56  4/5/19
17:30  4/5/19
20:23  4/6/19
02:00


Bedside Glucose                 256  H        269  H        380  H        248  H


Test
                     4/6/19
05:38  4/6/19
07:27  
             



White Blood Count                6.7


Red Blood Count                 4.26


Hemoglobin                     11.7  L


Hematocrit                     34.9  L


Mean Corpuscular Volume        81.9  L


Mean Corpuscular               27.5  L


Hemoglobin


Mean Corpuscular               33.5  
  
             
             



Hemoglobin
Concent


Red Cell Distribution           13.8


Width


Platelet Count                  113  L


Mean Platelet Volume           11.4  H


Immature Granulocytes %       1.300  H


Neutrophils %                  88.3  H


Lymphocytes %                   5.1  L


Monocytes %                      5.2


Eosinophils %                    0.0


Basophils %                      0.1


Nucleated Red Blood              0.0


Cells %


Immature Granulocytes #       0.090  H


Neutrophils #                    5.9


Lymphocytes #                   0.3  L


Monocytes #                      0.4


Eosinophils #                    0.0


Basophils #                      0.0


Nucleated Red Blood              0.0


Cells #


Sodium Level                     139


Potassium Level                 3.1  L


Chloride Level                   100


Carbon Dioxide Level             38  H


Anion Gap                         1  L


Blood Urea Nitrogen              22  H


Creatinine                      0.58


Est Glomerular Filtrat      
           
             
             



Rate
mL/min


Glucose Level                    183


Calcium Level                    9.6


Total Bilirubin                  0.4


Direct Bilirubin                0.00


Indirect Bilirubin               0.4


Aspartate Amino                  22  
  
             
             



Transf
(AST/SGOT)


Alanine                          39  
  
             
             



Aminotransferase
(ALT/SG


PT)


Alkaline Phosphatase             67  #


Total Protein                   4.9  L


Albumin                         2.7  L


Globulin                        2.20


Albumin/Globulin Ratio          1.22


Thyroxine (T4)                  2.7  L


Total Triiodothyronine         0.42  L


Random Cortisol                 75.5


Bedside Glucose                                179








Medications


Medication





Current Medications


Insulin Aspart (Novolog Insulin Pen) NOVOLOG *MILD* ALGORITHM WITH MEALS  BED


TIME SC  Last administered on 4/6/19at 08:02; Admin Dose 1 UNIT;  Start 4/2/19 


at 08:00


Hydralazine HCl (Apresoline) 10 mg Q6H  PRN IV ELEVATED BLOOD PRESSURE Last 


administered on 4/5/19at 20:36; Admin Dose 10 MG;  Start 4/2/19 at 04:30


Miscellaneous Information 1 ea NOTE XX ;  Start 4/2/19 at 04:30


Glucose (Glutose) 15 gm Q15M  PRN PO DECREASED GLUCOSE;  Start 4/2/19 at 04:30


Glucose (Glutose) 22.5 gm Q15M  PRN PO DECREASED GLUCOSE;  Start 4/2/19 at 04:30


Dextrose (D50w Syringe) 25 ml Q15M  PRN IV DECREASED GLUCOSE;  Start 4/2/19 at 


04:30


Dextrose (D50w Syringe) 50 ml Q15M  PRN IV DECREASED GLUCOSE;  Start 4/2/19 at 


04:30


Glucagon (Glucagen) 1 mg Q15M  PRN IM DECREASED GLUCOSE;  Start 4/2/19 at 04:30


Glucose (Glutose) 15 gm Q15M  PRN BUCCAL DECREASED GLUCOSE;  Start 4/2/19 at 


04:30


IV Flush (NS 3 ml) 3 ml PER PROTOCOL IV ;  Start 4/2/19 at 06:30


Acetaminophen (Tylenol Tab) 650 mg Q6H  PRN PO .PAIN 1-3 OR TEMP;  Start 4/2/19 


at 06:30


Amlodipine Besylate (Norvasc) 10 mg DAILY PO  Last administered on 4/6/19at 


08:30; Admin Dose 10 MG;  Start 4/2/19 at 09:00


Folic Acid (Folic Acid) 0.8 mg DAILY PO  Last administered on 4/6/19at 08:30; 


Admin Dose 0.8 MG;  Start 4/2/19 at 09:00


Lisinopril (Zestril) 40 mg DAILY PO  Last administered on 4/6/19at 08:30; Admin 


Dose 40 MG;  Start 4/3/19 at 09:00


Insulin Glargine (Lantus) 12 units DAILY@0800 SC  Last administered on 4/6/19at 


08:01; Admin Dose 12 UNITS;  Start 4/6/19 at 08:00


Insulin Aspart (Novolog Insulin Pen) 6 unit WITH  MEALS SC ;  Start 4/6/19 at 


12:00


Metformin HCl (Glucophage) 500 mg BID WITH  MEALS PO  Last administered on 4/6 /19at 09:24; Admin Dose 500 MG;  Start 4/6/19 at 08:30


Linagliptin (Tradjenta) 5 mg DAILY PO  Last administered on 4/6/19at 09:25; 


Admin Dose 5 MG;  Start 4/6/19 at 09:00











HANY LAMBERT MD            Apr 6, 2019 09:52

## 2019-04-06 NOTE — PN
Date/Time of Note


Date/Time of Note


DATE: 4/6/19 


TIME: 16:37





Assessment/Plan


VTE Prophylaxis


Risk score (from Nsg)>0 risk:  6


SCD applied (from Nsg):  Yes


Pharmacological prophylaxis:  heparin





Lines/Catheters


IV Catheter Type (from Nrsg):  Peripheral IV


Urinary Cath still in place:  No





Assessment/Plan


Hospital Course





EXAM:


Alert, nonverbal


No distress


Able to follow commands intermittently


RRR


CTAB


L hemiplegia


R side 4/5 sternght





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 


encephalopathy 


- MRI pending.  Dr Hahn to reset  shunt following study


- Management per neurology





Respiratory acidodisos with metabolic alkalosis


- unclear etiology of metabolic alkalosis with respiratory acidosis, supportive 


care





DMII: titrate basal/bolus insulin





Hypokalemia


- Replete electrolytes





Hypertension:


- Continue current meds


Result Diagram:  


4/6/19 0538                                                                     


          4/6/19 0538





Results 24hrs





Laboratory Tests


Test
                     4/5/19
17:30  4/5/19
20:23  4/6/19
02:00  4/6/19
05:38


Bedside Glucose                 269  H        380  H        248  H


White Blood Count                                                          6.7


Red Blood Count                                                           4.26


Hemoglobin                                                               11.7  L


Hematocrit                                                               34.9  L


Mean Corpuscular Volume                                                  81.9  L


Mean Corpuscular                                                         27.5  L


Hemoglobin


Mean Corpuscular          
             
             
                  33.5  



Hemoglobin
Concent


Red Cell Distribution                                                     13.8


Width


Platelet Count                                                            113  L


Mean Platelet Volume                                                     11.4  H


Immature Granulocytes %                                                 1.300  H


Neutrophils %                                                            88.3  H


Lymphocytes %                                                             5.1  L


Monocytes %                                                                5.2


Eosinophils %                                                              0.0


Basophils %                                                                0.1


Nucleated Red Blood                                                        0.0


Cells %


Immature Granulocytes #                                                 0.090  H


Neutrophils #                                                              5.9


Lymphocytes #                                                             0.3  L


Monocytes #                                                                0.4


Eosinophils #                                                              0.0


Basophils #                                                                0.0


Nucleated Red Blood                                                        0.0


Cells #


Sodium Level                                                               139


Potassium Level                                                           3.1  L


Chloride Level                                                             100


Carbon Dioxide Level                                                       38  H


Anion Gap                                                                   1  L


Blood Urea Nitrogen                                                        22  H


Creatinine                                                                0.58


Est Glomerular Filtrat    
             
             
               



Rate
mL/min


Glucose Level                                                              183


Calcium Level                                                              9.6


Total Bilirubin                                                            0.4


Direct Bilirubin                                                          0.00


Indirect Bilirubin                                                         0.4


Aspartate Amino           
             
             
                    22  



Transf
(AST/SGOT)


Alanine                   
             
             
                    39  



Aminotransferase
(ALT/SG


PT)


Alkaline Phosphatase                                                       67  #


Total Protein                                                             4.9  L


Albumin                                                                   2.7  L


Globulin                                                                  2.20


Albumin/Globulin Ratio                                                    1.22


Thyroxine (T4)                                                            2.7  L


Total Triiodothyronine                                                   0.42  L


Random Cortisol                                                           75.5


Test
                     4/6/19
07:27  4/6/19
11:49  
             



Bedside Glucose                  179          248  H








Subjective


24 Hr Interval Summary


Free Text/Dictation


No change to clinical status


Awaiting MRI





Exam/Review of Systems


Exam


Vitals





Vital Signs


  Date      Temp  Pulse  Resp  B/P (MAP)   Pulse Ox  O2          O2 Flow    FiO2


Time                                                 Delivery    Rate


    4/6/19  97.8    117    19      121/77        98


     15:21                           (92)


    4/5/19                                           Room Air


     15:40








Intake and Output





4/5/19 4/5/19 4/6/19





1414:59


22:59


06:59





IntakeIntake Total


720 ml


550 ml





BalanceBalance


720 ml


550 ml














Results


Results 24hrs





Laboratory Tests


Test
                     4/5/19
17:30  4/5/19
20:23  4/6/19
02:00  4/6/19
05:38


Bedside Glucose                 269  H        380  H        248  H


White Blood Count                                                          6.7


Red Blood Count                                                           4.26


Hemoglobin                                                               11.7  L


Hematocrit                                                               34.9  L


Mean Corpuscular Volume                                                  81.9  L


Mean Corpuscular                                                         27.5  L


Hemoglobin


Mean Corpuscular          
             
             
                  33.5  



Hemoglobin
Concent


Red Cell Distribution                                                     13.8


Width


Platelet Count                                                            113  L


Mean Platelet Volume                                                     11.4  H


Immature Granulocytes %                                                 1.300  H


Neutrophils %                                                            88.3  H


Lymphocytes %                                                             5.1  L


Monocytes %                                                                5.2


Eosinophils %                                                              0.0


Basophils %                                                                0.1


Nucleated Red Blood                                                        0.0


Cells %


Immature Granulocytes #                                                 0.090  H


Neutrophils #                                                              5.9


Lymphocytes #                                                             0.3  L


Monocytes #                                                                0.4


Eosinophils #                                                              0.0


Basophils #                                                                0.0


Nucleated Red Blood                                                        0.0


Cells #


Sodium Level                                                               139


Potassium Level                                                           3.1  L


Chloride Level                                                             100


Carbon Dioxide Level                                                       38  H


Anion Gap                                                                   1  L


Blood Urea Nitrogen                                                        22  H


Creatinine                                                                0.58


Est Glomerular Filtrat    
             
             
               



Rate
mL/min


Glucose Level                                                              183


Calcium Level                                                              9.6


Total Bilirubin                                                            0.4


Direct Bilirubin                                                          0.00


Indirect Bilirubin                                                         0.4


Aspartate Amino           
             
             
                    22  



Transf
(AST/SGOT)


Alanine                   
             
             
                    39  



Aminotransferase
(ALT/SG


PT)


Alkaline Phosphatase                                                       67  #


Total Protein                                                             4.9  L


Albumin                                                                   2.7  L


Globulin                                                                  2.20


Albumin/Globulin Ratio                                                    1.22


Thyroxine (T4)                                                            2.7  L


Total Triiodothyronine                                                   0.42  L


Random Cortisol                                                           75.5


Test
                     4/6/19
07:27  4/6/19
11:49  
             



Bedside Glucose                  179          248  H








Medications


Medication





Current Medications


Insulin Aspart (Novolog Insulin Pen) NOVOLOG *MILD* ALGORITHM WITH MEALS  


BEDTIME SC  Last administered on 4/6/19at 11:56; Admin Dose 3 UNIT;  Start 


4/2/19 at 08:00


Hydralazine HCl (Apresoline) 10 mg Q6H  PRN IV ELEVATED BLOOD PRESSURE Last 


administered on 4/5/19at 20:36; Admin Dose 10 MG;  Start 4/2/19 at 04:30


Miscellaneous Information 1 ea NOTE XX ;  Start 4/2/19 at 04:30


Glucose (Glutose) 15 gm Q15M  PRN PO DECREASED GLUCOSE;  Start 4/2/19 at 04:30


Glucose (Glutose) 22.5 gm Q15M  PRN PO DECREASED GLUCOSE;  Start 4/2/19 at 04:30


Dextrose (D50w Syringe) 25 ml Q15M  PRN IV DECREASED GLUCOSE;  Start 4/2/19 at 


04:30


Dextrose (D50w Syringe) 50 ml Q15M  PRN IV DECREASED GLUCOSE;  Start 4/2/19 at 


04:30


Glucagon (Glucagen) 1 mg Q15M  PRN IM DECREASED GLUCOSE;  Start 4/2/19 at 04:30


Glucose (Glutose) 15 gm Q15M  PRN BUCCAL DECREASED GLUCOSE;  Start 4/2/19 at 04


:30


IV Flush (NS 3 ml) 3 ml PER PROTOCOL IV ;  Start 4/2/19 at 06:30


Acetaminophen (Tylenol Tab) 650 mg Q6H  PRN PO .PAIN 1-3 OR TEMP;  Start 4/2/19 


at 06:30


Amlodipine Besylate (Norvasc) 10 mg DAILY PO  Last administered on 4/6/19at 


08:30; Admin Dose 10 MG;  Start 4/2/19 at 09:00


Folic Acid (Folic Acid) 0.8 mg DAILY PO  Last administered on 4/6/19at 08:30; 


Admin Dose 0.8 MG;  Start 4/2/19 at 09:00


Lisinopril (Zestril) 40 mg DAILY PO  Last administered on 4/6/19at 08:30; Admin 


Dose 40 MG;  Start 4/3/19 at 09:00


Insulin Glargine (Lantus) 12 units DAILY@0800 SC  Last administered on 4/6/19at 


08:01; Admin Dose 12 UNITS;  Start 4/6/19 at 08:00


Insulin Aspart (Novolog Insulin Pen) 6 unit WITH  MEALS SC  Last administered on


4/6/19at 11:56; Admin Dose 6 UNIT;  Start 4/6/19 at 12:00


Metformin HCl (Glucophage) 500 mg BID WITH  MEALS PO  Last administered on 


4/6/19at 09:24; Admin Dose 500 MG;  Start 4/6/19 at 08:30


Linagliptin (Tradjenta) 5 mg DAILY PO  Last administered on 4/6/19at 09:25; Admi


n Dose 5 MG;  Start 4/6/19 at 09:00


Potassium Chloride (Potassium Chloride Pwd/Soln) 40 meq ONCE  ONCE PO ;  Start 


4/6/19 at 17:00;  Stop 4/6/19 at 17:01











CHERRY JANE MD           Apr 6, 2019 16:37

## 2019-04-06 NOTE — CONS
Assessment/Plan


Assessment/Plan


Hospital Course


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 


presents for evaluation of ams x 1 week.





The clinical picture could be consistent w/ an acute on chronic encephalopathy.





Stroke is not yet excluded.





Focal motor seizure was additionally considered; however, EEG was normal..





Head CT is without obvious acute intracranial pathology.


TSH, FT4, and FT3 are low..of uncertain significance








P:


Appreciate endocrinology recs; await cortisol and adnl hormone levels as 


ordered..


Await MRI brain for further characterization


[place]arker as necessary


Limit sedating medications where possible


PT/OT/ST as necessary


Other medical management and supportive care per primary


Will follow clinically





Consultation Date/Type/Reason


Admit Date/Time


Apr 2, 2019 at 03:41


Type of Consult


Neurology


Reason for Consultation


ams


Requesting Provider:  ALVIN LOCK


Date/Time of Note


DATE: 4/6/19 


TIME: 10:59





24 HR Interval Summary


Free Text/Dictation


remains nonverbal





Exam


Vital Signs


Vitals





Vital Signs


  Date      Temp  Pulse  Resp  B/P (MAP)   Pulse Ox  O2          O2 Flow    FiO2


Time                                                 Delivery    Rate


    4/6/19           63


     08:01


    4/6/19  98.1           19      176/79        96


     07:21                          (111)


    4/5/19                                           Room Air


     15:40








Intake and Output





4/5/19 4/5/19 4/6/19





1515:00


23:00


07:00





IntakeIntake Total


720 ml


550 ml





BalanceBalance


720 ml


550 ml














Exam


PE:


Gen Appearance: No Apparent Distress


HEENT: Normocephalic


Cardiovascular: Regular rate


Abdomen: Soft


Extremities: Dry





NE:


The patient was alert though nonverbal..able to follow commands.. 





Pupils were equal and reactive to light. There was no afferent pupillary defect.


Visual fields were normal. Funduscopic examination was limited. Extra-ocular 


movements were full. Ptosis was absent. There was no nystagmus. Facial sensation


was normal. Face was symmetric with normal strength. Hearing was intact. Palate 


movements were normal. Neck strength was normal. There was normal tongue bulk 


and speed of movement.





Tone was normal. Muscle bulk was normal. I did not see fasciculations. Arms and 


legs were symmetric.





Vibration sensation was normal. Temperature and pinprick sensation was normal.





Rapid alternating movements were normal. There was no dysmetria. There was no 


intention tremor. Gait was deferred due to bedrest.





Arm and leg reflexes were symmetric. Villar's sign was absent. Plantar 


responses were flexor.











FROY MA                  Apr 6, 2019 11:01

## 2019-04-07 VITALS — RESPIRATION RATE: 17 BRPM | HEART RATE: 82 BPM | DIASTOLIC BLOOD PRESSURE: 79 MMHG | SYSTOLIC BLOOD PRESSURE: 159 MMHG

## 2019-04-07 VITALS — RESPIRATION RATE: 21 BRPM | DIASTOLIC BLOOD PRESSURE: 72 MMHG | SYSTOLIC BLOOD PRESSURE: 163 MMHG | HEART RATE: 87 BPM

## 2019-04-07 VITALS — DIASTOLIC BLOOD PRESSURE: 77 MMHG | HEART RATE: 89 BPM | RESPIRATION RATE: 18 BRPM | SYSTOLIC BLOOD PRESSURE: 168 MMHG

## 2019-04-07 VITALS — SYSTOLIC BLOOD PRESSURE: 164 MMHG | RESPIRATION RATE: 20 BRPM | HEART RATE: 78 BPM | DIASTOLIC BLOOD PRESSURE: 72 MMHG

## 2019-04-07 VITALS — HEART RATE: 82 BPM | RESPIRATION RATE: 21 BRPM | SYSTOLIC BLOOD PRESSURE: 163 MMHG | DIASTOLIC BLOOD PRESSURE: 76 MMHG

## 2019-04-07 VITALS — HEART RATE: 87 BPM

## 2019-04-07 VITALS — RESPIRATION RATE: 18 BRPM | SYSTOLIC BLOOD PRESSURE: 157 MMHG | HEART RATE: 84 BPM | DIASTOLIC BLOOD PRESSURE: 74 MMHG

## 2019-04-07 VITALS — HEART RATE: 71 BPM

## 2019-04-07 VITALS — HEART RATE: 86 BPM

## 2019-04-07 VITALS — HEART RATE: 90 BPM

## 2019-04-07 VITALS — HEART RATE: 88 BPM

## 2019-04-07 VITALS — HEART RATE: 78 BPM

## 2019-04-07 RX ADMIN — ACETAMINOPHEN SCH MG: 400 TABLET ORAL at 07:30

## 2019-04-07 RX ADMIN — LINAGLIPTIN 1 MG: 5 TABLET, FILM COATED ORAL at 07:30

## 2019-04-07 RX ADMIN — INSULIN ASPART 1 UNIT: 100 INJECTION, SOLUTION INTRAVENOUS; SUBCUTANEOUS at 12:18

## 2019-04-07 RX ADMIN — AMLODIPINE BESYLATE SCH MG: 10 TABLET ORAL at 07:30

## 2019-04-07 RX ADMIN — LINAGLIPTIN SCH MG: 5 TABLET, FILM COATED ORAL at 07:30

## 2019-04-07 RX ADMIN — LISINOPRIL SCH MG: 20 TABLET ORAL at 07:31

## 2019-04-07 RX ADMIN — FOLIC ACID TAB 400 MCG 1 MG: 400 TAB at 07:30

## 2019-04-07 RX ADMIN — LISINOPRIL 1 MG: 20 TABLET ORAL at 07:31

## 2019-04-07 RX ADMIN — AMLODIPINE BESYLATE 1 MG: 10 TABLET ORAL at 07:30

## 2019-04-07 RX ADMIN — INSULIN ASPART 1 UNIT: 100 INJECTION, SOLUTION INTRAVENOUS; SUBCUTANEOUS at 20:53

## 2019-04-07 RX ADMIN — INSULIN ASPART 1 UNIT: 100 INJECTION, SOLUTION INTRAVENOUS; SUBCUTANEOUS at 17:02

## 2019-04-07 RX ADMIN — CASTOR OIL AND BALSAM, PERU 1 APPLIC: 788; 87 OINTMENT TOPICAL at 20:53

## 2019-04-07 RX ADMIN — CASTOR OIL AND BALSAM, PERU 1 APPLIC: 788; 87 OINTMENT TOPICAL at 07:31

## 2019-04-07 RX ADMIN — INSULIN ASPART 1 UNIT: 100 INJECTION, SOLUTION INTRAVENOUS; SUBCUTANEOUS at 07:43

## 2019-04-07 RX ADMIN — INSULIN GLARGINE SCH UNITS: 100 INJECTION, SOLUTION SUBCUTANEOUS at 07:43

## 2019-04-07 RX ADMIN — INSULIN GLARGINE 1 UNITS: 100 INJECTION, SOLUTION SUBCUTANEOUS at 07:43

## 2019-04-07 RX ADMIN — INSULIN ASPART 1 UNIT: 100 INJECTION, SOLUTION INTRAVENOUS; SUBCUTANEOUS at 07:44

## 2019-04-07 RX ADMIN — INSULIN ASPART 1 UNIT: 100 INJECTION, SOLUTION INTRAVENOUS; SUBCUTANEOUS at 11:44

## 2019-04-07 NOTE — PN
Date/Time of Note


Date/Time of Note


DATE: 4/7/19 


TIME: 11:43





Assessment/Plan


VTE Prophylaxis


Risk score (from Nsg)>0 risk:  7


SCD applied (from Nsg):  Yes


Pharmacological prophylaxis:  heparin





Lines/Catheters


IV Catheter Type (from Nrsg):  Peripheral IV


Urinary Cath still in place:  No





Assessment/Plan


Hospital Course





EXAM:


Alert, nonverbal


No distress


Able to follow commands intermittently


RRR


CTAB


L hemiplegia


R side 4/5 sternght





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 


encephalopathy 


- MRI pending.  Dr Hahn to reset  shunt following study


- Management per neurology





Respiratory acidodisos with metabolic alkalosis


- unclear etiology of metabolic alkalosis with respiratory acidosis, supportive 


care





DMII: titrate basal/bolus insulin





Hypokalemia


- Replete electrolytes





Hypertension:


- Continue current meds


Result Diagram:  


4/6/19 0538                                                                     


          4/6/19 0538





Results 24hrs





Laboratory Tests


    Test
            4/6/19
11:49  4/6/19
16:47  4/6/19
21:08  4/7/19
07:26


    Bedside Glucose        248  H         200           114           153








Subjective


24 Hr Interval Summary


Free Text/Dictation


No change to clinical status


Awaiting MRI





Exam/Review of Systems


Exam


Vitals





Vital Signs


  Date      Temp  Pulse  Resp  B/P (MAP)   Pulse Ox  O2          O2 Flow    FiO2


Time                                                 Delivery    Rate


    4/7/19  98.1     89    18      168/77        99


     11:27                          (107)


    4/5/19                                           Room Air


     15:40








Intake and Output





4/6/19 4/6/19 4/7/19





1515:00


23:00


07:00





IntakeIntake Total


400 ml





BalanceBalance


400 ml














Results


Results 24hrs





Laboratory Tests


    Test
            4/6/19
11:49  4/6/19
16:47  4/6/19
21:08  4/7/19
07:26


    Bedside Glucose        248  H         200           114           153








Medications


Medication





Current Medications


Insulin Aspart (Novolog Insulin Pen) NOVOLOG *MILD* ALGORITHM WITH MEALS  


BEDTIME SC  Last administered on 4/7/19at 07:44; Admin Dose 1 UNIT;  Start 


4/2/19 at 08:00


Hydralazine HCl (Apresoline) 10 mg Q6H  PRN IV ELEVATED BLOOD PRESSURE Last 


administered on 4/5/19at 20:36; Admin Dose 10 MG;  Start 4/2/19 at 04:30


Miscellaneous Information 1 ea NOTE XX ;  Start 4/2/19 at 04:30


Glucose (Glutose) 15 gm Q15M  PRN PO DECREASED GLUCOSE;  Start 4/2/19 at 04:30


Glucose (Glutose) 22.5 gm Q15M  PRN PO DECREASED GLUCOSE;  Start 4/2/19 at 04:30


Dextrose (D50w Syringe) 25 ml Q15M  PRN IV DECREASED GLUCOSE;  Start 4/2/19 at 


04:30


Dextrose (D50w Syringe) 50 ml Q15M  PRN IV DECREASED GLUCOSE;  Start 4/2/19 at 


04:30


Glucagon (Glucagen) 1 mg Q15M  PRN IM DECREASED GLUCOSE;  Start 4/2/19 at 04:30


Glucose (Glutose) 15 gm Q15M  PRN BUCCAL DECREASED GLUCOSE;  Start 4/2/19 at 04


:30


IV Flush (NS 3 ml) 3 ml PER PROTOCOL IV ;  Start 4/2/19 at 06:30


Acetaminophen (Tylenol Tab) 650 mg Q6H  PRN PO .PAIN 1-3 OR TEMP;  Start 4/2/19 


at 06:30


Amlodipine Besylate (Norvasc) 10 mg DAILY PO  Last administered on 4/7/19at 


07:30; Admin Dose 10 MG;  Start 4/2/19 at 09:00


Folic Acid (Folic Acid) 0.8 mg DAILY PO  Last administered on 4/7/19at 07:30; 


Admin Dose 0.8 MG;  Start 4/2/19 at 09:00


Lisinopril (Zestril) 40 mg DAILY PO  Last administered on 4/7/19at 07:31; Admin 


Dose 40 MG;  Start 4/3/19 at 09:00


Insulin Glargine (Lantus) 12 units DAILY@0800 SC  Last administered on 4/7/19at 


07:43; Admin Dose 12 UNITS;  Start 4/6/19 at 08:00


Insulin Aspart (Novolog Insulin Pen) 6 unit WITH  MEALS SC  Last administered on


4/7/19at 07:43; Admin Dose 6 UNIT;  Start 4/6/19 at 12:00


Metformin HCl (Glucophage) 500 mg BID WITH  MEALS PO  Last administered on 


4/7/19at 07:30; Admin Dose 500 MG;  Start 4/6/19 at 08:30


Linagliptin (Tradjenta) 5 mg DAILY PO  Last administered on 4/7/19at 07:30; Admi


n Dose 5 MG;  Start 4/6/19 at 09:00











CHERRY JANE MD           Apr 7, 2019 11:43

## 2019-04-07 NOTE — QN
Documentation


Comment


PROCEDURE: 


Shunt valve reprogramming to performance level 140 mmHg





INDICATION: 


Patient with Codman Hakim valve s/p MRI





PROCEDURE : Primo Soler





PROCEDURE SUMMARY: 


A lateral skull Xray demonstrated that the patient's  shunt valve was changed 


to a performance level of 150mmHg after MRI. The shunt  was used to 


adjust the shunt to 140mmHg without difficulty and was confirmed to be accurate.











PRIMO SOLER MD                 Apr 7, 2019 17:56

## 2019-04-07 NOTE — CONS
Assessment/Plan


Assessment/Plan


Problems:  


(1) Type 2 diabetes mellitus with hyperglycemia


Status:  Chronic


Comment:  Control markedly improved.  Cont. current regimen.  





(2) Abnormal results of thyroid function studies


Status:  Acute


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  See previous note. 








Consultation Date/Type/Reason


Admit Date/Time


Apr 2, 2019 at 03:41


Initial Consult Date


4/5/19


Type of Consult


Endocrinology


Reason for Consultation


Abnormal thyroid function studies


Requesting Provider:  ALVIN LOCK


Date/Time of Note


DATE: 4/7/19 


TIME: 12:03





24 HR Interval Summary


Subjective hx not possible:  pt non-verbal





Exam/Review of Systems


Exam


Vitals





                          VS - Last 72 Hours, by Label


  Date      Temp  Pulse  Resp  B/P (MAP)   Pulse Ox  O2          O2 Flow    FiO2


Time                                                 Delivery    Rate


    4/7/19  98.1     89    18      168/77        99


     11:27                          (107)


    4/7/19           71


     08:01


    4/7/19  98.0     84    18      157/74        95


     07:11                          (101)


    4/7/19           78


     04:00


    4/7/19  98.4     87    21      163/72        93


     03:51                          (102)


    4/7/19           86


     00:00


    4/6/19  98.1     93    21      151/77        99


     23:32                          (101)


    4/6/19           78


     20:00


    4/6/19  97.7     93    19      152/74        99


     19:36                          (100)


    4/6/19           96


     16:01


    4/6/19  97.8    117    19      121/77        98


     15:21                           (92)


    4/6/19           93


     12:01


    4/6/19  98.1     84    19      123/82        95


     11:51                           (96)


    4/6/19           63


     08:01


    4/6/19  98.1     68    19      176/79        96


     07:21                          (111)


    4/6/19           66


     04:00


    4/6/19  98.2     81    18      157/73        98


     03:48                          (101)


    4/6/19  98.3           19      159/68        96


     01:07                           (98)


    4/6/19           81


     00:00


    4/5/19                         151/70


     22:52                           (97)


    4/5/19           80


     20:00


    4/5/19  98.3     88    18      174/74        93


     19:34                          (107)


    4/5/19           78


     16:00


    4/5/19  98.1     80    20      154/68        97  Room Air


     15:40                           (96)


    4/5/19           68


     12:00


    4/5/19  97.9     85    20      154/69        98  Room Air


     11:11                           (97)


    4/5/19           91    18      168/71


     09:29                          (103)


    4/5/19           81


     08:00


    4/5/19  97.6     78    20      193/88        97  Room Air


     07:13                          (123)


    4/5/19           65


     04:21


    4/5/19  98.3     81    22      151/77        97


     04:01                          (101)


    4/5/19           73


     00:13


    4/4/19  98.1     83    19      145/83       100


     23:55                          (103)


    4/4/19           66


     20:12


    4/4/19  98.3     78    17      171/77       100


     20:07                          (108)


    4/4/19           73


     16:23


    4/4/19  98.2     72    18      164/69       100


     15:34                          (100)


    4/4/19           65


     12:37





Vital Signs


  Date      Temp  Pulse  Resp  B/P (MAP)   Pulse Ox  O2          O2 Flow    FiO2


Time                                                 Delivery    Rate


    4/7/19  98.1     89    18      168/77        99


     11:27                          (107)


    4/5/19                                           Room Air


     15:40








Intake and Output





4/6/19 4/6/19 4/7/19





1515:00


23:00


07:00





IntakeIntake Total


400 ml





BalanceBalance


400 ml











Constitutional:  alert, non-verbal, frail


Respiratory:  clear to auscultation, normal air movement


Cardiovascular:  regular rate and rhythm, nl pulses; 


   No edema, No murmurs/extra sounds, No rub


Gastrointestinal:  soft, nl liver, spleen, non-tender, bowel sounds; 


   No mass, No rebound or guarding


Musculoskeletal:  nl extremities to inspection


Extremities:  normal pulses; 


   No cyanosis, No clubbing, No edema


Neurological:  CNS II-XII intact, nl mental status, nl strength; 


   No nl speech


Additional Comments





Bedside Glucose - 72 Hours


Test
               4/4/19
12:11    4/4/19
17:07    4/5/19
01:54    4/5/19
07:43


Bedside                      264             243             216             140


Glucose
         mg/dL
()   mg/dL
()


                               H               H


Test
               4/5/19
11:56    4/5/19
17:30    4/5/19
20:23    4/6/19
02:00


Bedside                      256             269             380             248


Glucose
         mg/dL
()   mg/dL
()


                               H               H               H               H


Test
               4/6/19
07:27    4/6/19
11:49    4/6/19
16:47    4/6/19
21:08


Bedside                      179             248             200             114


Glucose
          mg/dL
()  mg/dL
()


                                               H


Test
               4/7/19
07:26    4/7/19
11:42  
               



Bedside                      153             132  
               



Glucose
          mg/dL
()  mg/dL
()








Results


Result Diagram:  


4/6/19 0538                                                                     


          4/6/19 0538





Results 24hrs





Laboratory Tests


    Test
            4/6/19
16:47  4/6/19
21:08  4/7/19
07:26  4/7/19
11:42


    Bedside Glucose         200           114           153           132








Medications


Medication





Current Medications


Insulin Aspart (Novolog Insulin Pen) NOVOLOG *MILD* ALGORITHM WITH MEALS  


BEDTIME SC  Last administered on 4/7/19at 07:44; Admin Dose 1 UNIT;  Start 


4/2/19 at 08:00


Hydralazine HCl (Apresoline) 10 mg Q6H  PRN IV ELEVATED BLOOD PRESSURE Last 


administered on 4/5/19at 20:36; Admin Dose 10 MG;  Start 4/2/19 at 04:30


Miscellaneous Information 1 ea NOTE XX ;  Start 4/2/19 at 04:30


Glucose (Glutose) 15 gm Q15M  PRN PO DECREASED GLUCOSE;  Start 4/2/19 at 04:30


Glucose (Glutose) 22.5 gm Q15M  PRN PO DECREASED GLUCOSE;  Start 4/2/19 at 04:30


Dextrose (D50w Syringe) 25 ml Q15M  PRN IV DECREASED GLUCOSE;  Start 4/2/19 at 


04:30


Dextrose (D50w Syringe) 50 ml Q15M  PRN IV DECREASED GLUCOSE;  Start 4/2/19 at 


04:30


Glucagon (Glucagen) 1 mg Q15M  PRN IM DECREASED GLUCOSE;  Start 4/2/19 at 04:30


Glucose (Glutose) 15 gm Q15M  PRN BUCCAL DECREASED GLUCOSE;  Start 4/2/19 at 


04:30


IV Flush (NS 3 ml) 3 ml PER PROTOCOL IV ;  Start 4/2/19 at 06:30


Acetaminophen (Tylenol Tab) 650 mg Q6H  PRN PO .PAIN 1-3 OR TEMP;  Start 4/2/19 


at 06:30


Amlodipine Besylate (Norvasc) 10 mg DAILY PO  Last administered on 4/7/19at 


07:30; Admin Dose 10 MG;  Start 4/2/19 at 09:00


Folic Acid (Folic Acid) 0.8 mg DAILY PO  Last administered on 4/7/19at 07:30; 


Admin Dose 0.8 MG;  Start 4/2/19 at 09:00


Lisinopril (Zestril) 40 mg DAILY PO  Last administered on 4/7/19at 07:31; Admin 


Dose 40 MG;  Start 4/3/19 at 09:00


Insulin Glargine (Lantus) 12 units DAILY@0800 SC  Last administered on 4/7/19at 


07:43; Admin Dose 12 UNITS;  Start 4/6/19 at 08:00


Insulin Aspart (Novolog Insulin Pen) 6 unit WITH  MEALS SC  Last administered on


4/7/19at 07:43; Admin Dose 6 UNIT;  Start 4/6/19 at 12:00


Metformin HCl (Glucophage) 500 mg BID WITH  MEALS PO  Last administered on 


4/7/19at 07:30; Admin Dose 500 MG;  Start 4/6/19 at 08:30


Linagliptin (Tradjenta) 5 mg DAILY PO  Last administered on 4/7/19at 07:30; 


Admin Dose 5 MG;  Start 4/6/19 at 09:00











HANY LAMBERT MD            Apr 7, 2019 12:05

## 2019-04-08 VITALS — HEART RATE: 63 BPM

## 2019-04-08 VITALS — HEART RATE: 70 BPM | RESPIRATION RATE: 19 BRPM | DIASTOLIC BLOOD PRESSURE: 88 MMHG | SYSTOLIC BLOOD PRESSURE: 161 MMHG

## 2019-04-08 VITALS — DIASTOLIC BLOOD PRESSURE: 75 MMHG | HEART RATE: 88 BPM | SYSTOLIC BLOOD PRESSURE: 161 MMHG | RESPIRATION RATE: 16 BRPM

## 2019-04-08 VITALS — HEART RATE: 92 BPM

## 2019-04-08 VITALS — HEART RATE: 80 BPM | SYSTOLIC BLOOD PRESSURE: 159 MMHG | RESPIRATION RATE: 18 BRPM | DIASTOLIC BLOOD PRESSURE: 73 MMHG

## 2019-04-08 VITALS — HEART RATE: 74 BPM | SYSTOLIC BLOOD PRESSURE: 180 MMHG | DIASTOLIC BLOOD PRESSURE: 74 MMHG | RESPIRATION RATE: 18 BRPM

## 2019-04-08 VITALS — HEART RATE: 70 BPM | DIASTOLIC BLOOD PRESSURE: 78 MMHG | RESPIRATION RATE: 17 BRPM | SYSTOLIC BLOOD PRESSURE: 170 MMHG

## 2019-04-08 VITALS — HEART RATE: 61 BPM

## 2019-04-08 VITALS — HEART RATE: 73 BPM

## 2019-04-08 VITALS — HEART RATE: 76 BPM

## 2019-04-08 VITALS — DIASTOLIC BLOOD PRESSURE: 76 MMHG | SYSTOLIC BLOOD PRESSURE: 181 MMHG | RESPIRATION RATE: 18 BRPM | HEART RATE: 94 BPM

## 2019-04-08 LAB
ANION GAP: 6 (ref 5–13)
BLOOD UREA NITROGEN: 26 MG/DL (ref 7–20)
CALCIUM: 9.9 MG/DL (ref 8.4–10.2)
CARBON DIOXIDE: 34 MMOL/L (ref 21–31)
CHLORIDE: 101 MMOL/L (ref 97–110)
CREATININE: 0.43 MG/DL (ref 0.44–1)
GLUCOSE: 106 MG/DL (ref 70–220)
POTASSIUM: 4.2 MMOL/L (ref 3.5–5.1)
SODIUM: 141 MMOL/L (ref 135–144)

## 2019-04-08 RX ADMIN — INSULIN ASPART 1 UNIT: 100 INJECTION, SOLUTION INTRAVENOUS; SUBCUTANEOUS at 21:00

## 2019-04-08 RX ADMIN — LISINOPRIL SCH MG: 20 TABLET ORAL at 08:22

## 2019-04-08 RX ADMIN — INSULIN ASPART 1 UNIT: 100 INJECTION, SOLUTION INTRAVENOUS; SUBCUTANEOUS at 08:00

## 2019-04-08 RX ADMIN — INSULIN GLARGINE SCH UNITS: 100 INJECTION, SOLUTION SUBCUTANEOUS at 12:52

## 2019-04-08 RX ADMIN — INSULIN ASPART 1 UNIT: 100 INJECTION, SOLUTION INTRAVENOUS; SUBCUTANEOUS at 09:26

## 2019-04-08 RX ADMIN — ACETAMINOPHEN SCH MG: 400 TABLET ORAL at 08:20

## 2019-04-08 RX ADMIN — HYDRALAZINE HYDROCHLORIDE PRN MG: 20 INJECTION INTRAMUSCULAR; INTRAVENOUS at 14:38

## 2019-04-08 RX ADMIN — FOLIC ACID TAB 400 MCG 1 MG: 400 TAB at 08:20

## 2019-04-08 RX ADMIN — INSULIN GLARGINE 1 UNITS: 100 INJECTION, SOLUTION SUBCUTANEOUS at 12:52

## 2019-04-08 RX ADMIN — CASTOR OIL AND BALSAM, PERU 1 APPLIC: 788; 87 OINTMENT TOPICAL at 21:03

## 2019-04-08 RX ADMIN — AMLODIPINE BESYLATE 1 MG: 10 TABLET ORAL at 08:22

## 2019-04-08 RX ADMIN — LINAGLIPTIN SCH MG: 5 TABLET, FILM COATED ORAL at 08:21

## 2019-04-08 RX ADMIN — HYDRALAZINE HYDROCHLORIDE 1 MG: 20 INJECTION INTRAMUSCULAR; INTRAVENOUS at 14:38

## 2019-04-08 RX ADMIN — LISINOPRIL 1 MG: 20 TABLET ORAL at 08:22

## 2019-04-08 RX ADMIN — AMLODIPINE BESYLATE SCH MG: 10 TABLET ORAL at 08:22

## 2019-04-08 RX ADMIN — CASTOR OIL AND BALSAM, PERU 1 APPLIC: 788; 87 OINTMENT TOPICAL at 08:22

## 2019-04-08 RX ADMIN — LINAGLIPTIN 1 MG: 5 TABLET, FILM COATED ORAL at 08:21

## 2019-04-08 RX ADMIN — INSULIN ASPART 1 UNIT: 100 INJECTION, SOLUTION INTRAVENOUS; SUBCUTANEOUS at 18:25

## 2019-04-08 RX ADMIN — INSULIN ASPART 1 UNIT: 100 INJECTION, SOLUTION INTRAVENOUS; SUBCUTANEOUS at 12:52

## 2019-04-08 NOTE — PN
Date/Time of Note


Date/Time of Note


DATE: 4/8/19 


TIME: 14:26





Assessment/Plan


VTE Prophylaxis


Risk score (from Nsg)>0 risk:  7


SCD applied (from Nsg):  Yes


Pharmacological prophylaxis:  LMWH





Lines/Catheters


IV Catheter Type (from Nrsg):  Peripheral IV


Urinary Cath still in place:  No





Assessment/Plan


Hospital Course


77 yo female with NPH s/p  shunt and CVA presents with worsening encepha


lopathy 


- MRI shows no acute findings, Dr Hahn has already reset  shunt following 


study


-Clear for DC per neurology


-ARU evaluation





Metabolic alkalosis likely secondary to contraction





DMII: titrate basal/bolus insulin





Hypokalemia


- Replete electrolytes





Hypertension:


- Continue current meds





Prophylaxis: Lovenox





DC planning: Follow-up on ARU evaluation, nursing home if not a candidate for 


inpatient rehab


Result Diagram:  


4/6/19 0538                                                                     


          4/8/19 0519





Results 24hrs





Laboratory Tests


Test
                     4/7/19
16:48  4/7/19
20:44  4/8/19
05:19  4/8/19
08:01


Bedside Glucose                  172           171                         128


Sodium Level                                                 141


Potassium Level                                              4.2


Chloride Level                                               101


Carbon Dioxide Level                                         34  H


Anion Gap                                                      6


Blood Urea Nitrogen                                          26  H


Creatinine                                                 0.43  L


Est Glomerular Filtrat    
             
               
           



Rate
mL/min


Glucose Level                                               106  #


Calcium Level                                                9.9


Test
                     4/8/19
11:43  
             
             



Bedside Glucose                 222  H








Subjective


24 Hr Interval Summary


Constitutional:  disoriented





Exam/Review of Systems


Exam


Vitals





Vital Signs


  Date      Temp  Pulse  Resp  B/P (MAP)   Pulse Ox  O2          O2 Flow    FiO2


Time                                                 Delivery    Rate


    4/8/19           73


     12:01


    4/8/19  97.6           18      180/74        97


     11:57                          (109)


    4/5/19                                           Room Air


     15:40








Intake and Output





4/7/19 4/7/19 4/8/19





1515:00


23:00


07:00





IntakeIntake Total


900 ml


350 ml





BalanceBalance


900 ml


350 ml











Psych:  confusion


Respiratory:  clear to auscultation


Cardiovascular:  regular rate and rhythm


Gastrointestinal:  soft; 


   No distended


Musculoskeletal:  nl extremities to inspection





Results


Results 24hrs





Laboratory Tests


Test
                     4/7/19
16:48  4/7/19
20:44  4/8/19
05:19  4/8/19
08:01


Bedside Glucose                  172           171                         128


Sodium Level                                                 141


Potassium Level                                              4.2


Chloride Level                                               101


Carbon Dioxide Level                                         34  H


Anion Gap                                                      6


Blood Urea Nitrogen                                          26  H


Creatinine                                                 0.43  L


Est Glomerular Filtrat    
             
               
           



Rate
mL/min


Glucose Level                                               106  #


Calcium Level                                                9.9


Test
                     4/8/19
11:43  
             
             



Bedside Glucose                 222  H








Medications


Medication





Current Medications


Insulin Aspart (Novolog Insulin Pen) NOVOLOG *MILD* ALGORITHM WITH MEALS  


BEDTIME SC  Last administered on 4/8/19at 12:52; Admin Dose 3 UNIT;  Start 


4/2/19 at 08:00


Hydralazine HCl (Apresoline) 10 mg Q6H  PRN IV ELEVATED BLOOD PRESSURE Last 


administered on 4/5/19at 20:36; Admin Dose 10 MG;  Start 4/2/19 at 04:30


Miscellaneous Information 1 ea NOTE XX ;  Start 4/2/19 at 04:30


Glucose (Glutose) 15 gm Q15M  PRN PO DECREASED GLUCOSE;  Start 4/2/19 at 04:30


Glucose (Glutose) 22.5 gm Q15M  PRN PO DECREASED GLUCOSE;  Start 4/2/19 at 04:30


Dextrose (D50w Syringe) 25 ml Q15M  PRN IV DECREASED GLUCOSE;  Start 4/2/19 at 


04:30


Dextrose (D50w Syringe) 50 ml Q15M  PRN IV DECREASED GLUCOSE;  Start 4/2/19 at 


04:30


Glucagon (Glucagen) 1 mg Q15M  PRN IM DECREASED GLUCOSE;  Start 4/2/19 at 04:30


Glucose (Glutose) 15 gm Q15M  PRN BUCCAL DECREASED GLUCOSE;  Start 4/2/19 at 


04:30


IV Flush (NS 3 ml) 3 ml PER PROTOCOL IV ;  Start 4/2/19 at 06:30


Acetaminophen (Tylenol Tab) 650 mg Q6H  PRN PO .PAIN 1-3 OR TEMP;  Start 4/2/19 


at 06:30


Amlodipine Besylate (Norvasc) 10 mg DAILY PO  Last administered on 4/8/19at 


08:22; Admin Dose 10 MG;  Start 4/2/19 at 09:00


Folic Acid (Folic Acid) 0.8 mg DAILY PO  Last administered on 4/8/19at 08:20; 


Admin Dose 0.8 MG;  Start 4/2/19 at 09:00


Lisinopril (Zestril) 40 mg DAILY PO  Last administered on 4/8/19at 08:22; Admin 


Dose 40 MG;  Start 4/3/19 at 09:00


Insulin Glargine (Lantus) 12 units DAILY@0800 SC  Last administered on 4/8/19at 


12:52; Admin Dose 12 UNITS;  Start 4/6/19 at 08:00


Insulin Aspart (Novolog Insulin Pen) 6 unit WITH  MEALS SC  Last administered on


4/8/19at 12:52; Admin Dose 6 UNIT;  Start 4/6/19 at 12:00


Metformin HCl (Glucophage) 500 mg BID WITH  MEALS PO  Last administered on 


4/8/19at 08:21; Admin Dose 500 MG;  Start 4/6/19 at 08:30


Linagliptin (Tradjenta) 5 mg DAILY PO  Last administered on 4/8/19at 08:21; 


Admin Dose 5 MG;  Start 4/6/19 at 09:00











KATIE PARADA                   Apr 8, 2019 14:37

## 2019-04-08 NOTE — CONS
Assessment/Plan


Assessment/Plan


Hospital Course


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 


presents for evaluation of ams x 1 week.





The clinical picture could be consistent w/ an acute on chronic encephalopathy.





MRI brain is without evidence of acute intracranial pathology.





Focal motor seizure was additionally considered; however, EEG was normal..





TSH, FT4, and FT3 are low....c/w sick euthyroid syndrome.








P:


[place] as necessary


Limit sedating medications where possible


PT/OT/ST as necessary


Other medical management and supportive care per primary


Neurologically cleared for discharge with outpatient follow





Consultation Date/Type/Reason


Admit Date/Time


Apr 2, 2019 at 03:41


Type of Consult


Neurology


Reason for Consultation


ams


Requesting Provider:  ALVIN LOCK


Date/Time of Note


DATE: 4/8/19 


TIME: 13:28





24 HR Interval Summary


Free Text/Dictation


Continues acute care


s/p MRI brain





Exam


Vital Signs


Vitals





Vital Signs


  Date      Temp  Pulse  Resp  B/P (MAP)   Pulse Ox  O2          O2 Flow    FiO2


Time                                                 Delivery    Rate


    4/8/19  97.6     74    18      180/74        97


     11:57                          (109)


    4/5/19                                           Room Air


     15:40








Intake and Output





4/7/19 4/7/19 4/8/19





1515:00


23:00


07:00





IntakeIntake Total


900 ml


350 ml





BalanceBalance


900 ml


350 ml














Exam


PE:


Gen Appearance: No Apparent Distress


HEENT: Normocephalic


Cardiovascular: Regular rate


Abdomen: Soft


Extremities: Dry





NE:


The patient was alert though nonverbal..able to follow commands.. 





Pupils were equal and reactive to light. There was no afferent pupillary defect.


Visual fields were normal. Funduscopic examination was limited. Extra-ocular 


movements were full. Ptosis was absent. There was no nystagmus. Facial sensation


was normal. Face was symmetric with normal strength. Hearing was intact. Palate 


movements were normal. Neck strength was normal. There was normal tongue bulk 


and speed of movement.





Tone was normal. Muscle bulk was normal. I did not see fasciculations. Arms and 


legs were symmetric.





Vibration sensation was normal. Temperature and pinprick sensation was normal.





Rapid alternating movements were normal. There was no dysmetria. There was no 


intention tremor. Gait was deferred due to bedrest.





Arm and leg reflexes were symmetric. Villar's sign was absent. Plantar 


responses were flexor.











FROY MA                  Apr 8, 2019 13:31


ASPEN BARCENAS NP           Apr 8, 2019 14:31

## 2019-04-08 NOTE — CONS
Assessment/Plan


Assessment/Plan


Hospital Course


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 


presents for evaluation of ams x 1 week.





The clinical picture could be consistent w/ an acute on chronic encephalopathy.





Stroke is not yet excluded.





Focal motor seizure was additionally considered; however, EEG was normal..





Head CT is without obvious acute intracranial pathology.


TSH, FT4, and FT3 are low..of uncertain significance








P:


Appreciate endocrinology recs; await cortisol and adnl hormone levels as 


ordered..


Await MRI brain for further characterization


[place] as necessary


Limit sedating medications where possible


PT/OT/ST as necessary


Other medical management and supportive care per primary


Will follow clinically





Consultation Date/Type/Reason


Admit Date/Time


Apr 2, 2019 at 03:41


Type of Consult


Neurology


Requesting Provider:  ALVIN LOCK


Date/Time of Note


DATE: 4/8/19 


TIME: 12:35





Exam


Vital Signs


Vitals





Vital Signs


  Date      Temp  Pulse  Resp  B/P (MAP)   Pulse Ox  O2          O2 Flow    FiO2


Time                                                 Delivery    Rate


    4/8/19  97.6     74    18      180/74        97


     11:57                          (109)


    4/5/19                                           Room Air


     15:40








Intake and Output





4/7/19 4/7/19 4/8/19





1515:00


23:00


07:00





IntakeIntake Total


900 ml


350 ml





BalanceBalance


900 ml


350 ml




















ASPEN BARCENAS NP           Apr 8, 2019 12:35

## 2019-04-09 VITALS — DIASTOLIC BLOOD PRESSURE: 81 MMHG | RESPIRATION RATE: 18 BRPM | SYSTOLIC BLOOD PRESSURE: 183 MMHG | HEART RATE: 81 BPM

## 2019-04-09 VITALS — HEART RATE: 66 BPM | DIASTOLIC BLOOD PRESSURE: 82 MMHG | SYSTOLIC BLOOD PRESSURE: 183 MMHG | RESPIRATION RATE: 17 BRPM

## 2019-04-09 VITALS — DIASTOLIC BLOOD PRESSURE: 79 MMHG | HEART RATE: 85 BPM | SYSTOLIC BLOOD PRESSURE: 155 MMHG | RESPIRATION RATE: 16 BRPM

## 2019-04-09 VITALS — SYSTOLIC BLOOD PRESSURE: 120 MMHG | HEART RATE: 88 BPM | DIASTOLIC BLOOD PRESSURE: 63 MMHG | RESPIRATION RATE: 18 BRPM

## 2019-04-09 VITALS — HEART RATE: 64 BPM

## 2019-04-09 VITALS — SYSTOLIC BLOOD PRESSURE: 155 MMHG | RESPIRATION RATE: 18 BRPM | HEART RATE: 88 BPM | DIASTOLIC BLOOD PRESSURE: 74 MMHG

## 2019-04-09 VITALS — HEART RATE: 86 BPM

## 2019-04-09 VITALS — HEART RATE: 71 BPM

## 2019-04-09 VITALS — HEART RATE: 80 BPM

## 2019-04-09 RX ADMIN — LABETALOL HYDROCHLORIDE 1 MG: 5 INJECTION, SOLUTION INTRAVENOUS at 19:01

## 2019-04-09 RX ADMIN — ACETAMINOPHEN SCH MG: 400 TABLET ORAL at 09:26

## 2019-04-09 RX ADMIN — LINAGLIPTIN SCH MG: 5 TABLET, FILM COATED ORAL at 09:25

## 2019-04-09 RX ADMIN — HYDRALAZINE HYDROCHLORIDE 1 MG: 20 INJECTION INTRAMUSCULAR; INTRAVENOUS at 17:58

## 2019-04-09 RX ADMIN — INSULIN ASPART 1 UNIT: 100 INJECTION, SOLUTION INTRAVENOUS; SUBCUTANEOUS at 09:35

## 2019-04-09 RX ADMIN — INSULIN ASPART 1 UNIT: 100 INJECTION, SOLUTION INTRAVENOUS; SUBCUTANEOUS at 17:45

## 2019-04-09 RX ADMIN — LISINOPRIL SCH MG: 20 TABLET ORAL at 09:25

## 2019-04-09 RX ADMIN — LINAGLIPTIN 1 MG: 5 TABLET, FILM COATED ORAL at 09:25

## 2019-04-09 RX ADMIN — INSULIN ASPART 1 UNIT: 100 INJECTION, SOLUTION INTRAVENOUS; SUBCUTANEOUS at 17:31

## 2019-04-09 RX ADMIN — CASTOR OIL AND BALSAM, PERU 1 APPLIC: 788; 87 OINTMENT TOPICAL at 09:30

## 2019-04-09 RX ADMIN — AMLODIPINE BESYLATE 1 MG: 10 TABLET ORAL at 09:26

## 2019-04-09 RX ADMIN — INSULIN ASPART 1 UNIT: 100 INJECTION, SOLUTION INTRAVENOUS; SUBCUTANEOUS at 12:07

## 2019-04-09 RX ADMIN — FOLIC ACID TAB 400 MCG 1 MG: 400 TAB at 09:26

## 2019-04-09 RX ADMIN — INSULIN GLARGINE 1 UNITS: 100 INJECTION, SOLUTION SUBCUTANEOUS at 09:35

## 2019-04-09 RX ADMIN — LISINOPRIL 1 MG: 20 TABLET ORAL at 09:25

## 2019-04-09 RX ADMIN — INSULIN GLARGINE SCH UNITS: 100 INJECTION, SOLUTION SUBCUTANEOUS at 09:35

## 2019-04-09 RX ADMIN — AMLODIPINE BESYLATE SCH MG: 10 TABLET ORAL at 09:26

## 2019-04-09 RX ADMIN — HYDRALAZINE HYDROCHLORIDE PRN MG: 20 INJECTION INTRAMUSCULAR; INTRAVENOUS at 17:58

## 2019-04-09 RX ADMIN — INSULIN ASPART 1 UNIT: 100 INJECTION, SOLUTION INTRAVENOUS; SUBCUTANEOUS at 08:00

## 2019-04-09 RX ADMIN — ENOXAPARIN SODIUM 1 MG: 100 INJECTION SUBCUTANEOUS at 09:35

## 2019-04-09 NOTE — CONS
Assessment/Plan


Assessment/Plan


Hospital Course


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 


presents for evaluation of ams x 1 week.





The clinical picture could be consistent w/ an acute on chronic encephalopathy.





MRI brain is reassuringly without evidence of acute intracranial pathology.





Focal motor seizure was additionally considered; however, EEG was normal..





TSH, FT4, and FT3 are low....c/w sick euthyroid syndrome.








P:


[place] as necessary


Limit sedating medications where possible


PT/OT/ST as necessary


Other medical management and supportive care per primary


Neurologically cleared for d/c w/ outpatient follow up when medically able





Consultation Date/Type/Reason


Admit Date/Time


Apr 2, 2019 at 03:41


Type of Consult


Neurology


Reason for Consultation


ams


Requesting Provider:  ALVIN LOCK


Date/Time of Note


DATE: 4/9/19 


TIME: 10:47





24 HR Interval Summary


Free Text/Dictation


Continues acute care





Exam


Vital Signs


Vitals





Vital Signs


  Date      Temp  Pulse  Resp  B/P (MAP)   Pulse Ox  O2          O2 Flow    FiO2


Time                                                 Delivery    Rate


    4/9/19           64


     08:01


    4/9/19  98.3           17      183/82        99


     07:37                          (115)


    4/5/19                                           Room Air


     15:40








Intake and Output





4/8/19 4/8/19 4/9/19





1515:00


23:00


07:00





IntakeIntake Total


100 ml





BalanceBalance


100 ml














Exam


PE:


Gen Appearance: No Apparent Distress


HEENT: Normocephalic


Cardiovascular: Regular rate


Abdomen: Soft


Extremities: Dry





NE:


The patient was alert though nonverbal..able to follow commands.. 





Pupils were equal and reactive to light. There was no afferent pupillary defect.


Visual fields were normal. Funduscopic examination was limited. Extra-ocular 


movements were full. Ptosis was absent. There was no nystagmus. Facial sensation


was normal. Face was symmetric with normal strength. Hearing was intact. Palate 


movements were normal. Neck strength was normal. There was normal tongue bulk 


and speed of movement.





Tone was normal. Muscle bulk was normal. I did not see fasciculations. Arms and 


legs were symmetric.





Vibration sensation was normal. Temperature and pinprick sensation was normal.





Rapid alternating movements were normal. There was no dysmetria. There was no 


intention tremor. Gait was deferred due to bedrest.





Arm and leg reflexes were symmetric. Villar's sign was absent. Plantar 


responses were flexor.











FROY MA                  Apr 9, 2019 10:49


ASPEN BARCENAS NP           Apr 9, 2019 14:43

## 2019-04-09 NOTE — DS
Date/Time of Note


Date/Time of Note


DATE: 4/9/19 


TIME: 16:22





Discharge Summary


Admission/Discharge Info


Admit Date/Time


Apr 2, 2019 at 03:41


Discharge Date/Time


April 9, 2019


Discharge Diagnosis


77 yo female with progressive dementia versus NPH s/p  shunt and CVA presents 


with worsening encephalopathy 


- MRI shows no acute findings, Dr Hahn has already reset  shunt following 


study


-Clear for DC per neurology


-Patient too low functioning for ARU,  has arranged nursing home 


placement





Metabolic alkalosis likely secondary to contraction





DMII: titrate basal/bolus insulin





Hypokalemia


- Repleted





Hypertension:


- Continue current meds and have added hydralazine


Patient Condition:  Good


Hospital Course


Patient is a 77 yo female with progressive dementia versus possible NPH s/p  


shunt and CVA who presents with worsening encephalopathy.  Patient was 


reportedly initially diagnosed with dementia but there was a question of 


possible NPH and patient had a  shunt placed, according to neurosurgeon 


patient had no significant improvement but daughter did believe the patient 


improved this.  Patient had been more confused and weak over the past several 


weeks prior to admission, no acute etiology for her altered mentation was 


detected, MRI showed no acute findings and patient had no evidence of infection.


 Patient was seen by neurology and neurosurgery did reset the  shunt after MRI


study.  Patient's mentation continued to be poor and patient was too low 


functioning for acute inpatient rehab,  did arrange for nursing home


placement.  On day of discharge patient's vitals, labs and physical exam are 


stable.  Patient's functional status continues to be poor and likely has 


progressive dementia.


Home Meds


Active Scripts


Hydralazine Hcl* (Hydralazine Hcl*) 25 Mg Tab, 25 MG PO TID, #60 TAB


   Prov:KATIE PARADA         4/9/19


Reported Medications


Cyanocobalamin (Vitamin B12) Unknown Strength Tab, PO, TAB


   4/2/19


Cholecalciferol (Vitamin D3) (Vitamin D3) 500 Unit/5 Ml Liquid, 2000 UNIT PO 


DAILY


   4/2/19


Acetaminophen* (Acetaminophen*) 500 MG Extra Strength Tablet, 500 MG PO Q4H PRN 


for PAIN AND OR ELEVATED TEMP, TAB


   4/2/19


Multivitamins* (Theragran*) 1 Tab Tab, 1 TAB PO DAILY, TAB


   4/2/19


Folic Acid* (Folic Acid*) 0.8 Mg Tablet, 0.8 MG PO DAILY, TAB


   4/2/19


Lisinopril* (Lisinopril*) 40 Mg Tablet, 40 MG PO DAILY, #30 TAB


   4/2/19


Amlodipine Besylate* (Amlodipine Besylate*) 10 Mg Tablet, 10 MG PO DAILY, #30 


TAB


   4/2/19


Metformin Hcl* (Metformin Hcl*) 500 Mg Tablet, 500 MG PO DAILY, #30 TAB


   9/10/17


Discontinued Reported Medications


Olmesartan Medoxomil (Benicar) 40 Mg Tablet, 40 MG PO DAILY, #30 TAB


   9/10/17


Follow-up Plan


Follow-up physicians at skilled nursing facility


Primary Care Provider


Lewis Sandoval


Time spent on discharge:   > 30 minutes











KATIE PARADA                   Apr 9, 2019 16:25

## 2019-04-09 NOTE — PN
Date/Time of Note


Date/Time of Note


DATE: 4/9/19 


TIME: 14:11





Assessment/Plan


VTE Prophylaxis


Risk score (from Ns)>0 risk:  7


SCD applied (from Ns):  Yes


Pharmacological prophylaxis:  NA/contraindicated


Pharm contraindication:  surgical contra





Lines/Catheters


IV Catheter Type (from Nrsg):  Saline Lock


Urinary Cath still in place:  No





Assessment/Plan


Hospital Course


77 yo female with dementia versus NPH s/p  shunt and CVA presents with 


worsening encephalopathy 


- MRI shows no acute findings, Dr Hahn has already reset  shunt following 


study


-Clear for DC per neurology


-Patient too low functioning for ARU,  working on nursing home 


placement





Metabolic alkalosis likely secondary to contraction





DMII: titrate basal/bolus insulin





Hypokalemia


- Replete electrolytes





Hypertension:


- Continue current meds





Prophylaxis: Lovenox





DC planning:  arranging for nursing home placement


Result Diagram:  


4/6/19 0538                                                                     


          4/8/19 0519





Results 24hrs





Laboratory Tests


    Test
            4/8/19
17:54  4/8/19
20:58  4/9/19
09:22  4/9/19
12:03


    Bedside Glucose         198           178            93           176








Subjective


24 Hr Interval Summary


Subjective hx not possible:  pt non-verbal





Exam/Review of Systems


Exam


Vitals





Vital Signs


  Date      Temp  Pulse  Resp  B/P (MAP)   Pulse Ox  O2          O2 Flow    FiO2


Time                                                 Delivery    Rate


    4/9/19           80


     12:01


    4/9/19  97.9           18      155/74        97


     11:47                          (101)


    4/5/19                                           Room Air


     15:40








Intake and Output





4/8/19 4/8/19 4/9/19





1515:00


23:00


07:00





IntakeIntake Total


100 ml





BalanceBalance


100 ml











Constitutional:  non-verbal


Respiratory:  clear to auscultation


Cardiovascular:  regular rate and rhythm


Gastrointestinal:  soft; 


   No distended


Musculoskeletal:  nl extremities to inspection





Results


Results 24hrs





Laboratory Tests


    Test
            4/8/19
17:54  4/8/19
20:58  4/9/19
09:22  4/9/19
12:03


    Bedside Glucose         198           178            93           176








Medications


Medication





Current Medications


Insulin Aspart (Novolog Insulin Pen) NOVOLOG *MILD* ALGORITHM WITH MEALS  


BEDTIME SC  Last administered on 4/9/19at 12:07; Admin Dose 1 UNIT;  Start 


4/2/19 at 08:00


Hydralazine HCl (Apresoline) 10 mg Q6H  PRN IV ELEVATED BLOOD PRESSURE Last 


administered on 4/8/19at 14:38; Admin Dose 10 MG;  Start 4/2/19 at 04:30


Miscellaneous Information 1 ea NOTE XX ;  Start 4/2/19 at 04:30


Glucose (Glutose) 15 gm Q15M  PRN PO DECREASED GLUCOSE;  Start 4/2/19 at 04:30


Glucose (Glutose) 22.5 gm Q15M  PRN PO DECREASED GLUCOSE;  Start 4/2/19 at 04:30


Dextrose (D50w Syringe) 25 ml Q15M  PRN IV DECREASED GLUCOSE;  Start 4/2/19 at 


04:30


Dextrose (D50w Syringe) 50 ml Q15M  PRN IV DECREASED GLUCOSE;  Start 4/2/19 at 


04:30


Glucagon (Glucagen) 1 mg Q15M  PRN IM DECREASED GLUCOSE;  Start 4/2/19 at 04:30


Glucose (Glutose) 15 gm Q15M  PRN BUCCAL DECREASED GLUCOSE;  Start 4/2/19 at 


04:30


IV Flush (NS 3 ml) 3 ml PER PROTOCOL IV ;  Start 4/2/19 at 06:30


Acetaminophen (Tylenol Tab) 650 mg Q6H  PRN PO .PAIN 1-3 OR TEMP;  Start 4/2/19 


at 06:30


Amlodipine Besylate (Norvasc) 10 mg DAILY PO  Last administered on 4/9/19at 


09:26; Admin Dose 10 MG;  Start 4/2/19 at 09:00


Folic Acid (Folic Acid) 0.8 mg DAILY PO  Last administered on 4/9/19at 09:26; 


Admin Dose 0.8 MG;  Start 4/2/19 at 09:00


Lisinopril (Zestril) 40 mg DAILY PO  Last administered on 4/9/19at 09:25; Admin 


Dose 40 MG;  Start 4/3/19 at 09:00


Insulin Glargine (Lantus) 12 units DAILY@0800 SC  Last administered on 4/9/19at 


09:35; Admin Dose 12 UNITS;  Start 4/6/19 at 08:00


Insulin Aspart (Novolog Insulin Pen) 6 unit WITH  MEALS SC  Last administered on


4/9/19at 12:07; Admin Dose 6 UNIT;  Start 4/6/19 at 12:00


Metformin HCl (Glucophage) 500 mg BID WITH  MEALS PO  Last administered on 


4/9/19at 09:25; Admin Dose 500 MG;  Start 4/6/19 at 08:30


Linagliptin (Tradjenta) 5 mg DAILY PO  Last administered on 4/9/19at 09:25; 


Admin Dose 5 MG;  Start 4/6/19 at 09:00


Enoxaparin Sodium (Lovenox) 40 mg DAILY SC  Last administered on 4/9/19at 09:35;


Admin Dose 40 MG;  Start 4/9/19 at 09:00


Hydralazine HCl (Apresoline) 25 mg TID PO  Last administered on 4/9/19at 13:32; 


Admin Dose 25 MG;  Start 4/8/19 at 21:00











KATIE PARADA                   Apr 9, 2019 14:13

## 2019-05-28 ENCOUNTER — HOSPITAL ENCOUNTER (INPATIENT)
Dept: HOSPITAL 54 - ER | Age: 79
LOS: 8 days | Discharge: HOME HEALTH | DRG: 622 | End: 2019-06-05
Attending: NURSE PRACTITIONER | Admitting: INTERNAL MEDICINE
Payer: MEDICARE

## 2019-05-28 VITALS — BODY MASS INDEX: 15.79 KG/M2 | WEIGHT: 98.25 LBS | HEIGHT: 66 IN

## 2019-05-28 DIAGNOSIS — N17.0: ICD-10-CM

## 2019-05-28 DIAGNOSIS — Z98.2: ICD-10-CM

## 2019-05-28 DIAGNOSIS — Z98.890: ICD-10-CM

## 2019-05-28 DIAGNOSIS — E11.65: ICD-10-CM

## 2019-05-28 DIAGNOSIS — D64.9: ICD-10-CM

## 2019-05-28 DIAGNOSIS — J81.1: ICD-10-CM

## 2019-05-28 DIAGNOSIS — I10: ICD-10-CM

## 2019-05-28 DIAGNOSIS — G91.2: ICD-10-CM

## 2019-05-28 DIAGNOSIS — B96.20: ICD-10-CM

## 2019-05-28 DIAGNOSIS — N39.0: ICD-10-CM

## 2019-05-28 DIAGNOSIS — Z79.84: ICD-10-CM

## 2019-05-28 DIAGNOSIS — Z74.01: ICD-10-CM

## 2019-05-28 DIAGNOSIS — E87.6: ICD-10-CM

## 2019-05-28 DIAGNOSIS — E86.0: ICD-10-CM

## 2019-05-28 DIAGNOSIS — D69.6: ICD-10-CM

## 2019-05-28 DIAGNOSIS — E83.52: ICD-10-CM

## 2019-05-28 DIAGNOSIS — E87.0: Primary | ICD-10-CM

## 2019-05-28 DIAGNOSIS — E83.42: ICD-10-CM

## 2019-05-28 DIAGNOSIS — E44.0: ICD-10-CM

## 2019-05-28 DIAGNOSIS — E83.39: ICD-10-CM

## 2019-05-28 DIAGNOSIS — L89.153: ICD-10-CM

## 2019-05-28 DIAGNOSIS — L89.323: ICD-10-CM

## 2019-05-28 DIAGNOSIS — G93.41: ICD-10-CM

## 2019-05-28 LAB
ALBUMIN SERPL BCP-MCNC: 2.7 G/DL (ref 3.4–5)
ALP SERPL-CCNC: 123 U/L (ref 46–116)
ALT SERPL W P-5'-P-CCNC: 63 U/L (ref 12–78)
APPEARANCE UR: (no result)
AST SERPL W P-5'-P-CCNC: 4 U/L (ref 15–37)
BASOPHILS NFR BLD AUTO: 0 % (ref 0–2)
BILIRUB DIRECT SERPL-MCNC: 0 MG/DL (ref 0–0.2)
BILIRUB SERPL-MCNC: 0.3 MG/DL (ref 0.2–1)
BILIRUB UR QL STRIP: NEGATIVE
BUN SERPL-MCNC: 32 MG/DL (ref 7–18)
CALCIUM SERPL-MCNC: 10.3 MG/DL (ref 8.5–10.1)
CHLORIDE SERPL-SCNC: 122 MMOL/L (ref 98–107)
CO2 SERPL-SCNC: 34 MMOL/L (ref 21–32)
COLOR UR: YELLOW
CREAT SERPL-MCNC: 0.8 MG/DL (ref 0.6–1.3)
EOSINOPHIL NFR BLD AUTO: 0 % (ref 0–6)
GLUCOSE SERPL-MCNC: 300 MG/DL (ref 74–106)
GLUCOSE UR STRIP-MCNC: (no result) MG/DL
HCT VFR BLD AUTO: 39 % (ref 33–45)
HGB BLD-MCNC: 12.7 G/DL (ref 11.5–14.8)
HGB UR QL STRIP: (no result) ERY/UL
KETONES UR STRIP-MCNC: NEGATIVE MG/DL
LEUKOCYTE ESTERASE UR QL STRIP: NEGATIVE
LYMPHOCYTES NFR BLD AUTO: 3.5 % (ref 20–44)
MCHC RBC AUTO-ENTMCNC: 32 G/DL (ref 31–36)
MCV RBC AUTO: 91 FL (ref 82–100)
MONOCYTES NFR BLD AUTO: 3.1 % (ref 2–12)
NEUTROPHILS NFR BLD AUTO: 93.4 % (ref 43–81)
NITRITE UR QL STRIP: NEGATIVE
PH UR STRIP: 5 [PH] (ref 5–8)
PLATELET # BLD AUTO: 115 /CMM (ref 150–450)
POTASSIUM SERPL-SCNC: 2.4 MMOL/L (ref 3.5–5.1)
PROT SERPL-MCNC: 6.1 G/DL (ref 6.4–8.2)
PROT UR QL STRIP: 30 MG/DL
RBC # BLD AUTO: 4.32 MIL/UL (ref 4–5.2)
SODIUM SERPL-SCNC: 167 MMOL/L (ref 136–145)
WBC NRBC COR # BLD AUTO: 8.7 K/UL (ref 4.3–11)

## 2019-05-28 PROCEDURE — A6402 STERILE GAUZE <= 16 SQ IN: HCPCS

## 2019-05-28 PROCEDURE — A6248 HYDROGEL DRSG GEL FILLER: HCPCS

## 2019-05-28 PROCEDURE — G0378 HOSPITAL OBSERVATION PER HR: HCPCS

## 2019-05-28 RX ADMIN — POTASSIUM CHLORIDE SCH MLS/HR: 200 INJECTION, SOLUTION INTRAVENOUS at 23:26

## 2019-05-28 NOTE — NUR
BIB AMBULANCE. DTR PRESENT AT THE BED SIDE. DTR REPORTED INCREASED WEAKNESS AND 
LATHARGIC SINCE SATURDAY NIGHT. 

PMH: DM TYPE 2,  SHUNT IN PLACE. 

VSS . PLACED ON A MONITOR . WILL CONT TO MONITOR ,

## 2019-05-29 VITALS — DIASTOLIC BLOOD PRESSURE: 72 MMHG | SYSTOLIC BLOOD PRESSURE: 131 MMHG

## 2019-05-29 VITALS — SYSTOLIC BLOOD PRESSURE: 149 MMHG | DIASTOLIC BLOOD PRESSURE: 75 MMHG

## 2019-05-29 VITALS — SYSTOLIC BLOOD PRESSURE: 135 MMHG | DIASTOLIC BLOOD PRESSURE: 77 MMHG

## 2019-05-29 VITALS — SYSTOLIC BLOOD PRESSURE: 130 MMHG | DIASTOLIC BLOOD PRESSURE: 86 MMHG

## 2019-05-29 VITALS — DIASTOLIC BLOOD PRESSURE: 80 MMHG | SYSTOLIC BLOOD PRESSURE: 142 MMHG

## 2019-05-29 VITALS — DIASTOLIC BLOOD PRESSURE: 72 MMHG | SYSTOLIC BLOOD PRESSURE: 145 MMHG

## 2019-05-29 LAB
BASOPHILS # BLD AUTO: 0 /CMM (ref 0–0.2)
BASOPHILS NFR BLD AUTO: 0.1 % (ref 0–2)
BUN SERPL-MCNC: 22 MG/DL (ref 7–18)
CALCIUM SERPL-MCNC: 8.5 MG/DL (ref 8.5–10.1)
CHLORIDE SERPL-SCNC: 125 MMOL/L (ref 98–107)
CHOLEST SERPL-MCNC: 187 MG/DL (ref ?–200)
CO2 SERPL-SCNC: 35 MMOL/L (ref 21–32)
CREAT SERPL-MCNC: 0.4 MG/DL (ref 0.6–1.3)
EOSINOPHIL NFR BLD AUTO: 0.1 % (ref 0–6)
GLUCOSE SERPL-MCNC: 144 MG/DL (ref 74–106)
HCT VFR BLD AUTO: 35 % (ref 33–45)
HDLC SERPL-MCNC: 45 MG/DL (ref 40–60)
HGB BLD-MCNC: 11.2 G/DL (ref 11.5–14.8)
LDLC SERPL DIRECT ASSAY-MCNC: 109 MG/DL (ref 0–99)
LYMPHOCYTES NFR BLD AUTO: 0.3 /CMM (ref 0.8–4.8)
LYMPHOCYTES NFR BLD AUTO: 4.5 % (ref 20–44)
MAGNESIUM SERPL-MCNC: 1.8 MG/DL (ref 1.8–2.4)
MCHC RBC AUTO-ENTMCNC: 32 G/DL (ref 31–36)
MCV RBC AUTO: 90 FL (ref 82–100)
MONOCYTES NFR BLD AUTO: 0.2 /CMM (ref 0.1–1.3)
MONOCYTES NFR BLD AUTO: 3.3 % (ref 2–12)
NEUTROPHILS # BLD AUTO: 6.3 /CMM (ref 1.8–8.9)
NEUTROPHILS NFR BLD AUTO: 92 % (ref 43–81)
OSMOLALITY UR: 486 MOS/KG (ref 340–1090)
PHOSPHATE SERPL-MCNC: 1.8 MG/DL (ref 2.5–4.9)
PLATELET # BLD AUTO: 83 /CMM (ref 150–450)
POTASSIUM SERPL-SCNC: 2.6 MMOL/L (ref 3.5–5.1)
RBC # BLD AUTO: 3.82 MIL/UL (ref 4–5.2)
RBC #/AREA URNS HPF: (no result) /HPF (ref 0–2)
SODIUM SERPL-SCNC: 167 MMOL/L (ref 136–145)
SODIUM UR-SCNC: 64 MMOL/L (ref 40–220)
TRIGL SERPL-MCNC: 229 MG/DL (ref 30–150)
TSH SERPL DL<=0.005 MIU/L-ACNC: 0.36 UIU/ML (ref 0.36–3.74)
WBC #/AREA URNS HPF: (no result) /HPF (ref 0–3)
WBC NRBC COR # BLD AUTO: 6.8 K/UL (ref 4.3–11)

## 2019-05-29 RX ADMIN — CLOTRIMAZOLE SCH GM: 1 CREAM TOPICAL at 17:45

## 2019-05-29 RX ADMIN — Medication SCH EACH: at 06:53

## 2019-05-29 RX ADMIN — POTASSIUM CHLORIDE SCH MLS/HR: 200 INJECTION, SOLUTION INTRAVENOUS at 00:31

## 2019-05-29 RX ADMIN — SODIUM CHLORIDE PRN MLS/HR: 4.5 INJECTION, SOLUTION INTRAVENOUS at 11:53

## 2019-05-29 RX ADMIN — Medication SCH EACH: at 11:52

## 2019-05-29 RX ADMIN — INSULIN HUMAN PRN UNIT: 100 INJECTION, SOLUTION PARENTERAL at 17:51

## 2019-05-29 RX ADMIN — Medication SCH GM: at 11:52

## 2019-05-29 RX ADMIN — FAMOTIDINE SCH MG: 10 INJECTION INTRAVENOUS at 08:40

## 2019-05-29 RX ADMIN — Medication SCH ML: at 17:45

## 2019-05-29 RX ADMIN — POTASSIUM CHLORIDE SCH MLS/HR: 200 INJECTION, SOLUTION INTRAVENOUS at 01:15

## 2019-05-29 RX ADMIN — POTASSIUM CHLORIDE SCH MLS/HR: 200 INJECTION, SOLUTION INTRAVENOUS at 11:34

## 2019-05-29 RX ADMIN — POTASSIUM CHLORIDE SCH MLS/HR: 200 INJECTION, SOLUTION INTRAVENOUS at 16:27

## 2019-05-29 RX ADMIN — Medication SCH EACH: at 00:42

## 2019-05-29 RX ADMIN — INSULIN HUMAN PRN UNIT: 100 INJECTION, SOLUTION PARENTERAL at 11:56

## 2019-05-29 RX ADMIN — POTASSIUM CHLORIDE SCH MLS/HR: 200 INJECTION, SOLUTION INTRAVENOUS at 10:23

## 2019-05-29 RX ADMIN — INSULIN HUMAN PRN UNIT: 100 INJECTION, SOLUTION PARENTERAL at 00:50

## 2019-05-29 RX ADMIN — POTASSIUM CHLORIDE SCH MLS/HR: 200 INJECTION, SOLUTION INTRAVENOUS at 14:01

## 2019-05-29 RX ADMIN — SODIUM CHLORIDE PRN MLS/HR: 4.5 INJECTION, SOLUTION INTRAVENOUS at 22:00

## 2019-05-29 RX ADMIN — LISINOPRIL SCH MG: 20 TABLET ORAL at 16:26

## 2019-05-29 RX ADMIN — POTASSIUM CHLORIDE SCH MLS/HR: 200 INJECTION, SOLUTION INTRAVENOUS at 02:38

## 2019-05-29 RX ADMIN — FAMOTIDINE SCH MG: 10 INJECTION INTRAVENOUS at 17:45

## 2019-05-29 RX ADMIN — POTASSIUM CHLORIDE SCH MLS/HR: 200 INJECTION, SOLUTION INTRAVENOUS at 15:21

## 2019-05-29 RX ADMIN — POTASSIUM CHLORIDE SCH MLS/HR: 200 INJECTION, SOLUTION INTRAVENOUS at 12:39

## 2019-05-29 RX ADMIN — Medication SCH EACH: at 17:45

## 2019-05-29 NOTE — NUR
MS RN NOTE



PER PINKY FROM CRISIS TEAM, SHE IS PLACING PT ON HOLD AND WILL BRING UP DOCUMENTATION 
SHORTLY. 

-------------------------------------------------------------------------------

Addendum: 05/29/19 at 1852 by AD LOVING RN

-------------------------------------------------------------------------------

PLEASE DISREGARD, WRONG PT

## 2019-05-29 NOTE — NUR
TELE RN NOTE



REPORTED CRITICAL LAB RESULTS  AND K 2.6 TO PRIMARY HOSPITALIST NARINDER FALL NP 
PER PROTOCOL. NO ORDERS AT THIS TIME, PER NARINDER HE WILL REVIEW AND PT IS PENDING 
NEPHROLOGY CONSULT.

## 2019-05-29 NOTE — NUR
TELE RN NOTE



REPORTED TO PRIMARY HOSPITALIST YVONNE FALL NP THAT PT PRESENTS WITH INCREASED LEFT 
SIDED WEAKNESS SINCE ADMISSION WITH NO HEAD IMAGING DONE SINCE ER ADMISSION. PER DMITIRY HE 
WILL REVIEW AND PLACE ORDERS IF HEAD IMAGING INDICATED.

## 2019-05-29 NOTE — NUR
Tele RN closing notes

 Pt is aphasic/non verbal. Pt is resting in bed comfortbaly. Arouse easily. No signs of 
acute distress or discomfort. VS is stable. IV sites on left AC is intact, patent and 
infusing well. Pt is on cardiac monitor-SR with occasionally PVC, occasionally PAC and 
occasionally junctional. Pt is NPO per MD order. Pt blood glucose in the morning was 128. 
Safety precaution is maintained. Bed at low position and call light. Will endorse to morning 
nurse for JOHNNY.

## 2019-05-29 NOTE — NUR
TELE RN OPENING NOTE



RECEIVED PT IN BED, SEMI-FOWLERS. PT IS NON-VERBAL, EYES OPEN SPONTANEOUSLY TO VERBAL AND 
TACTILE STIMULI. NO ACUTE DISTRESS NOTED, BREATHING IS EVEN AND UNLABORED ON ROOM AIR. LEFT 
AC #20G IS INFUSING 1/2 NS @ 75 ML/HR WITHOUT REDNESS OR SWELLING. ASPIRATION PRECAUTIONS 
MAINTAINED. PT IS NPO PENDING SWALLOW EVAL. 1:1 SITTER AT THE BEDSIDE FOR SAFETY. ALL NEEDS 
ATTENDED TO. BED IS LOCKED AND IN LOWEST POSITION, SIDE RAILS UP X2, BED ALARM ON, CALL 
LIGHT AND POSSESSIONS WITHIN REACH.

## 2019-05-29 NOTE — NUR
Tele RN notes

Pt is resting in bed comfortably with eyes closed. No signs of distress or discomfort. NO 
SOB. Iv sites on L AC is intact and patent.  Administered IV fluids 0.45%NS 1000ml rate @ 
75ml/hr. Will continue to monitor.

## 2019-05-29 NOTE — NUR
TWO BAG OF KCL WAS ADMINISTERED AND ANOTHER TWO BAG WAS HANDED TO PABLO ON 
THIRD FLOOR TO BE ADMINISTERED.

## 2019-05-29 NOTE — NUR
MS RN NOTES

RECEIVED ON BED SLEEPING ON SEMI LEFT SIDE POSITION,BREATHING REGULAR,NOT IN ANY FORM OF 
RESPIRATORY DISTRESS.IVF 1/2 NS AT 100ML/HR RATE IN PROGRESS VIA IV PUMP ON LAC,SITTER AT 
BEDSIDE FOR SAFETY.SACRAL DRESSING INTACT AND DRY.CALL LIGHT IN REACH,NEEDS ANTICIPATED.

## 2019-05-29 NOTE — NUR
Tele RN opening notes

Received Pt from ER nurse Marycarmen. Pt is awake and aphasic. Pt's daughter at the bed side. 
Pt's daughter helped with the admission process. Pt potassium was 2.4 and received 2 bags of 
potassium with Marycarmen  and will continue to have 2 more bags of potassium.  Pt's glucose 
was 155 and gave 2 Units of regular insulin. VS is stable Pt is on tele monitor Sinus 
Rhythm. Safety precaution is maintained. Bed at low position and call light is within reach. 
Will continue to monitor.

## 2019-05-29 NOTE — NUR
TELE RN CLOSING NOTE



PT IN BED, SEMI-FOWLERS. PT IS NON-VERBAL, EYES OPEN SPONTANEOUSLY TO VERBAL AND TACTILE 
STIMULI. NO ACUTE DISTRESS NOTED, BREATHING IS EVEN AND UNLABORED ON ROOM AIR. PT ON CARDIAC 
MONITOR WITH SINUS TACHYCARDIA WIH PVC AND PJC,  AT THIS TIME. LEFT AC #20G IS 
INFUSING 1/2 NS @ 75 ML/HR WITHOUT REDNESS OR SWELLING. ASPIRATION PRECAUTIONS MAINTAINED. 
ADLS AND WOUND CARE PROVIDED AS ORDERED AND PT TURNED AND REPOSITIONED Q2H FOR THE DURATION 
OF THE SHIFT. 1:1 SITTER AT THE BEDSIDE FOR SAFETY. ALL NEEDS ATTENDED TO. BED IS LOCKED AND 
IN LOWEST POSITION, SIDE RAILS UP X2, BED ALARM ON, CALL LIGHT AND POSSESSIONS WITHIN REACH. 
WILL ENDORSE TO NIGHT SHIFT NURSE FOR CONTINUITY OF CARE.

## 2019-05-30 VITALS — DIASTOLIC BLOOD PRESSURE: 78 MMHG | SYSTOLIC BLOOD PRESSURE: 153 MMHG

## 2019-05-30 VITALS — SYSTOLIC BLOOD PRESSURE: 136 MMHG | DIASTOLIC BLOOD PRESSURE: 62 MMHG

## 2019-05-30 VITALS — DIASTOLIC BLOOD PRESSURE: 83 MMHG | SYSTOLIC BLOOD PRESSURE: 149 MMHG

## 2019-05-30 VITALS — SYSTOLIC BLOOD PRESSURE: 139 MMHG | DIASTOLIC BLOOD PRESSURE: 69 MMHG

## 2019-05-30 LAB
BASOPHILS # BLD AUTO: 0 /CMM (ref 0–0.2)
BASOPHILS NFR BLD AUTO: 0.3 % (ref 0–2)
BUN SERPL-MCNC: 19 MG/DL (ref 7–18)
CALCIUM SERPL-MCNC: 8.7 MG/DL (ref 8.5–10.1)
CHLORIDE SERPL-SCNC: 114 MMOL/L (ref 98–107)
CO2 SERPL-SCNC: 32 MMOL/L (ref 21–32)
CREAT SERPL-MCNC: 0.6 MG/DL (ref 0.6–1.3)
EOSINOPHIL NFR BLD AUTO: 0.7 % (ref 0–6)
GLUCOSE SERPL-MCNC: 360 MG/DL (ref 74–106)
HCT VFR BLD AUTO: 36 % (ref 33–45)
HGB BLD-MCNC: 11.3 G/DL (ref 11.5–14.8)
LYMPHOCYTES NFR BLD AUTO: 0.4 /CMM (ref 0.8–4.8)
LYMPHOCYTES NFR BLD AUTO: 5.3 % (ref 20–44)
LYMPHOCYTES NFR BLD MANUAL: 3 % (ref 16–48)
MAGNESIUM SERPL-MCNC: 1.7 MG/DL (ref 1.8–2.4)
MCHC RBC AUTO-ENTMCNC: 32 G/DL (ref 31–36)
MCV RBC AUTO: 91 FL (ref 82–100)
MONOCYTES NFR BLD AUTO: 0.2 /CMM (ref 0.1–1.3)
MONOCYTES NFR BLD AUTO: 2.9 % (ref 2–12)
MONOCYTES NFR BLD MANUAL: 5 % (ref 0–11)
NEUTROPHILS # BLD AUTO: 6.2 /CMM (ref 1.8–8.9)
NEUTROPHILS NFR BLD AUTO: 90.8 % (ref 43–81)
NEUTS SEG NFR BLD MANUAL: 92 % (ref 42–76)
PHOSPHATE SERPL-MCNC: 2.2 MG/DL (ref 2.5–4.9)
PLATELET # BLD AUTO: 70 /CMM (ref 150–450)
POTASSIUM SERPL-SCNC: 2.6 MMOL/L (ref 3.5–5.1)
RBC # BLD AUTO: 3.95 MIL/UL (ref 4–5.2)
SODIUM SERPL-SCNC: 154 MMOL/L (ref 136–145)
WBC NRBC COR # BLD AUTO: 6.8 K/UL (ref 4.3–11)

## 2019-05-30 RX ADMIN — Medication SCH EACH: at 23:49

## 2019-05-30 RX ADMIN — CLOTRIMAZOLE SCH GM: 1 CREAM TOPICAL at 08:37

## 2019-05-30 RX ADMIN — MAGNESIUM SULFATE IN DEXTROSE SCH MLS/HR: 10 INJECTION, SOLUTION INTRAVENOUS at 13:14

## 2019-05-30 RX ADMIN — Medication SCH TAB: at 08:34

## 2019-05-30 RX ADMIN — AMLODIPINE BESYLATE SCH MG: 10 TABLET ORAL at 08:34

## 2019-05-30 RX ADMIN — POTASSIUM CHLORIDE SCH MLS/HR: 200 INJECTION, SOLUTION INTRAVENOUS at 18:53

## 2019-05-30 RX ADMIN — INSULIN HUMAN PRN UNIT: 100 INJECTION, SOLUTION PARENTERAL at 23:52

## 2019-05-30 RX ADMIN — POTASSIUM CHLORIDE SCH MLS/HR: 200 INJECTION, SOLUTION INTRAVENOUS at 16:41

## 2019-05-30 RX ADMIN — MAGNESIUM SULFATE IN DEXTROSE SCH MLS/HR: 10 INJECTION, SOLUTION INTRAVENOUS at 11:29

## 2019-05-30 RX ADMIN — INSULIN HUMAN PRN UNIT: 100 INJECTION, SOLUTION PARENTERAL at 00:19

## 2019-05-30 RX ADMIN — POTASSIUM CHLORIDE SCH MLS/HR: 200 INJECTION, SOLUTION INTRAVENOUS at 13:14

## 2019-05-30 RX ADMIN — FAMOTIDINE SCH MG: 10 INJECTION INTRAVENOUS at 08:38

## 2019-05-30 RX ADMIN — Medication SCH ML: at 17:41

## 2019-05-30 RX ADMIN — Medication SCH ML: at 08:38

## 2019-05-30 RX ADMIN — POTASSIUM CHLORIDE SCH MLS/HR: 200 INJECTION, SOLUTION INTRAVENOUS at 11:29

## 2019-05-30 RX ADMIN — INSULIN HUMAN PRN UNIT: 100 INJECTION, SOLUTION PARENTERAL at 12:15

## 2019-05-30 RX ADMIN — Medication SCH EACH: at 00:11

## 2019-05-30 RX ADMIN — POTASSIUM CHLORIDE SCH MLS/HR: 200 INJECTION, SOLUTION INTRAVENOUS at 17:41

## 2019-05-30 RX ADMIN — Medication SCH EACH: at 06:32

## 2019-05-30 RX ADMIN — CLOTRIMAZOLE SCH GM: 1 CREAM TOPICAL at 17:40

## 2019-05-30 RX ADMIN — Medication SCH EACH: at 11:30

## 2019-05-30 RX ADMIN — Medication SCH GM: at 08:37

## 2019-05-30 RX ADMIN — SODIUM CHLORIDE PRN MLS/HR: 4.5 INJECTION, SOLUTION INTRAVENOUS at 08:33

## 2019-05-30 RX ADMIN — OXYCODONE HYDROCHLORIDE AND ACETAMINOPHEN SCH MG: 500 TABLET ORAL at 08:34

## 2019-05-30 RX ADMIN — Medication SCH ML: at 13:14

## 2019-05-30 RX ADMIN — POTASSIUM CHLORIDE SCH MLS/HR: 200 INJECTION, SOLUTION INTRAVENOUS at 14:46

## 2019-05-30 RX ADMIN — INSULIN HUMAN PRN UNIT: 100 INJECTION, SOLUTION PARENTERAL at 17:57

## 2019-05-30 RX ADMIN — LISINOPRIL SCH MG: 20 TABLET ORAL at 08:34

## 2019-05-30 RX ADMIN — FAMOTIDINE SCH MG: 10 INJECTION INTRAVENOUS at 17:41

## 2019-05-30 RX ADMIN — Medication SCH EACH: at 17:41

## 2019-05-30 RX ADMIN — INSULIN HUMAN PRN UNIT: 100 INJECTION, SOLUTION PARENTERAL at 05:44

## 2019-05-30 NOTE — NUR
MS RN NOTES  ACCU-CHECK

BLOOD SUGAR CHECK 180,COVERED WITH HUMULIN R 3 UNITS PER SLIDING SCALE.IVF IN PROGRESS.

## 2019-05-30 NOTE — NUR
Tele RN Closing Note



Patient currently resting in bed with eyes open in Semi-Fowlers position, no acute distress 
noted.  Aspiration precautions maintained. Easily arousable to verbal and tactile stimuli. 
Alert and oriented to self, non-verbal. No acute neurological changes. Respirations even and 
unlabored on room air. External cardiac monitor in place: current rhythm junctional with PAC 
and PVC at 92 bpm. Peripheral IV access to the left AC 20 gauge, intact, patent and infusing 
fluids as ordered. Safety and fall precautions in place: bed in lowest and locked position, 
side rails up x2, bed alarm on, call light and personal possessions in reach, room well lit, 
floor clutter-free. Patient currently clean, dry and comfortable, on low-air loss mattress. 
Turned and repositioned every 2 hours and as tolerated to maintain skin integrity. Family 
present at bedside. No acute events this shift. Will endorse to Sonal RN for continuity of 
care.

## 2019-05-30 NOTE — NUR
TELE RN NOTES

REMAINS NON VERBAL,OPEN EYES ONLY,SACRAL WOUND DRESSING CHANGED WITH MEPILEX,ID RECOMMEND 
SURGICAL CONSULT.LEFT ARMS REMAINS FLACCID AND WEAK ON RIGHT ARM.IVF IN PROGRESS.MORNING 
CARE ADMINISTERED BY MARIBETH POSADA TOLERATED WELL.IN NO ACUTE DISTRESS.WILL ENDORSE TO DAY 
NURSE FOR JOHNNY.

## 2019-05-30 NOTE — NUR
Tele RN Opening Note



Patient currently resting in bed with eyes open in Semi-Fowlers position, no acute distress 
noted.  Aspiration precautions maintained. Easily arousable to verbal and tactile stimuli. 
Alert and oriented to self, non-verbal. No acute neurological changes. Respirations even and 
unlabored on room air. External cardiac monitor in place: current rhythm junctional with PAC 
and PVC at 97 bpm. Peripheral IV access to the left AC 20 gauge, intact, patent and infusing 
0.45% NS at 100 ml/hr. as ordered. Safety and fall precautions in place: bed in lowest and 
locked position, side rails up x2, bed alarm on, call light and personal possessions in 
reach, room well lit, floor clutter-free. Patient currently clean, dry and comfortable, on 
low-air loss mattress. Will continue to monitor and intervene as needed.

## 2019-05-30 NOTE — NUR
Tele RN Note



Seen and examined by MD. at bedside. Given verbal update to daughter via telephone. Will 
continue to monitor.

## 2019-05-30 NOTE — NUR
TELE RN NOTES

RECEIVED ON BED HAVING DINNER FOOD,WITH DAUGHTER AT BEDSIDE.NOTED ALMOST 100% OF HER DINNER 
FOOD CONSUMED.NEGATIVE FOR ASPIRATION.HOB ELEVATED .LAST DOSE OF POTASSIUM IV INFUSING VIA 
IV PUMP.IV SITE REMAINS PATENT.LEFT ARM REMAINS FLACCID AND WEAKNESS ON THE RIGHT 
ARM.MEPILEX ON SACRAL AREA INTACT AND DRY.REPOSITION  PER PROTOCOL.ON GEL BED FOR WOUND 
MANAGEMENT.WILL CONTINUE TO MONITOR STATUS.

## 2019-05-31 VITALS — SYSTOLIC BLOOD PRESSURE: 131 MMHG | DIASTOLIC BLOOD PRESSURE: 68 MMHG

## 2019-05-31 VITALS — SYSTOLIC BLOOD PRESSURE: 130 MMHG | DIASTOLIC BLOOD PRESSURE: 72 MMHG

## 2019-05-31 VITALS — DIASTOLIC BLOOD PRESSURE: 69 MMHG | SYSTOLIC BLOOD PRESSURE: 139 MMHG

## 2019-05-31 VITALS — SYSTOLIC BLOOD PRESSURE: 154 MMHG | DIASTOLIC BLOOD PRESSURE: 77 MMHG

## 2019-05-31 VITALS — DIASTOLIC BLOOD PRESSURE: 67 MMHG | SYSTOLIC BLOOD PRESSURE: 155 MMHG

## 2019-05-31 LAB
BASOPHILS # BLD AUTO: 0 /CMM (ref 0–0.2)
BASOPHILS NFR BLD AUTO: 0.1 % (ref 0–2)
BUN SERPL-MCNC: 17 MG/DL (ref 7–18)
CALCIUM SERPL-MCNC: 8.5 MG/DL (ref 8.5–10.1)
CHLORIDE SERPL-SCNC: 115 MMOL/L (ref 98–107)
CO2 SERPL-SCNC: 35 MMOL/L (ref 21–32)
CREAT SERPL-MCNC: 0.4 MG/DL (ref 0.6–1.3)
EOSINOPHIL NFR BLD AUTO: 0.1 % (ref 0–6)
GLUCOSE SERPL-MCNC: 259 MG/DL (ref 74–106)
HCT VFR BLD AUTO: 29 % (ref 33–45)
HGB BLD-MCNC: 9.6 G/DL (ref 11.5–14.8)
LYMPHOCYTES NFR BLD AUTO: 0.3 /CMM (ref 0.8–4.8)
LYMPHOCYTES NFR BLD AUTO: 5.4 % (ref 20–44)
LYMPHOCYTES NFR BLD MANUAL: 8 % (ref 16–48)
MAGNESIUM SERPL-MCNC: 2.1 MG/DL (ref 1.8–2.4)
MCHC RBC AUTO-ENTMCNC: 33 G/DL (ref 31–36)
MCV RBC AUTO: 89 FL (ref 82–100)
MONOCYTES NFR BLD AUTO: 0.2 /CMM (ref 0.1–1.3)
MONOCYTES NFR BLD AUTO: 2.9 % (ref 2–12)
MONOCYTES NFR BLD MANUAL: 3 % (ref 0–11)
NEUTROPHILS # BLD AUTO: 5 /CMM (ref 1.8–8.9)
NEUTROPHILS NFR BLD AUTO: 91.5 % (ref 43–81)
NEUTS BAND NFR BLD MANUAL: 1 % (ref 0–5)
NEUTS SEG NFR BLD MANUAL: 88 % (ref 42–76)
PHOSPHATE SERPL-MCNC: 2.3 MG/DL (ref 2.5–4.9)
PLATELET # BLD AUTO: 66 /CMM (ref 150–450)
POTASSIUM SERPL-SCNC: 3.6 MMOL/L (ref 3.5–5.1)
RBC # BLD AUTO: 3.24 MIL/UL (ref 4–5.2)
SODIUM SERPL-SCNC: 152 MMOL/L (ref 136–145)
WBC NRBC COR # BLD AUTO: 5.5 K/UL (ref 4.3–11)

## 2019-05-31 PROCEDURE — 0JB70ZZ EXCISION OF BACK SUBCUTANEOUS TISSUE AND FASCIA, OPEN APPROACH: ICD-10-PCS

## 2019-05-31 PROCEDURE — 0JB90ZZ EXCISION OF BUTTOCK SUBCUTANEOUS TISSUE AND FASCIA, OPEN APPROACH: ICD-10-PCS

## 2019-05-31 RX ADMIN — Medication SCH ML: at 09:11

## 2019-05-31 RX ADMIN — AMLODIPINE BESYLATE SCH MG: 10 TABLET ORAL at 09:11

## 2019-05-31 RX ADMIN — Medication SCH TAB: at 09:10

## 2019-05-31 RX ADMIN — Medication SCH EACH: at 12:26

## 2019-05-31 RX ADMIN — Medication SCH EACH: at 05:26

## 2019-05-31 RX ADMIN — CEFEPIME HYDROCHLORIDE SCH MLS/HR: 1 INJECTION, POWDER, FOR SOLUTION INTRAMUSCULAR; INTRAVENOUS at 21:46

## 2019-05-31 RX ADMIN — FAMOTIDINE SCH MG: 10 INJECTION INTRAVENOUS at 09:10

## 2019-05-31 RX ADMIN — CLOTRIMAZOLE SCH GM: 1 CREAM TOPICAL at 17:36

## 2019-05-31 RX ADMIN — INSULIN HUMAN PRN UNIT: 100 INJECTION, SOLUTION PARENTERAL at 12:29

## 2019-05-31 RX ADMIN — Medication SCH GM: at 09:23

## 2019-05-31 RX ADMIN — Medication SCH EACH: at 17:25

## 2019-05-31 RX ADMIN — LISINOPRIL SCH MG: 20 TABLET ORAL at 09:11

## 2019-05-31 RX ADMIN — FAMOTIDINE SCH MG: 10 INJECTION INTRAVENOUS at 17:00

## 2019-05-31 RX ADMIN — OXYCODONE HYDROCHLORIDE AND ACETAMINOPHEN SCH MG: 500 TABLET ORAL at 09:10

## 2019-05-31 RX ADMIN — Medication SCH ML: at 12:31

## 2019-05-31 RX ADMIN — INSULIN HUMAN PRN UNIT: 100 INJECTION, SOLUTION PARENTERAL at 05:33

## 2019-05-31 RX ADMIN — CLOTRIMAZOLE SCH GM: 1 CREAM TOPICAL at 09:22

## 2019-05-31 RX ADMIN — Medication SCH ML: at 17:00

## 2019-05-31 RX ADMIN — INSULIN HUMAN PRN UNIT: 100 INJECTION, SOLUTION PARENTERAL at 17:44

## 2019-05-31 NOTE — NUR
TELE RN NOTES  ACCU-CHECK

BLOOD SUGAR CHECK 250,COVERED WITH HUMULIN R 6 UNITS GIVEN SQ ON LEFT DELTOID

## 2019-05-31 NOTE — NUR
MS RN Closing Note



Patient currently resting in bed with eyes open in Semi-Fowlers position, no acute distress 
noted.  Aspiration precautions maintained. Easily arousable to verbal and tactile stimuli. 
Alert and oriented to self, non-verbal. Tracks with eyes. No acute neurological changes. 
Respirations even and unlabored on room air. Peripheral IV access to the left AC 20 gauge, 
intact, patent and infusing fluids as ordered. Safety and fall precautions in place: bed in 
lowest and locked position, side rails up x2, bed alarm on, call light and personal 
possessions in reach, room well lit, floor clutter-free. Patient currently clean, dry and 
comfortable, on low-air loss mattress. Turned and repositioned every 2 hours and as 
tolerated to maintain skin integrity. Family present at bedside. No acute events this shift. 
Wound care rendered as ordered. Will endorse to nightshift RN for continuity of care.

## 2019-05-31 NOTE — NUR
TELE RN NOTES  ACCU-CHECK

BLOOD SUGAR CHECK 225,COVERED WITH HUMULIN R 6 UNITS ,GIVEN SQ ON RIGHT LOWER QUADRANT

## 2019-05-31 NOTE — NUR
MSRN

IV SITE LEAKING, STARTED 24 GAUGE ON RIGHT HAND WITH GOOD BLOOD RETURN BY RN.  REMAINS 
NONVERBAL .  HS CARE STARTED, KEPT DRY CLEAN AND COMFORTABLE.  PRESENT IVF INFUSING WELL.

## 2019-05-31 NOTE — NUR
MSRN

ASLEEP. APPEARS COMFORTABLE.  DAUGHTER AT BEDSIDE.  DISCUSSED TO DAUGHTER PLAN OF CARE AND 
MEDICATION REGIMEN. NEEDS FREQ REPOSITIONING, SACRAL ULCER COVERED WITH MEPILEX, S/P 
INCISION AND DEBRIDEMENT EARLIER TODAY.  NO OTHER NEEDS FOR NOW, CLOSELY WATCHED.

## 2019-05-31 NOTE — NUR
Tele RN Opening Note



Patient currently resting in bed with eyes open in Semi-Fowlers position, no acute distress 
noted.  Aspiration precautions maintained. Easily arousable to verbal and tactile stimuli. 
Alert and oriented to self, non-verbal. No acute neurological changes. Respirations even and 
unlabored on room air. External cardiac monitor in place: current rhythm junctional with PAC 
and PVC at 85 bpm. Peripheral IV access to the left AC 20 gauge, intact, patent and infusing 
fluids as ordered. Safety and fall precautions in place: bed in lowest and locked position, 
side rails up x2, bed alarm on, call light and personal possessions in reach, room well lit, 
floor clutter-free. Patient currently clean, dry and comfortable, on low-air loss mattress. 
Will continue to monitor and intervene as needed.

## 2019-06-01 VITALS — DIASTOLIC BLOOD PRESSURE: 77 MMHG | SYSTOLIC BLOOD PRESSURE: 151 MMHG

## 2019-06-01 VITALS — DIASTOLIC BLOOD PRESSURE: 88 MMHG | SYSTOLIC BLOOD PRESSURE: 153 MMHG

## 2019-06-01 VITALS — DIASTOLIC BLOOD PRESSURE: 71 MMHG | SYSTOLIC BLOOD PRESSURE: 160 MMHG

## 2019-06-01 LAB
BASOPHILS # BLD AUTO: 0 /CMM (ref 0–0.2)
BASOPHILS NFR BLD AUTO: 0 % (ref 0–2)
BUN SERPL-MCNC: 20 MG/DL (ref 7–18)
CALCIUM SERPL-MCNC: 8.7 MG/DL (ref 8.5–10.1)
CHLORIDE SERPL-SCNC: 113 MMOL/L (ref 98–107)
CO2 SERPL-SCNC: 34 MMOL/L (ref 21–32)
CREAT SERPL-MCNC: 0.5 MG/DL (ref 0.6–1.3)
EOSINOPHIL NFR BLD AUTO: 0.1 % (ref 0–6)
GLUCOSE SERPL-MCNC: 291 MG/DL (ref 74–106)
HCT VFR BLD AUTO: 31 % (ref 33–45)
HGB BLD-MCNC: 10.4 G/DL (ref 11.5–14.8)
LYMPHOCYTES NFR BLD AUTO: 0.3 /CMM (ref 0.8–4.8)
LYMPHOCYTES NFR BLD AUTO: 5.1 % (ref 20–44)
LYMPHOCYTES NFR BLD MANUAL: 4 % (ref 16–48)
MCHC RBC AUTO-ENTMCNC: 34 G/DL (ref 31–36)
MCV RBC AUTO: 89 FL (ref 82–100)
MONOCYTES NFR BLD AUTO: 0.2 /CMM (ref 0.1–1.3)
MONOCYTES NFR BLD AUTO: 3.2 % (ref 2–12)
MONOCYTES NFR BLD MANUAL: 4 % (ref 0–11)
NEUTROPHILS # BLD AUTO: 5.4 /CMM (ref 1.8–8.9)
NEUTROPHILS NFR BLD AUTO: 91.6 % (ref 43–81)
NEUTS SEG NFR BLD MANUAL: 92 % (ref 42–76)
PHOSPHATE SERPL-MCNC: 2.3 MG/DL (ref 2.5–4.9)
PLATELET # BLD AUTO: 77 /CMM (ref 150–450)
POTASSIUM SERPL-SCNC: 3.5 MMOL/L (ref 3.5–5.1)
RBC # BLD AUTO: 3.44 MIL/UL (ref 4–5.2)
SODIUM SERPL-SCNC: 152 MMOL/L (ref 136–145)
WBC NRBC COR # BLD AUTO: 6 K/UL (ref 4.3–11)

## 2019-06-01 RX ADMIN — INSULIN HUMAN PRN UNIT: 100 INJECTION, SOLUTION PARENTERAL at 17:37

## 2019-06-01 RX ADMIN — INSULIN HUMAN PRN UNIT: 100 INJECTION, SOLUTION PARENTERAL at 00:50

## 2019-06-01 RX ADMIN — Medication SCH TAB: at 09:00

## 2019-06-01 RX ADMIN — AMLODIPINE BESYLATE SCH MG: 10 TABLET ORAL at 09:00

## 2019-06-01 RX ADMIN — Medication SCH ML: at 09:16

## 2019-06-01 RX ADMIN — LISINOPRIL SCH MG: 20 TABLET ORAL at 09:16

## 2019-06-01 RX ADMIN — INSULIN HUMAN PRN UNIT: 100 INJECTION, SOLUTION PARENTERAL at 23:28

## 2019-06-01 RX ADMIN — CLOTRIMAZOLE SCH GM: 1 CREAM TOPICAL at 11:07

## 2019-06-01 RX ADMIN — Medication SCH EACH: at 12:08

## 2019-06-01 RX ADMIN — Medication SCH EACH: at 00:49

## 2019-06-01 RX ADMIN — CLOTRIMAZOLE SCH GM: 1 CREAM TOPICAL at 17:00

## 2019-06-01 RX ADMIN — CEFEPIME HYDROCHLORIDE SCH MLS/HR: 1 INJECTION, POWDER, FOR SOLUTION INTRAMUSCULAR; INTRAVENOUS at 08:59

## 2019-06-01 RX ADMIN — INSULIN HUMAN PRN UNIT: 100 INJECTION, SOLUTION PARENTERAL at 06:47

## 2019-06-01 RX ADMIN — Medication SCH ML: at 13:12

## 2019-06-01 RX ADMIN — SODIUM CHLORIDE, SODIUM LACTATE, POTASSIUM CHLORIDE, AND CALCIUM CHLORIDE PRN MLS/HR: .6; .31; .03; .02 INJECTION, SOLUTION INTRAVENOUS at 21:09

## 2019-06-01 RX ADMIN — Medication SCH ML: at 18:03

## 2019-06-01 RX ADMIN — DEXTROSE MONOHYDRATE SCH MLS/HR: 50 INJECTION, SOLUTION INTRAVENOUS at 19:00

## 2019-06-01 RX ADMIN — INSULIN HUMAN PRN UNIT: 100 INJECTION, SOLUTION PARENTERAL at 12:10

## 2019-06-01 RX ADMIN — FAMOTIDINE SCH MG: 10 INJECTION INTRAVENOUS at 18:03

## 2019-06-01 RX ADMIN — Medication SCH EACH: at 18:01

## 2019-06-01 RX ADMIN — Medication SCH GM: at 11:07

## 2019-06-01 RX ADMIN — Medication SCH EACH: at 06:45

## 2019-06-01 RX ADMIN — Medication SCH EACH: at 23:27

## 2019-06-01 RX ADMIN — FAMOTIDINE SCH MG: 10 INJECTION INTRAVENOUS at 08:59

## 2019-06-01 RX ADMIN — OXYCODONE HYDROCHLORIDE AND ACETAMINOPHEN SCH MG: 500 TABLET ORAL at 09:00

## 2019-06-01 NOTE — NUR
MS RN



RECEIVE PT IN BED OPENS EYES NON VERBAL, RESPIRATIONS EVEN AND UNLABORED,STABLE, NO S/S OF 
DISTRESS, SAFETY MEASURES IN PLACE. WILL CONTINUE TO MONITOR

## 2019-06-01 NOTE — NUR
PATIENT ASLEEP. APPEARS COMFORTABLE.  DAUGHTER AT BEDSIDE. NEEDS ATTENDED, PT TURNED AND 
REPOSITIONED Q2H, WOUND CARE PERFORMED, POST DEBRIDEMENT PICTURE TAKEN AND PLACED IN THE 
CHART. IV ASSESS INTACT AND PATENT, IV FLUID IS RUNNING AS ORDERED. WILL ENDORSE TO NEXT 
SHIFT FOR JOHNNY.

## 2019-06-02 VITALS — DIASTOLIC BLOOD PRESSURE: 76 MMHG | SYSTOLIC BLOOD PRESSURE: 139 MMHG

## 2019-06-02 VITALS — DIASTOLIC BLOOD PRESSURE: 63 MMHG | SYSTOLIC BLOOD PRESSURE: 130 MMHG

## 2019-06-02 VITALS — SYSTOLIC BLOOD PRESSURE: 149 MMHG | DIASTOLIC BLOOD PRESSURE: 68 MMHG

## 2019-06-02 LAB
BASOPHILS # BLD AUTO: 0 /CMM (ref 0–0.2)
BASOPHILS NFR BLD AUTO: 0 % (ref 0–2)
BUN SERPL-MCNC: 19 MG/DL (ref 7–18)
CALCIUM SERPL-MCNC: 8.4 MG/DL (ref 8.5–10.1)
CHLORIDE SERPL-SCNC: 110 MMOL/L (ref 98–107)
CO2 SERPL-SCNC: 32 MMOL/L (ref 21–32)
CREAT SERPL-MCNC: 0.5 MG/DL (ref 0.6–1.3)
EOSINOPHIL NFR BLD AUTO: 0.1 % (ref 0–6)
GLUCOSE SERPL-MCNC: 296 MG/DL (ref 74–106)
HCT VFR BLD AUTO: 28 % (ref 33–45)
HGB BLD-MCNC: 9.3 G/DL (ref 11.5–14.8)
LYMPHOCYTES NFR BLD AUTO: 0.3 /CMM (ref 0.8–4.8)
LYMPHOCYTES NFR BLD AUTO: 4.2 % (ref 20–44)
LYMPHOCYTES NFR BLD MANUAL: 9 % (ref 16–48)
MCHC RBC AUTO-ENTMCNC: 34 G/DL (ref 31–36)
MCV RBC AUTO: 89 FL (ref 82–100)
MONOCYTES NFR BLD AUTO: 0.2 /CMM (ref 0.1–1.3)
MONOCYTES NFR BLD AUTO: 3.5 % (ref 2–12)
MONOCYTES NFR BLD MANUAL: 1 % (ref 0–11)
NEUTROPHILS # BLD AUTO: 5.7 /CMM (ref 1.8–8.9)
NEUTROPHILS NFR BLD AUTO: 92.2 % (ref 43–81)
NEUTS SEG NFR BLD MANUAL: 90 % (ref 42–76)
PHOSPHATE SERPL-MCNC: 2.3 MG/DL (ref 2.5–4.9)
PLATELET # BLD AUTO: 80 /CMM (ref 150–450)
POTASSIUM SERPL-SCNC: 3.5 MMOL/L (ref 3.5–5.1)
RBC # BLD AUTO: 3.12 MIL/UL (ref 4–5.2)
SODIUM SERPL-SCNC: 147 MMOL/L (ref 136–145)
WBC NRBC COR # BLD AUTO: 6.2 K/UL (ref 4.3–11)

## 2019-06-02 RX ADMIN — INSULIN HUMAN PRN UNIT: 100 INJECTION, SOLUTION PARENTERAL at 17:08

## 2019-06-02 RX ADMIN — Medication SCH ML: at 17:09

## 2019-06-02 RX ADMIN — SODIUM CHLORIDE, SODIUM LACTATE, POTASSIUM CHLORIDE, AND CALCIUM CHLORIDE PRN MLS/HR: .6; .31; .03; .02 INJECTION, SOLUTION INTRAVENOUS at 11:52

## 2019-06-02 RX ADMIN — FAMOTIDINE SCH MG: 10 INJECTION INTRAVENOUS at 08:54

## 2019-06-02 RX ADMIN — Medication SCH GM: at 09:00

## 2019-06-02 RX ADMIN — Medication SCH ML: at 13:10

## 2019-06-02 RX ADMIN — INSULIN HUMAN PRN UNIT: 100 INJECTION, SOLUTION PARENTERAL at 21:56

## 2019-06-02 RX ADMIN — INSULIN HUMAN PRN UNIT: 100 INJECTION, SOLUTION PARENTERAL at 11:43

## 2019-06-02 RX ADMIN — Medication SCH OZ: at 09:00

## 2019-06-02 RX ADMIN — DEXTROSE MONOHYDRATE SCH MLS/HR: 50 INJECTION, SOLUTION INTRAVENOUS at 17:07

## 2019-06-02 RX ADMIN — LISINOPRIL SCH MG: 20 TABLET ORAL at 08:57

## 2019-06-02 RX ADMIN — DEXTROSE MONOHYDRATE SCH MLS/HR: 50 INJECTION, SOLUTION INTRAVENOUS at 01:55

## 2019-06-02 RX ADMIN — CLOTRIMAZOLE SCH GM: 1 CREAM TOPICAL at 17:09

## 2019-06-02 RX ADMIN — Medication SCH EACH: at 05:18

## 2019-06-02 RX ADMIN — DEXTROSE MONOHYDRATE SCH MLS/HR: 50 INJECTION, SOLUTION INTRAVENOUS at 09:00

## 2019-06-02 RX ADMIN — Medication SCH EACH: at 16:46

## 2019-06-02 RX ADMIN — CLOTRIMAZOLE SCH GM: 1 CREAM TOPICAL at 09:00

## 2019-06-02 RX ADMIN — Medication SCH ML: at 09:02

## 2019-06-02 RX ADMIN — Medication SCH EACH: at 11:36

## 2019-06-02 RX ADMIN — FAMOTIDINE SCH MG: 10 INJECTION INTRAVENOUS at 17:07

## 2019-06-02 RX ADMIN — INSULIN HUMAN PRN UNIT: 100 INJECTION, SOLUTION PARENTERAL at 05:20

## 2019-06-02 RX ADMIN — Medication SCH TAB: at 08:54

## 2019-06-02 RX ADMIN — OXYCODONE HYDROCHLORIDE AND ACETAMINOPHEN SCH MG: 500 TABLET ORAL at 08:54

## 2019-06-02 RX ADMIN — AMLODIPINE BESYLATE SCH MG: 10 TABLET ORAL at 08:56

## 2019-06-02 NOTE — NUR
MS RN 



ASLEEP AND EASILY AWAKEN, RESPIRATION EVEN AND UNLABORED, KEPT CLEAN AND DRY AND 
COMFORTABLE. NEEDS ATTENDED AND ANTICIPATED, NURSING CARE RENDERED, OFFLOAD HEELS AND 
ELBOWS. REPOSITION EVERY 2 HOURS. TREATMENT AS ORDERED TO THE WOUND. GOOD SKIN CARE 
PROVIDED. SAFETY MEASURES AT ALL TIMES. ENDORSE TO THE NEXT SHIFT.

## 2019-06-02 NOTE — NUR
PATIENT ASLEEP. NO S/S PAIN OR DISCOMFORT.  DAUGHTER AT BEDSIDE. NEEDS ATTENDED, PT TURNED 
AND REPOSITIONED Q2H, WOUND CARE PERFORMED,  IV ASSESS INTACT AND PATENT, IV FLUID IS 
RUNNING AS ORDERED. WILL ENDORSE TO NEXT SHIFT FOR JOHNNY.

## 2019-06-02 NOTE — NUR
RN MS OPENING NOTES 

PT IN BED, SLEEPING, EASILY AROUSED TO NAME CALL, BREATHING EVEN AND UNLABORED ON ROOM AIR. 
IN NO APPARENT PAIN OR DISCOMFORT AT THIS TIME, IV ACCESS ON THE R HAND 24G WITH 1/2 NS WITH 
40 MEQ @100ML./HR BED IN LOWEST LOCKED POSITION, CALL LIGHT WITHIN REACH AT ALL TIMES. 
DAUGHTER AT BEDSIDE. WILL CONTINUE TO MONITOR FREQUENTLY.

## 2019-06-03 VITALS — SYSTOLIC BLOOD PRESSURE: 138 MMHG | DIASTOLIC BLOOD PRESSURE: 77 MMHG

## 2019-06-03 VITALS — DIASTOLIC BLOOD PRESSURE: 78 MMHG | SYSTOLIC BLOOD PRESSURE: 165 MMHG

## 2019-06-03 VITALS — DIASTOLIC BLOOD PRESSURE: 77 MMHG | SYSTOLIC BLOOD PRESSURE: 138 MMHG

## 2019-06-03 VITALS — DIASTOLIC BLOOD PRESSURE: 78 MMHG | SYSTOLIC BLOOD PRESSURE: 140 MMHG

## 2019-06-03 LAB
BASOPHILS # BLD AUTO: 0 /CMM (ref 0–0.2)
BASOPHILS NFR BLD AUTO: 0.3 % (ref 0–2)
BUN SERPL-MCNC: 20 MG/DL (ref 7–18)
CALCIUM SERPL-MCNC: 8.5 MG/DL (ref 8.5–10.1)
CHLORIDE SERPL-SCNC: 106 MMOL/L (ref 98–107)
CO2 SERPL-SCNC: 29 MMOL/L (ref 21–32)
CREAT SERPL-MCNC: 0.4 MG/DL (ref 0.6–1.3)
EOSINOPHIL NFR BLD AUTO: 1.3 % (ref 0–6)
GLUCOSE SERPL-MCNC: 256 MG/DL (ref 74–106)
HCT VFR BLD AUTO: 31 % (ref 33–45)
HGB BLD-MCNC: 10.4 G/DL (ref 11.5–14.8)
LYMPHOCYTES NFR BLD AUTO: 0.5 /CMM (ref 0.8–4.8)
LYMPHOCYTES NFR BLD AUTO: 5.2 % (ref 20–44)
MCHC RBC AUTO-ENTMCNC: 33 G/DL (ref 31–36)
MCV RBC AUTO: 89 FL (ref 82–100)
MONOCYTES NFR BLD AUTO: 0.3 /CMM (ref 0.1–1.3)
MONOCYTES NFR BLD AUTO: 3.7 % (ref 2–12)
NEUTROPHILS # BLD AUTO: 7.8 /CMM (ref 1.8–8.9)
NEUTROPHILS NFR BLD AUTO: 89.5 % (ref 43–81)
PHOSPHATE SERPL-MCNC: 2.6 MG/DL (ref 2.5–4.9)
PLATELET # BLD AUTO: 118 /CMM (ref 150–450)
POTASSIUM SERPL-SCNC: 3.7 MMOL/L (ref 3.5–5.1)
RBC # BLD AUTO: 3.52 MIL/UL (ref 4–5.2)
SODIUM SERPL-SCNC: 142 MMOL/L (ref 136–145)
WBC NRBC COR # BLD AUTO: 8.8 K/UL (ref 4.3–11)

## 2019-06-03 RX ADMIN — INSULIN HUMAN PRN UNIT: 100 INJECTION, SOLUTION PARENTERAL at 12:05

## 2019-06-03 RX ADMIN — FAMOTIDINE SCH MG: 10 INJECTION INTRAVENOUS at 17:33

## 2019-06-03 RX ADMIN — INSULIN GLARGINE SCH UNIT: 100 INJECTION, SOLUTION SUBCUTANEOUS at 21:55

## 2019-06-03 RX ADMIN — AMLODIPINE BESYLATE SCH MG: 10 TABLET ORAL at 08:25

## 2019-06-03 RX ADMIN — Medication SCH EACH: at 12:04

## 2019-06-03 RX ADMIN — INSULIN HUMAN PRN UNIT: 100 INJECTION, SOLUTION PARENTERAL at 05:30

## 2019-06-03 RX ADMIN — INSULIN HUMAN PRN UNIT: 100 INJECTION, SOLUTION PARENTERAL at 00:19

## 2019-06-03 RX ADMIN — LISINOPRIL SCH MG: 20 TABLET ORAL at 08:24

## 2019-06-03 RX ADMIN — Medication SCH TAB: at 08:25

## 2019-06-03 RX ADMIN — Medication SCH ML: at 17:22

## 2019-06-03 RX ADMIN — Medication SCH APPLIC: at 11:21

## 2019-06-03 RX ADMIN — DEXTROSE MONOHYDRATE SCH MLS/HR: 50 INJECTION, SOLUTION INTRAVENOUS at 17:33

## 2019-06-03 RX ADMIN — CLOTRIMAZOLE SCH APPLIC: 1 CREAM TOPICAL at 11:18

## 2019-06-03 RX ADMIN — SODIUM CHLORIDE, SODIUM LACTATE, POTASSIUM CHLORIDE, AND CALCIUM CHLORIDE PRN MLS/HR: .6; .31; .03; .02 INJECTION, SOLUTION INTRAVENOUS at 01:41

## 2019-06-03 RX ADMIN — Medication SCH EACH: at 00:17

## 2019-06-03 RX ADMIN — Medication SCH EACH: at 05:25

## 2019-06-03 RX ADMIN — CLOTRIMAZOLE SCH APPLIC: 1 CREAM TOPICAL at 17:22

## 2019-06-03 RX ADMIN — FAMOTIDINE SCH MG: 10 INJECTION INTRAVENOUS at 11:42

## 2019-06-03 RX ADMIN — Medication SCH ML: at 12:07

## 2019-06-03 RX ADMIN — INSULIN HUMAN PRN UNIT: 100 INJECTION, SOLUTION PARENTERAL at 17:29

## 2019-06-03 RX ADMIN — OXYCODONE HYDROCHLORIDE AND ACETAMINOPHEN SCH MG: 500 TABLET ORAL at 08:25

## 2019-06-03 RX ADMIN — Medication SCH ML: at 08:30

## 2019-06-03 RX ADMIN — DEXTROSE MONOHYDRATE SCH MLS/HR: 50 INJECTION, SOLUTION INTRAVENOUS at 11:43

## 2019-06-03 RX ADMIN — Medication SCH APPLIC: at 11:17

## 2019-06-03 RX ADMIN — Medication SCH EACH: at 17:22

## 2019-06-03 RX ADMIN — DEXTROSE MONOHYDRATE SCH MLS/HR: 50 INJECTION, SOLUTION INTRAVENOUS at 01:40

## 2019-06-03 NOTE — NUR
m/s lvn: notes



e.r. nurse here to inserted new iv to right hand, gauge #22 at this time. will continue to 
monitor.

## 2019-06-03 NOTE — NUR
m/s lvn: notes



received order to d'c iv fluids of d5w with 20meq potassium. order read back and carried out 
and acknowledged.

## 2019-06-03 NOTE — NUR
RN MS OPENING NOTES 

PT IN BED, SLEEPING, EASILY AROUSED TO NAME CALL, BREATHING EVEN AND UNLABORED ON ROOM AIR. 
IN NO APPARENT PAIN OR DISCOMFORT AT THIS TIME, IV ACCESS ON THE R HAND 22 SL, PATENT AND 
FLUSHING. BED IN LOWEST LOCKED POSITION, CALL LIGHT WITHIN REACH AT ALL TIMES. WILL CONTINUE 
TO MONITOR FREQUENTLY.

## 2019-06-03 NOTE — NUR
m/s lvn: notes



dr. east (surgeon) here and clarify sacral wound tx order with order to d'c hydrogel. 
order read back and carried out.

## 2019-06-03 NOTE — NUR
m/s lvn: notes



daughter visiting at this time and updated plan of care; also all questions and concerns 
answered.

## 2019-06-03 NOTE — NUR
m/s lvn: notes



daughter left at this time. pt resting comfortable with no distress noted. will monitor.

## 2019-06-03 NOTE — NUR
Post-op RN Discharge Note



Patient ambulates with steady gait, in stable condition. Vital signs stable. Monitored 
appropriately. Personal belongings inventoried, signed belongings form. No skin issues 
noted. Peripheral IV access established to the left hand 20 gauge removed with catheter tip 
intact, no bleeding, redness or swelling of site. Provided post-operative education, 
discharge education and Exitcare provided to both patient and spouse. Emphasized emergency 
situations and possible complications with both patient and spouse. Verbalized 
understanding, signed discharge form. All questions answered. ID bands removed. Patient 
escorted to [facility] via wheelchair with GEOFFREY Deng, discharged home in stable 
condition with spouse, Erika, via private car.

-------------------------------------------------------------------------------

Addendum: 06/03/19 at 1616 by RASHAAD JAEGER RN

-------------------------------------------------------------------------------

Correction: Documentation for wrong patient for this entry.

## 2019-06-03 NOTE — NUR
m/s lvn: initial assessment



received pt in bed with eyes open, non-verbal, and unable to comprehend. reality orientation 
provided prn. phlebotomist at bedside to draw blood, but unable to due to hard stick. noted 
iv leaking to right hand. no apparent distress noted. will continue to monitor.

## 2019-06-03 NOTE — NUR
RN MS CLOSING NOTES 

PT REMAINS IN BED, SLEEPING, EASILY AROUSED TO NAME CALL, BREATHING EVEN AND UNLABORED ON 
ROOM AIR. IN NO APPARENT PAIN OR DISCOMFORT AT THIS TIME, IV ACCESS ON THE R HAND 24G WITH 
1/2 NS WITH 20 MEQ @100ML./HR BG , INSULIN GIVEN.  BED IN LOWEST LOCKED POSITION, CALL 
LIGHT WITHIN REACH AT ALL TIMES. DAUGHTER AT BEDSIDE. WILL ENDORSE TO DAY NURSE FOR JOHNNY.

## 2019-06-03 NOTE — NUR
m/s lvn: notes



resting comfortable in bed with no distress noted. needs attended. call light within reach. 
will continue to monitor.

## 2019-06-03 NOTE — NUR
m/s lvn: notes



pt received glucerna on her tray, but order was ensure. pt is diabetic and per 
recommendation pt should be on glucerna. order change to glucerna. lab called and informed 
me that her blood was clotted and someone will draw her lab around 9am.

## 2019-06-04 VITALS — DIASTOLIC BLOOD PRESSURE: 71 MMHG | SYSTOLIC BLOOD PRESSURE: 145 MMHG

## 2019-06-04 VITALS — SYSTOLIC BLOOD PRESSURE: 155 MMHG | DIASTOLIC BLOOD PRESSURE: 77 MMHG

## 2019-06-04 VITALS — DIASTOLIC BLOOD PRESSURE: 77 MMHG | SYSTOLIC BLOOD PRESSURE: 140 MMHG

## 2019-06-04 RX ADMIN — Medication SCH EACH: at 05:50

## 2019-06-04 RX ADMIN — INSULIN HUMAN PRN UNIT: 100 INJECTION, SOLUTION PARENTERAL at 12:10

## 2019-06-04 RX ADMIN — DEXTROSE MONOHYDRATE SCH MLS/HR: 50 INJECTION, SOLUTION INTRAVENOUS at 18:31

## 2019-06-04 RX ADMIN — Medication SCH EACH: at 00:33

## 2019-06-04 RX ADMIN — Medication SCH EACH: at 12:08

## 2019-06-04 RX ADMIN — FAMOTIDINE SCH MG: 10 INJECTION INTRAVENOUS at 08:42

## 2019-06-04 RX ADMIN — Medication SCH ML: at 08:04

## 2019-06-04 RX ADMIN — INSULIN HUMAN PRN UNIT: 100 INJECTION, SOLUTION PARENTERAL at 00:32

## 2019-06-04 RX ADMIN — LISINOPRIL SCH MG: 20 TABLET ORAL at 08:42

## 2019-06-04 RX ADMIN — CLOTRIMAZOLE SCH GM: 1 CREAM TOPICAL at 17:24

## 2019-06-04 RX ADMIN — INSULIN GLARGINE SCH UNIT: 100 INJECTION, SOLUTION SUBCUTANEOUS at 21:35

## 2019-06-04 RX ADMIN — INSULIN HUMAN PRN UNIT: 100 INJECTION, SOLUTION PARENTERAL at 05:52

## 2019-06-04 RX ADMIN — Medication SCH ML: at 12:11

## 2019-06-04 RX ADMIN — OXYCODONE HYDROCHLORIDE AND ACETAMINOPHEN SCH MG: 500 TABLET ORAL at 08:42

## 2019-06-04 RX ADMIN — Medication SCH EACH: at 23:28

## 2019-06-04 RX ADMIN — Medication SCH TAB: at 08:42

## 2019-06-04 RX ADMIN — FAMOTIDINE SCH MG: 10 INJECTION INTRAVENOUS at 17:19

## 2019-06-04 RX ADMIN — DEXTROSE MONOHYDRATE SCH MLS/HR: 50 INJECTION, SOLUTION INTRAVENOUS at 02:02

## 2019-06-04 RX ADMIN — DEXTROSE MONOHYDRATE SCH MLS/HR: 50 INJECTION, SOLUTION INTRAVENOUS at 10:17

## 2019-06-04 RX ADMIN — Medication SCH EACH: at 17:19

## 2019-06-04 RX ADMIN — Medication SCH ML: at 17:23

## 2019-06-04 RX ADMIN — AMLODIPINE BESYLATE SCH MG: 10 TABLET ORAL at 08:42

## 2019-06-04 RX ADMIN — Medication SCH OZ: at 08:43

## 2019-06-04 RX ADMIN — INSULIN HUMAN PRN UNIT: 100 INJECTION, SOLUTION PARENTERAL at 17:22

## 2019-06-04 RX ADMIN — INSULIN HUMAN PRN UNIT: 100 INJECTION, SOLUTION PARENTERAL at 23:30

## 2019-06-04 RX ADMIN — CLOTRIMAZOLE SCH GM: 1 CREAM TOPICAL at 08:43

## 2019-06-04 NOTE — NUR
MS RN CLOSING NOTES

Patient remained in bed, intermittently sleeping, easily aroused. All due meds given and 
tolerated. No facial grimacing or moaning noted or reported. On continuous oxygen therapy 
@2LPM via nasal cannula with no SOB/labored breathing noted or reported. No signs and 
symptoms of distress noted or reported. Continue antibiotics and hospital stay as planned. 
Continue on IVF treatment. Kept patient clean, dry and  comfortable. wound treatment 
rendered. Called RT for breathing treatment. Awaiting for arrival. Bed in lowest position 
with bed alarm on and call light within reach. Endorsed to oncoming shift nurse


-------------------------------------------------------------------------------

Addendum: 06/05/19 at 0832 by JIMMY GRIFFITH RN

-------------------------------------------------------------------------------

*********************************INCORRECT PATIENT*****************************

-------------------------------------------------------------------------------

Addendum: 06/05/19 at 0834 by JIMMY GRIFFITH RN

-------------------------------------------------------------------------------

******************************INCORRECT PATIENT (DUPLICATE)****************************

## 2019-06-04 NOTE — NUR
MS RN INITIAL NOTES

Report received at bedside. Patient received in bed, awake, comfortable. Patient is 
non-verbal. No signs and symptoms of distress. No SOB/labored breathing noted. Safety 
measures in place. Will continue to monitor and assess patient

## 2019-06-04 NOTE — NUR
MS RN OPENING NOTES



Received patient sitting up in bed watching TV, daughter at bedside. Breathing even and 
unlabored. Not in any distress, on room air. Daughter said she just finished feeding her 
mother, tolerating well. Safety measures in place; call light within reach. Bed in low, 
locked position. Patient stable as endorsed by the morning RN. Will continue to monitor 
accordingly

## 2019-06-04 NOTE — NUR
MS RN CLOSING NOTES

Patient remained in bed, intermittently sleeping, easily aroused. All due meds given and 
tolerated. No facial grimacing or moaning noted or reported. No SOB/labored breathing noted 
or reported. No signs and symptoms of distress noted or reported. Continue antibiotics and 
hospital stay as planned. Kept patient clean, dry and  comfortable. Wound treatment 
rendered. Bed in lowest position with bed alarm on and call light within reach. Daughter at 
bedside aware of possible discharge tomorrow. Endorsed to oncoming shift nurse

## 2019-06-05 VITALS — SYSTOLIC BLOOD PRESSURE: 133 MMHG | DIASTOLIC BLOOD PRESSURE: 71 MMHG

## 2019-06-05 VITALS — DIASTOLIC BLOOD PRESSURE: 61 MMHG | SYSTOLIC BLOOD PRESSURE: 133 MMHG

## 2019-06-05 RX ADMIN — Medication SCH EACH: at 05:29

## 2019-06-05 RX ADMIN — DEXTROSE MONOHYDRATE SCH MLS/HR: 50 INJECTION, SOLUTION INTRAVENOUS at 01:41

## 2019-06-05 RX ADMIN — Medication SCH TAB: at 09:16

## 2019-06-05 RX ADMIN — Medication SCH OZ: at 09:18

## 2019-06-05 RX ADMIN — AMLODIPINE BESYLATE SCH MG: 10 TABLET ORAL at 09:16

## 2019-06-05 RX ADMIN — Medication SCH ML: at 08:31

## 2019-06-05 RX ADMIN — DEXTROSE MONOHYDRATE SCH MLS/HR: 50 INJECTION, SOLUTION INTRAVENOUS at 10:34

## 2019-06-05 RX ADMIN — INSULIN HUMAN PRN UNIT: 100 INJECTION, SOLUTION PARENTERAL at 05:31

## 2019-06-05 RX ADMIN — Medication SCH ML: at 12:23

## 2019-06-05 RX ADMIN — LISINOPRIL SCH MG: 20 TABLET ORAL at 09:16

## 2019-06-05 RX ADMIN — FAMOTIDINE SCH MG: 10 INJECTION INTRAVENOUS at 09:16

## 2019-06-05 RX ADMIN — CLOTRIMAZOLE SCH GM: 1 CREAM TOPICAL at 09:17

## 2019-06-05 RX ADMIN — INSULIN HUMAN PRN UNIT: 100 INJECTION, SOLUTION PARENTERAL at 12:25

## 2019-06-05 RX ADMIN — Medication SCH EACH: at 12:23

## 2019-06-05 RX ADMIN — OXYCODONE HYDROCHLORIDE AND ACETAMINOPHEN SCH MG: 500 TABLET ORAL at 09:16

## 2019-06-05 NOTE — NUR
MS RN DISCHARGE NOTES

Patient picked up and to be transported by home by [facility]. Report given to EMT. 
Patient in stable condition. No signs and symptoms of distress noted.

## 2019-06-05 NOTE — NUR
MS RN CLOSING NOTES



Patient in bed, awake, non- verbal. Breathing even and unlabored. Not in any distress, on 
room air. IV access intact and patent. No acute changes overnight. Safety measures in place. 
Endorsed JOHNNY to AM RN

## 2019-06-11 ENCOUNTER — HOSPITAL ENCOUNTER (INPATIENT)
Dept: HOSPITAL 54 - ER | Age: 79
LOS: 11 days | Discharge: HOME | DRG: 166 | End: 2019-06-22
Attending: NURSE PRACTITIONER | Admitting: NURSE PRACTITIONER
Payer: COMMERCIAL

## 2019-06-11 VITALS — WEIGHT: 110 LBS | BODY MASS INDEX: 17.68 KG/M2 | HEIGHT: 66 IN

## 2019-06-11 VITALS — SYSTOLIC BLOOD PRESSURE: 164 MMHG | DIASTOLIC BLOOD PRESSURE: 75 MMHG

## 2019-06-11 DIAGNOSIS — E11.65: ICD-10-CM

## 2019-06-11 DIAGNOSIS — Z98.2: ICD-10-CM

## 2019-06-11 DIAGNOSIS — E43: ICD-10-CM

## 2019-06-11 DIAGNOSIS — J69.0: Primary | ICD-10-CM

## 2019-06-11 DIAGNOSIS — G93.41: ICD-10-CM

## 2019-06-11 DIAGNOSIS — Z74.01: ICD-10-CM

## 2019-06-11 DIAGNOSIS — E83.42: ICD-10-CM

## 2019-06-11 DIAGNOSIS — Z87.440: ICD-10-CM

## 2019-06-11 DIAGNOSIS — E24.9: ICD-10-CM

## 2019-06-11 DIAGNOSIS — G91.2: ICD-10-CM

## 2019-06-11 DIAGNOSIS — E83.39: ICD-10-CM

## 2019-06-11 DIAGNOSIS — L89.154: ICD-10-CM

## 2019-06-11 DIAGNOSIS — E87.3: ICD-10-CM

## 2019-06-11 DIAGNOSIS — L89.153: ICD-10-CM

## 2019-06-11 DIAGNOSIS — F03.90: ICD-10-CM

## 2019-06-11 DIAGNOSIS — E86.1: ICD-10-CM

## 2019-06-11 DIAGNOSIS — R47.01: ICD-10-CM

## 2019-06-11 DIAGNOSIS — E87.4: ICD-10-CM

## 2019-06-11 DIAGNOSIS — D69.6: ICD-10-CM

## 2019-06-11 DIAGNOSIS — I25.10: ICD-10-CM

## 2019-06-11 DIAGNOSIS — D63.8: ICD-10-CM

## 2019-06-11 DIAGNOSIS — J96.01: ICD-10-CM

## 2019-06-11 DIAGNOSIS — I10: ICD-10-CM

## 2019-06-11 DIAGNOSIS — D68.59: ICD-10-CM

## 2019-06-11 DIAGNOSIS — N17.0: ICD-10-CM

## 2019-06-11 DIAGNOSIS — Z79.899: ICD-10-CM

## 2019-06-11 DIAGNOSIS — E87.0: ICD-10-CM

## 2019-06-11 DIAGNOSIS — Z74.09: ICD-10-CM

## 2019-06-11 DIAGNOSIS — L89.323: ICD-10-CM

## 2019-06-11 DIAGNOSIS — G81.94: ICD-10-CM

## 2019-06-11 DIAGNOSIS — S90.822A: ICD-10-CM

## 2019-06-11 DIAGNOSIS — E87.6: ICD-10-CM

## 2019-06-11 DIAGNOSIS — Z79.84: ICD-10-CM

## 2019-06-11 LAB
ALBUMIN SERPL BCP-MCNC: 2.3 G/DL (ref 3.4–5)
ALP SERPL-CCNC: 128 U/L (ref 46–116)
ALT SERPL W P-5'-P-CCNC: 29 U/L (ref 12–78)
AST SERPL W P-5'-P-CCNC: 21 U/L (ref 15–37)
BASOPHILS # BLD AUTO: 0 /CMM (ref 0–0.2)
BASOPHILS NFR BLD AUTO: 0.2 % (ref 0–2)
BILIRUB DIRECT SERPL-MCNC: 0.2 MG/DL (ref 0–0.2)
BILIRUB SERPL-MCNC: 0.6 MG/DL (ref 0.2–1)
BUN SERPL-MCNC: 24 MG/DL (ref 7–18)
CALCIUM SERPL-MCNC: 9.3 MG/DL (ref 8.5–10.1)
CHLORIDE SERPL-SCNC: 102 MMOL/L (ref 98–107)
CO2 SERPL-SCNC: 33 MMOL/L (ref 21–32)
CREAT SERPL-MCNC: 0.5 MG/DL (ref 0.6–1.3)
EOSINOPHIL NFR BLD AUTO: 0 % (ref 0–6)
GLUCOSE SERPL-MCNC: 388 MG/DL (ref 74–106)
GLUCOSE UR STRIP-MCNC: (no result) MG/DL
HCT VFR BLD AUTO: 30 % (ref 33–45)
HGB BLD-MCNC: 10.4 G/DL (ref 11.5–14.8)
LYMPHOCYTES NFR BLD AUTO: 0.1 /CMM (ref 0.8–4.8)
LYMPHOCYTES NFR BLD AUTO: 1.4 % (ref 20–44)
MCHC RBC AUTO-ENTMCNC: 34 G/DL (ref 31–36)
MCV RBC AUTO: 90 FL (ref 82–100)
MONOCYTES NFR BLD AUTO: 0.3 /CMM (ref 0.1–1.3)
MONOCYTES NFR BLD AUTO: 3.2 % (ref 2–12)
NEUTROPHILS # BLD AUTO: 9.9 /CMM (ref 1.8–8.9)
NEUTROPHILS NFR BLD AUTO: 95.2 % (ref 43–81)
PH UR STRIP: 6.5 [PH] (ref 5–8)
PLATELET # BLD AUTO: 142 /CMM (ref 150–450)
POTASSIUM SERPL-SCNC: 3.3 MMOL/L (ref 3.5–5.1)
PROT SERPL-MCNC: 5.1 G/DL (ref 6.4–8.2)
RBC # BLD AUTO: 3.36 MIL/UL (ref 4–5.2)
RBC #/AREA URNS HPF: (no result) /HPF (ref 0–2)
SODIUM SERPL-SCNC: 142 MMOL/L (ref 136–145)
UROBILINOGEN UR STRIP-MCNC: 0.2 EU/DL
WBC #/AREA URNS HPF: (no result) /HPF (ref 0–3)
WBC NRBC COR # BLD AUTO: 10.4 K/UL (ref 4.3–11)

## 2019-06-11 PROCEDURE — A4216 STERILE WATER/SALINE, 10 ML: HCPCS

## 2019-06-11 PROCEDURE — G0378 HOSPITAL OBSERVATION PER HR: HCPCS

## 2019-06-11 PROCEDURE — A6403 STERILE GAUZE>16 <= 48 SQ IN: HCPCS

## 2019-06-11 PROCEDURE — A6248 HYDROGEL DRSG GEL FILLER: HCPCS

## 2019-06-11 RX ADMIN — AMLODIPINE BESYLATE SCH MG: 10 TABLET ORAL at 21:49

## 2019-06-11 RX ADMIN — LISINOPRIL SCH MG: 20 TABLET ORAL at 21:49

## 2019-06-11 RX ADMIN — INSULIN HUMAN PRN UNIT: 100 INJECTION, SOLUTION PARENTERAL at 21:34

## 2019-06-11 RX ADMIN — Medication SCH EACH: at 21:35

## 2019-06-11 RX ADMIN — SODIUM CHLORIDE PRN MLS/HR: 9 INJECTION, SOLUTION INTRAVENOUS at 21:01

## 2019-06-11 RX ADMIN — ENOXAPARIN SODIUM SCH MG: 40 INJECTION SUBCUTANEOUS at 21:03

## 2019-06-11 RX ADMIN — Medication SCH EACH: at 21:01

## 2019-06-11 NOTE — NUR
MS RN NOTE:



RECEIVED ADMIT ORDER FROM MONI CHAMBERS NP, PATIENT WILL BLOOD PRESSURE /75, HR 87, 
CLARIFIED WITH MD IF PATIENT BLOOD PRESSURE MEDICATIONS CAN BE STARTED TONIGHT SINCE PATIENT 
HAS NOT RECEIVED TODAY'S DOSE. NORVASC 10MG ORAL AND LISINOPRIL 40MG ORAL ADJUSTED TO START 
TONIGHT. ORDER NOTED AND CARRIED OUT. WILL CONTINUE TO MONITOR.

## 2019-06-11 NOTE — NUR
MS RN NOTE:



PATIENT RESTING IN BED, NO ACUTE DISTRESS NOTED, DAUGHTER AT BEDSIDE. BREATHING EVEN AND 
UNLABORED, NO SOB NOTED. IV TO LFA  IN PLACE. AWAITING ADMIT ORDERS. NO S/S OF 
HYPER/HYPOGLYCEMIA NOTED. BED LOCKED AND IN LOWEST POSITION, CALL LIGHT IN REACH. WILL 
CONTINUE TO MONITOR.

## 2019-06-11 NOTE — NUR
MS RN NOTE:



PATIENT BLOOD SUGAR LEVEL 255MG/DL, PATIENT TO RECEIVE 6 UNITS OF INSULIN PER SLIDING SCALE. 
NO S/S OF HYPERGLYCEMIA NOTED. WILL CONTINUE TO MONITOR.

## 2019-06-12 VITALS — DIASTOLIC BLOOD PRESSURE: 68 MMHG | SYSTOLIC BLOOD PRESSURE: 168 MMHG

## 2019-06-12 VITALS — DIASTOLIC BLOOD PRESSURE: 69 MMHG | SYSTOLIC BLOOD PRESSURE: 125 MMHG

## 2019-06-12 VITALS — SYSTOLIC BLOOD PRESSURE: 137 MMHG | DIASTOLIC BLOOD PRESSURE: 60 MMHG

## 2019-06-12 LAB
ALBUMIN SERPL BCP-MCNC: 2 G/DL (ref 3.4–5)
ALP SERPL-CCNC: 105 U/L (ref 46–116)
ALT SERPL W P-5'-P-CCNC: 26 U/L (ref 12–78)
AST SERPL W P-5'-P-CCNC: 30 U/L (ref 15–37)
BASOPHILS # BLD AUTO: 0 /CMM (ref 0–0.2)
BASOPHILS NFR BLD AUTO: 0.2 % (ref 0–2)
BILIRUB SERPL-MCNC: 0.6 MG/DL (ref 0.2–1)
BUN SERPL-MCNC: 17 MG/DL (ref 7–18)
CALCIUM SERPL-MCNC: 8.8 MG/DL (ref 8.5–10.1)
CHLORIDE SERPL-SCNC: 108 MMOL/L (ref 98–107)
CHOLEST SERPL-MCNC: 150 MG/DL (ref ?–200)
CO2 SERPL-SCNC: 33 MMOL/L (ref 21–32)
CREAT SERPL-MCNC: 0.2 MG/DL (ref 0.6–1.3)
EOSINOPHIL NFR BLD AUTO: 0 % (ref 0–6)
GLUCOSE SERPL-MCNC: 167 MG/DL (ref 74–106)
HCT VFR BLD AUTO: 27 % (ref 33–45)
HDLC SERPL-MCNC: 43 MG/DL (ref 40–60)
HGB BLD-MCNC: 9.1 G/DL (ref 11.5–14.8)
IRON SERPL-MCNC: 45 UG/DL (ref 50–175)
LDLC SERPL DIRECT ASSAY-MCNC: 88 MG/DL (ref 0–99)
LYMPHOCYTES NFR BLD AUTO: 0.2 /CMM (ref 0.8–4.8)
LYMPHOCYTES NFR BLD AUTO: 3.3 % (ref 20–44)
MAGNESIUM SERPL-MCNC: 1.4 MG/DL (ref 1.8–2.4)
MCHC RBC AUTO-ENTMCNC: 34 G/DL (ref 31–36)
MCV RBC AUTO: 89 FL (ref 82–100)
MONOCYTES NFR BLD AUTO: 0.2 /CMM (ref 0.1–1.3)
MONOCYTES NFR BLD AUTO: 3.5 % (ref 2–12)
NEUTROPHILS # BLD AUTO: 6.5 /CMM (ref 1.8–8.9)
NEUTROPHILS NFR BLD AUTO: 93 % (ref 43–81)
PHOSPHATE SERPL-MCNC: 1.8 MG/DL (ref 2.5–4.9)
PLATELET # BLD AUTO: 125 /CMM (ref 150–450)
POTASSIUM SERPL-SCNC: 2.6 MMOL/L (ref 3.5–5.1)
PROT SERPL-MCNC: 4.4 G/DL (ref 6.4–8.2)
RBC # BLD AUTO: 3 MIL/UL (ref 4–5.2)
SODIUM SERPL-SCNC: 146 MMOL/L (ref 136–145)
TIBC SERPL-MCNC: 104 UG/DL (ref 250–450)
TRIGL SERPL-MCNC: 153 MG/DL (ref 30–150)
TSH SERPL DL<=0.005 MIU/L-ACNC: 0.24 UIU/ML (ref 0.36–3.74)
WBC NRBC COR # BLD AUTO: 7 K/UL (ref 4.3–11)

## 2019-06-12 RX ADMIN — DEXTROSE MONOHYDRATE SCH MLS/HR: 50 INJECTION, SOLUTION INTRAVENOUS at 08:39

## 2019-06-12 RX ADMIN — Medication SCH EACH: at 07:30

## 2019-06-12 RX ADMIN — Medication SCH EACH: at 12:10

## 2019-06-12 RX ADMIN — INSULIN HUMAN PRN UNIT: 100 INJECTION, SOLUTION PARENTERAL at 17:38

## 2019-06-12 RX ADMIN — LISINOPRIL SCH MG: 20 TABLET ORAL at 08:40

## 2019-06-12 RX ADMIN — Medication SCH EACH: at 17:35

## 2019-06-12 RX ADMIN — AMLODIPINE BESYLATE SCH MG: 10 TABLET ORAL at 08:40

## 2019-06-12 RX ADMIN — MAGNESIUM SULFATE IN DEXTROSE SCH MLS/HR: 10 INJECTION, SOLUTION INTRAVENOUS at 13:54

## 2019-06-12 RX ADMIN — INSULIN HUMAN PRN UNIT: 100 INJECTION, SOLUTION PARENTERAL at 05:56

## 2019-06-12 RX ADMIN — MAGNESIUM SULFATE IN DEXTROSE SCH MLS/HR: 10 INJECTION, SOLUTION INTRAVENOUS at 16:00

## 2019-06-12 RX ADMIN — MAGNESIUM SULFATE IN DEXTROSE SCH MLS/HR: 10 INJECTION, SOLUTION INTRAVENOUS at 12:13

## 2019-06-12 RX ADMIN — CLOTRIMAZOLE SCH GM: 1 CREAM TOPICAL at 17:41

## 2019-06-12 RX ADMIN — Medication SCH GM: at 10:24

## 2019-06-12 RX ADMIN — CLOTRIMAZOLE SCH GM: 1 CREAM TOPICAL at 10:25

## 2019-06-12 RX ADMIN — SODIUM CHLORIDE PRN MLS/HR: 9 INJECTION, SOLUTION INTRAVENOUS at 10:41

## 2019-06-12 RX ADMIN — INSULIN HUMAN PRN UNIT: 100 INJECTION, SOLUTION PARENTERAL at 12:24

## 2019-06-12 RX ADMIN — INSULIN HUMAN PRN UNIT: 100 INJECTION, SOLUTION PARENTERAL at 21:17

## 2019-06-12 RX ADMIN — MAGNESIUM SULFATE IN DEXTROSE SCH MLS/HR: 10 INJECTION, SOLUTION INTRAVENOUS at 15:01

## 2019-06-12 RX ADMIN — Medication SCH EACH: at 21:12

## 2019-06-12 RX ADMIN — ENOXAPARIN SODIUM SCH MG: 40 INJECTION SUBCUTANEOUS at 21:07

## 2019-06-12 NOTE — NUR
MS RN CLOSING NOTES



PATIENT RESTING IN BED, NO ACUTE DISTRESS NOTED. BREATHING EVEN AND UNLABORED, NO SOB NOTED. 
IV TO LFA  INTACT.SEEN BY DIGNA YING.NO S/S OF HYPER/HYPOGLYCEMIA NOTED. DTR AT BEDSIDE.BED 
LOCKED AND IN LOWEST POSITION, CALL LIGHT IN REACH.

## 2019-06-12 NOTE — NUR
WOUND CARE CONSULT: PT PRESENTS WITH STAGE 3 SACRAL ULCER AND LEFT HEEL WOUND, SOME SCRATCH 
MARKS TO HIP AREAS, PRESENT ON ADMISSION. RECOMMEND SURGICAL AND DPM CONSULTS. LOW AIRLOSS 
BED TO BE PLACED (ISOFLEX).  ALL SKIN PROTECTION AND WOUND CARE RECOMMENDATIONS DISCUSSED 
WITH NURSING STAFF. DEFER TO DPM FOR LOWER EXTREMITY. WILL SEE PRN. MD IN AGREEMENT WITH 
PLAN OF CARE. 


-------------------------------------------------------------------------------

Addendum: 06/12/19 at 0844 by ANGELINE JOSEU

-------------------------------------------------------------------------------

Amended: Links added.

-------------------------------------------------------------------------------

Addendum: 06/12/19 at 0849 by ANGELINE JOSEU

-------------------------------------------------------------------------------

DR RENARD CARBALLO AND DR MCCARTNEY NOTIFIED OF SURGICAL AND DPM CONSULT REQUESTS.

## 2019-06-12 NOTE — NUR
MS RN NOTE:



PATIENT RESTING IN BED, AWAKE,NON VERBAL,NO ACUTE DISTRESS NOTED,BREATHING EVEN AND 
UNLABORED, NO SOB NOTED. IV TO LFA  IN PLACE WITH IV NS AT 75 ML/HR INFUSING WELL.NO S/S OF 
HYPER/HYPOGLYCEMIA NOTED.SEEN BY ANGELINEWOUND CONSULTANT RN WITH TX ORDERS.BED LOCKED AND IN 
LOWEST POSITION, CALL LIGHT IN REACH. WILL CONTINUE TO MONITOR.

## 2019-06-12 NOTE — NUR
RECEIVED PATIENT IN BED AWAKE; NONVERBAL. NO ACUTE DISTRESS NOTED. NO SIGNS OF PAIN NOTED. 
IV SITE PATENT, INTACT; FLUSHED. DAUGHTER AT BEDSIDE. SAFETY REMINDERS GIVEN. ON LOW BED 
WITH BILATERAL UPPER SIDE RAILS UP. CALL BELL WITHIN EASY REACH. WILL CONTINUE TO MONITOR.

## 2019-06-12 NOTE — NUR
MS RN NOTE:



PATIENT RESTING IN BED, NO ACUTE DISTRESS NOTED. BREATHING EVEN AND UNLABORED, NO SOB NOTED. 
IV TO LFA  IN PLACE, INFUSING NS AT 75ML/HR. PATIENT BLOOD SUGAR LEVEL 163MG/DL, TO RECEIVE 
3 UNITS OF INSULIN PER SLIDING SCALE. NO S/S OF HYPER/HYPOGLYCEMIA NOTED. BED LOCKED AND IN 
LOWEST POSITION, CALL LIGHT IN REACH. WILL ENDORSE TO DAY NURSE TO CONTINUE WITH PLAN OF 
CARE.

## 2019-06-13 VITALS — DIASTOLIC BLOOD PRESSURE: 78 MMHG | SYSTOLIC BLOOD PRESSURE: 152 MMHG

## 2019-06-13 VITALS — DIASTOLIC BLOOD PRESSURE: 60 MMHG | SYSTOLIC BLOOD PRESSURE: 131 MMHG

## 2019-06-13 VITALS — SYSTOLIC BLOOD PRESSURE: 152 MMHG | DIASTOLIC BLOOD PRESSURE: 78 MMHG

## 2019-06-13 VITALS — SYSTOLIC BLOOD PRESSURE: 160 MMHG | DIASTOLIC BLOOD PRESSURE: 73 MMHG

## 2019-06-13 LAB
BASOPHILS # BLD AUTO: 0 /CMM (ref 0–0.2)
BASOPHILS NFR BLD AUTO: 0 % (ref 0–2)
BUN SERPL-MCNC: 14 MG/DL (ref 7–18)
CALCIUM SERPL-MCNC: 8.8 MG/DL (ref 8.5–10.1)
CHLORIDE SERPL-SCNC: 106 MMOL/L (ref 98–107)
CO2 SERPL-SCNC: 36 MMOL/L (ref 21–32)
CREAT SERPL-MCNC: 0.3 MG/DL (ref 0.6–1.3)
EOSINOPHIL NFR BLD AUTO: 0 % (ref 0–6)
GLUCOSE SERPL-MCNC: 153 MG/DL (ref 74–106)
HCT VFR BLD AUTO: 30 % (ref 33–45)
HGB BLD-MCNC: 10.1 G/DL (ref 11.5–14.8)
LYMPHOCYTES NFR BLD AUTO: 0.3 /CMM (ref 0.8–4.8)
LYMPHOCYTES NFR BLD AUTO: 3 % (ref 20–44)
MAGNESIUM SERPL-MCNC: 2.1 MG/DL (ref 1.8–2.4)
MCHC RBC AUTO-ENTMCNC: 34 G/DL (ref 31–36)
MCV RBC AUTO: 90 FL (ref 82–100)
MONOCYTES NFR BLD AUTO: 0.3 /CMM (ref 0.1–1.3)
MONOCYTES NFR BLD AUTO: 3.7 % (ref 2–12)
NEUTROPHILS # BLD AUTO: 7.9 /CMM (ref 1.8–8.9)
NEUTROPHILS NFR BLD AUTO: 93.3 % (ref 43–81)
PHOSPHATE SERPL-MCNC: 1.9 MG/DL (ref 2.5–4.9)
PLATELET # BLD AUTO: 136 /CMM (ref 150–450)
POTASSIUM SERPL-SCNC: 2.4 MMOL/L (ref 3.5–5.1)
RBC # BLD AUTO: 3.31 MIL/UL (ref 4–5.2)
SODIUM SERPL-SCNC: 147 MMOL/L (ref 136–145)
WBC NRBC COR # BLD AUTO: 8.5 K/UL (ref 4.3–11)

## 2019-06-13 RX ADMIN — Medication SCH EACH: at 06:42

## 2019-06-13 RX ADMIN — AMLODIPINE BESYLATE SCH MG: 10 TABLET ORAL at 08:12

## 2019-06-13 RX ADMIN — INSULIN HUMAN PRN UNIT: 100 INJECTION, SOLUTION PARENTERAL at 06:43

## 2019-06-13 RX ADMIN — INSULIN HUMAN PRN UNIT: 100 INJECTION, SOLUTION PARENTERAL at 12:54

## 2019-06-13 RX ADMIN — SODIUM CHLORIDE, SODIUM LACTATE, POTASSIUM CHLORIDE, AND CALCIUM CHLORIDE SCH MLS/HR: .6; .31; .03; .02 INJECTION, SOLUTION INTRAVENOUS at 16:51

## 2019-06-13 RX ADMIN — Medication SCH GM: at 08:14

## 2019-06-13 RX ADMIN — SODIUM CHLORIDE, SODIUM LACTATE, POTASSIUM CHLORIDE, AND CALCIUM CHLORIDE SCH MLS/HR: .6; .31; .03; .02 INJECTION, SOLUTION INTRAVENOUS at 12:47

## 2019-06-13 RX ADMIN — SODIUM CHLORIDE SCH MLS/HR: 9 INJECTION, SOLUTION INTRAVENOUS at 20:23

## 2019-06-13 RX ADMIN — INSULIN HUMAN PRN UNIT: 100 INJECTION, SOLUTION PARENTERAL at 21:36

## 2019-06-13 RX ADMIN — SODIUM CHLORIDE, SODIUM LACTATE, POTASSIUM CHLORIDE, AND CALCIUM CHLORIDE SCH MLS/HR: .6; .31; .03; .02 INJECTION, SOLUTION INTRAVENOUS at 17:46

## 2019-06-13 RX ADMIN — INSULIN HUMAN PRN UNIT: 100 INJECTION, SOLUTION PARENTERAL at 19:04

## 2019-06-13 RX ADMIN — Medication SCH OZ: at 19:03

## 2019-06-13 RX ADMIN — CLOTRIMAZOLE SCH GM: 1 CREAM TOPICAL at 19:02

## 2019-06-13 RX ADMIN — ENOXAPARIN SODIUM SCH MG: 40 INJECTION SUBCUTANEOUS at 21:25

## 2019-06-13 RX ADMIN — DEXTROSE MONOHYDRATE SCH MLS/HR: 50 INJECTION, SOLUTION INTRAVENOUS at 08:11

## 2019-06-13 RX ADMIN — SODIUM CHLORIDE, SODIUM LACTATE, POTASSIUM CHLORIDE, AND CALCIUM CHLORIDE SCH MLS/HR: .6; .31; .03; .02 INJECTION, SOLUTION INTRAVENOUS at 15:30

## 2019-06-13 RX ADMIN — LISINOPRIL SCH MG: 20 TABLET ORAL at 08:13

## 2019-06-13 RX ADMIN — Medication SCH EACH: at 19:03

## 2019-06-13 RX ADMIN — SODIUM CHLORIDE SCH MLS/HR: 9 INJECTION, SOLUTION INTRAVENOUS at 15:30

## 2019-06-13 RX ADMIN — CLOTRIMAZOLE SCH GM: 1 CREAM TOPICAL at 08:15

## 2019-06-13 RX ADMIN — SODIUM CHLORIDE SCH MLS/HR: 9 INJECTION, SOLUTION INTRAVENOUS at 19:01

## 2019-06-13 RX ADMIN — SODIUM CHLORIDE SCH MLS/HR: 9 INJECTION, SOLUTION INTRAVENOUS at 11:15

## 2019-06-13 RX ADMIN — Medication SCH EACH: at 21:30

## 2019-06-13 RX ADMIN — Medication SCH EACH: at 12:36

## 2019-06-13 RX ADMIN — Medication SCH OZ: at 10:47

## 2019-06-13 NOTE — NUR
PATIENT ASLEEP, EASILY AROUSABLE. RESPIRATIONS EVEN. NO SIGNS OF PAIN NOTED. NO SYMPTOMS OF 
HYPER/HYPOGLYCEMIA. DUE MEDS GIVEN WITH NO ASE NOTED. NEEDS ATTENDED. KEPT CLEAN, DRY, AND 
COMFORTABLE. TURNED AND REPOSITIONED Q 2 HOURS. SAFETY PRECAUTIONS AND COMFORT MEASURES IN 
PLACE. WILL GIVE REPORT TO DAY SHIFT FOR CONTINUITY OF CARE.

## 2019-06-13 NOTE — NUR
MS 2 RN AM NOTES

RECEIVED PATIENT IN BED AWAKE; NONVERBAL. NO ACUTE DISTRESS NOTED. NO SIGNS OF PAIN NOTED. 
IV SITE PATENT, INTACT; FLUSHED. TURNED EVERY TWO HRS.WITH PITTING EDEMA ON BUE/BLE.ELEVATED 
BUE/BLE WITH PILLOWS. SAFETY REMINDERS GIVEN. ON LOW BED WITH BILATERAL UPPER SIDE RAILS UP. 
CALL BELL WITHIN EASY REACH. WILL CONTINUE TO MONITOR.

## 2019-06-13 NOTE — NUR
RECEIVED PATIENT IN BED AWAKE; NONVERBAL. NO ACUTE DISTRESS NOTED. NO SIGNS OF PAIN NOTED. 
IV SITES PATENT, INTACT; FLUSHED. DAUGHTER AT BEDSIDE. SAFETY REMINDERS GIVEN. ON LOW BED 
WITH BILATERAL UPPER SIDE RAILS UP. CALL BELL WITHIN EASY REACH. WILL CONTINUE TO MONITOR.

## 2019-06-14 VITALS — DIASTOLIC BLOOD PRESSURE: 70 MMHG | SYSTOLIC BLOOD PRESSURE: 156 MMHG

## 2019-06-14 VITALS — DIASTOLIC BLOOD PRESSURE: 71 MMHG | SYSTOLIC BLOOD PRESSURE: 141 MMHG

## 2019-06-14 VITALS — DIASTOLIC BLOOD PRESSURE: 91 MMHG | SYSTOLIC BLOOD PRESSURE: 158 MMHG

## 2019-06-14 LAB
BASOPHILS # BLD AUTO: 0 /CMM (ref 0–0.2)
BASOPHILS NFR BLD AUTO: 0.1 % (ref 0–2)
BUN SERPL-MCNC: 16 MG/DL (ref 7–18)
CALCIUM SERPL-MCNC: 8.5 MG/DL (ref 8.5–10.1)
CHLORIDE SERPL-SCNC: 104 MMOL/L (ref 98–107)
CO2 SERPL-SCNC: 34 MMOL/L (ref 21–32)
CREAT SERPL-MCNC: 0.4 MG/DL (ref 0.6–1.3)
EOSINOPHIL NFR BLD AUTO: 0 % (ref 0–6)
GLUCOSE SERPL-MCNC: 204 MG/DL (ref 74–106)
HCT VFR BLD AUTO: 30 % (ref 33–45)
HGB BLD-MCNC: 10 G/DL (ref 11.5–14.8)
LYMPHOCYTES NFR BLD AUTO: 0.3 /CMM (ref 0.8–4.8)
LYMPHOCYTES NFR BLD AUTO: 2.9 % (ref 20–44)
MAGNESIUM SERPL-MCNC: 1.8 MG/DL (ref 1.8–2.4)
MCHC RBC AUTO-ENTMCNC: 34 G/DL (ref 31–36)
MCV RBC AUTO: 90 FL (ref 82–100)
MONOCYTES NFR BLD AUTO: 0.3 /CMM (ref 0.1–1.3)
MONOCYTES NFR BLD AUTO: 3.6 % (ref 2–12)
NEUTROPHILS # BLD AUTO: 8.5 /CMM (ref 1.8–8.9)
NEUTROPHILS NFR BLD AUTO: 93.4 % (ref 43–81)
PHOSPHATE SERPL-MCNC: 2.9 MG/DL (ref 2.5–4.9)
PLATELET # BLD AUTO: 135 /CMM (ref 150–450)
POTASSIUM SERPL-SCNC: 2.8 MMOL/L (ref 3.5–5.1)
RBC # BLD AUTO: 3.28 MIL/UL (ref 4–5.2)
SODIUM SERPL-SCNC: 145 MMOL/L (ref 136–145)
WBC NRBC COR # BLD AUTO: 9.1 K/UL (ref 4.3–11)

## 2019-06-14 RX ADMIN — INSULIN HUMAN PRN UNIT: 100 INJECTION, SOLUTION PARENTERAL at 06:50

## 2019-06-14 RX ADMIN — INSULIN HUMAN PRN UNIT: 100 INJECTION, SOLUTION PARENTERAL at 12:15

## 2019-06-14 RX ADMIN — CLOTRIMAZOLE SCH APPLIC: 1 CREAM TOPICAL at 09:01

## 2019-06-14 RX ADMIN — CLOTRIMAZOLE SCH APPLIC: 1 CREAM TOPICAL at 18:04

## 2019-06-14 RX ADMIN — POTASSIUM CHLORIDE SCH MLS/HR: 200 INJECTION, SOLUTION INTRAVENOUS at 20:49

## 2019-06-14 RX ADMIN — Medication SCH EACH: at 12:05

## 2019-06-14 RX ADMIN — DEXTROSE MONOHYDRATE SCH MLS/HR: 50 INJECTION, SOLUTION INTRAVENOUS at 08:41

## 2019-06-14 RX ADMIN — Medication SCH APP: at 18:04

## 2019-06-14 RX ADMIN — GUAIFENESIN AND DEXTROMETHORPHAN PRN ML: 100; 10 SYRUP ORAL at 21:20

## 2019-06-14 RX ADMIN — AMLODIPINE BESYLATE SCH MG: 10 TABLET ORAL at 08:42

## 2019-06-14 RX ADMIN — POTASSIUM CHLORIDE SCH MLS/HR: 200 INJECTION, SOLUTION INTRAVENOUS at 23:26

## 2019-06-14 RX ADMIN — INSULIN HUMAN PRN UNIT: 100 INJECTION, SOLUTION PARENTERAL at 21:20

## 2019-06-14 RX ADMIN — ENOXAPARIN SODIUM SCH MG: 40 INJECTION SUBCUTANEOUS at 21:22

## 2019-06-14 RX ADMIN — POTASSIUM CHLORIDE SCH MLS/HR: 200 INJECTION, SOLUTION INTRAVENOUS at 22:00

## 2019-06-14 RX ADMIN — POTASSIUM CHLORIDE SCH MLS/HR: 200 INJECTION, SOLUTION INTRAVENOUS at 17:13

## 2019-06-14 RX ADMIN — Medication SCH EACH: at 17:56

## 2019-06-14 RX ADMIN — POTASSIUM CHLORIDE SCH MLS/HR: 200 INJECTION, SOLUTION INTRAVENOUS at 18:14

## 2019-06-14 RX ADMIN — Medication SCH APP: at 09:01

## 2019-06-14 RX ADMIN — INSULIN HUMAN PRN UNIT: 100 INJECTION, SOLUTION PARENTERAL at 17:56

## 2019-06-14 RX ADMIN — LISINOPRIL SCH MG: 20 TABLET ORAL at 08:42

## 2019-06-14 RX ADMIN — Medication SCH EACH: at 21:19

## 2019-06-14 RX ADMIN — Medication SCH APPLIC: at 09:02

## 2019-06-14 RX ADMIN — POTASSIUM CHLORIDE SCH MLS/HR: 200 INJECTION, SOLUTION INTRAVENOUS at 19:13

## 2019-06-14 RX ADMIN — Medication SCH EACH: at 06:49

## 2019-06-14 NOTE — NUR
RN NOTES



ADMINISTERED ROBITUSSIN DM AS ORDERED FOR COUGH AT FAMILY REQUEST. PER FAMILY PATIENT HAS 
HAD SLIGHT COUGH THROUGHOUT DAY. WILL CONTINUE TO MONITOR.

## 2019-06-14 NOTE — NUR
MS RN OPENING NOTES



RECEIVED PT IN BED, AWAKE, NON VERBAL, A/O X1. TOLERATING RA, WITH NO ACUTE RESPIRATORY 
DISTRESS NOTED. PT HAS NO SIGNS OF PAIN OR ANY DISCOMFORT NOTED. PIVS TO LFA G20 SL AND RAC 
G24 SL, BOTH FLUSHED WITH NS, INTACT AND OPERATIONAL. PT KEPT COMFORTABLE. PT'S BED IN 
LOWEST, LOCKED POSITION X2. CALL LIGHT WITHIN REACH. WILL CONTINUE PLAN OF CARE.

## 2019-06-14 NOTE — NUR
MS RN CLOSING NOTES



RECEIVED PT IN BED, AWAKE, NON VERBAL, A/O X1. TOLERATING RA, WITH NO ACUTE RESPIRATORY 
DISTRESS NOTED. PT HAS NO SIGNS OF PAIN OR ANY DISCOMFORT NOTED. DAUGHTER PRESENT BEDSIDE. 
PIVS TO LFA G20 SL AND RAC G24 SL, BOTH FLUSHED WITH NS, INTACT AND OPERATIONAL. ON GOING 
POTASSIUM IV TO LFA, NO REDNESS OR ANY INFILTRATION NOTED. ALL NEEDS AND CARE PROVIDED. PT 
KEPT COMFORTABLE. PT'S BED IN LOWEST, LOCKED POSITION X3. CALL LIGHT WITHIN REACH. ENDORSED 
TO NIGHT NURSE FOR JOHNNY.

## 2019-06-14 NOTE — NUR
RN OPEN NOTES



RECEIVED PATIENT AWAKE IN BED WITH FAMILY AT BEDSIDE. A/OX1, NON-VERBAL BUT TRACKING. NO 
SIGNS OF DISTRESS OR DISCOMFORT. BREATHING EVEN AND UNLABORED. IV ACCESS IN RAC AND LFA WITH 
KCL 10MEQ INFUSING, PATENT AND INTACT, NO SIGNS OF REDNESS OR INFILTRATION. BED IN LOW 
LOCKED POSITION WITH SIDE RAILS X3. CALL LIGHT WITHIN REACH. WILL CONTINUE TO MONITOR.

## 2019-06-15 VITALS — SYSTOLIC BLOOD PRESSURE: 150 MMHG | DIASTOLIC BLOOD PRESSURE: 83 MMHG

## 2019-06-15 VITALS — SYSTOLIC BLOOD PRESSURE: 152 MMHG | DIASTOLIC BLOOD PRESSURE: 82 MMHG

## 2019-06-15 VITALS — DIASTOLIC BLOOD PRESSURE: 90 MMHG | SYSTOLIC BLOOD PRESSURE: 153 MMHG

## 2019-06-15 LAB
BASOPHILS # BLD AUTO: 0 /CMM (ref 0–0.2)
BASOPHILS NFR BLD AUTO: 0 % (ref 0–2)
BUN SERPL-MCNC: 17 MG/DL (ref 7–18)
CALCIUM SERPL-MCNC: 8.6 MG/DL (ref 8.5–10.1)
CHLORIDE SERPL-SCNC: 106 MMOL/L (ref 98–107)
CHLORIDE UR-SCNC: 80 MMOL/L (ref 55–125)
CO2 SERPL-SCNC: 38 MMOL/L (ref 21–32)
CREAT SERPL-MCNC: 0.4 MG/DL (ref 0.6–1.3)
EOSINOPHIL NFR BLD AUTO: 0 % (ref 0–6)
GLUCOSE SERPL-MCNC: 171 MG/DL (ref 74–106)
HCT VFR BLD AUTO: 29 % (ref 33–45)
HGB BLD-MCNC: 9.8 G/DL (ref 11.5–14.8)
LYMPHOCYTES NFR BLD AUTO: 0.2 /CMM (ref 0.8–4.8)
LYMPHOCYTES NFR BLD AUTO: 2.2 % (ref 20–44)
MAGNESIUM SERPL-MCNC: 1.8 MG/DL (ref 1.8–2.4)
MCHC RBC AUTO-ENTMCNC: 34 G/DL (ref 31–36)
MCV RBC AUTO: 90 FL (ref 82–100)
MONOCYTES NFR BLD AUTO: 0.3 /CMM (ref 0.1–1.3)
MONOCYTES NFR BLD AUTO: 3.5 % (ref 2–12)
NEUTROPHILS # BLD AUTO: 8.7 /CMM (ref 1.8–8.9)
NEUTROPHILS NFR BLD AUTO: 94.3 % (ref 43–81)
OSMOLALITY UR: 401 MOS/KG (ref 340–1090)
PHOSPHATE SERPL-MCNC: 2 MG/DL (ref 2.5–4.9)
PLATELET # BLD AUTO: 131 /CMM (ref 150–450)
POTASSIUM SERPL-SCNC: 3.3 MMOL/L (ref 3.5–5.1)
POTASSIUM UR-SCNC: 47 MMOL/L (ref 25–125)
RBC # BLD AUTO: 3.22 MIL/UL (ref 4–5.2)
SODIUM SERPL-SCNC: 146 MMOL/L (ref 136–145)
SODIUM UR-SCNC: 42 MMOL/L (ref 40–220)
TSH SERPL DL<=0.005 MIU/L-ACNC: 0.29 UIU/ML (ref 0.36–3.74)
URATE SERPL-MCNC: 3.5 MG/DL (ref 2.6–7.2)
WBC NRBC COR # BLD AUTO: 9.2 K/UL (ref 4.3–11)

## 2019-06-15 RX ADMIN — Medication SCH EACH: at 17:47

## 2019-06-15 RX ADMIN — Medication SCH EACH: at 11:49

## 2019-06-15 RX ADMIN — LISINOPRIL SCH MG: 20 TABLET ORAL at 08:43

## 2019-06-15 RX ADMIN — CLOTRIMAZOLE SCH APPLIC: 1 CREAM TOPICAL at 08:44

## 2019-06-15 RX ADMIN — INSULIN HUMAN PRN UNIT: 100 INJECTION, SOLUTION PARENTERAL at 06:31

## 2019-06-15 RX ADMIN — CLOTRIMAZOLE SCH APPLIC: 1 CREAM TOPICAL at 17:48

## 2019-06-15 RX ADMIN — POTASSIUM CHLORIDE SCH MLS/HR: 200 INJECTION, SOLUTION INTRAVENOUS at 11:13

## 2019-06-15 RX ADMIN — INSULIN HUMAN PRN UNIT: 100 INJECTION, SOLUTION PARENTERAL at 21:38

## 2019-06-15 RX ADMIN — Medication SCH APP: at 17:48

## 2019-06-15 RX ADMIN — AMLODIPINE BESYLATE SCH MG: 10 TABLET ORAL at 08:42

## 2019-06-15 RX ADMIN — Medication SCH APPLIC: at 09:52

## 2019-06-15 RX ADMIN — Medication SCH APP: at 08:44

## 2019-06-15 RX ADMIN — ENOXAPARIN SODIUM SCH MG: 40 INJECTION SUBCUTANEOUS at 21:33

## 2019-06-15 RX ADMIN — Medication SCH EACH: at 06:30

## 2019-06-15 RX ADMIN — INSULIN HUMAN PRN UNIT: 100 INJECTION, SOLUTION PARENTERAL at 11:53

## 2019-06-15 RX ADMIN — GUAIFENESIN AND DEXTROMETHORPHAN PRN ML: 100; 10 SYRUP ORAL at 19:58

## 2019-06-15 RX ADMIN — INSULIN HUMAN PRN UNIT: 100 INJECTION, SOLUTION PARENTERAL at 17:44

## 2019-06-15 RX ADMIN — Medication SCH EACH: at 21:32

## 2019-06-15 RX ADMIN — DEXTROSE MONOHYDRATE SCH MLS/HR: 50 INJECTION, SOLUTION INTRAVENOUS at 08:42

## 2019-06-15 RX ADMIN — POTASSIUM CHLORIDE SCH MLS/HR: 200 INJECTION, SOLUTION INTRAVENOUS at 12:12

## 2019-06-15 NOTE — NUR
MS RN OPENING NOTES



RECEIVED PT IN BED, ASLEEP, EASILY AROUSED, NON VERBAL, A/O X1. TOLERATING RA, WITH NO ACUTE 
RESPIRATORY DISTRESS NOTED. PT HAS NO SIGNS OF PAIN OR ANY DISCOMFORT NOTED. PIVS TO LFA G20 
SL AND RAC G24 SL, BOTH FLUSHED WITH NS, INTACT AND OPERATIONAL. PT KEPT COMFORTABLE. HOB 
ELEVATED. PT'S BED IN LOWEST, LOCKED POSITION X3. CALL LIGHT WITHIN REACH. WILL CONTINUE TO 
MONITOR.

## 2019-06-15 NOTE — NUR
MS RN CLOSING NOTES



PT REMAINS IN BED, AWAKE, A/O X1. DAUGHTER PRESENT BEDSIDE. ON SUPPLEMENTAL OXYGEN 8L VIA 
SIMPLE MASK, WITH NO ACUTE RESPIRATORY DISTRESS NOTED. PT HAS NO SIGNS OF PAIN OR ANY 
DISCOMFORT NOTED. PIVS TO LFA G20 SL AND RAC G24 SL, BOTH FLUSHED WITH NS, INTACT AND 
OPERATIONAL. PT KEPT COMFORTABLE. HOB ELEVATED. PT'S BED IN LOWEST, LOCKED POSITION X3. CALL 
LIGHT WITHIN REACH. ENDORSED TO NIGHT SHIFT NURSE FOR JOHNNY. SPECIFICALLY FOR DEXAMETHASONE 
SUPPRESSION TEST TONIGHT AND STAT CXRAY THAT WAS JUST DONE AT 0645.

## 2019-06-15 NOTE — NUR
RN NOTES





DR JOHNSON NO RESPONSE UP TO THIS MOMENT. HOSPITALIST/TS MADE AWARE WITH ORDERS NOTED. 
MISCELLANEOUS ORDER WILL ENDORSE TO INCOMING NIGHT NURSE REGARDING IMPORTANT TEST.

## 2019-06-15 NOTE — NUR
RN NOTES



PT HAD EPISODE OF /70, HR AT 130S, RR 26, O2 SATURATION AT RA AT 85-88%; HAPPENED 
WHEN DAUGHTER WAS FEEDING THE PT. PT PLACED ON SUPPLEMENTAL OXYGEN AT 4L VIA NC. STILL 
SATING AT 85-86%. PT RECHECKED /88, HR 130S. HOSPITALIST TS MADE AWARE AND ORDERED 
STAT CXRAY FOR POSSIBLE ASPIRATION. DAUGHTER PRESENT BEDSIDE AND AWARE. WILL ENDORSE TO 
INCOMING NIGHT NURSE.

## 2019-06-15 NOTE — NUR
RN NOTES





RECEIVED A FAX FROM [facility] REGARDING PT'S CORTISOL LEVEL OF 78.1. DR JOHNSON MADE AWARE. AWAITING FOR RESPONSE.

## 2019-06-15 NOTE — NUR
RN CLOSING NOTES



PATIENT RESTING IN BED, EASILY AROUSABLE. A/OX1, NON-VERBAL BUT TRACKING. NO SIGNS OF 
DISTRESS OR DISCOMFORT. BREATHING EVEN AND UNLABORED. IV ACCESS IN RAC AND LFA , PATENT AND 
INTACT, NO SIGNS OF REDNESS OR INFILTRATION. ALL NEEDS MET. NO SIGNIFICANT CHANGES THROUGH 
THE NIGHT. PATIENT REPOSITIONED Q2H AND PRN. BED IN LOW LOCKED POSITION WITH SIDE RAILS X3. 
CALL LIGHT WITHIN REACH. WILL ENDORSE TO AM SHIFT FOR JOHNNY.

## 2019-06-15 NOTE — NUR
MS RN NOTE:



PATIENT BLOOD SUGAR LEVEL 212MG/DL, PATIENT TO RECEIVE 4 UNITS OF INSULIN PER SLIDING SCALE. 
NO S/S OF HYPERGLYCEMIA NOTED. WILL CONTINUE TO MONITOR.

## 2019-06-15 NOTE — NUR
MS RN NOTE:



PATIENT RESTING IN BED, NO ACUTE DISTRESS NOTED, DAUGHTER AT BEDSIDE. BREATHING EVEN AND 
UNLABORED, NO SOB NOTED. IV TO LFA AND RFA IN PLACE. NO S/S OF HYPER/HYPOGLYCEMIA NOTED. BED 
LOCKED AND IN LOWEST POSITION, CALL LIGHT IN REACH. WILL CONTINUE TO MONITOR.

## 2019-06-16 VITALS — DIASTOLIC BLOOD PRESSURE: 78 MMHG | SYSTOLIC BLOOD PRESSURE: 150 MMHG

## 2019-06-16 VITALS — SYSTOLIC BLOOD PRESSURE: 137 MMHG | DIASTOLIC BLOOD PRESSURE: 73 MMHG

## 2019-06-16 VITALS — DIASTOLIC BLOOD PRESSURE: 39 MMHG | SYSTOLIC BLOOD PRESSURE: 145 MMHG

## 2019-06-16 VITALS — SYSTOLIC BLOOD PRESSURE: 136 MMHG | DIASTOLIC BLOOD PRESSURE: 75 MMHG

## 2019-06-16 LAB
BASOPHILS NFR BLD AUTO: 0 % (ref 0–2)
BUN SERPL-MCNC: 16 MG/DL (ref 7–18)
CALCIUM SERPL-MCNC: 8.9 MG/DL (ref 8.5–10.1)
CHLORIDE SERPL-SCNC: 105 MMOL/L (ref 98–107)
CO2 SERPL-SCNC: 33 MMOL/L (ref 21–32)
CREAT SERPL-MCNC: 0.3 MG/DL (ref 0.6–1.3)
EOSINOPHIL NFR BLD AUTO: 1 % (ref 0–6)
GLUCOSE SERPL-MCNC: 163 MG/DL (ref 74–106)
HCT VFR BLD AUTO: 39 % (ref 33–45)
HGB BLD-MCNC: 12.4 G/DL (ref 11.5–14.8)
LYMPHOCYTES NFR BLD AUTO: 8.2 % (ref 20–44)
LYMPHOCYTES NFR BLD MANUAL: 11 % (ref 16–48)
MAGNESIUM SERPL-MCNC: 1.8 MG/DL (ref 1.8–2.4)
MCHC RBC AUTO-ENTMCNC: 32 G/DL (ref 31–36)
MCV RBC AUTO: 93 FL (ref 82–100)
MONOCYTES NFR BLD AUTO: 2.2 % (ref 2–12)
MONOCYTES NFR BLD MANUAL: 5 % (ref 0–11)
NEUTROPHILS NFR BLD AUTO: 88.6 % (ref 43–81)
NEUTS BAND NFR BLD MANUAL: 7 % (ref 0–5)
NEUTS SEG NFR BLD MANUAL: 76 % (ref 42–76)
PHOSPHATE SERPL-MCNC: 2.6 MG/DL (ref 2.5–4.9)
PLATELET # BLD AUTO: 115 /CMM (ref 150–450)
POTASSIUM SERPL-SCNC: 4.1 MMOL/L (ref 3.5–5.1)
RBC # BLD AUTO: 4.14 MIL/UL (ref 4–5.2)
SODIUM SERPL-SCNC: 143 MMOL/L (ref 136–145)
VARIANT LYMPHS NFR BLD MANUAL: 1 % (ref 0–0)
WBC NRBC COR # BLD AUTO: 2.3 K/UL (ref 4.3–11)

## 2019-06-16 PROCEDURE — 05H533Z INSERTION OF INFUSION DEVICE INTO RIGHT SUBCLAVIAN VEIN, PERCUTANEOUS APPROACH: ICD-10-PCS | Performed by: NURSE PRACTITIONER

## 2019-06-16 PROCEDURE — B546ZZA ULTRASONOGRAPHY OF RIGHT SUBCLAVIAN VEIN, GUIDANCE: ICD-10-PCS | Performed by: NURSE PRACTITIONER

## 2019-06-16 RX ADMIN — MEROPENEM SCH MLS/HR: 1 INJECTION INTRAVENOUS at 17:58

## 2019-06-16 RX ADMIN — METOPROLOL TARTRATE SCH MG: 25 TABLET, FILM COATED ORAL at 20:18

## 2019-06-16 RX ADMIN — Medication SCH EACH: at 12:37

## 2019-06-16 RX ADMIN — Medication SCH EACH: at 06:44

## 2019-06-16 RX ADMIN — Medication SCH EACH: at 21:54

## 2019-06-16 RX ADMIN — INSULIN HUMAN PRN UNIT: 100 INJECTION, SOLUTION PARENTERAL at 12:46

## 2019-06-16 RX ADMIN — DEXTROSE MONOHYDRATE SCH MLS/HR: 50 INJECTION, SOLUTION INTRAVENOUS at 08:35

## 2019-06-16 RX ADMIN — AMLODIPINE BESYLATE SCH MG: 10 TABLET ORAL at 08:35

## 2019-06-16 RX ADMIN — Medication SCH APP: at 16:54

## 2019-06-16 RX ADMIN — INSULIN HUMAN PRN UNIT: 100 INJECTION, SOLUTION PARENTERAL at 17:59

## 2019-06-16 RX ADMIN — Medication SCH EACH: at 16:52

## 2019-06-16 RX ADMIN — INSULIN HUMAN PRN UNIT: 100 INJECTION, SOLUTION PARENTERAL at 21:55

## 2019-06-16 RX ADMIN — CLOTRIMAZOLE SCH APPLIC: 1 CREAM TOPICAL at 08:36

## 2019-06-16 RX ADMIN — GUAIFENESIN AND DEXTROMETHORPHAN PRN ML: 100; 10 SYRUP ORAL at 03:20

## 2019-06-16 RX ADMIN — METOPROLOL TARTRATE SCH MG: 25 TABLET, FILM COATED ORAL at 21:00

## 2019-06-16 RX ADMIN — Medication SCH APPLIC: at 08:36

## 2019-06-16 RX ADMIN — LISINOPRIL SCH MG: 20 TABLET ORAL at 08:35

## 2019-06-16 RX ADMIN — METOPROLOL TARTRATE SCH MG: 25 TABLET, FILM COATED ORAL at 16:53

## 2019-06-16 RX ADMIN — Medication SCH EACH: at 16:53

## 2019-06-16 RX ADMIN — DEXTROSE MONOHYDRATE SCH MLS/HR: 50 INJECTION, SOLUTION INTRAVENOUS at 14:34

## 2019-06-16 RX ADMIN — Medication SCH APP: at 08:37

## 2019-06-16 RX ADMIN — ENOXAPARIN SODIUM SCH MG: 40 INJECTION SUBCUTANEOUS at 20:18

## 2019-06-16 RX ADMIN — CLOTRIMAZOLE SCH APPLIC: 1 CREAM TOPICAL at 16:54

## 2019-06-16 RX ADMIN — DEXTROSE MONOHYDRATE SCH MLS/HR: 50 INJECTION, SOLUTION INTRAVENOUS at 09:00

## 2019-06-16 RX ADMIN — INSULIN HUMAN PRN UNIT: 100 INJECTION, SOLUTION PARENTERAL at 06:44

## 2019-06-16 NOTE — NUR
MS RN NOTE



INFORMED CM REGARDING PT DAUGHTER REQUEST TO SPEAK TO CM ABOUT HOME HEALTH AND DME NEEDS 
UPON DISCHARGE.

## 2019-06-16 NOTE — NUR
MS RN NOTE



INFORMED PRIMARY HOSPITALIST,  REGARDING PT -140 FOR THE DURATION OF THE 
MORNING INCLUDING AFTER LISINOPRIL 20MG AND AMLODIPINE 10MG ADMINISTRATION. CURRENT BP: 
136/75. AWAITING RESPONSE.

## 2019-06-16 NOTE — NUR
MS RN NOTE:



PATIENT RESTING IN BED, NO ACUTE DISTRESS NOTED. BREATHING EVEN AND UNLABORED, NO SOB NOTED. 
IV TO LFA AND RFA IN PLACE.  PATIENT BLOOD SUGAR LEVEL 134MG/DL, PATIENT TO RECEIVE 2 UNITS 
OF INSULIN PER SLIDING SCALE. NO S/S OF HYPER/HYPOGLYCEMIA NOTED. BED LOCKED AND IN LOWEST 
POSITION, CALL LIGHT IN REACH. WILL ENDORSE TO DAY NURSE TO CONTINUE WITH PLAN OF CARE.

## 2019-06-16 NOTE — NUR
MS RN OPENING NOTE



RECEIVED PT IN BED, SEMI-FOWLERS. PT IS NON-VERBAL, EYES OPEN SPONTANEOUSLY TO VERBAL AND 
TACTILE STIMULI. NO ACUTE DISTRESS NOTED, BREATHING IS EVEN AND UNLABORED ON 3L NC AT THIS 
TIME. LEFT AC #20G AND R AC #24G IVS ARE SALINE LOCKED WITHOUT REDNESS OR SWELLING. 
ASPIRATION PRECAUTIONS MAINTAINED. BED IS LOCKED AND IN LOWEST POSITION, SIDE RAILS UP X3, 
BED ALARM ON, CALL LIGHT AND POSSESSIONS WITHIN REACH.

## 2019-06-16 NOTE — NUR
MS RN NOTE



INFORMED DIGNA EVANS FROM ID REGARDING  REQUESTING TO D/C ROCEPHIN AND BEGIN VANCO AND 
ZOSYN. SHE IS AT THE BEDSIDE TO ROUND ON PATIENT. PER NATHAN SHE "WILL TAKE CARE OF IT".

## 2019-06-16 NOTE — NUR
MS RN NOTE



INFORMED NURSING SUPERVISOR REGARDING PT HAVING NO IV ACCESS AND WAITING RESPONSE FROM 
PRIMARY HOSPITALIST

## 2019-06-16 NOTE — NUR
MS RN CLOSING NOTE



PT IN BED, SEMI-FOWLERS. PT IS NON-VERBAL, EYES OPEN SPONTANEOUSLY TO VERBAL AND TACTILE 
STIMULI. BREATHING IS EVEN AND UNLABORED ON 4L NC, CONTINUOS PULSE OX IN PLACE WITH CLINICAL 
ALARMS CHECKED. RIGHT UPPER ARM MIDLINE IS PATENT WITH GOOD BLOOD RETURN, CLEAN, DRY AND 
INTACT. ADLS AND WOUND CARE PROVIDED AS ORDERED AND PT ASSISTED TO TURN AND REPOSITION Q2H 
FOR THE DURATION OF THE SHIFT. ASPIRATION PRECAUTIONS MAINTAINED. BED IS LOCKED AND IN 
LOWEST POSITION, SIDE RAILS UP X3, BED ALARM ON, CALL LIGHT AND POSSESSIONS WITHIN REACH. 
WILL ENDORSE TO NIGHT SHIFT NURSE FOR CONTINUITY OF CARE.

## 2019-06-16 NOTE — NUR
RN MS OPENING NOTES 

RECEIVED PT IN BED, AWAKE ALERT ORIENTEDX1, NON VERBAL, DAUGHTER AT BEDSIDE FEEDING HER. 
BREATHING EVEN AND UNLABORED ON 2L O2 NC. IN NO APPARENT PAIN OR DISCOMFORT AT THIS TIME. R 
UA MIDLINE IN PLACE, PATENT AND FLUSHING. BED UP RIGHT IN LOWEST LOCKED POSITION, CALL LIGHT 
WITHIN REACH AT ALL TIMES. WILL CONTINUE TO MONITOR FREQUENTLY.

## 2019-06-16 NOTE — NUR
MS RN NOTE



BOTH LEFT AC #20G AND R AC #24G IVS NOT FLUSHING AND WITH NO BLOOD RETURN. ATTEMPTED IV 
INSERTX2 UNSUCCESSFULLY. REQUESTED ANOTHER STAFF MEMBER TO TRY, AWAITING ARRIVAL.

## 2019-06-16 NOTE — NUR
MS RN NOTE



PER DOPPLER TECH, NO EVIDENCE OF DVT IN THE BILATERAL LOWER EXTREMITIES OR IN THE LEFT UPPER 
EXTREMITY.

## 2019-06-16 NOTE — NUR
MS RN NOTE



PER PRIMARY HOSPITALIST DR.TIM REYES IBARRA TO ADMIN ROCEPHIN 1 G NOW. INFORMED NAZ IN 
PHARMACY.

## 2019-06-16 NOTE — NUR
MS RN NOTE



INFORMED PRIMARY HOSPITALIST THAT ID ORDERED MERREM 1G Q12H AND KEPT ROCEPHIN 1G Q24H. 
ORDERS RECEIVED AND VERIFIED TO D/C ROCEPHIN AND START VANCO PER PHARMACY. INFORMED 
PHARMACIST OF NEED FOR VANCO DOSING THAT THAT PT IS A HARD STICK WITH ONLY ONE LINE.

## 2019-06-16 NOTE — NUR
MS RN NOTE



OBTAINED ORDER FOR MIDLINE INSERTION FROM PRIMARY HOSPITALIST DR.TIM CHAMBERS, INFORMED NURSING 
SUPERVISOR.

## 2019-06-17 VITALS — DIASTOLIC BLOOD PRESSURE: 63 MMHG | SYSTOLIC BLOOD PRESSURE: 134 MMHG

## 2019-06-17 VITALS — SYSTOLIC BLOOD PRESSURE: 138 MMHG | DIASTOLIC BLOOD PRESSURE: 76 MMHG

## 2019-06-17 VITALS — DIASTOLIC BLOOD PRESSURE: 65 MMHG | SYSTOLIC BLOOD PRESSURE: 152 MMHG

## 2019-06-17 LAB
BASOPHILS # BLD AUTO: 0 /CMM (ref 0–0.2)
BASOPHILS NFR BLD AUTO: 0 % (ref 0–2)
BUN SERPL-MCNC: 18 MG/DL (ref 7–18)
CALCIUM SERPL-MCNC: 9.1 MG/DL (ref 8.5–10.1)
CHLORIDE SERPL-SCNC: 105 MMOL/L (ref 98–107)
CO2 SERPL-SCNC: 38 MMOL/L (ref 21–32)
CREAT SERPL-MCNC: 0.4 MG/DL (ref 0.6–1.3)
EOSINOPHIL NFR BLD AUTO: 0.1 % (ref 0–6)
GLUCOSE SERPL-MCNC: 133 MG/DL (ref 74–106)
HCT VFR BLD AUTO: 30 % (ref 33–45)
HGB BLD-MCNC: 9.9 G/DL (ref 11.5–14.8)
LYMPHOCYTES NFR BLD AUTO: 0.2 /CMM (ref 0.8–4.8)
LYMPHOCYTES NFR BLD AUTO: 3.5 % (ref 20–44)
LYMPHOCYTES NFR BLD MANUAL: 8 % (ref 16–48)
MAGNESIUM SERPL-MCNC: 1.8 MG/DL (ref 1.8–2.4)
MCHC RBC AUTO-ENTMCNC: 33 G/DL (ref 31–36)
MCV RBC AUTO: 91 FL (ref 82–100)
MONOCYTES NFR BLD AUTO: 0.2 /CMM (ref 0.1–1.3)
MONOCYTES NFR BLD AUTO: 2.5 % (ref 2–12)
MONOCYTES NFR BLD MANUAL: 2 % (ref 0–11)
NEUTROPHILS # BLD AUTO: 5.6 /CMM (ref 1.8–8.9)
NEUTROPHILS NFR BLD AUTO: 93.9 % (ref 43–81)
NEUTS BAND NFR BLD MANUAL: 24 % (ref 0–5)
NEUTS SEG NFR BLD MANUAL: 66 % (ref 42–76)
PHOSPHATE SERPL-MCNC: 2.1 MG/DL (ref 2.5–4.9)
PLATELET # BLD AUTO: 106 /CMM (ref 150–450)
POTASSIUM SERPL-SCNC: 3 MMOL/L (ref 3.5–5.1)
RBC # BLD AUTO: 3.27 MIL/UL (ref 4–5.2)
SODIUM SERPL-SCNC: 147 MMOL/L (ref 136–145)
WBC NRBC COR # BLD AUTO: 5.9 K/UL (ref 4.3–11)

## 2019-06-17 RX ADMIN — Medication SCH EACH: at 16:28

## 2019-06-17 RX ADMIN — Medication SCH APP: at 09:06

## 2019-06-17 RX ADMIN — CLOTRIMAZOLE SCH APPLIC: 1 CREAM TOPICAL at 09:06

## 2019-06-17 RX ADMIN — METOPROLOL TARTRATE SCH MG: 25 TABLET, FILM COATED ORAL at 09:05

## 2019-06-17 RX ADMIN — Medication SCH APPLIC: at 09:06

## 2019-06-17 RX ADMIN — MEROPENEM SCH MLS/HR: 1 INJECTION INTRAVENOUS at 18:00

## 2019-06-17 RX ADMIN — Medication SCH APP: at 16:29

## 2019-06-17 RX ADMIN — INSULIN HUMAN PRN UNIT: 100 INJECTION, SOLUTION PARENTERAL at 23:11

## 2019-06-17 RX ADMIN — INSULIN HUMAN PRN UNIT: 100 INJECTION, SOLUTION PARENTERAL at 08:07

## 2019-06-17 RX ADMIN — DEXTROSE MONOHYDRATE SCH MLS/HR: 50 INJECTION, SOLUTION INTRAVENOUS at 21:05

## 2019-06-17 RX ADMIN — Medication SCH EACH: at 17:34

## 2019-06-17 RX ADMIN — Medication SCH EACH: at 08:04

## 2019-06-17 RX ADMIN — Medication SCH MG: at 16:28

## 2019-06-17 RX ADMIN — CLOTRIMAZOLE SCH APPLIC: 1 CREAM TOPICAL at 16:29

## 2019-06-17 RX ADMIN — Medication SCH EACH: at 12:25

## 2019-06-17 RX ADMIN — AMLODIPINE BESYLATE SCH MG: 10 TABLET ORAL at 09:05

## 2019-06-17 RX ADMIN — Medication SCH EACH: at 09:04

## 2019-06-17 RX ADMIN — INSULIN HUMAN PRN UNIT: 100 INJECTION, SOLUTION PARENTERAL at 18:25

## 2019-06-17 RX ADMIN — Medication SCH EACH: at 23:10

## 2019-06-17 RX ADMIN — MEROPENEM SCH MLS/HR: 1 INJECTION INTRAVENOUS at 05:53

## 2019-06-17 RX ADMIN — METOPROLOL TARTRATE SCH MG: 25 TABLET, FILM COATED ORAL at 21:06

## 2019-06-17 RX ADMIN — LISINOPRIL SCH MG: 20 TABLET ORAL at 09:04

## 2019-06-17 NOTE — NUR
RN MS OPENING NOTES 

RECEIVED PT IN BED, AWAKE ALERT ORIENTEDX1, NON VERBAL, DAUGHTER AT BEDSIDE. BREATHING EVEN 
AND UNLABORED ON 3L O2 NC. IN NO APPARENT PAIN OR DISCOMFORT AT THIS TIME. R UA MIDLINE IN 
PLACE 1/2 NS @75ML/HR . BED UP RIGHT IN LOWEST LOCKED POSITION, CALL LIGHT WITHIN REACH AT 
ALL TIMES. WILL CONTINUE TO MONITOR FREQUENTLY.

## 2019-06-17 NOTE — NUR
MS/RN  NOTE





BLOOD SUGAR 217 AND 4 UNITS REGULAR INSULIN IS GIVEN PER ORDER. WITNESSED BY YAJAIRA NAYAK.

## 2019-06-17 NOTE — NUR
MS/RN  NOTE



THE PATIENT IS SEEN BY DR MONI CHAMBERS AND RECEIVED NEW ORDER OF COLACE 100 MG BID PO. READ 
BACK, VERIFIED. NOTED AND CARRIED OUT.

## 2019-06-17 NOTE — NUR
RN MS CLOSING NOTES 

PT REMAINS IN BED, AWAKE ALERT ORIENTEDX1, NON VERBAL. BREATHING EVEN AND UNLABORED ON 2L O2 
NC. IN NO APPARENT PAIN OR DISCOMFORT AT THIS TIME. R UA MIDLINE IN PLACE, PATENT AND 
FLUSHING. BG , NO COVERAGE. SACRAL WOUND REDRESSED, PT REPOSITIONED Q2HRS.BED UP RIGHT 
IN LOWEST LOCKED POSITION, CALL LIGHT WITHIN REACH AT ALL TIMES. WILL ENDORSE TO DAY SHIFT 
FOR JOHNNY. BEDSIDE COMMODE IN ROOM AS REQUESTED BY DAUGHTER.

## 2019-06-17 NOTE — NUR
MS/RN  NOTE



THE PATIENT ALERT AND ORIENTED X1. RESPIRATION REGULAR AND UNLABORED. ON OXYGEN AT 4L/MIN 
VIA NASAL CANNULA. DORINA MIDLINE PATENT AND SALINE LOCKED. BED LOW AND LOCKED. SIDE RAILS UP 
X3. CALL LIGHT WITHIN REACH. WILL CONTINUE TO MONITOR.

## 2019-06-17 NOTE — NUR
MS/RN  CLOSING NOTE



THE PATIENT ALERT AND ORIENTED X1. RECEIVING OXYGEN AT 3L/MIN VIA NASAL CANNULA AND 
SATURATION IS AT 98%. RESPIRATION REGULAR AND UNLABORED. DENIES SOB. DENIES PAIN. THE 
PATIENT IS IN NO APPARENT DISTRESS. DORINA MIDLINE PATENT AND 0.45 NS INFUSING AT 75ML/HR AND 
NO S/S INFILTRATION NOTED. GOOD AND GENTLE SKIN CARE RENDERED. KEPT CLEAN, DRY AND 
COMFORTABLE. TURNED AND REPOSITIONED Q2HR AND AS NEEDED. BED LOW AND LOCKED. SIDE RAILS UP 
X3. CALL LIGHT WITHIN REACH. WILL ENDORSE TO NIGHT SHIFT.

## 2019-06-18 VITALS — SYSTOLIC BLOOD PRESSURE: 149 MMHG | DIASTOLIC BLOOD PRESSURE: 85 MMHG

## 2019-06-18 VITALS — SYSTOLIC BLOOD PRESSURE: 153 MMHG | DIASTOLIC BLOOD PRESSURE: 79 MMHG

## 2019-06-18 VITALS — DIASTOLIC BLOOD PRESSURE: 98 MMHG | SYSTOLIC BLOOD PRESSURE: 155 MMHG

## 2019-06-18 LAB
BASOPHILS # BLD AUTO: 0 /CMM (ref 0–0.2)
BASOPHILS NFR BLD AUTO: 0.4 % (ref 0–2)
BUN SERPL-MCNC: 17 MG/DL (ref 7–18)
CALCIUM SERPL-MCNC: 9.2 MG/DL (ref 8.5–10.1)
CHLORIDE SERPL-SCNC: 106 MMOL/L (ref 98–107)
CO2 SERPL-SCNC: 35 MMOL/L (ref 21–32)
CREAT SERPL-MCNC: 0.5 MG/DL (ref 0.6–1.3)
EOSINOPHIL NFR BLD AUTO: 1.9 % (ref 0–6)
GLUCOSE SERPL-MCNC: 211 MG/DL (ref 74–106)
HCT VFR BLD AUTO: 32 % (ref 33–45)
HGB BLD-MCNC: 10.6 G/DL (ref 11.5–14.8)
LYMPHOCYTES NFR BLD AUTO: 0.1 /CMM (ref 0.8–4.8)
LYMPHOCYTES NFR BLD AUTO: 1.7 % (ref 20–44)
MAGNESIUM SERPL-MCNC: 1.7 MG/DL (ref 1.8–2.4)
MCHC RBC AUTO-ENTMCNC: 33 G/DL (ref 31–36)
MCV RBC AUTO: 93 FL (ref 82–100)
MONOCYTES NFR BLD AUTO: 0.2 /CMM (ref 0.1–1.3)
MONOCYTES NFR BLD AUTO: 2.5 % (ref 2–12)
NEUTROPHILS # BLD AUTO: 7.1 /CMM (ref 1.8–8.9)
NEUTROPHILS NFR BLD AUTO: 93.5 % (ref 43–81)
PHOSPHATE SERPL-MCNC: 2.4 MG/DL (ref 2.5–4.9)
PLATELET # BLD AUTO: 121 /CMM (ref 150–450)
POTASSIUM SERPL-SCNC: 3.5 MMOL/L (ref 3.5–5.1)
RBC # BLD AUTO: 3.5 MIL/UL (ref 4–5.2)
SODIUM SERPL-SCNC: 147 MMOL/L (ref 136–145)
WBC NRBC COR # BLD AUTO: 7.6 K/UL (ref 4.3–11)

## 2019-06-18 PROCEDURE — 0JB70ZZ EXCISION OF BACK SUBCUTANEOUS TISSUE AND FASCIA, OPEN APPROACH: ICD-10-PCS

## 2019-06-18 RX ADMIN — CLOTRIMAZOLE SCH APPLIC: 1 CREAM TOPICAL at 08:31

## 2019-06-18 RX ADMIN — Medication SCH EACH: at 22:41

## 2019-06-18 RX ADMIN — Medication SCH APP: at 08:30

## 2019-06-18 RX ADMIN — Medication SCH APP: at 17:44

## 2019-06-18 RX ADMIN — Medication SCH MG: at 17:44

## 2019-06-18 RX ADMIN — Medication SCH EACH: at 17:44

## 2019-06-18 RX ADMIN — DEXTROSE MONOHYDRATE SCH MLS/HR: 50 INJECTION, SOLUTION INTRAVENOUS at 08:29

## 2019-06-18 RX ADMIN — MEROPENEM SCH MLS/HR: 1 INJECTION INTRAVENOUS at 17:52

## 2019-06-18 RX ADMIN — INSULIN HUMAN PRN UNIT: 100 INJECTION, SOLUTION PARENTERAL at 17:40

## 2019-06-18 RX ADMIN — Medication SCH MG: at 08:00

## 2019-06-18 RX ADMIN — INSULIN HUMAN PRN UNIT: 100 INJECTION, SOLUTION PARENTERAL at 22:43

## 2019-06-18 RX ADMIN — INSULIN HUMAN PRN UNIT: 100 INJECTION, SOLUTION PARENTERAL at 12:16

## 2019-06-18 RX ADMIN — MAGNESIUM SULFATE IN DEXTROSE SCH MLS/HR: 10 INJECTION, SOLUTION INTRAVENOUS at 12:15

## 2019-06-18 RX ADMIN — MEROPENEM SCH MLS/HR: 1 INJECTION INTRAVENOUS at 05:48

## 2019-06-18 RX ADMIN — METOPROLOL TARTRATE SCH MG: 25 TABLET, FILM COATED ORAL at 08:00

## 2019-06-18 RX ADMIN — Medication SCH EACH: at 08:00

## 2019-06-18 RX ADMIN — DEXTROSE MONOHYDRATE SCH MLS/HR: 50 INJECTION, SOLUTION INTRAVENOUS at 05:46

## 2019-06-18 RX ADMIN — Medication SCH EACH: at 17:41

## 2019-06-18 RX ADMIN — Medication SCH EACH: at 12:15

## 2019-06-18 RX ADMIN — DEXTROSE MONOHYDRATE SCH MLS/HR: 50 INJECTION, SOLUTION INTRAVENOUS at 20:07

## 2019-06-18 RX ADMIN — MAGNESIUM SULFATE IN DEXTROSE SCH MLS/HR: 10 INJECTION, SOLUTION INTRAVENOUS at 14:18

## 2019-06-18 RX ADMIN — LISINOPRIL SCH MG: 20 TABLET ORAL at 08:01

## 2019-06-18 RX ADMIN — AMLODIPINE BESYLATE SCH MG: 10 TABLET ORAL at 08:00

## 2019-06-18 RX ADMIN — METOPROLOL TARTRATE SCH MG: 25 TABLET, FILM COATED ORAL at 20:35

## 2019-06-18 RX ADMIN — INSULIN HUMAN PRN UNIT: 100 INJECTION, SOLUTION PARENTERAL at 06:51

## 2019-06-18 RX ADMIN — Medication SCH EACH: at 06:51

## 2019-06-18 RX ADMIN — CLOTRIMAZOLE SCH APPLIC: 1 CREAM TOPICAL at 17:44

## 2019-06-18 RX ADMIN — Medication SCH APPLIC: at 08:31

## 2019-06-18 NOTE — NUR
RN MS OPENING NOTES 

RECEIVED PT IN BED, AWAKE ALERT ORIENTEDX1, NON VERBAL, DAUGHTER AT BEDSIDE. BREATHING EVEN 
AND UNLABORED ON 2L O2 NC. IN NO APPARENT PAIN OR DISCOMFORT AT THIS TIME. R UA MIDLINE IN 
PLACE. BED UP RIGHT IN LOWEST LOCKED POSITION, CALL LIGHT WITHIN REACH AT ALL TIMES. WILL 
CONTINUE TO MONITOR FREQUENTLY.

## 2019-06-18 NOTE — NUR
RN MS CLOSING NOTES 

PT REMAINS IN BED, AWAKE ALERT ORIENTEDX1, NON VERBAL. BREATHING EVEN AND UNLABORED ON 2L O2 
NC. IN NO APPARENT PAIN OR DISCOMFORT AT THIS TIME. R UA MIDLINE IN PLACE, PATENT AND 
FLUSHING. SACRAL WOUND REDRESSED, PT REPOSITIONED Q2HRS.BED UP RIGHT IN LOWEST LOCKED 
POSITION, CALL LIGHT WITHIN REACH AT ALL TIMES. WILL ENDORSE TO DAY SHIFT FOR JOHNNY.

## 2019-06-18 NOTE — NUR
METOPROLOL 



PER PT'S DAUGHTER REQUEST, PM DOSE OF METOPROLOL HELD. PT'S HEART RATE GOING DOWN TO THE 40S 
DURING THE DAY, PER DAUGHTER.

## 2019-06-18 NOTE — NUR
MS/RN  CLOSING NOTE



THE PATIENT ALERT AND ORIENTED X1. NON-VERBAL BUT ABLE TO FOLLOW WITH HER EYES. RECEIVING 
OXYGEN AT 1L/MIN AND SATURATION IS AT 96%. RESPIRATION REGULAR AND UNLABORED. IN NO APPARENT 
DISTRESS. DORINA MIDLINE PATENT AND IV ANTIBIOTIC INFUSING WITH NO S/S INFILTRATION. BED LOW 
AND LOCK.CALL  LIGHT WITHIN REACH. WILL ENDORSE TO NIGHT SHIFT.

## 2019-06-18 NOTE — NUR
MS/RN   OPENING NOTE



THE PATIENT IS RECEIVED IN BED. AWAKE. PATIENT ALERT AND ORIENTED X1. PATIENT NON-VERBAL BUT 
ABLE TO FOLLOW WITH HER EYES. RECEIVING OXYGEN AT 2L/MIN VIA NASAL CANNULA. RESPIRATION 
REGULAR AND UNLABORED. IN NO APPARENT DISTRESS. DORINA MIDLINE PATENT AND SALINE LOCKED. BED 
LOW AND LOCKED. SIDE RAILS UP X3. CALL LIGHT WITHIN REACH. WILL CONTINUE TO MONITOR.

## 2019-06-18 NOTE — NUR
MS/RN  NOTE



THE PATIENT WAS SEEN BY TAYLA AND NEW ORDER OF THERAHONEY TO BE APPLIED ON SACRAL 
ULCER. NOTED AND CARRIED OUT.

## 2019-06-19 VITALS — DIASTOLIC BLOOD PRESSURE: 82 MMHG | SYSTOLIC BLOOD PRESSURE: 149 MMHG

## 2019-06-19 VITALS — SYSTOLIC BLOOD PRESSURE: 138 MMHG | DIASTOLIC BLOOD PRESSURE: 97 MMHG

## 2019-06-19 VITALS — SYSTOLIC BLOOD PRESSURE: 149 MMHG | DIASTOLIC BLOOD PRESSURE: 94 MMHG

## 2019-06-19 LAB
BASE EXCESS BLDA CALC-SCNC: 16.3 MMOL/L
BUN SERPL-MCNC: 17 MG/DL (ref 7–18)
CALCIUM SERPL-MCNC: 8.7 MG/DL (ref 8.5–10.1)
CHLORIDE SERPL-SCNC: 108 MMOL/L (ref 98–107)
CO2 SERPL-SCNC: 39 MMOL/L (ref 21–32)
CREAT SERPL-MCNC: 0.5 MG/DL (ref 0.6–1.3)
DO-HGB MFR BLDA: 199.6 MMHG
GLUCOSE SERPL-MCNC: 216 MG/DL (ref 74–106)
INHALED O2 CONCENTRATION: 50 %
INHALED O2 FLOW RATE: 12 L/MIN (ref 0–30)
MAGNESIUM SERPL-MCNC: 1.9 MG/DL (ref 1.8–2.4)
PCO2 TEMP ADJ BLDA: 52 MMHG (ref 35–45)
PH TEMP ADJ BLDA: 7.52 [PH] (ref 7.35–7.45)
PO2 TEMP ADJ BLDA: 98.4 MMHG (ref 75–100)
POTASSIUM SERPL-SCNC: 2.2 MMOL/L (ref 3.5–5.1)
SAO2 % BLDA: 97.1 % (ref 92–98.5)
SODIUM SERPL-SCNC: 152 MMOL/L (ref 136–145)
VENTILATION MODE VENT: (no result)

## 2019-06-19 RX ADMIN — SODIUM CHLORIDE, SODIUM LACTATE, POTASSIUM CHLORIDE, AND CALCIUM CHLORIDE PRN MLS/HR: .6; .31; .03; .02 INJECTION, SOLUTION INTRAVENOUS at 18:46

## 2019-06-19 RX ADMIN — AMLODIPINE BESYLATE SCH MG: 10 TABLET ORAL at 08:13

## 2019-06-19 RX ADMIN — CLOTRIMAZOLE SCH APPLIC: 1 CREAM TOPICAL at 17:22

## 2019-06-19 RX ADMIN — INSULIN HUMAN PRN UNIT: 100 INJECTION, SOLUTION PARENTERAL at 12:18

## 2019-06-19 RX ADMIN — Medication SCH EACH: at 22:41

## 2019-06-19 RX ADMIN — Medication SCH EACH: at 11:17

## 2019-06-19 RX ADMIN — GUAIFENESIN AND DEXTROMETHORPHAN PRN ML: 100; 10 SYRUP ORAL at 20:07

## 2019-06-19 RX ADMIN — INSULIN HUMAN PRN UNIT: 100 INJECTION, SOLUTION PARENTERAL at 17:29

## 2019-06-19 RX ADMIN — MEROPENEM SCH MLS/HR: 1 INJECTION INTRAVENOUS at 05:03

## 2019-06-19 RX ADMIN — Medication SCH MG: at 17:19

## 2019-06-19 RX ADMIN — SPIRONOLACTONE SCH MG: 25 TABLET, FILM COATED ORAL at 15:30

## 2019-06-19 RX ADMIN — Medication SCH MG: at 08:13

## 2019-06-19 RX ADMIN — Medication SCH APP: at 17:19

## 2019-06-19 RX ADMIN — Medication SCH APP: at 08:14

## 2019-06-19 RX ADMIN — Medication SCH EACH: at 17:19

## 2019-06-19 RX ADMIN — POTASSIUM CHLORIDE SCH MLS/HR: 200 INJECTION, SOLUTION INTRAVENOUS at 12:17

## 2019-06-19 RX ADMIN — DOXYCYCLINE HYCLATE SCH MG: 100 TABLET, COATED ORAL at 20:07

## 2019-06-19 RX ADMIN — Medication SCH EACH: at 08:13

## 2019-06-19 RX ADMIN — POTASSIUM CHLORIDE SCH MLS/HR: 200 INJECTION, SOLUTION INTRAVENOUS at 13:20

## 2019-06-19 RX ADMIN — Medication SCH APPLIC: at 08:15

## 2019-06-19 RX ADMIN — POTASSIUM CHLORIDE SCH MLS/HR: 200 INJECTION, SOLUTION INTRAVENOUS at 11:14

## 2019-06-19 RX ADMIN — INSULIN HUMAN PRN UNIT: 100 INJECTION, SOLUTION PARENTERAL at 22:41

## 2019-06-19 RX ADMIN — Medication SCH EACH: at 06:37

## 2019-06-19 RX ADMIN — MEROPENEM SCH MLS/HR: 1 INJECTION INTRAVENOUS at 17:22

## 2019-06-19 RX ADMIN — Medication SCH EACH: at 17:21

## 2019-06-19 RX ADMIN — LISINOPRIL SCH MG: 20 TABLET ORAL at 08:13

## 2019-06-19 RX ADMIN — METOPROLOL TARTRATE SCH MG: 25 TABLET, FILM COATED ORAL at 08:52

## 2019-06-19 RX ADMIN — POTASSIUM CHLORIDE SCH MLS/HR: 200 INJECTION, SOLUTION INTRAVENOUS at 14:23

## 2019-06-19 RX ADMIN — INSULIN HUMAN PRN UNIT: 100 INJECTION, SOLUTION PARENTERAL at 06:39

## 2019-06-19 RX ADMIN — DOXYCYCLINE HYCLATE SCH MG: 100 TABLET, COATED ORAL at 08:13

## 2019-06-19 RX ADMIN — CLOTRIMAZOLE SCH APPLIC: 1 CREAM TOPICAL at 08:15

## 2019-06-19 NOTE — NUR
MS RN NOTE:



PATIENT RESTING IN BED, NO ACUTE DISTRESS NOTED, DAUGHTER AT BEDSIDE. BREATHING EVEN AND 
UNLABORED, NO SOB NOTED. MIDLINE TO DORINA IN PLACE, INFUSING D5W WITH 40MEQ KCL AT 100ML/HR. 
NO S/S OF HYPER/HYPOGLYCEMIA NOTED. BED LOCKED AND IN LOWEST POSITION, CALL LIGHT IN REACH. 
WILL CONTINUE TO MONITOR.

## 2019-06-19 NOTE — NUR
MS RN NOTE



PT DAUGHTER REFUSED METOPROLOL. EDUCATION PROVIDED, PT DAUGHTER STILL REFUSED. WILL INFORM 
PRIMARY HOSPITALIST OF HER REQUEST TO D/C MED ENTIRELY.

## 2019-06-19 NOTE — NUR
MS RN NOTE



PT BEGAN TO COUGH WHEN BEING FED IN HIGH FOWLERS POSITION, SP02 DECREASED TO 83% ON 2L NC. 
PROVIDED ORAL SUCTION, SWITCHED PT TO SIMPLE MASK 8L, SP02 INCREASED TO 93%. RT AT THE 
BEDSIDE FOR ASSESSMENT AND DEEP SUCTION. INFORMED PRIMARY HOSPITALIST CARLOS JUNIOR NP AND 
RECEIVED ORDERS FOR STAT ABG, PER CARLOS BAXTER CHEST X-RAY AT THIS TIME. INFORMED RT. DAUGHTER 
AT THE BEDSIDE.

## 2019-06-19 NOTE — NUR
RN MS CLOSING NOTES 

PT REMAINS IN BED, AWAKE ALERT ORIENTEDX1, NON VERBAL. BREATHING EVEN AND UNLABORED ON 1L O2 
NC. IN NO APPARENT PAIN OR DISCOMFORT AT THIS TIME. R UA MIDLINE IN PLACE, PATENT AND 
FLUSHING. SACRAL WOUND REDRESSED, PT REPOSITIONED Q2HRS.BED UP RIGHT IN LOWEST LOCKED 
POSITION, CALL LIGHT WITHIN REACH AT ALL TIMES. WILL ENDORSE TO DAY SHIFT FOR JOHNNY.

## 2019-06-19 NOTE — NUR
MS RN NOTE



RT COMPLETED DEEP SUCTION, PLACED PT BACK ON SIMPLE MASK 12L, SP02 96%, PER RT HE WILL BE 
BACK SHORTLY TO COMPLETE ABG.

## 2019-06-19 NOTE — NUR
MS RN NOTE



REQUESTED D5W WITH 40MEW KCL FROM PHARMACY, PER MICHAEL STILL BEING MADE, AWAITING RECEIPT.

## 2019-06-19 NOTE — NUR
MS RN OPENING NOTE



RECEIVED PT IN BED, SEMI-FOWLERS. PT IS NON-VERBAL, EYES OPEN SPONTANEOUSLY TO VERBAL AND 
TACTILE STIMULI. NO ACUTE DISTRESS NOTED, BREATHING IS EVEN AND UNLABORED ON 3L NC AT THIS 
TIME. RIGHT UPPER ARM MIDLINE IS PATENT WITH GOOD BLOOD RETURN, CLEAN, DRY AND INTACT. 
CONTINUOS PULSE OX IN PLACE, ALL CLINICAL ALARMS CHECKED. ASPIRATION PRECAUTIONS MAINTAINED. 
BED IS LOCKED AND IN LOWEST POSITION, SIDE RAILS UP X3, BED ALARM ON, CALL LIGHT AND 
POSSESSIONS WITHIN REACH.

## 2019-06-19 NOTE — NUR
MS RN NOTE



ROUNDED WITH PRIMARY HOSPITALIST CARLOS JUNIOR NP. PROVIDED NOTE FROM DAUGHTER PER HER 
REQUEST.

## 2019-06-19 NOTE — NUR
MS RN NOTE:



PATIENT BLOOD SUGAR LEVEL 265MG/DL, PATIENT TO RECEIVE 6 UNITS OF INSULIN PER SLIDING SCALE. 
NO S/S OF HYPERGLYCEMIA NOTED. WILL CONTINUE TO MONITOR.

## 2019-06-20 VITALS — DIASTOLIC BLOOD PRESSURE: 74 MMHG | SYSTOLIC BLOOD PRESSURE: 145 MMHG

## 2019-06-20 VITALS — SYSTOLIC BLOOD PRESSURE: 160 MMHG | DIASTOLIC BLOOD PRESSURE: 72 MMHG

## 2019-06-20 VITALS — SYSTOLIC BLOOD PRESSURE: 169 MMHG | DIASTOLIC BLOOD PRESSURE: 84 MMHG

## 2019-06-20 LAB
BASE EXCESS BLDA CALC-SCNC: 18.5 MMOL/L
BASOPHILS # BLD AUTO: 0 /CMM (ref 0–0.2)
BASOPHILS NFR BLD AUTO: 0.1 % (ref 0–2)
BUN SERPL-MCNC: 16 MG/DL (ref 7–18)
CALCIUM SERPL-MCNC: 9 MG/DL (ref 8.5–10.1)
CHLORIDE SERPL-SCNC: 109 MMOL/L (ref 98–107)
CO2 SERPL-SCNC: 42 MMOL/L (ref 21–32)
CREAT SERPL-MCNC: 0.4 MG/DL (ref 0.6–1.3)
DO-HGB MFR BLDA: 41.6 MMHG
EOSINOPHIL NFR BLD AUTO: 0 % (ref 0–6)
GLUCOSE SERPL-MCNC: 207 MG/DL (ref 74–106)
HCT VFR BLD AUTO: 25 % (ref 33–45)
HGB BLD-MCNC: 8.2 G/DL (ref 11.5–14.8)
INHALED O2 CONCENTRATION: 36 %
LYMPHOCYTES NFR BLD AUTO: 0.2 /CMM (ref 0.8–4.8)
LYMPHOCYTES NFR BLD AUTO: 3 % (ref 20–44)
LYMPHOCYTES NFR BLD MANUAL: 4 % (ref 16–48)
MCHC RBC AUTO-ENTMCNC: 33 G/DL (ref 31–36)
MCV RBC AUTO: 91 FL (ref 82–100)
MONOCYTES NFR BLD AUTO: 0.3 /CMM (ref 0.1–1.3)
MONOCYTES NFR BLD AUTO: 4.4 % (ref 2–12)
MONOCYTES NFR BLD MANUAL: 5 % (ref 0–11)
NEUTROPHILS # BLD AUTO: 6.2 /CMM (ref 1.8–8.9)
NEUTROPHILS NFR BLD AUTO: 92.5 % (ref 43–81)
NEUTS BAND NFR BLD MANUAL: 2 % (ref 0–5)
NEUTS SEG NFR BLD MANUAL: 89 % (ref 42–76)
PCO2 TEMP ADJ BLDA: 51.7 MMHG (ref 35–45)
PH TEMP ADJ BLDA: 7.54 [PH] (ref 7.35–7.45)
PLATELET # BLD AUTO: 103 /CMM (ref 150–450)
PO2 TEMP ADJ BLDA: 155.1 MMHG (ref 75–100)
POTASSIUM SERPL-SCNC: 3.3 MMOL/L (ref 3.5–5.1)
RBC # BLD AUTO: 2.71 MIL/UL (ref 4–5.2)
RENIN PLAS-CCNC: 0.72 NG/ML/HR (ref 0.17–5.38)
SAO2 % BLDA: 98.3 % (ref 92–98.5)
SODIUM SERPL-SCNC: 151 MMOL/L (ref 136–145)
WBC NRBC COR # BLD AUTO: 6.7 K/UL (ref 4.3–11)

## 2019-06-20 RX ADMIN — AMLODIPINE BESYLATE SCH MG: 10 TABLET ORAL at 08:20

## 2019-06-20 RX ADMIN — Medication SCH EACH: at 06:36

## 2019-06-20 RX ADMIN — CLOTRIMAZOLE SCH APPLIC: 1 CREAM TOPICAL at 09:53

## 2019-06-20 RX ADMIN — INSULIN HUMAN PRN UNIT: 100 INJECTION, SOLUTION PARENTERAL at 18:08

## 2019-06-20 RX ADMIN — INSULIN HUMAN PRN UNIT: 100 INJECTION, SOLUTION PARENTERAL at 11:46

## 2019-06-20 RX ADMIN — Medication SCH EACH: at 21:22

## 2019-06-20 RX ADMIN — Medication SCH MG: at 17:53

## 2019-06-20 RX ADMIN — DOXYCYCLINE HYCLATE SCH MG: 100 TABLET, COATED ORAL at 21:22

## 2019-06-20 RX ADMIN — SODIUM CHLORIDE, SODIUM LACTATE, POTASSIUM CHLORIDE, AND CALCIUM CHLORIDE PRN MLS/HR: .6; .31; .03; .02 INJECTION, SOLUTION INTRAVENOUS at 18:00

## 2019-06-20 RX ADMIN — INSULIN HUMAN PRN UNIT: 100 INJECTION, SOLUTION PARENTERAL at 06:38

## 2019-06-20 RX ADMIN — Medication SCH EACH: at 08:19

## 2019-06-20 RX ADMIN — Medication SCH EACH: at 17:53

## 2019-06-20 RX ADMIN — MEROPENEM SCH MLS/HR: 1 INJECTION INTRAVENOUS at 17:55

## 2019-06-20 RX ADMIN — GUAIFENESIN AND DEXTROMETHORPHAN PRN ML: 100; 10 SYRUP ORAL at 13:47

## 2019-06-20 RX ADMIN — INSULIN HUMAN PRN UNIT: 100 INJECTION, SOLUTION PARENTERAL at 21:26

## 2019-06-20 RX ADMIN — Medication SCH EACH: at 11:49

## 2019-06-20 RX ADMIN — Medication SCH APPLIC: at 17:56

## 2019-06-20 RX ADMIN — SODIUM CHLORIDE, SODIUM LACTATE, POTASSIUM CHLORIDE, AND CALCIUM CHLORIDE PRN MLS/HR: .6; .31; .03; .02 INJECTION, SOLUTION INTRAVENOUS at 05:12

## 2019-06-20 RX ADMIN — Medication SCH MG: at 08:19

## 2019-06-20 RX ADMIN — LISINOPRIL SCH MG: 20 TABLET ORAL at 08:20

## 2019-06-20 RX ADMIN — POTASSIUM CHLORIDE SCH MEQ: 1.5 POWDER, FOR SOLUTION ORAL at 13:40

## 2019-06-20 RX ADMIN — SPIRONOLACTONE SCH MG: 25 TABLET, FILM COATED ORAL at 08:20

## 2019-06-20 RX ADMIN — Medication SCH APPLIC: at 09:53

## 2019-06-20 RX ADMIN — DOXYCYCLINE HYCLATE SCH MG: 100 TABLET, COATED ORAL at 08:19

## 2019-06-20 RX ADMIN — MEROPENEM SCH MLS/HR: 1 INJECTION INTRAVENOUS at 05:12

## 2019-06-20 RX ADMIN — Medication SCH APPLIC: at 08:29

## 2019-06-20 RX ADMIN — GUAIFENESIN AND DEXTROMETHORPHAN PRN ML: 100; 10 SYRUP ORAL at 21:23

## 2019-06-20 NOTE — NUR
RN OPENING NOTES

PT AWAKE AND RESTING IN BED. PT NONVERBAL. NO APPARENT S/S OF PAIN, DISTRESS OR SOB AT THIS 
TIME. PT HAS RIGHT UPPER ARM MIDLINE INTACT AND RUNNING D5W WITH 40 MEQ KCL AT 100ML/HR. PT 
TOLERATING FLUIDS WELL. PT ON CONTINUOUS PULSE OX. SAFETY PRECAUTIONS IN PLACE, BED IN 
LOWEST LOCKED POSITION, X3 SIDE RAILS UP AND CALL LIGHT WITHIN REACH. WILL CONTINUE TO 
MONITOR.

## 2019-06-20 NOTE — NUR
MS RN



RECEIVE PT IN BED A/O X1 NON VERBAL DAUGHTER AT BEDSIDE STABLE, NO S/S OF DISTRESS, SAFETY 
MEASURES IN PLACE. WILL CONTINUE TO MONITOR

## 2019-06-20 NOTE — NUR
MS RN NOTE:



PATIENT RESTING IN BED, NO ACUTE DISTRESS NOTED. BREATHING EVEN AND UNLABORED, NO SOB NOTED. 
MIDLINE TO DORINA IN PLACE, INFUSING D5W WITH 40 MEQ KCL AT 100ML/HR.  PATIENT BLOOD SUGAR 
LEVEL 163MG/DL, PATIENT TO RECEIVE 3 UNITS OF INSULIN PER SLIDING SCALE. NO S/S OF 
HYPER/HYPOGLYCEMIA NOTED. BED LOCKED AND IN LOWEST POSITION, CALL LIGHT IN REACH. WILL 
ENDORSE TO DAY NURSE TO CONTINUE WITH PLAN OF CARE.

## 2019-06-20 NOTE — NUR
RN CLOSING NOTES

PT AWAKE AND RESTING IN BED. PT NONVERBAL. DAUGHTER AT BEDSIDE. NO APPARENT S/S OF PAIN, 
DISTRESS OR SOB AT THIS TIME. PT HAS RIGHT UPPER ARM MIDLINE INTACT AND RUNNING D5W WITH 40 
MEQ KCL AT 100ML/HR. PT TOLERATING FLUIDS WELL. PT ON CONTINUOUS PULSE OX. SAFETY 
PRECAUTIONS IN PLACE, BED IN LOWEST LOCKED POSITION, X3 SIDE RAILS UP AND CALL LIGHT WITHIN 
REACH. WILL ENDORSE TO NIGHT SHIFT NURSE FOR CONTINUITY OF CARE.

## 2019-06-21 VITALS — DIASTOLIC BLOOD PRESSURE: 79 MMHG | SYSTOLIC BLOOD PRESSURE: 166 MMHG

## 2019-06-21 VITALS — SYSTOLIC BLOOD PRESSURE: 141 MMHG | DIASTOLIC BLOOD PRESSURE: 82 MMHG

## 2019-06-21 VITALS — SYSTOLIC BLOOD PRESSURE: 150 MMHG | DIASTOLIC BLOOD PRESSURE: 91 MMHG

## 2019-06-21 LAB
BASOPHILS # BLD AUTO: 0 /CMM (ref 0–0.2)
BASOPHILS NFR BLD AUTO: 0 % (ref 0–2)
BUN SERPL-MCNC: 16 MG/DL (ref 7–18)
CALCIUM SERPL-MCNC: 9.2 MG/DL (ref 8.5–10.1)
CHLORIDE SERPL-SCNC: 103 MMOL/L (ref 98–107)
CO2 SERPL-SCNC: 39 MMOL/L (ref 21–32)
CREAT SERPL-MCNC: 0.6 MG/DL (ref 0.6–1.3)
EOSINOPHIL NFR BLD AUTO: 0.1 % (ref 0–6)
GLUCOSE SERPL-MCNC: 271 MG/DL (ref 74–106)
HCT VFR BLD AUTO: 30 % (ref 33–45)
HGB BLD-MCNC: 9.8 G/DL (ref 11.5–14.8)
LYMPHOCYTES NFR BLD AUTO: 0.2 /CMM (ref 0.8–4.8)
LYMPHOCYTES NFR BLD AUTO: 4.5 % (ref 20–44)
LYMPHOCYTES NFR BLD MANUAL: 4 % (ref 16–48)
MCHC RBC AUTO-ENTMCNC: 33 G/DL (ref 31–36)
MCV RBC AUTO: 91 FL (ref 82–100)
MONOCYTES NFR BLD AUTO: 0.3 /CMM (ref 0.1–1.3)
MONOCYTES NFR BLD AUTO: 7 % (ref 2–12)
MONOCYTES NFR BLD MANUAL: 6 % (ref 0–11)
NEUTROPHILS # BLD AUTO: 3.9 /CMM (ref 1.8–8.9)
NEUTROPHILS NFR BLD AUTO: 88.4 % (ref 43–81)
NEUTS BAND NFR BLD MANUAL: 5 % (ref 0–5)
NEUTS SEG NFR BLD MANUAL: 85 % (ref 42–76)
PLATELET # BLD AUTO: 120 /CMM (ref 150–450)
POTASSIUM SERPL-SCNC: 3.8 MMOL/L (ref 3.5–5.1)
RBC # BLD AUTO: 3.29 MIL/UL (ref 4–5.2)
SODIUM SERPL-SCNC: 144 MMOL/L (ref 136–145)
WBC NRBC COR # BLD AUTO: 4.4 K/UL (ref 4.3–11)

## 2019-06-21 RX ADMIN — MEROPENEM SCH MLS/HR: 1 INJECTION INTRAVENOUS at 05:03

## 2019-06-21 RX ADMIN — Medication SCH MG: at 16:37

## 2019-06-21 RX ADMIN — MEROPENEM SCH MLS/HR: 1 INJECTION INTRAVENOUS at 17:53

## 2019-06-21 RX ADMIN — Medication SCH EACH: at 21:51

## 2019-06-21 RX ADMIN — Medication SCH EACH: at 17:53

## 2019-06-21 RX ADMIN — DOXYCYCLINE HYCLATE SCH MG: 100 TABLET, COATED ORAL at 20:46

## 2019-06-21 RX ADMIN — DOXYCYCLINE HYCLATE SCH MG: 100 TABLET, COATED ORAL at 09:13

## 2019-06-21 RX ADMIN — SPIRONOLACTONE SCH MG: 25 TABLET, FILM COATED ORAL at 09:14

## 2019-06-21 RX ADMIN — INSULIN HUMAN PRN UNIT: 100 INJECTION, SOLUTION PARENTERAL at 12:55

## 2019-06-21 RX ADMIN — GUAIFENESIN AND DEXTROMETHORPHAN PRN ML: 100; 10 SYRUP ORAL at 15:42

## 2019-06-21 RX ADMIN — POTASSIUM CHLORIDE SCH MEQ: 1.5 POWDER, FOR SOLUTION ORAL at 09:13

## 2019-06-21 RX ADMIN — Medication SCH EACH: at 06:36

## 2019-06-21 RX ADMIN — AMLODIPINE BESYLATE SCH MG: 10 TABLET ORAL at 09:14

## 2019-06-21 RX ADMIN — Medication SCH EACH: at 11:28

## 2019-06-21 RX ADMIN — GUAIFENESIN AND DEXTROMETHORPHAN PRN ML: 100; 10 SYRUP ORAL at 20:46

## 2019-06-21 RX ADMIN — INSULIN HUMAN PRN UNIT: 100 INJECTION, SOLUTION PARENTERAL at 21:53

## 2019-06-21 RX ADMIN — Medication SCH APPLIC: at 09:12

## 2019-06-21 RX ADMIN — SODIUM CHLORIDE, SODIUM LACTATE, POTASSIUM CHLORIDE, AND CALCIUM CHLORIDE PRN MLS/HR: .6; .31; .03; .02 INJECTION, SOLUTION INTRAVENOUS at 05:03

## 2019-06-21 RX ADMIN — Medication SCH EACH: at 09:13

## 2019-06-21 RX ADMIN — LISINOPRIL SCH MG: 20 TABLET ORAL at 09:14

## 2019-06-21 RX ADMIN — INSULIN HUMAN PRN UNIT: 100 INJECTION, SOLUTION PARENTERAL at 17:55

## 2019-06-21 RX ADMIN — Medication SCH APPLIC: at 17:53

## 2019-06-21 RX ADMIN — Medication SCH MG: at 09:13

## 2019-06-21 RX ADMIN — INSULIN HUMAN PRN UNIT: 100 INJECTION, SOLUTION PARENTERAL at 06:38

## 2019-06-21 RX ADMIN — GUAIFENESIN AND DEXTROMETHORPHAN PRN ML: 100; 10 SYRUP ORAL at 11:28

## 2019-06-21 NOTE — NUR
RN OPENING NOTES

PT SLEEPING IN BED. PT NONVERBAL. NO APPARENT S/S OF PAIN, DISTRESS OR SOB AT THIS TIME. PT 
02 98% ON 2L O2 VIA NASAL CANNULA. PT HAS RIGHT UPPER ARM MIDLINE INTACT AND RUNNING D5 WITH 
40 MEQ OF KCL@ 100 ML/HR. SAFETY PRECAUTIONS IN PLACE, BED IN LOWEST LOCKED POSITION, X2 
SIDE RAILS UP AND CALL LIGHT WITHIN REACH. WILL CONTINUE TO MONITOR.

## 2019-06-21 NOTE — NUR
MS RN 



SLEPT WELL THROUGHOUT THE NIGHT. NO SIGNIFICANT CHANGES THROUGHOUT THE SHIFT. RESPIRATIONS 
EVEN AND UNLABORED. 02 SAT AT 98%. KEPT CLEAN AND DRY AND COMFORTABLE. NEEDS ATTENDED AND 
ANTICIPATED, AM CARE RENDERED, OFFLOAD HEELS AND ELBOWS. ASSISTED REPOSITION EVERY 2 HOURS. 
SAFETY MEASURES AT ALL TIMES. ENDORSE TO THE NEXT SHIFT.

## 2019-06-21 NOTE — NUR
MS RN



RECEIVE PT IN BED A/O X 1 WATCHING TV STABLE, NO S/S OF DISTRESS, SAFETY MEASURES IN PLACE. 
WILL CONTINUE TO MONITOR

-------------------------------------------------------------------------------

Addendum: 06/21/19 at 1922 by ALEIDA BARKLEY RN

-------------------------------------------------------------------------------

PT NON VERBAL RESPONSIVE TO VERBAL STIMULATION OPENS EYES TO NAME

## 2019-06-22 VITALS — DIASTOLIC BLOOD PRESSURE: 83 MMHG | SYSTOLIC BLOOD PRESSURE: 145 MMHG

## 2019-06-22 VITALS — SYSTOLIC BLOOD PRESSURE: 145 MMHG | DIASTOLIC BLOOD PRESSURE: 83 MMHG

## 2019-06-22 LAB
BUN SERPL-MCNC: 14 MG/DL (ref 7–18)
CALCIUM SERPL-MCNC: 8.9 MG/DL (ref 8.5–10.1)
CHLORIDE SERPL-SCNC: 98 MMOL/L (ref 98–107)
CO2 SERPL-SCNC: 35 MMOL/L (ref 21–32)
CREAT SERPL-MCNC: 0.4 MG/DL (ref 0.6–1.3)
GLUCOSE SERPL-MCNC: 207 MG/DL (ref 74–106)
POTASSIUM SERPL-SCNC: 3.1 MMOL/L (ref 3.5–5.1)
SODIUM SERPL-SCNC: 138 MMOL/L (ref 136–145)

## 2019-06-22 RX ADMIN — Medication SCH APPLIC: at 09:05

## 2019-06-22 RX ADMIN — GUAIFENESIN AND DEXTROMETHORPHAN PRN ML: 100; 10 SYRUP ORAL at 08:53

## 2019-06-22 RX ADMIN — Medication SCH EACH: at 06:35

## 2019-06-22 RX ADMIN — INSULIN HUMAN PRN UNIT: 100 INJECTION, SOLUTION PARENTERAL at 06:38

## 2019-06-22 RX ADMIN — LISINOPRIL SCH MG: 20 TABLET ORAL at 08:55

## 2019-06-22 RX ADMIN — MEROPENEM SCH MLS/HR: 1 INJECTION INTRAVENOUS at 05:31

## 2019-06-22 RX ADMIN — Medication SCH EACH: at 08:53

## 2019-06-22 RX ADMIN — INSULIN HUMAN PRN UNIT: 100 INJECTION, SOLUTION PARENTERAL at 12:21

## 2019-06-22 RX ADMIN — Medication SCH MG: at 08:56

## 2019-06-22 RX ADMIN — SPIRONOLACTONE SCH MG: 25 TABLET, FILM COATED ORAL at 08:55

## 2019-06-22 RX ADMIN — AMLODIPINE BESYLATE SCH MG: 10 TABLET ORAL at 08:54

## 2019-06-22 RX ADMIN — DOXYCYCLINE HYCLATE SCH MG: 100 TABLET, COATED ORAL at 08:55

## 2019-06-22 RX ADMIN — GUAIFENESIN AND DEXTROMETHORPHAN PRN ML: 100; 10 SYRUP ORAL at 13:20

## 2019-06-22 RX ADMIN — Medication SCH EACH: at 12:20

## 2019-06-22 RX ADMIN — POTASSIUM CHLORIDE SCH MEQ: 1.5 POWDER, FOR SOLUTION ORAL at 08:54

## 2019-06-22 NOTE — NUR
MS/RN  NOTE



RIGHT UPPER ARM MIDLINE IS REMOVED PER ORDER. NO MISSING PARTS NOTED. NO BLEEDING OR S/S 
INFECTION AT THE SITE.

## 2019-06-22 NOTE — NUR
MS/RN  NOTE



DISCHARGE EDUCATION PROVIDED TO THE CAREGIVER (DAUGHTER) DUE TO PATIENT BEING ALERT AND 
ORIENTED X1. THE CAREGIVER VERBALIZED UNDERSTANDING. PRESCRIPTION GIVEN TO THE CAREGIVER AND 
THE COPY IS SAVED IN THE CHARGE. THE PATIENT WILL BE PICKED UP VIA AMBULANCE.

## 2019-06-22 NOTE — NUR
MS/RN  NOTE



THE PATIENT ALERT AND ORIENTED X1. IN ROOM AIR AND SATURATION IS AT 93%. RESPIRATION REGULAR 
AND UNLABORED. THE PATIENT IN NO APPARENT DISTRESS. THE PATIENT IS PICKED UP BY [facility] GOING 
HOME. DAUGHTER WAITING FOR THE PATIENT AT HOME. PATIENT LEFT THE HOSPITAL IN STABLE 
CONDITION.

## 2019-06-22 NOTE — NUR
MS RN 



AM CARE RENDERED. 02 %, NON VERBAL OPENS EYES TO TACTILE STIMULATION NAME, REPOSITION 
Q2H. NO SIGNIFICANT CHANGES THROUGHOUT THE SHIFT. RESPIRATIONS EVEN AND UNLABORED.  KEPT 
CLEAN AND DRY AND COMFORTABLE. NEEDS ATTENDED AND ANTICIPATED, OFFLOAD HEELS AND ELBOWS. 
SAFETY MEASURES AT ALL TIMES. ENDORSE TO THE NEXT SHIFT. SLEPT WELL DURING THE NIGHT.

## 2019-07-22 ENCOUNTER — HOSPITAL ENCOUNTER (INPATIENT)
Dept: HOSPITAL 54 - ER | Age: 79
LOS: 23 days | Discharge: NURSING, REHAB OR LONG-TERM CARE | DRG: 4 | End: 2019-08-14
Attending: NURSE PRACTITIONER | Admitting: INTERNAL MEDICINE
Payer: MEDICARE

## 2019-07-22 VITALS — DIASTOLIC BLOOD PRESSURE: 67 MMHG | SYSTOLIC BLOOD PRESSURE: 109 MMHG

## 2019-07-22 VITALS — DIASTOLIC BLOOD PRESSURE: 69 MMHG | SYSTOLIC BLOOD PRESSURE: 138 MMHG

## 2019-07-22 VITALS — DIASTOLIC BLOOD PRESSURE: 73 MMHG | SYSTOLIC BLOOD PRESSURE: 121 MMHG

## 2019-07-22 VITALS — BODY MASS INDEX: 21.03 KG/M2 | HEIGHT: 67 IN | WEIGHT: 134 LBS

## 2019-07-22 DIAGNOSIS — I10: ICD-10-CM

## 2019-07-22 DIAGNOSIS — I70.0: ICD-10-CM

## 2019-07-22 DIAGNOSIS — E86.1: ICD-10-CM

## 2019-07-22 DIAGNOSIS — E87.6: ICD-10-CM

## 2019-07-22 DIAGNOSIS — E11.9: ICD-10-CM

## 2019-07-22 DIAGNOSIS — R29.810: ICD-10-CM

## 2019-07-22 DIAGNOSIS — I21.A1: ICD-10-CM

## 2019-07-22 DIAGNOSIS — I42.9: ICD-10-CM

## 2019-07-22 DIAGNOSIS — J15.6: ICD-10-CM
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
## 2019-07-22 DIAGNOSIS — D69.6: ICD-10-CM

## 2019-07-22 DIAGNOSIS — J96.01: ICD-10-CM

## 2019-07-22 DIAGNOSIS — B37.9: ICD-10-CM

## 2019-07-22 DIAGNOSIS — J96.02: ICD-10-CM

## 2019-07-22 DIAGNOSIS — I27.20: ICD-10-CM

## 2019-07-22 DIAGNOSIS — Z87.01: ICD-10-CM

## 2019-07-22 DIAGNOSIS — N39.0: ICD-10-CM

## 2019-07-22 DIAGNOSIS — Z79.899: ICD-10-CM

## 2019-07-22 DIAGNOSIS — Z74.01: ICD-10-CM

## 2019-07-22 DIAGNOSIS — T17.890A: Primary | ICD-10-CM

## 2019-07-22 DIAGNOSIS — J69.0: ICD-10-CM

## 2019-07-22 DIAGNOSIS — D63.8: ICD-10-CM

## 2019-07-22 DIAGNOSIS — L89.153: ICD-10-CM

## 2019-07-22 DIAGNOSIS — E87.5: ICD-10-CM

## 2019-07-22 DIAGNOSIS — Z79.4: ICD-10-CM

## 2019-07-22 DIAGNOSIS — E26.9: ICD-10-CM

## 2019-07-22 DIAGNOSIS — E11.65: ICD-10-CM

## 2019-07-22 DIAGNOSIS — E87.70: ICD-10-CM

## 2019-07-22 DIAGNOSIS — E43: ICD-10-CM

## 2019-07-22 DIAGNOSIS — E87.0: ICD-10-CM

## 2019-07-22 DIAGNOSIS — K52.9: ICD-10-CM

## 2019-07-22 DIAGNOSIS — D68.59: ICD-10-CM

## 2019-07-22 DIAGNOSIS — F03.90: ICD-10-CM

## 2019-07-22 DIAGNOSIS — D61.818: ICD-10-CM
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
## 2019-07-22 DIAGNOSIS — G91.2: ICD-10-CM

## 2019-07-22 DIAGNOSIS — E87.3: ICD-10-CM

## 2019-07-22 DIAGNOSIS — I48.0: ICD-10-CM

## 2019-07-22 DIAGNOSIS — Z87.440: ICD-10-CM

## 2019-07-22 DIAGNOSIS — N17.0: ICD-10-CM

## 2019-07-22 DIAGNOSIS — R91.1: ICD-10-CM

## 2019-07-22 DIAGNOSIS — R13.10: ICD-10-CM

## 2019-07-22 DIAGNOSIS — G81.94: ICD-10-CM

## 2019-07-22 DIAGNOSIS — K76.9: ICD-10-CM

## 2019-07-22 DIAGNOSIS — E83.39: ICD-10-CM

## 2019-07-22 DIAGNOSIS — J39.8: ICD-10-CM

## 2019-07-22 DIAGNOSIS — Z98.2: ICD-10-CM

## 2019-07-22 DIAGNOSIS — Z79.84: ICD-10-CM

## 2019-07-22 DIAGNOSIS — E83.42: ICD-10-CM

## 2019-07-22 DIAGNOSIS — F09: ICD-10-CM

## 2019-07-22 DIAGNOSIS — K72.00: ICD-10-CM

## 2019-07-22 DIAGNOSIS — A41.9: ICD-10-CM

## 2019-07-22 DIAGNOSIS — K57.30: ICD-10-CM

## 2019-07-22 DIAGNOSIS — I82.622: ICD-10-CM

## 2019-07-22 DIAGNOSIS — E86.0: ICD-10-CM

## 2019-07-22 DIAGNOSIS — G92: ICD-10-CM

## 2019-07-22 DIAGNOSIS — I25.10: ICD-10-CM

## 2019-07-22 LAB
ALBUMIN SERPL BCP-MCNC: 2 G/DL (ref 3.4–5)
ALP SERPL-CCNC: 499 U/L (ref 46–116)
ALT SERPL W P-5'-P-CCNC: 896 U/L (ref 12–78)
AST SERPL W P-5'-P-CCNC: 236 U/L (ref 15–37)
BASOPHILS # BLD AUTO: 0 /CMM (ref 0–0.2)
BASOPHILS NFR BLD AUTO: 0.1 % (ref 0–2)
BILIRUB DIRECT SERPL-MCNC: 0.4 MG/DL (ref 0–0.2)
BILIRUB SERPL-MCNC: 0.9 MG/DL (ref 0.2–1)
BILIRUB UR QL STRIP: (no result)
BUN SERPL-MCNC: 25 MG/DL (ref 7–18)
CALCIUM SERPL-MCNC: 8.3 MG/DL (ref 8.5–10.1)
CHLORIDE SERPL-SCNC: 99 MMOL/L (ref 98–107)
CHOLEST SERPL-MCNC: 121 MG/DL (ref ?–200)
CO2 SERPL-SCNC: 30 MMOL/L (ref 21–32)
CREAT SERPL-MCNC: 0.8 MG/DL (ref 0.6–1.3)
EOSINOPHIL NFR BLD AUTO: 0.1 % (ref 0–6)
GLUCOSE SERPL-MCNC: 233 MG/DL (ref 74–106)
HCT VFR BLD AUTO: 29 % (ref 33–45)
HDLC SERPL-MCNC: 35 MG/DL (ref 40–60)
HGB BLD-MCNC: 9.4 G/DL (ref 11.5–14.8)
IRON SERPL-MCNC: 95 UG/DL (ref 50–175)
KETONES UR STRIP-MCNC: 15 MG/DL
LDLC SERPL DIRECT ASSAY-MCNC: 57 MG/DL (ref 0–99)
LIPASE SERPL-CCNC: 308 U/L (ref 73–393)
LYMPHOCYTES NFR BLD AUTO: 0.4 /CMM (ref 0.8–4.8)
LYMPHOCYTES NFR BLD AUTO: 3.8 % (ref 20–44)
LYMPHOCYTES NFR BLD MANUAL: 2 % (ref 16–48)
MAGNESIUM SERPL-MCNC: 0.8 MG/DL (ref 1.8–2.4)
MCHC RBC AUTO-ENTMCNC: 33 G/DL (ref 31–36)
MCV RBC AUTO: 94 FL (ref 82–100)
MONOCYTES NFR BLD AUTO: 0.2 /CMM (ref 0.1–1.3)
MONOCYTES NFR BLD AUTO: 2.5 % (ref 2–12)
MONOCYTES NFR BLD MANUAL: 3 % (ref 0–11)
NEUTROPHILS # BLD AUTO: 9.1 /CMM (ref 1.8–8.9)
NEUTROPHILS NFR BLD AUTO: 93.5 % (ref 43–81)
NEUTS SEG NFR BLD MANUAL: 95 % (ref 42–76)
PH UR STRIP: 7 [PH] (ref 5–8)
PHOSPHATE SERPL-MCNC: 2.4 MG/DL (ref 2.5–4.9)
PLATELET # BLD AUTO: 68 /CMM (ref 150–450)
POTASSIUM SERPL-SCNC: 5.2 MMOL/L (ref 3.5–5.1)
PROT SERPL-MCNC: 4.6 G/DL (ref 6.4–8.2)
PROT UR QL STRIP: 100 MG/DL
RBC # BLD AUTO: 3.04 MIL/UL (ref 4–5.2)
RBC #/AREA URNS HPF: (no result) /HPF (ref 0–2)
SODIUM SERPL-SCNC: 136 MMOL/L (ref 136–145)
TIBC SERPL-MCNC: 121 UG/DL (ref 250–450)
TRIGL SERPL-MCNC: 234 MG/DL (ref 30–150)
TSH SERPL DL<=0.005 MIU/L-ACNC: 0.38 UIU/ML (ref 0.36–3.74)
UROBILINOGEN UR STRIP-MCNC: 1 EU/DL
WBC #/AREA URNS HPF: (no result) /HPF (ref 0–3)
WBC NRBC COR # BLD AUTO: 10.3 K/UL (ref 4.3–11)

## 2019-07-22 PROCEDURE — 05H933Z INSERTION OF INFUSION DEVICE INTO RIGHT BRACHIAL VEIN, PERCUTANEOUS APPROACH: ICD-10-PCS | Performed by: EMERGENCY MEDICINE

## 2019-07-22 PROCEDURE — A6253 ABSORPT DRG > 48 SQ IN W/O B: HCPCS

## 2019-07-22 PROCEDURE — A4623 TRACHEOSTOMY INNER CANNULA: HCPCS

## 2019-07-22 PROCEDURE — A4216 STERILE WATER/SALINE, 10 ML: HCPCS

## 2019-07-22 PROCEDURE — A7526 TRACHEOSTOMY TUBE COLLAR: HCPCS

## 2019-07-22 PROCEDURE — G0378 HOSPITAL OBSERVATION PER HR: HCPCS

## 2019-07-22 RX ADMIN — HUMAN INSULIN PRN UNIT: 100 INJECTION, SOLUTION SUBCUTANEOUS at 22:33

## 2019-07-22 RX ADMIN — Medication SCH EACH: at 22:31

## 2019-07-22 RX ADMIN — MAGNESIUM SULFATE IN DEXTROSE SCH MLS/HR: 10 INJECTION, SOLUTION INTRAVENOUS at 22:56

## 2019-07-22 RX ADMIN — SODIUM CHLORIDE PRN MLS/HR: 9 INJECTION, SOLUTION INTRAVENOUS at 22:59

## 2019-07-22 RX ADMIN — SODIUM CHLORIDE PRN MLS/HR: 9 INJECTION, SOLUTION INTRAVENOUS at 21:56

## 2019-07-22 NOTE — NUR
ICU RN

RCD PT FROM ER W/DX SEPSIS; PT IS AWAKE DOES NOT FOLLOW COMMANDS AND IS NON VERBAL.

AFIB ON THE MONITOR. ON O2 2L VIA NC W/DIMINISHED LUNG SOUNDS. PT W/SMALL AMOUNT OF STOOL 
NOTED; PENDING C DIFF SPECIMEN TO BE COLLECTED AS PT HAS HX OF DIARRHEA.

LEFT HAND SWELLING; ELEVATED ON PILLOW. WOUNDS NOTED TO SACRAL AREA WITH WOUND TREATMENT 
RENDERED. 

REQUESTED WOUND CARE CONSULT. DORINA MIDLINE WITH NS INFUSING. PT FROM ER WITH MAGNESIUM 0.8 
CALLED DR DOMINGUEZ FOR FURTHER ORDERS. DAUGHTER SERGE WOULD LIKE TO BE UPDATED ON 
MEDICATIONS/TREATMENTS GIVEN TO PT. UPDATED ON PLAN OF CARE AND VISITATION POLICY.

## 2019-07-22 NOTE — NUR
BIBA RA 87 From Home "Daughter called worried about the fact she may be 
dehydrated and have swelling on her hand" , TO ER BED 10, NON-VERBAL, 
HOOKED TO CARDIAC MONITOR, TACHY AT 142BPM, HYPERVENTILATING NOTED, CHANGED TO 
GOWN, PROVIDED W WARM BLANKET, DR FREITAS AT BEDSIDE.

## 2019-07-23 VITALS — SYSTOLIC BLOOD PRESSURE: 75 MMHG | DIASTOLIC BLOOD PRESSURE: 18 MMHG

## 2019-07-23 VITALS — SYSTOLIC BLOOD PRESSURE: 122 MMHG | DIASTOLIC BLOOD PRESSURE: 67 MMHG

## 2019-07-23 VITALS — SYSTOLIC BLOOD PRESSURE: 99 MMHG | DIASTOLIC BLOOD PRESSURE: 61 MMHG

## 2019-07-23 VITALS — SYSTOLIC BLOOD PRESSURE: 115 MMHG | DIASTOLIC BLOOD PRESSURE: 48 MMHG

## 2019-07-23 VITALS — DIASTOLIC BLOOD PRESSURE: 54 MMHG | SYSTOLIC BLOOD PRESSURE: 83 MMHG

## 2019-07-23 VITALS — SYSTOLIC BLOOD PRESSURE: 86 MMHG | DIASTOLIC BLOOD PRESSURE: 40 MMHG

## 2019-07-23 VITALS — DIASTOLIC BLOOD PRESSURE: 82 MMHG | SYSTOLIC BLOOD PRESSURE: 110 MMHG

## 2019-07-23 VITALS — SYSTOLIC BLOOD PRESSURE: 87 MMHG | DIASTOLIC BLOOD PRESSURE: 54 MMHG

## 2019-07-23 VITALS — SYSTOLIC BLOOD PRESSURE: 121 MMHG | DIASTOLIC BLOOD PRESSURE: 64 MMHG

## 2019-07-23 VITALS — DIASTOLIC BLOOD PRESSURE: 58 MMHG | SYSTOLIC BLOOD PRESSURE: 108 MMHG

## 2019-07-23 VITALS — SYSTOLIC BLOOD PRESSURE: 103 MMHG | DIASTOLIC BLOOD PRESSURE: 68 MMHG

## 2019-07-23 VITALS — DIASTOLIC BLOOD PRESSURE: 59 MMHG | SYSTOLIC BLOOD PRESSURE: 114 MMHG

## 2019-07-23 VITALS — SYSTOLIC BLOOD PRESSURE: 109 MMHG | DIASTOLIC BLOOD PRESSURE: 77 MMHG

## 2019-07-23 VITALS — DIASTOLIC BLOOD PRESSURE: 77 MMHG | SYSTOLIC BLOOD PRESSURE: 145 MMHG

## 2019-07-23 VITALS — DIASTOLIC BLOOD PRESSURE: 75 MMHG | SYSTOLIC BLOOD PRESSURE: 113 MMHG

## 2019-07-23 VITALS — DIASTOLIC BLOOD PRESSURE: 62 MMHG | SYSTOLIC BLOOD PRESSURE: 100 MMHG

## 2019-07-23 VITALS — DIASTOLIC BLOOD PRESSURE: 76 MMHG | SYSTOLIC BLOOD PRESSURE: 138 MMHG

## 2019-07-23 VITALS — SYSTOLIC BLOOD PRESSURE: 103 MMHG | DIASTOLIC BLOOD PRESSURE: 74 MMHG

## 2019-07-23 VITALS — DIASTOLIC BLOOD PRESSURE: 73 MMHG | SYSTOLIC BLOOD PRESSURE: 121 MMHG

## 2019-07-23 VITALS — SYSTOLIC BLOOD PRESSURE: 99 MMHG | DIASTOLIC BLOOD PRESSURE: 70 MMHG

## 2019-07-23 VITALS — SYSTOLIC BLOOD PRESSURE: 107 MMHG | DIASTOLIC BLOOD PRESSURE: 67 MMHG

## 2019-07-23 LAB
ALBUMIN SERPL BCP-MCNC: 1.8 G/DL (ref 3.4–5)
ALP SERPL-CCNC: 387 U/L (ref 46–116)
ALT SERPL W P-5'-P-CCNC: 681 U/L (ref 12–78)
AST SERPL W P-5'-P-CCNC: 119 U/L (ref 15–37)
BASOPHILS # BLD AUTO: 0 /CMM (ref 0–0.2)
BASOPHILS NFR BLD AUTO: 0 % (ref 0–2)
BILIRUB DIRECT SERPL-MCNC: 0.2 MG/DL (ref 0–0.2)
BILIRUB SERPL-MCNC: 0.5 MG/DL (ref 0.2–1)
BUN SERPL-MCNC: 22 MG/DL (ref 7–18)
CALCIUM SERPL-MCNC: 7.3 MG/DL (ref 8.5–10.1)
CHLORIDE SERPL-SCNC: 103 MMOL/L (ref 98–107)
CO2 SERPL-SCNC: 31 MMOL/L (ref 21–32)
CREAT SERPL-MCNC: 0.7 MG/DL (ref 0.6–1.3)
EOSINOPHIL NFR BLD AUTO: 0 % (ref 0–6)
GLUCOSE SERPL-MCNC: 265 MG/DL (ref 74–106)
HCT VFR BLD AUTO: 26 % (ref 33–45)
HGB BLD-MCNC: 8.5 G/DL (ref 11.5–14.8)
LYMPHOCYTES NFR BLD AUTO: 0.2 /CMM (ref 0.8–4.8)
LYMPHOCYTES NFR BLD AUTO: 2.2 % (ref 20–44)
LYMPHOCYTES NFR BLD MANUAL: 3 % (ref 16–48)
MAGNESIUM SERPL-MCNC: 1.4 MG/DL (ref 1.8–2.4)
MCHC RBC AUTO-ENTMCNC: 33 G/DL (ref 31–36)
MCV RBC AUTO: 94 FL (ref 82–100)
MONOCYTES NFR BLD AUTO: 0.2 /CMM (ref 0.1–1.3)
MONOCYTES NFR BLD AUTO: 2 % (ref 2–12)
MONOCYTES NFR BLD MANUAL: 3 % (ref 0–11)
NEUTROPHILS # BLD AUTO: 9.3 /CMM (ref 1.8–8.9)
NEUTROPHILS NFR BLD AUTO: 95.8 % (ref 43–81)
NEUTS SEG NFR BLD MANUAL: 94 % (ref 42–76)
PHOSPHATE SERPL-MCNC: 2.3 MG/DL (ref 2.5–4.9)
PLATELET # BLD AUTO: 54 /CMM (ref 150–450)
POTASSIUM SERPL-SCNC: 4 MMOL/L (ref 3.5–5.1)
PROT SERPL-MCNC: 4 G/DL (ref 6.4–8.2)
RBC # BLD AUTO: 2.72 MIL/UL (ref 4–5.2)
SODIUM SERPL-SCNC: 141 MMOL/L (ref 136–145)
WBC NRBC COR # BLD AUTO: 9.7 K/UL (ref 4.3–11)

## 2019-07-23 RX ADMIN — SODIUM CHLORIDE SCH MLS/HR: 9 INJECTION, SOLUTION INTRAVENOUS at 14:36

## 2019-07-23 RX ADMIN — SODIUM CHLORIDE SCH MLS/HR: 9 INJECTION, SOLUTION INTRAVENOUS at 20:13

## 2019-07-23 RX ADMIN — PIPERACILLIN SODIUM AND TAZOBACTAM SODIUM SCH MLS/HR: .375; 3 INJECTION, POWDER, LYOPHILIZED, FOR SOLUTION INTRAVENOUS at 01:01

## 2019-07-23 RX ADMIN — Medication SCH EACH: at 21:41

## 2019-07-23 RX ADMIN — SODIUM CHLORIDE PRN MLS/HR: 9 INJECTION, SOLUTION INTRAVENOUS at 03:35

## 2019-07-23 RX ADMIN — PANTOPRAZOLE SODIUM SCH MG: 40 TABLET, DELAYED RELEASE ORAL at 08:12

## 2019-07-23 RX ADMIN — PIPERACILLIN SODIUM AND TAZOBACTAM SODIUM SCH MLS/HR: .375; 3 INJECTION, POWDER, LYOPHILIZED, FOR SOLUTION INTRAVENOUS at 08:34

## 2019-07-23 RX ADMIN — INSULIN HUMAN PRN UNIT: 100 INJECTION, SOLUTION PARENTERAL at 12:17

## 2019-07-23 RX ADMIN — MAGNESIUM SULFATE IN DEXTROSE SCH MLS/HR: 10 INJECTION, SOLUTION INTRAVENOUS at 08:34

## 2019-07-23 RX ADMIN — MAGNESIUM SULFATE IN DEXTROSE SCH MLS/HR: 10 INJECTION, SOLUTION INTRAVENOUS at 00:04

## 2019-07-23 RX ADMIN — Medication SCH EA: at 12:00

## 2019-07-23 RX ADMIN — Medication SCH EACH: at 08:12

## 2019-07-23 RX ADMIN — Medication SCH EACH: at 12:08

## 2019-07-23 RX ADMIN — SODIUM CHLORIDE PRN MLS/HR: 9 INJECTION, SOLUTION INTRAVENOUS at 16:08

## 2019-07-23 RX ADMIN — HUMAN INSULIN PRN UNIT: 100 INJECTION, SOLUTION SUBCUTANEOUS at 21:40

## 2019-07-23 RX ADMIN — INSULIN HUMAN PRN UNIT: 100 INJECTION, SOLUTION PARENTERAL at 17:16

## 2019-07-23 RX ADMIN — MAGNESIUM SULFATE IN DEXTROSE SCH MLS/HR: 10 INJECTION, SOLUTION INTRAVENOUS at 09:52

## 2019-07-23 RX ADMIN — SODIUM CHLORIDE PRN MLS/HR: 9 INJECTION, SOLUTION INTRAVENOUS at 08:35

## 2019-07-23 RX ADMIN — SODIUM CHLORIDE PRN MLS/HR: 9 INJECTION, SOLUTION INTRAVENOUS at 00:04

## 2019-07-23 RX ADMIN — PIPERACILLIN SODIUM AND TAZOBACTAM SODIUM SCH MLS/HR: .375; 3 INJECTION, POWDER, LYOPHILIZED, FOR SOLUTION INTRAVENOUS at 16:09

## 2019-07-23 RX ADMIN — SODIUM CHLORIDE SCH MLS/HR: 9 INJECTION, SOLUTION INTRAVENOUS at 11:18

## 2019-07-23 RX ADMIN — Medication SCH EACH: at 17:02

## 2019-07-23 RX ADMIN — INSULIN HUMAN PRN UNIT: 100 INJECTION, SOLUTION PARENTERAL at 08:48

## 2019-07-23 NOTE — NUR
ICU RN CLOSING NOTES



ALL NEEDS MET. PATIENT RESTING COMFORTABLY. NOT IN ANY DISTRESS. PM CARE DONE AND PRESCRIBED 
WOUND TREATMENT. TURNED AND REPOSITIONED Q 2HOURS. HR REMAINS  130s TO 140s, MD AWARE. IVF 
RUNNING AT 75 ML/HR TO RIGHT UPPER ARM MIDLINE. SITE CLEAR. PUREED DIET. CALL LIGHT WITHIN 
REACH. SAFETY MEASURES IN PLACE. NO OTHER SIGNIFICANT CHANGE IN CONDITION. STILL FOR C DIFF 
STOOL SPECIMEN FOR COLLECTION, NO BM THE WHOLE SHIFT. WILL ENDORSE TO NEXT SHIFT FOR JOHNNY.

## 2019-07-23 NOTE — NUR
TELE RN NOTES



ACCUCHECK.  MG/DL. 6 UNITS HUM R GIVEN PER SS.



TRANSFER TO TELE STATUS PER DR. LEWIS

## 2019-07-23 NOTE — NUR
RN NOTES



PATIENT TRANSFFERED TO  [facility] ROOM 117-1  FOR CONTINUITY OF CARE REPORT GIVEN TO SHAHBAZ GATES. 
 PATIENT IS AWAKE, NON VERBAL. NO ACUTE RESPIRATORY DISTRESS.  WITH O2 3LPM VIA NC.  
SATURATION 100%. A-FIB UNCONTROLLED ON TELE MONITOR.  IV SITE INTACT AND PATENT. IV ATB  AT 
8PM GIVEN AS ORDERED. VSS  CONTINUE WITH  IVF. KEPT PT CLEAN  AND DRY. ENDORSED CONTINUITY 
OF CARE.

## 2019-07-23 NOTE — NUR
RN NOTE

RECEIVED PATIENT FROM [facility] ICU, REQUESTED BY A NURSE TO TAKE A PICTURE OF LEFT 
ELBOW/ARM, PICTURE WAS TAKEN, PATIENT IS ON 3 L/MIN VIA NASAL CANULA, NO RESPIRATORY 
DISTRESS NOTED, PATIENT IS NON VERBAL, UNCONTROLLED A- BEATS/MIN NOTED, SKIN CARE 
PROVIDED, ALL SAFETY MEASURES TAKEN, WILL CONTINUE TO MONITOR PATIENT

## 2019-07-23 NOTE — NUR
WOUND CARE CONSULT: PT PRESENTS WITH SACRAL STAGE 3 ULCER WHICH EXTENDS TO LEFT BUTTOCK, 
LEFT ELBOW SCAR AND LEFT HEEL INTACT DEEP TISSUE INJURY, ALL PRESENT ON ADMISSION. PT NOTED 
TO HAVE GENERALIZED EDEMA. SURGICAL CONSULT RECOMMENDED AND DR RENARD CARBALLO AWARE OF CONSULT 
REQUEST. RECOMMENDATIONS MADE FOR WOUND CARE AND SKIN PROTECTION. DISCUSSED WITH NURSING 
STAFF AND [facility] PT ON FIRST STEP [facility]. WILL 
SEE PRN. MD IN AGREEMENT WITH PLAN OF CARE. 

-------------------------------------------------------------------------------

Addendum: 07/23/19 at 1111 by ANGELINE CASON WNDNU

-------------------------------------------------------------------------------

Amended: Links added.

-------------------------------------------------------------------------------

Addendum: 07/23/19 at 1346 by ANGELINE CASON WNDNU

-------------------------------------------------------------------------------

CORRECTION: LEFT HEEL DETERMINED TO BE SCAR. AREA TO BE PROTECTED AND OFFLOADED. DISCUSSED 
WITH NURSING STAFF.

## 2019-07-23 NOTE — NUR
ICU RN AM NOTES



RECEIVED REPORT. PT IN BED, NON VERBAL, EYES OPEN, BLANK STARE, NO SOB, ON 2L O2 VIA NASAL 
CANULA. AFIB  ON TELE MONITOR. NO SIGNS OF PAIN, BOTH HAND SWOLLEN AND OOZING. 
ELEVATED WITH PILLOWS. RT HAND G 22 IV ACCESS FLUSHES WELL AND DORINA MIDLINE WITH NS  
ML/HR BAG #2. BOTH SITES CLEAR. SEE NURSING FLOWSHEETS FOR SKIN ISSUES. FOR WOUND CARE 
CONSULT. FOR SWALLOW EVAL. DAUGHTER SERGE WOULD LIKE TO BE UPDATED ON 
MEDICATIONS/TREATMENTS GIVEN TO PT. UPDATED ON PLAN OF CARE AND VISITATION POLICY. HOB 
ELEVATED. SAFETY MEASURES IN PLACE. WILL MONITOR CLOSELY.

## 2019-07-23 NOTE — NUR
ICU RN

UNABLE TO COLLECT SPECIMEN FOR CDIFF AS PT DID NOT HAVE BM. WILL ENDORSE TO NEXT SHIFT.

SPO2 SENSOR DID NOT PROPERLY ; NO DISTRESS NOTED.

## 2019-07-24 VITALS — SYSTOLIC BLOOD PRESSURE: 130 MMHG | DIASTOLIC BLOOD PRESSURE: 79 MMHG

## 2019-07-24 VITALS — DIASTOLIC BLOOD PRESSURE: 48 MMHG | SYSTOLIC BLOOD PRESSURE: 119 MMHG

## 2019-07-24 VITALS — SYSTOLIC BLOOD PRESSURE: 129 MMHG | DIASTOLIC BLOOD PRESSURE: 84 MMHG

## 2019-07-24 VITALS — DIASTOLIC BLOOD PRESSURE: 66 MMHG | SYSTOLIC BLOOD PRESSURE: 112 MMHG

## 2019-07-24 VITALS — SYSTOLIC BLOOD PRESSURE: 118 MMHG | DIASTOLIC BLOOD PRESSURE: 67 MMHG

## 2019-07-24 VITALS — SYSTOLIC BLOOD PRESSURE: 115 MMHG | DIASTOLIC BLOOD PRESSURE: 74 MMHG

## 2019-07-24 VITALS — SYSTOLIC BLOOD PRESSURE: 113 MMHG | DIASTOLIC BLOOD PRESSURE: 61 MMHG

## 2019-07-24 VITALS — SYSTOLIC BLOOD PRESSURE: 128 MMHG | DIASTOLIC BLOOD PRESSURE: 67 MMHG

## 2019-07-24 VITALS — DIASTOLIC BLOOD PRESSURE: 92 MMHG | SYSTOLIC BLOOD PRESSURE: 126 MMHG

## 2019-07-24 VITALS — SYSTOLIC BLOOD PRESSURE: 91 MMHG | DIASTOLIC BLOOD PRESSURE: 55 MMHG

## 2019-07-24 VITALS — SYSTOLIC BLOOD PRESSURE: 141 MMHG | DIASTOLIC BLOOD PRESSURE: 63 MMHG

## 2019-07-24 VITALS — SYSTOLIC BLOOD PRESSURE: 112 MMHG | DIASTOLIC BLOOD PRESSURE: 70 MMHG

## 2019-07-24 VITALS — DIASTOLIC BLOOD PRESSURE: 72 MMHG | SYSTOLIC BLOOD PRESSURE: 128 MMHG

## 2019-07-24 LAB
ALBUMIN SERPL BCP-MCNC: 1.7 G/DL (ref 3.4–5)
ALBUMIN SERPL BCP-MCNC: 1.7 G/DL (ref 3.4–5)
ALP SERPL-CCNC: 424 U/L (ref 46–116)
ALP SERPL-CCNC: 432 U/L (ref 46–116)
ALT SERPL W P-5'-P-CCNC: 505 U/L (ref 12–78)
ALT SERPL W P-5'-P-CCNC: 510 U/L (ref 12–78)
AST SERPL W P-5'-P-CCNC: 63 U/L (ref 15–37)
AST SERPL W P-5'-P-CCNC: 66 U/L (ref 15–37)
BASE EXCESS BLDA CALC-SCNC: -1 MMOL/L
BASE EXCESS BLDA CALC-SCNC: -2.4 MMOL/L
BASE EXCESS BLDA CALC-SCNC: -5.7 MMOL/L
BASOPHILS # BLD AUTO: 0 /CMM (ref 0–0.2)
BASOPHILS NFR BLD AUTO: 0 % (ref 0–2)
BILIRUB DIRECT SERPL-MCNC: 0.2 MG/DL (ref 0–0.2)
BILIRUB SERPL-MCNC: 0.5 MG/DL (ref 0.2–1)
BILIRUB SERPL-MCNC: 0.5 MG/DL (ref 0.2–1)
BUN SERPL-MCNC: 16 MG/DL (ref 7–18)
CALCIUM SERPL-MCNC: 8.1 MG/DL (ref 8.5–10.1)
CHLORIDE SERPL-SCNC: 105 MMOL/L (ref 98–107)
CO2 SERPL-SCNC: 27 MMOL/L (ref 21–32)
CREAT SERPL-MCNC: 0.7 MG/DL (ref 0.6–1.3)
DO-HGB MFR BLDA: 556.8 MMHG
DO-HGB MFR BLDA: 579.7 MMHG
DO-HGB MFR BLDA: 595.2 MMHG
EOSINOPHIL NFR BLD AUTO: 0 % (ref 0–6)
GLUCOSE SERPL-MCNC: 145 MG/DL (ref 74–106)
HCT VFR BLD AUTO: 26 % (ref 33–45)
HGB BLD-MCNC: 8.5 G/DL (ref 11.5–14.8)
INHALED O2 CONCENTRATION: 100 %
INHALED O2 FLOW RATE: 15 L/MIN (ref 0–30)
LYMPHOCYTES NFR BLD AUTO: 0.2 /CMM (ref 0.8–4.8)
LYMPHOCYTES NFR BLD AUTO: 1.6 % (ref 20–44)
LYMPHOCYTES NFR BLD MANUAL: 1 % (ref 16–48)
MAGNESIUM SERPL-MCNC: 1.5 MG/DL (ref 1.8–2.4)
MCHC RBC AUTO-ENTMCNC: 33 G/DL (ref 31–36)
MCV RBC AUTO: 93 FL (ref 82–100)
MONOCYTES NFR BLD AUTO: 0.2 /CMM (ref 0.1–1.3)
MONOCYTES NFR BLD AUTO: 2.3 % (ref 2–12)
MONOCYTES NFR BLD MANUAL: 3 % (ref 0–11)
NEUTROPHILS # BLD AUTO: 9.4 /CMM (ref 1.8–8.9)
NEUTROPHILS NFR BLD AUTO: 96.1 % (ref 43–81)
NEUTS BAND NFR BLD MANUAL: 1 % (ref 0–5)
NEUTS SEG NFR BLD MANUAL: 95 % (ref 42–76)
PCO2 TEMP ADJ BLDA: 104.7 MMHG (ref 35–45)
PCO2 TEMP ADJ BLDA: 45.5 MMHG (ref 35–45)
PCO2 TEMP ADJ BLDA: 53.7 MMHG (ref 35–45)
PH TEMP ADJ BLDA: 7.02 [PH] (ref 7.35–7.45)
PH TEMP ADJ BLDA: 7.28 [PH] (ref 7.35–7.45)
PH TEMP ADJ BLDA: 7.35 [PH] (ref 7.35–7.45)
PHOSPHATE SERPL-MCNC: 2.6 MG/DL (ref 2.5–4.9)
PLATELET # BLD AUTO: 59 /CMM (ref 150–450)
PO2 TEMP ADJ BLDA: 51.5 MMHG (ref 75–100)
PO2 TEMP ADJ BLDA: 64.1 MMHG (ref 75–100)
PO2 TEMP ADJ BLDA: 87.8 MMHG (ref 75–100)
POTASSIUM SERPL-SCNC: 3.1 MMOL/L (ref 3.5–5.1)
PROT SERPL-MCNC: 4.2 G/DL (ref 6.4–8.2)
PROT SERPL-MCNC: 4.3 G/DL (ref 6.4–8.2)
RBC # BLD AUTO: 2.75 MIL/UL (ref 4–5.2)
SAO2 % BLDA: 61.9 % (ref 92–98.5)
SAO2 % BLDA: 85.8 % (ref 92–98.5)
SAO2 % BLDA: 94.6 % (ref 92–98.5)
SET RATE, BG: 24
SET RATE, BG: 24
SODIUM SERPL-SCNC: 141 MMOL/L (ref 136–145)
VENTILATION MODE VENT: (no result)
VENTILATION MODE VENT: (no result)
WBC NRBC COR # BLD AUTO: 9.8 K/UL (ref 4.3–11)

## 2019-07-24 PROCEDURE — 5A09457 ASSISTANCE WITH RESPIRATORY VENTILATION, 24-96 CONSECUTIVE HOURS, CONTINUOUS POSITIVE AIRWAY PRESSURE: ICD-10-PCS | Performed by: INTERNAL MEDICINE

## 2019-07-24 RX ADMIN — SODIUM CHLORIDE PRN MLS/HR: 9 INJECTION, SOLUTION INTRAVENOUS at 12:31

## 2019-07-24 RX ADMIN — POTASSIUM CHLORIDE SCH MLS/HR: 200 INJECTION, SOLUTION INTRAVENOUS at 19:15

## 2019-07-24 RX ADMIN — POTASSIUM CHLORIDE SCH MLS/HR: 200 INJECTION, SOLUTION INTRAVENOUS at 17:57

## 2019-07-24 RX ADMIN — MAGNESIUM SULFATE IN DEXTROSE SCH MLS/HR: 10 INJECTION, SOLUTION INTRAVENOUS at 11:16

## 2019-07-24 RX ADMIN — PIPERACILLIN SODIUM AND TAZOBACTAM SODIUM SCH MLS/HR: .375; 3 INJECTION, POWDER, LYOPHILIZED, FOR SOLUTION INTRAVENOUS at 17:06

## 2019-07-24 RX ADMIN — Medication SCH EA: at 08:17

## 2019-07-24 RX ADMIN — PANTOPRAZOLE SODIUM SCH MG: 40 TABLET, DELAYED RELEASE ORAL at 08:06

## 2019-07-24 RX ADMIN — MAGNESIUM SULFATE IN DEXTROSE SCH MLS/HR: 10 INJECTION, SOLUTION INTRAVENOUS at 13:26

## 2019-07-24 RX ADMIN — PIPERACILLIN SODIUM AND TAZOBACTAM SODIUM SCH MLS/HR: .375; 3 INJECTION, POWDER, LYOPHILIZED, FOR SOLUTION INTRAVENOUS at 00:26

## 2019-07-24 RX ADMIN — PIPERACILLIN SODIUM AND TAZOBACTAM SODIUM SCH MLS/HR: .375; 3 INJECTION, POWDER, LYOPHILIZED, FOR SOLUTION INTRAVENOUS at 09:25

## 2019-07-24 RX ADMIN — SODIUM CHLORIDE SCH MLS/HR: 9 INJECTION, SOLUTION INTRAVENOUS at 20:34

## 2019-07-24 RX ADMIN — Medication SCH EACH: at 12:22

## 2019-07-24 RX ADMIN — POTASSIUM CHLORIDE SCH MLS/HR: 200 INJECTION, SOLUTION INTRAVENOUS at 16:51

## 2019-07-24 RX ADMIN — Medication SCH EACH: at 17:32

## 2019-07-24 RX ADMIN — INSULIN HUMAN PRN UNIT: 100 INJECTION, SOLUTION PARENTERAL at 17:35

## 2019-07-24 RX ADMIN — ENOXAPARIN SODIUM SCH MG: 40 INJECTION SUBCUTANEOUS at 20:34

## 2019-07-24 RX ADMIN — POTASSIUM CHLORIDE SCH MLS/HR: 200 INJECTION, SOLUTION INTRAVENOUS at 15:16

## 2019-07-24 RX ADMIN — Medication SCH EACH: at 22:47

## 2019-07-24 RX ADMIN — POTASSIUM CHLORIDE SCH MLS/HR: 200 INJECTION, SOLUTION INTRAVENOUS at 13:44

## 2019-07-24 RX ADMIN — Medication SCH ML: at 14:03

## 2019-07-24 RX ADMIN — INSULIN HUMAN PRN UNIT: 100 INJECTION, SOLUTION PARENTERAL at 12:24

## 2019-07-24 RX ADMIN — POTASSIUM CHLORIDE SCH MLS/HR: 200 INJECTION, SOLUTION INTRAVENOUS at 11:16

## 2019-07-24 RX ADMIN — Medication SCH EACH: at 07:46

## 2019-07-24 NOTE — NUR
WOUND CARE CONSULT: RECEIVED CONSULT TO RECHECK LEFT ELBOW SCAR. NO DRAINAGE NOTED BUT LEFT 
ARM NOTED TO BE VERY EDEMATOUS WITH SOME WEEPING EDEMA. ELEVATED ON PILLOW WITH MEPILEX 
PROTECTION ON LEFT ELBOW. CONTINUE PRESENT TREATMENT. DISCUSSED WITH NURSING STAFF. DEFER TO 
MD FOR EDEMA. WILL SEE PRN.

## 2019-07-24 NOTE — NUR
ICU RN NOTES



RECEIVED PT ON BED. A/O X 1. LETHARGIC. ON TELE MONITOR AFIB UNCONTROLLED. ON NASAL CANNULA 
5LPM, RESPIRATORY DISTRESS NOTED. 



19:26 



CALLED RRT FOR SOB, DESATURATION IN NASAL CANNULA 5LPM. PT PLACED ON NRB 15L. CALLED EPIC ON 
CALL FOR ORDERS. ABG RESULTED, CXR STAT ORDERED.



PER ON CALL, TRANSFER PT TO ICU WITH BIPAP ORDERS.  



19:58

PT TRANSFERRED TO ICU VIA ACLS PROTOCOL WITH ICU NURSE AND PRIMARY NURSE.

## 2019-07-24 NOTE — NUR
RN NOTES 



DR BARKSDALE AT BEDSIDE, AWARE OF THE PATIENT'S BILATERAL EDEMA ON BILATERAL UPPER 
EXTREMITIES. BLOOD CX PENDING. AWARE THAT THE DAUGHTER IS CONCERNED ABOUT THE PATIENT'S 
OXYGEN ON NASAL CANNULA.

## 2019-07-24 NOTE — NUR
RN CLOSING NOTES 



PATIENT IN BED, NON VERBAL OPENS EYES ONLY. DAUGHTER AT BEDSIDE AND NOC SHIFT CN AT BEDSIDE, 
DECIDED TO CALL RRT. PATIENT'S BASELINE IS A&Ox0. WAS NOT ABLE TO GET A CORRECT READING OF 
THE PATIENT'S SATURATION. ORDERED A STAT ABG, AND STAT CXR PER DR CHAMBERS. RESULTS STILL 
PENDING. CN AND ICU NURSE AT BEDSIDE. AWARE THAT THE PATIENT IS STILL ON UNCONTROLLED AFIB, 
MD AWARE. DR BARKSDALE AWARE THAT PATIENT IS POSITIVE FOR DVT ON HER LEFT BRACHIAL AND RADIAL. 
ORDERED LOVENOX, CARRIED OUT BY NOC SHIFT RN. STOOL SAMPLE STILL PENDING. BLOOD CX STILL 
PENDING. REPORT GIVEN TO NOC SHIFT. ICU NURSE, CN AND PRIMARY NURSE AND DAUGHTER STILL AT 
BEDSIDE

## 2019-07-24 NOTE — NUR
ICU RN

RCD PT FROM [facility] POST [facility].

PT PLACED ON BIPAP. ABG TO FOLLOW IN ONE HOUR. 

DAUGHTER AT BEDSIDE UPDATED ON PLAN OF CARE.

## 2019-07-24 NOTE — NUR
RN INITIAL NOTES



PATIENT IN BED, NON VERBAL ONLY OPENS EYES. ON 3L NC, SATING WELL >95%. ON TELE MONITOR, 
AFIB. CARDIOLOGIST AWARE STATES THAT MAY BE PHYSIOLOGIC, HYDRATE PATIENT AND FOLLOW UP. 
STOOL SAMPLE PENDING, NO BM LAST NIGHT. HAS RIGHT UA MIDLINE WITH NS AT 75ML/HR. RIGHT HAND 
#22 SALINE LOCKED. NO SIGNS OF ANY PAIN OR SOB. BED LOCKED AND IN LOWEST POSITION. WILL 
CONTINUE TO MONITOR LATER

## 2019-07-24 NOTE — NUR
INITIAL RESULTS OF DUPLEX VENOUS UPPER EXT BI SHOWED POSITIVE FOR THROMBUS AT LT BRACHIAL 
AND LT RADIAL VEINS.  ADVISED ATTENDING AND CHARGE RN, AND SAUD RE PRELIMINARY 
FINDINGS.

## 2019-07-24 NOTE — NUR
PT PLACED ON BIPAP 20/5, RATE 24, FIO2 100% PER MD'S ORDERED . BIPAP PLUGGED INTO RED 
OUTLET, ALARMS ON AND AUDIBLE. AMBU BAG AT BEDSIDE . WILL CONTINUE TO MONITOR THE PT.

## 2019-07-24 NOTE — NUR
RN NOTES 



DAUGHTER AT BEDSIDE, AWARE OF PATIENT'S CONDITION AT THIS TIME. BLOOD CX STILL PENDING.

## 2019-07-25 VITALS — DIASTOLIC BLOOD PRESSURE: 28 MMHG | SYSTOLIC BLOOD PRESSURE: 66 MMHG

## 2019-07-25 VITALS — DIASTOLIC BLOOD PRESSURE: 119 MMHG | SYSTOLIC BLOOD PRESSURE: 141 MMHG

## 2019-07-25 VITALS — DIASTOLIC BLOOD PRESSURE: 74 MMHG | SYSTOLIC BLOOD PRESSURE: 99 MMHG

## 2019-07-25 VITALS — SYSTOLIC BLOOD PRESSURE: 97 MMHG | DIASTOLIC BLOOD PRESSURE: 65 MMHG

## 2019-07-25 VITALS — SYSTOLIC BLOOD PRESSURE: 111 MMHG | DIASTOLIC BLOOD PRESSURE: 71 MMHG

## 2019-07-25 VITALS — SYSTOLIC BLOOD PRESSURE: 142 MMHG | DIASTOLIC BLOOD PRESSURE: 59 MMHG

## 2019-07-25 VITALS — SYSTOLIC BLOOD PRESSURE: 121 MMHG | DIASTOLIC BLOOD PRESSURE: 78 MMHG

## 2019-07-25 VITALS — DIASTOLIC BLOOD PRESSURE: 100 MMHG | SYSTOLIC BLOOD PRESSURE: 134 MMHG

## 2019-07-25 VITALS — DIASTOLIC BLOOD PRESSURE: 70 MMHG | SYSTOLIC BLOOD PRESSURE: 94 MMHG

## 2019-07-25 VITALS — SYSTOLIC BLOOD PRESSURE: 121 MMHG | DIASTOLIC BLOOD PRESSURE: 71 MMHG

## 2019-07-25 VITALS — SYSTOLIC BLOOD PRESSURE: 106 MMHG | DIASTOLIC BLOOD PRESSURE: 71 MMHG

## 2019-07-25 VITALS — DIASTOLIC BLOOD PRESSURE: 79 MMHG | SYSTOLIC BLOOD PRESSURE: 132 MMHG

## 2019-07-25 VITALS — SYSTOLIC BLOOD PRESSURE: 112 MMHG | DIASTOLIC BLOOD PRESSURE: 64 MMHG

## 2019-07-25 VITALS — SYSTOLIC BLOOD PRESSURE: 134 MMHG | DIASTOLIC BLOOD PRESSURE: 74 MMHG

## 2019-07-25 VITALS — SYSTOLIC BLOOD PRESSURE: 119 MMHG | DIASTOLIC BLOOD PRESSURE: 61 MMHG

## 2019-07-25 VITALS — SYSTOLIC BLOOD PRESSURE: 127 MMHG | DIASTOLIC BLOOD PRESSURE: 110 MMHG

## 2019-07-25 VITALS — SYSTOLIC BLOOD PRESSURE: 103 MMHG | DIASTOLIC BLOOD PRESSURE: 55 MMHG

## 2019-07-25 VITALS — SYSTOLIC BLOOD PRESSURE: 146 MMHG | DIASTOLIC BLOOD PRESSURE: 50 MMHG

## 2019-07-25 VITALS — DIASTOLIC BLOOD PRESSURE: 76 MMHG | SYSTOLIC BLOOD PRESSURE: 126 MMHG

## 2019-07-25 VITALS — SYSTOLIC BLOOD PRESSURE: 121 MMHG | DIASTOLIC BLOOD PRESSURE: 64 MMHG

## 2019-07-25 VITALS — SYSTOLIC BLOOD PRESSURE: 124 MMHG | DIASTOLIC BLOOD PRESSURE: 75 MMHG

## 2019-07-25 VITALS — SYSTOLIC BLOOD PRESSURE: 92 MMHG | DIASTOLIC BLOOD PRESSURE: 72 MMHG

## 2019-07-25 VITALS — DIASTOLIC BLOOD PRESSURE: 60 MMHG | SYSTOLIC BLOOD PRESSURE: 109 MMHG

## 2019-07-25 VITALS — DIASTOLIC BLOOD PRESSURE: 67 MMHG | SYSTOLIC BLOOD PRESSURE: 117 MMHG

## 2019-07-25 VITALS — SYSTOLIC BLOOD PRESSURE: 121 MMHG | DIASTOLIC BLOOD PRESSURE: 85 MMHG

## 2019-07-25 VITALS — DIASTOLIC BLOOD PRESSURE: 67 MMHG | SYSTOLIC BLOOD PRESSURE: 121 MMHG

## 2019-07-25 VITALS — SYSTOLIC BLOOD PRESSURE: 141 MMHG | DIASTOLIC BLOOD PRESSURE: 82 MMHG

## 2019-07-25 VITALS — DIASTOLIC BLOOD PRESSURE: 67 MMHG | SYSTOLIC BLOOD PRESSURE: 143 MMHG

## 2019-07-25 VITALS — SYSTOLIC BLOOD PRESSURE: 106 MMHG | DIASTOLIC BLOOD PRESSURE: 80 MMHG

## 2019-07-25 VITALS — SYSTOLIC BLOOD PRESSURE: 101 MMHG | DIASTOLIC BLOOD PRESSURE: 63 MMHG

## 2019-07-25 LAB
ALBUMIN SERPL BCP-MCNC: 1.5 G/DL (ref 3.4–5)
ALP SERPL-CCNC: 476 U/L (ref 46–116)
ALT SERPL W P-5'-P-CCNC: 384 U/L (ref 12–78)
AST SERPL W P-5'-P-CCNC: 80 U/L (ref 15–37)
BASOPHILS # BLD AUTO: 0 /CMM (ref 0–0.2)
BASOPHILS NFR BLD AUTO: 0.1 % (ref 0–2)
BILIRUB SERPL-MCNC: 0.4 MG/DL (ref 0.2–1)
BUN SERPL-MCNC: 19 MG/DL (ref 7–18)
CALCIUM SERPL-MCNC: 8.6 MG/DL (ref 8.5–10.1)
CHLORIDE SERPL-SCNC: 106 MMOL/L (ref 98–107)
CO2 SERPL-SCNC: 25 MMOL/L (ref 21–32)
CREAT SERPL-MCNC: 0.9 MG/DL (ref 0.6–1.3)
EOSINOPHIL NFR BLD AUTO: 0.1 % (ref 0–6)
GLUCOSE SERPL-MCNC: 330 MG/DL (ref 74–106)
HCT VFR BLD AUTO: 27 % (ref 33–45)
HGB BLD-MCNC: 8.9 G/DL (ref 11.5–14.8)
LYMPHOCYTES NFR BLD AUTO: 0.1 /CMM (ref 0.8–4.8)
LYMPHOCYTES NFR BLD AUTO: 3 % (ref 20–44)
LYMPHOCYTES NFR BLD MANUAL: 3 % (ref 16–48)
MAGNESIUM SERPL-MCNC: 2 MG/DL (ref 1.8–2.4)
MCHC RBC AUTO-ENTMCNC: 33 G/DL (ref 31–36)
MCV RBC AUTO: 96 FL (ref 82–100)
MONOCYTES NFR BLD AUTO: 0.1 /CMM (ref 0.1–1.3)
MONOCYTES NFR BLD AUTO: 3.1 % (ref 2–12)
MONOCYTES NFR BLD MANUAL: 3 % (ref 0–11)
NEUTROPHILS # BLD AUTO: 4.3 /CMM (ref 1.8–8.9)
NEUTROPHILS NFR BLD AUTO: 93.7 % (ref 43–81)
NEUTS BAND NFR BLD MANUAL: 7 % (ref 0–5)
NEUTS SEG NFR BLD MANUAL: 85 % (ref 42–76)
PHOSPHATE SERPL-MCNC: 2.7 MG/DL (ref 2.5–4.9)
PLATELET # BLD AUTO: 52 /CMM (ref 150–450)
POTASSIUM SERPL-SCNC: 4.5 MMOL/L (ref 3.5–5.1)
PROT SERPL-MCNC: 4.2 G/DL (ref 6.4–8.2)
RBC # BLD AUTO: 2.83 MIL/UL (ref 4–5.2)
SODIUM SERPL-SCNC: 141 MMOL/L (ref 136–145)
WBC NRBC COR # BLD AUTO: 4.5 K/UL (ref 4.3–11)

## 2019-07-25 RX ADMIN — Medication SCH EA: at 08:36

## 2019-07-25 RX ADMIN — Medication SCH MG: at 23:11

## 2019-07-25 RX ADMIN — Medication SCH EACH: at 23:56

## 2019-07-25 RX ADMIN — Medication SCH ML: at 08:33

## 2019-07-25 RX ADMIN — INSULIN HUMAN PRN UNIT: 100 INJECTION, SOLUTION PARENTERAL at 08:26

## 2019-07-25 RX ADMIN — Medication SCH EACH: at 08:20

## 2019-07-25 RX ADMIN — SODIUM CHLORIDE PRN MLS/HR: 9 INJECTION, SOLUTION INTRAVENOUS at 03:59

## 2019-07-25 RX ADMIN — ACETYLCYSTEINE SCH MG: 100 INHALANT RESPIRATORY (INHALATION) at 09:56

## 2019-07-25 RX ADMIN — PIPERACILLIN SODIUM AND TAZOBACTAM SODIUM SCH MLS/HR: .375; 3 INJECTION, POWDER, LYOPHILIZED, FOR SOLUTION INTRAVENOUS at 17:08

## 2019-07-25 RX ADMIN — Medication SCH EACH: at 17:08

## 2019-07-25 RX ADMIN — ACETYLCYSTEINE SCH MG: 100 INHALANT RESPIRATORY (INHALATION) at 15:14

## 2019-07-25 RX ADMIN — ENOXAPARIN SODIUM SCH MG: 40 INJECTION SUBCUTANEOUS at 08:22

## 2019-07-25 RX ADMIN — HUMAN INSULIN PRN UNIT: 100 INJECTION, SOLUTION SUBCUTANEOUS at 00:04

## 2019-07-25 RX ADMIN — Medication SCH EACH: at 22:00

## 2019-07-25 RX ADMIN — Medication SCH MG: at 11:30

## 2019-07-25 RX ADMIN — Medication SCH MG: at 19:29

## 2019-07-25 RX ADMIN — Medication SCH EACH: at 12:29

## 2019-07-25 RX ADMIN — PIPERACILLIN SODIUM AND TAZOBACTAM SODIUM SCH MLS/HR: .375; 3 INJECTION, POWDER, LYOPHILIZED, FOR SOLUTION INTRAVENOUS at 08:21

## 2019-07-25 RX ADMIN — Medication SCH MG: at 15:14

## 2019-07-25 RX ADMIN — Medication SCH EACH: at 21:42

## 2019-07-25 RX ADMIN — ENOXAPARIN SODIUM SCH MG: 40 INJECTION SUBCUTANEOUS at 21:16

## 2019-07-25 RX ADMIN — PANTOPRAZOLE SODIUM SCH MG: 40 TABLET, DELAYED RELEASE ORAL at 08:20

## 2019-07-25 RX ADMIN — INSULIN HUMAN PRN UNIT: 100 INJECTION, SOLUTION PARENTERAL at 12:30

## 2019-07-25 RX ADMIN — PIPERACILLIN SODIUM AND TAZOBACTAM SODIUM SCH MLS/HR: .375; 3 INJECTION, POWDER, LYOPHILIZED, FOR SOLUTION INTRAVENOUS at 01:02

## 2019-07-25 RX ADMIN — ACETYLCYSTEINE SCH MG: 100 INHALANT RESPIRATORY (INHALATION) at 23:11

## 2019-07-25 RX ADMIN — SODIUM CHLORIDE SCH MLS/HR: 9 INJECTION, SOLUTION INTRAVENOUS at 21:15

## 2019-07-25 NOTE — NUR
ICU/RN INITIAL NOTES



RECEIVED REPORT FROM NIGHT NURSE. PT OPENS EYES, DOES NOT FOLLOW COMMANDS, RESPONDS TO SOME 
TACTILE STIMULI. ON BIPAP WITH SETTINGS AS ORDERED BY MD, WILL TRY NASAL CANULA. PT DAXA ON 
TELE. IV FLUID INFUSING AS ORDERED. ADAMS CATH IN PLACE, DRAINING MINIMAL AMOUNT OF URINE. 
MIDLINE PATENT AND INTACT. BLOOD PRESSURE CUFF PLACED ON LEFT LEG. ALL NEEDS WILL BE 
ATTENDED TO, SAFETY MEASURES TAKEN, BED IN LOW POSITION, SIDE RAILS UP, CALL LIGHT WITHIN 
REACH. WILL CONTINUE TO MONITOR AND CARE.

## 2019-07-25 NOTE — NUR
placed into 3 lpm o2 flow via nasal cannula daughter @ bedside.

spo2 96 - 98%.

 bipap machine  on stand by @ bedside.



 

-------------------------------------------------------------------------------

Addendum: 07/25/19 at 0816 by SARA ADAMS RT

-------------------------------------------------------------------------------

Amended: Links added.

## 2019-07-25 NOTE — NUR
FIO2 DECREASED FROM 100% TO 50% PER DR. RIVERO

-------------------------------------------------------------------------------

Addendum: 07/25/19 at 0955 by SARA ADAMS RT

-------------------------------------------------------------------------------

Amended: Links added.

## 2019-07-25 NOTE — NUR
placed back to bipap due to increased wob and 87% spo2.

-------------------------------------------------------------------------------

Addendum: 07/25/19 at 0838 by SARA ADAMS RT

-------------------------------------------------------------------------------

Amended: Links added.

## 2019-07-25 NOTE — NUR
ICU NOTES 

RECEIVED PT ON BIPAP 50% RATE OF 24, W/ EYES OPEN,TRACKS,DOES NOT FOLLOW COMMANDS.DAUGHTER 
AT BEDSIDE VISITING,UPDATED W/ PT'S CONDITION,NEEDS A LOT OF REASSURANCE AND MORAL SUPPORT.

## 2019-07-25 NOTE — NUR
ICU/RN ENDING NOTES,AM



REPORT WILL BE ENDORSED TO NIGHT NURSE FOR CONTINUATION OF CARE. PT ON BIPAP AS ORDERED, 
TOLERATING WELL, NO DISTRESS. PT ON TELE, UNCONTROLLED A.FIB. BEAR HUGGER IN PLACE FOR LOW 
TEMP, ASSESSING FREQUENTLY. ADAMS CATH DRAINING MINIMAL URINE. ALL NEEDS WILL BE ATTENDED 
TO, SAFETY MEASURES TAKEN, BED IN LOW POSITION, SIDE RAILS UP, CALL LIGHT WITHIN REACH.

## 2019-07-26 VITALS — DIASTOLIC BLOOD PRESSURE: 57 MMHG | SYSTOLIC BLOOD PRESSURE: 115 MMHG

## 2019-07-26 VITALS — DIASTOLIC BLOOD PRESSURE: 118 MMHG | SYSTOLIC BLOOD PRESSURE: 147 MMHG

## 2019-07-26 VITALS — SYSTOLIC BLOOD PRESSURE: 144 MMHG | DIASTOLIC BLOOD PRESSURE: 61 MMHG

## 2019-07-26 VITALS — DIASTOLIC BLOOD PRESSURE: 49 MMHG | SYSTOLIC BLOOD PRESSURE: 99 MMHG

## 2019-07-26 VITALS — SYSTOLIC BLOOD PRESSURE: 158 MMHG | DIASTOLIC BLOOD PRESSURE: 139 MMHG

## 2019-07-26 VITALS — SYSTOLIC BLOOD PRESSURE: 59 MMHG | DIASTOLIC BLOOD PRESSURE: 51 MMHG

## 2019-07-26 VITALS — DIASTOLIC BLOOD PRESSURE: 58 MMHG | SYSTOLIC BLOOD PRESSURE: 150 MMHG

## 2019-07-26 VITALS — SYSTOLIC BLOOD PRESSURE: 91 MMHG | DIASTOLIC BLOOD PRESSURE: 62 MMHG

## 2019-07-26 VITALS — SYSTOLIC BLOOD PRESSURE: 142 MMHG | DIASTOLIC BLOOD PRESSURE: 109 MMHG

## 2019-07-26 VITALS — SYSTOLIC BLOOD PRESSURE: 65 MMHG | DIASTOLIC BLOOD PRESSURE: 45 MMHG

## 2019-07-26 VITALS — SYSTOLIC BLOOD PRESSURE: 148 MMHG | DIASTOLIC BLOOD PRESSURE: 70 MMHG

## 2019-07-26 VITALS — DIASTOLIC BLOOD PRESSURE: 92 MMHG | SYSTOLIC BLOOD PRESSURE: 158 MMHG

## 2019-07-26 VITALS — DIASTOLIC BLOOD PRESSURE: 73 MMHG | SYSTOLIC BLOOD PRESSURE: 114 MMHG

## 2019-07-26 VITALS — SYSTOLIC BLOOD PRESSURE: 57 MMHG | DIASTOLIC BLOOD PRESSURE: 36 MMHG

## 2019-07-26 VITALS — DIASTOLIC BLOOD PRESSURE: 39 MMHG | SYSTOLIC BLOOD PRESSURE: 108 MMHG

## 2019-07-26 VITALS — SYSTOLIC BLOOD PRESSURE: 130 MMHG | DIASTOLIC BLOOD PRESSURE: 71 MMHG

## 2019-07-26 VITALS — SYSTOLIC BLOOD PRESSURE: 72 MMHG | DIASTOLIC BLOOD PRESSURE: 68 MMHG

## 2019-07-26 VITALS — SYSTOLIC BLOOD PRESSURE: 112 MMHG | DIASTOLIC BLOOD PRESSURE: 69 MMHG

## 2019-07-26 VITALS — DIASTOLIC BLOOD PRESSURE: 57 MMHG | SYSTOLIC BLOOD PRESSURE: 84 MMHG

## 2019-07-26 VITALS — SYSTOLIC BLOOD PRESSURE: 87 MMHG | DIASTOLIC BLOOD PRESSURE: 63 MMHG

## 2019-07-26 VITALS — SYSTOLIC BLOOD PRESSURE: 155 MMHG | DIASTOLIC BLOOD PRESSURE: 73 MMHG

## 2019-07-26 VITALS — DIASTOLIC BLOOD PRESSURE: 90 MMHG | SYSTOLIC BLOOD PRESSURE: 115 MMHG

## 2019-07-26 VITALS — SYSTOLIC BLOOD PRESSURE: 136 MMHG | DIASTOLIC BLOOD PRESSURE: 80 MMHG

## 2019-07-26 VITALS — DIASTOLIC BLOOD PRESSURE: 91 MMHG | SYSTOLIC BLOOD PRESSURE: 148 MMHG

## 2019-07-26 VITALS — SYSTOLIC BLOOD PRESSURE: 147 MMHG | DIASTOLIC BLOOD PRESSURE: 66 MMHG

## 2019-07-26 VITALS — DIASTOLIC BLOOD PRESSURE: 61 MMHG | SYSTOLIC BLOOD PRESSURE: 85 MMHG

## 2019-07-26 VITALS — SYSTOLIC BLOOD PRESSURE: 187 MMHG | DIASTOLIC BLOOD PRESSURE: 125 MMHG

## 2019-07-26 VITALS — DIASTOLIC BLOOD PRESSURE: 52 MMHG | SYSTOLIC BLOOD PRESSURE: 82 MMHG

## 2019-07-26 VITALS — SYSTOLIC BLOOD PRESSURE: 102 MMHG | DIASTOLIC BLOOD PRESSURE: 69 MMHG

## 2019-07-26 VITALS — DIASTOLIC BLOOD PRESSURE: 30 MMHG | SYSTOLIC BLOOD PRESSURE: 79 MMHG

## 2019-07-26 VITALS — DIASTOLIC BLOOD PRESSURE: 67 MMHG | SYSTOLIC BLOOD PRESSURE: 136 MMHG

## 2019-07-26 VITALS — SYSTOLIC BLOOD PRESSURE: 109 MMHG | DIASTOLIC BLOOD PRESSURE: 77 MMHG

## 2019-07-26 VITALS — DIASTOLIC BLOOD PRESSURE: 31 MMHG | SYSTOLIC BLOOD PRESSURE: 69 MMHG

## 2019-07-26 VITALS — SYSTOLIC BLOOD PRESSURE: 110 MMHG | DIASTOLIC BLOOD PRESSURE: 69 MMHG

## 2019-07-26 VITALS — DIASTOLIC BLOOD PRESSURE: 48 MMHG | SYSTOLIC BLOOD PRESSURE: 92 MMHG

## 2019-07-26 LAB
BASE EXCESS BLDA CALC-SCNC: -5.7 MMOL/L
BASE EXCESS BLDA CALC-SCNC: 0 MMOL/L
BASOPHILS # BLD AUTO: 0 /CMM (ref 0–0.2)
BASOPHILS NFR BLD AUTO: 0.1 % (ref 0–2)
BUN SERPL-MCNC: 22 MG/DL (ref 7–18)
CALCIUM SERPL-MCNC: 8.7 MG/DL (ref 8.5–10.1)
CHLORIDE SERPL-SCNC: 108 MMOL/L (ref 98–107)
CO2 SERPL-SCNC: 22 MMOL/L (ref 21–32)
CREAT SERPL-MCNC: 0.7 MG/DL (ref 0.6–1.3)
DO-HGB MFR BLDA: 130.1 MMHG
DO-HGB MFR BLDA: 532.2 MMHG
EOSINOPHIL NFR BLD AUTO: 0 % (ref 0–6)
GLUCOSE SERPL-MCNC: 144 MG/DL (ref 74–106)
HCT VFR BLD AUTO: 25 % (ref 33–45)
HGB BLD-MCNC: 8.4 G/DL (ref 11.5–14.8)
INHALED O2 CONCENTRATION: 100 %
INHALED O2 CONCENTRATION: 50 %
INTRINSIC PEEP RESPIRATORY: 5 CM H2O
INTRINSIC PEEP RESPIRATORY: 5 CM H2O
LYMPHOCYTES NFR BLD AUTO: 0.3 /CMM (ref 0.8–4.8)
LYMPHOCYTES NFR BLD AUTO: 3.6 % (ref 20–44)
LYMPHOCYTES NFR BLD MANUAL: 4 % (ref 16–48)
MAGNESIUM SERPL-MCNC: 1.7 MG/DL (ref 1.8–2.4)
MCHC RBC AUTO-ENTMCNC: 34 G/DL (ref 31–36)
MCV RBC AUTO: 93 FL (ref 82–100)
MONOCYTES NFR BLD AUTO: 0.2 /CMM (ref 0.1–1.3)
MONOCYTES NFR BLD AUTO: 2.5 % (ref 2–12)
MONOCYTES NFR BLD MANUAL: 2 % (ref 0–11)
NEUTROPHILS # BLD AUTO: 6.5 /CMM (ref 1.8–8.9)
NEUTROPHILS NFR BLD AUTO: 93.8 % (ref 43–81)
NEUTS BAND NFR BLD MANUAL: 1 % (ref 0–5)
NEUTS SEG NFR BLD MANUAL: 92 % (ref 42–76)
PCO2 TEMP ADJ BLDA: 24.1 MMHG (ref 35–45)
PCO2 TEMP ADJ BLDA: 26.7 MMHG (ref 35–45)
PEEP SETTING VENT: 450 ML
PH TEMP ADJ BLDA: 7.44 [PH] (ref 7.35–7.45)
PH TEMP ADJ BLDA: 7.57 [PH] (ref 7.35–7.45)
PHOSPHATE SERPL-MCNC: 3.1 MG/DL (ref 2.5–4.9)
PLATELET # BLD AUTO: 37 /CMM (ref 150–450)
PO2 TEMP ADJ BLDA: 154.1 MMHG (ref 75–100)
PO2 TEMP ADJ BLDA: 199.3 MMHG (ref 75–100)
POTASSIUM SERPL-SCNC: 3.9 MMOL/L (ref 3.5–5.1)
RBC # BLD AUTO: 2.7 MIL/UL (ref 4–5.2)
SAO2 % BLDA: 98.2 % (ref 92–98.5)
SAO2 % BLDA: 98.5 % (ref 92–98.5)
SET RATE, BG: 18
SODIUM SERPL-SCNC: 141 MMOL/L (ref 136–145)
VENTILATION MODE VENT: (no result)
VENTILATION MODE VENT: (no result)
WBC NRBC COR # BLD AUTO: 6.9 K/UL (ref 4.3–11)

## 2019-07-26 PROCEDURE — 5A1955Z RESPIRATORY VENTILATION, GREATER THAN 96 CONSECUTIVE HOURS: ICD-10-PCS | Performed by: INTERNAL MEDICINE

## 2019-07-26 PROCEDURE — 0BH17EZ INSERTION OF ENDOTRACHEAL AIRWAY INTO TRACHEA, VIA NATURAL OR ARTIFICIAL OPENING: ICD-10-PCS

## 2019-07-26 RX ADMIN — PIPERACILLIN SODIUM AND TAZOBACTAM SODIUM SCH MLS/HR: .375; 3 INJECTION, POWDER, LYOPHILIZED, FOR SOLUTION INTRAVENOUS at 17:21

## 2019-07-26 RX ADMIN — Medication SCH EA: at 08:21

## 2019-07-26 RX ADMIN — Medication SCH EACH: at 17:22

## 2019-07-26 RX ADMIN — MAGNESIUM SULFATE IN DEXTROSE SCH MLS/HR: 10 INJECTION, SOLUTION INTRAVENOUS at 09:10

## 2019-07-26 RX ADMIN — Medication SCH MG: at 07:32

## 2019-07-26 RX ADMIN — PIPERACILLIN SODIUM AND TAZOBACTAM SODIUM SCH MLS/HR: .375; 3 INJECTION, POWDER, LYOPHILIZED, FOR SOLUTION INTRAVENOUS at 08:20

## 2019-07-26 RX ADMIN — PIPERACILLIN SODIUM AND TAZOBACTAM SODIUM SCH MLS/HR: .375; 3 INJECTION, POWDER, LYOPHILIZED, FOR SOLUTION INTRAVENOUS at 01:11

## 2019-07-26 RX ADMIN — Medication SCH MG: at 19:28

## 2019-07-26 RX ADMIN — DIGOXIN SCH MG: 250 INJECTION, SOLUTION INTRAMUSCULAR; INTRAVENOUS at 17:21

## 2019-07-26 RX ADMIN — Medication SCH MG: at 03:14

## 2019-07-26 RX ADMIN — Medication SCH MG: at 23:39

## 2019-07-26 RX ADMIN — PANTOPRAZOLE SODIUM SCH MG: 40 TABLET, DELAYED RELEASE ORAL at 07:30

## 2019-07-26 RX ADMIN — ACETYLCYSTEINE SCH MG: 100 INHALANT RESPIRATORY (INHALATION) at 07:32

## 2019-07-26 RX ADMIN — HUMAN INSULIN PRN UNIT: 100 INJECTION, SOLUTION SUBCUTANEOUS at 12:59

## 2019-07-26 RX ADMIN — Medication SCH EACH: at 12:58

## 2019-07-26 RX ADMIN — Medication SCH EACH: at 08:11

## 2019-07-26 RX ADMIN — ACETYLCYSTEINE SCH MG: 100 INHALANT RESPIRATORY (INHALATION) at 15:40

## 2019-07-26 RX ADMIN — Medication SCH ML: at 08:08

## 2019-07-26 RX ADMIN — MAGNESIUM SULFATE IN DEXTROSE SCH MLS/HR: 10 INJECTION, SOLUTION INTRAVENOUS at 08:20

## 2019-07-26 RX ADMIN — FOLIC ACID SCH MG: 1 TABLET ORAL at 10:00

## 2019-07-26 RX ADMIN — MEROPENEM SCH MLS/HR: 500 INJECTION INTRAVENOUS at 20:55

## 2019-07-26 RX ADMIN — Medication SCH MG: at 15:40

## 2019-07-26 RX ADMIN — DIGOXIN SCH MG: 250 INJECTION, SOLUTION INTRAMUSCULAR; INTRAVENOUS at 12:58

## 2019-07-26 RX ADMIN — ACETYLCYSTEINE SCH MG: 100 INHALANT RESPIRATORY (INHALATION) at 23:39

## 2019-07-26 RX ADMIN — SODIUM CHLORIDE SCH MLS/HR: 9 INJECTION, SOLUTION INTRAVENOUS at 19:44

## 2019-07-26 RX ADMIN — Medication SCH EACH: at 22:16

## 2019-07-26 RX ADMIN — DEXTROSE AND SODIUM CHLORIDE PRN MLS/HR: 5; 900 INJECTION, SOLUTION INTRAVENOUS at 10:05

## 2019-07-26 RX ADMIN — Medication SCH MG: at 11:39

## 2019-07-26 NOTE — NUR
ICU/RN ENDING NOTES,AM



REPORT WILL BE ENDORSED TO NIGHT NURSE FOR CONTINUATION OF CARE. PT ON VENT SETTINGS AS 
ORDERED BY MD, ETT 7.5 20CM AT THE LIP. NO DISTRESS NOTED. ADAMS CATH IN PLACE, RIGHT UPPER 
ARM MIDLINE PATENT AND INTACT, IVF INFUSING AS ORDERED. ALL NEEDS ATTENDED TO, SAFETY 
MEASURES TAKEN. BED IN LOW POSITION, SIDE RAILS UP, CALL LIGHT WITHIN REACH. PT TURNED AND 
REPOSITIONED.

## 2019-07-26 NOTE — NUR
ICU/RN: PT TAKEN OFF BIPAP FOR WEANING TRIAL PER . PT STARTED HAVING INCREASED WORK 
OF BREATHING. RT WAS UNABLE TO OBTAIN AN ABG. MEANWHILE PT PLACED BACK ON BIPAP WITH 
SETTINGS AS ORDERED. ORDERS RECEIVED TO INTUBATE PT BASED ON CLINICAL PRESENTATION, NON 
RESPONSIVENESS AND VBG RESULTS. FAMILY AT BEDSIDE, INFORMED.

## 2019-07-26 NOTE — NUR
ICU NOTES

RECEIVED PT AWAKE,TRACKS,DOES NOT FOLLOW COMMANDS.PT ON VENT VIA ORAL ETT,O2 SAT.91%-94% 
,FIO2 50%.MONITOR SHOW ATRIAL FLUTTER CONTROLLED RATE.

## 2019-07-26 NOTE — NUR
RT NOTE



PT INTUBATED PER MD ORDER. AC 18 450 100% +5 PER MD PELEG ORDER. 7.5 ETT 23 CM AT LIP. 
ALARMS SET PER PROTOCOL AND AUDIBLE. VENT PLUGGED IN TO RED OUTLET. AMBU BAG AT BED SIDE. 
RALES HEARD UPON AUSCULTATION. SMALL THIN CLEAR SECRETIONS FOUND UPON SUCTION. PT WAS 
PREVIOUSLY ON BIPAP BEFORE INTUBATION. FAILED WEANING ON NC DUE TO INCREASE WOB. MORNING ABG 
ON BIPAP WAS SHOWN TO PELEG DURING HES MORNING ROUNDING. PELEG MD ORDERED TRIAL ON NC AND 
POST ARTERIAL BLOOD GAS. UNABLE TO OBTAIN ARTERIAL BLOOD GAS ON NASAL CANNULA TRIAL, VENOUS 
RESULTS OBTAINED. ASKED NEL FLORES FOR HELP WITH ARTERIAL BLOOD DRAW. UNABLE TO OBTAIN ABG 
ONLY VENOUS RESULT BOTH RADIALLY AND BRACHIALLY. PLACED PT BACK ON BIPAP ON PREVIOUS 
SETTINGS DUE TO HIGH C02 AND LOW PH LEVEL ON VENOUS RESULTS MEAN WHILE WE CONTINUED TO TRY 
AND OBTAIN ACCURATE ARTERIAL BLOOD RESULTS. YAJAIRA NÚÑEZPI NOTIFIED OF VENOUS RESULTS AND PLAN TO 
CONTINUE TO TRY TO OBTAIN ACCURATE ARTERIAL BLOOD PER ARTERIAL BLOOD GAS ORDER.   

-------------------------------------------------------------------------------

Addendum: 07/26/19 at 1255 by ANA SANCHEZ RT

-------------------------------------------------------------------------------

Amended: Links added.

## 2019-07-26 NOTE — NUR
ICU/RN: RECEIVED A CALL FROM  AT [facility] REGARDING THE CHEST XRAY. ETT IN 
RIGHT MAIN STEM BRONCHUS. INFORMED . RECEIVED ORDERS TO PULL BACK ETT 3CM.

## 2019-07-26 NOTE — NUR
ICU/RN: PT INTUBATED PER MD ORDERS. PLACED ON VENT WITH SETTINGS AS ORDERED, DAUGHTER AT 
BEDSIDE. NO DISTRESS NOTED. WILL CONTINUE TO MONITOR

## 2019-07-27 VITALS — DIASTOLIC BLOOD PRESSURE: 73 MMHG | SYSTOLIC BLOOD PRESSURE: 115 MMHG

## 2019-07-27 VITALS — DIASTOLIC BLOOD PRESSURE: 55 MMHG | SYSTOLIC BLOOD PRESSURE: 97 MMHG

## 2019-07-27 VITALS — DIASTOLIC BLOOD PRESSURE: 38 MMHG | SYSTOLIC BLOOD PRESSURE: 115 MMHG

## 2019-07-27 VITALS — SYSTOLIC BLOOD PRESSURE: 101 MMHG | DIASTOLIC BLOOD PRESSURE: 61 MMHG

## 2019-07-27 VITALS — DIASTOLIC BLOOD PRESSURE: 67 MMHG | SYSTOLIC BLOOD PRESSURE: 116 MMHG

## 2019-07-27 VITALS — SYSTOLIC BLOOD PRESSURE: 110 MMHG | DIASTOLIC BLOOD PRESSURE: 69 MMHG

## 2019-07-27 VITALS — DIASTOLIC BLOOD PRESSURE: 67 MMHG | SYSTOLIC BLOOD PRESSURE: 103 MMHG

## 2019-07-27 VITALS — DIASTOLIC BLOOD PRESSURE: 51 MMHG | SYSTOLIC BLOOD PRESSURE: 91 MMHG

## 2019-07-27 VITALS — SYSTOLIC BLOOD PRESSURE: 122 MMHG | DIASTOLIC BLOOD PRESSURE: 69 MMHG

## 2019-07-27 VITALS — SYSTOLIC BLOOD PRESSURE: 104 MMHG | DIASTOLIC BLOOD PRESSURE: 77 MMHG

## 2019-07-27 VITALS — DIASTOLIC BLOOD PRESSURE: 78 MMHG | SYSTOLIC BLOOD PRESSURE: 114 MMHG

## 2019-07-27 VITALS — SYSTOLIC BLOOD PRESSURE: 109 MMHG | DIASTOLIC BLOOD PRESSURE: 73 MMHG

## 2019-07-27 VITALS — DIASTOLIC BLOOD PRESSURE: 74 MMHG | SYSTOLIC BLOOD PRESSURE: 105 MMHG

## 2019-07-27 VITALS — SYSTOLIC BLOOD PRESSURE: 123 MMHG | DIASTOLIC BLOOD PRESSURE: 63 MMHG

## 2019-07-27 VITALS — SYSTOLIC BLOOD PRESSURE: 109 MMHG | DIASTOLIC BLOOD PRESSURE: 76 MMHG

## 2019-07-27 VITALS — SYSTOLIC BLOOD PRESSURE: 132 MMHG | DIASTOLIC BLOOD PRESSURE: 76 MMHG

## 2019-07-27 VITALS — SYSTOLIC BLOOD PRESSURE: 104 MMHG | DIASTOLIC BLOOD PRESSURE: 79 MMHG

## 2019-07-27 VITALS — SYSTOLIC BLOOD PRESSURE: 143 MMHG | DIASTOLIC BLOOD PRESSURE: 111 MMHG

## 2019-07-27 VITALS — SYSTOLIC BLOOD PRESSURE: 114 MMHG | DIASTOLIC BLOOD PRESSURE: 77 MMHG

## 2019-07-27 VITALS — DIASTOLIC BLOOD PRESSURE: 69 MMHG | SYSTOLIC BLOOD PRESSURE: 116 MMHG

## 2019-07-27 VITALS — SYSTOLIC BLOOD PRESSURE: 116 MMHG | DIASTOLIC BLOOD PRESSURE: 65 MMHG

## 2019-07-27 VITALS — DIASTOLIC BLOOD PRESSURE: 72 MMHG | SYSTOLIC BLOOD PRESSURE: 105 MMHG

## 2019-07-27 VITALS — SYSTOLIC BLOOD PRESSURE: 119 MMHG | DIASTOLIC BLOOD PRESSURE: 66 MMHG

## 2019-07-27 VITALS — SYSTOLIC BLOOD PRESSURE: 131 MMHG | DIASTOLIC BLOOD PRESSURE: 64 MMHG

## 2019-07-27 VITALS — SYSTOLIC BLOOD PRESSURE: 99 MMHG | DIASTOLIC BLOOD PRESSURE: 52 MMHG

## 2019-07-27 VITALS — DIASTOLIC BLOOD PRESSURE: 64 MMHG | SYSTOLIC BLOOD PRESSURE: 103 MMHG

## 2019-07-27 VITALS — SYSTOLIC BLOOD PRESSURE: 115 MMHG | DIASTOLIC BLOOD PRESSURE: 66 MMHG

## 2019-07-27 VITALS — DIASTOLIC BLOOD PRESSURE: 77 MMHG | SYSTOLIC BLOOD PRESSURE: 105 MMHG

## 2019-07-27 VITALS — DIASTOLIC BLOOD PRESSURE: 61 MMHG | SYSTOLIC BLOOD PRESSURE: 124 MMHG

## 2019-07-27 VITALS — SYSTOLIC BLOOD PRESSURE: 119 MMHG | DIASTOLIC BLOOD PRESSURE: 63 MMHG

## 2019-07-27 VITALS — SYSTOLIC BLOOD PRESSURE: 108 MMHG | DIASTOLIC BLOOD PRESSURE: 73 MMHG

## 2019-07-27 VITALS — DIASTOLIC BLOOD PRESSURE: 70 MMHG | SYSTOLIC BLOOD PRESSURE: 125 MMHG

## 2019-07-27 VITALS — DIASTOLIC BLOOD PRESSURE: 65 MMHG | SYSTOLIC BLOOD PRESSURE: 117 MMHG

## 2019-07-27 VITALS — SYSTOLIC BLOOD PRESSURE: 85 MMHG | DIASTOLIC BLOOD PRESSURE: 44 MMHG

## 2019-07-27 LAB
ALBUMIN SERPL BCP-MCNC: 1.1 G/DL (ref 3.4–5)
ALP SERPL-CCNC: 385 U/L (ref 46–116)
ALT SERPL W P-5'-P-CCNC: 605 U/L (ref 12–78)
AST SERPL W P-5'-P-CCNC: 550 U/L (ref 15–37)
BASE EXCESS BLDA CALC-SCNC: -2.3 MMOL/L
BASOPHILS # BLD AUTO: 0 /CMM (ref 0–0.2)
BASOPHILS NFR BLD AUTO: 0 % (ref 0–2)
BILIRUB DIRECT SERPL-MCNC: 0.3 MG/DL (ref 0–0.2)
BILIRUB SERPL-MCNC: 0.6 MG/DL (ref 0.2–1)
BUN SERPL-MCNC: 25 MG/DL (ref 7–18)
CALCIUM SERPL-MCNC: 8.9 MG/DL (ref 8.5–10.1)
CHLORIDE SERPL-SCNC: 110 MMOL/L (ref 98–107)
CO2 SERPL-SCNC: 23 MMOL/L (ref 21–32)
CREAT SERPL-MCNC: 0.9 MG/DL (ref 0.6–1.3)
DO-HGB MFR BLDA: 181.8 MMHG
EOSINOPHIL NFR BLD AUTO: 0.2 % (ref 0–6)
GLUCOSE SERPL-MCNC: 291 MG/DL (ref 74–106)
HCT VFR BLD AUTO: 23 % (ref 33–45)
HEMOCCULT STL QL: POSITIVE
HGB BLD-MCNC: 7.3 G/DL (ref 11.5–14.8)
INHALED O2 CONCENTRATION: 60 %
INTRINSIC PEEP RESPIRATORY: 5 CM H2O
LYMPHOCYTES NFR BLD AUTO: 0.2 /CMM (ref 0.8–4.8)
LYMPHOCYTES NFR BLD AUTO: 3.2 % (ref 20–44)
LYMPHOCYTES NFR BLD MANUAL: 4 % (ref 16–48)
MAGNESIUM SERPL-MCNC: 2.2 MG/DL (ref 1.8–2.4)
MCHC RBC AUTO-ENTMCNC: 32 G/DL (ref 31–36)
MCV RBC AUTO: 96 FL (ref 82–100)
MONOCYTES NFR BLD AUTO: 0.1 /CMM (ref 0.1–1.3)
MONOCYTES NFR BLD AUTO: 1.3 % (ref 2–12)
MONOCYTES NFR BLD MANUAL: 2 % (ref 0–11)
NEUTROPHILS # BLD AUTO: 4.9 /CMM (ref 1.8–8.9)
NEUTROPHILS NFR BLD AUTO: 95.3 % (ref 43–81)
NEUTS BAND NFR BLD MANUAL: 1 % (ref 0–5)
NEUTS SEG NFR BLD MANUAL: 93 % (ref 42–76)
PCO2 TEMP ADJ BLDA: 25.7 MMHG (ref 35–45)
PEEP SETTING VENT: 450 ML
PH TEMP ADJ BLDA: 7.51 [PH] (ref 7.35–7.45)
PHOSPHATE SERPL-MCNC: 3.3 MG/DL (ref 2.5–4.9)
PLATELET # BLD AUTO: 26 /CMM (ref 150–450)
PO2 TEMP ADJ BLDA: 217.7 MMHG (ref 75–100)
POTASSIUM SERPL-SCNC: 3.7 MMOL/L (ref 3.5–5.1)
PROT SERPL-MCNC: 3.6 G/DL (ref 6.4–8.2)
RBC # BLD AUTO: 2.36 MIL/UL (ref 4–5.2)
SAO2 % BLDA: 98.7 % (ref 92–98.5)
SET RATE, BG: 14
SODIUM SERPL-SCNC: 143 MMOL/L (ref 136–145)
VENTILATION MODE VENT: (no result)
WBC NRBC COR # BLD AUTO: 5.1 K/UL (ref 4.3–11)

## 2019-07-27 PROCEDURE — 30233R1 TRANSFUSION OF NONAUTOLOGOUS PLATELETS INTO PERIPHERAL VEIN, PERCUTANEOUS APPROACH: ICD-10-PCS | Performed by: INTERNAL MEDICINE

## 2019-07-27 RX ADMIN — Medication SCH MG: at 15:37

## 2019-07-27 RX ADMIN — Medication SCH MG: at 07:06

## 2019-07-27 RX ADMIN — Medication PRN ML: at 18:05

## 2019-07-27 RX ADMIN — MEROPENEM SCH MLS/HR: 500 INJECTION INTRAVENOUS at 04:50

## 2019-07-27 RX ADMIN — MEROPENEM SCH MLS/HR: 1 INJECTION INTRAVENOUS at 12:44

## 2019-07-27 RX ADMIN — Medication SCH EACH: at 06:00

## 2019-07-27 RX ADMIN — Medication SCH EACH: at 12:43

## 2019-07-27 RX ADMIN — DEXTROSE AND SODIUM CHLORIDE PRN MLS/HR: 5; 900 INJECTION, SOLUTION INTRAVENOUS at 12:44

## 2019-07-27 RX ADMIN — SODIUM CHLORIDE SCH MLS/HR: 9 INJECTION, SOLUTION INTRAVENOUS at 20:08

## 2019-07-27 RX ADMIN — SODIUM CHLORIDE PRN MLS/HR: 9 INJECTION, SOLUTION INTRAVENOUS at 12:44

## 2019-07-27 RX ADMIN — Medication SCH ML: at 08:37

## 2019-07-27 RX ADMIN — Medication SCH MG: at 19:48

## 2019-07-27 RX ADMIN — DIGOXIN SCH MG: 250 INJECTION, SOLUTION INTRAMUSCULAR; INTRAVENOUS at 04:40

## 2019-07-27 RX ADMIN — Medication SCH EA: at 08:42

## 2019-07-27 RX ADMIN — Medication SCH MG: at 23:55

## 2019-07-27 RX ADMIN — ACETYLCYSTEINE SCH MG: 100 INHALANT RESPIRATORY (INHALATION) at 07:07

## 2019-07-27 RX ADMIN — FOLIC ACID SCH MG: 1 TABLET ORAL at 11:19

## 2019-07-27 RX ADMIN — Medication SCH MG: at 03:33

## 2019-07-27 RX ADMIN — INSULIN HUMAN PRN UNIT: 100 INJECTION, SOLUTION PARENTERAL at 13:00

## 2019-07-27 RX ADMIN — ACETYLCYSTEINE SCH MG: 100 INHALANT RESPIRATORY (INHALATION) at 15:37

## 2019-07-27 RX ADMIN — INSULIN HUMAN PRN UNIT: 100 INJECTION, SOLUTION PARENTERAL at 06:01

## 2019-07-27 RX ADMIN — DEXTROSE AND SODIUM CHLORIDE PRN MLS/HR: 5; 900 INJECTION, SOLUTION INTRAVENOUS at 00:13

## 2019-07-27 RX ADMIN — ACETYLCYSTEINE SCH MG: 100 INHALANT RESPIRATORY (INHALATION) at 23:55

## 2019-07-27 RX ADMIN — Medication SCH MG: at 11:15

## 2019-07-27 RX ADMIN — Medication SCH EACH: at 17:28

## 2019-07-27 NOTE — NUR
ICU RN NOTES: MD SOLITARIO (DR BARKSDALE)



MD AT BEDSIDE AND UPDATED ON PT'S CONDITION, VITALS, AND RECENT LABS. ORDERS NOTED TO 
TRANSFUSE 1 UNIT OF PLT AND OBTAIN SAMPLE FOR OB STOOL ONCE PT HAS A BM.  MD ALSO STATES 
THAT TUBE FEEDINGS MAY BE INITIATED VIA OG TUBE. DIETARY CONSULT PLACED. 



WILL CARRY OUT ORDERS AS DIRECTED 

-------------------------------------------------------------------------------

Addendum: 07/27/19 at 1158 by ALFREDO MURRY RN

-------------------------------------------------------------------------------

PER MD, D/C IV FLUIDS ONCE TUBE FEEDINGS ARE INITIATED

## 2019-07-27 NOTE — NUR
ICU RN INITIAL NOTES



PT RECEIVED INTUBATED AND ON MECHANICAL VENTILATOR. VENT SETTINGS ASSESSED FOR ACCURACY. PT 
TOLERATING SETTINGS WELL. NO SOB OR ACUTE SIGNS OF DISTRESS NOTED. BREATHING IS EVEN AND 
UNLABORED. PT SATING %. SHE IS UNABLE TO FOLLOW SIMPLE COMMANDS, HOWEVER RESPONDS TO 
TACTILE AND PAINFUL STIMULI AND TRACKS WITH EYES. SHE IS NOTED TO BE AFIB ON MONITOR WITH A 
CURRENT HR OF 93. F/C NOTED TO BE C/D/I AND DRAINING TO GRAVITY. RIGHT UPPER ARM MIDLINE 
NOTED TO BE PATENT AND INTACT. MINIMAL BLOOD RETURN NOTED. PT TOLERATING D5NS AT ORDERED 
RATE WELL. BED IN LOW LOCKED POSITION, SIDE RAILS UP X3. WILL CONTINUE TO MONITOR

## 2019-07-27 NOTE — NUR
ICU NOTES

DIGOXIN 0.5MG GIVEN IVP,MISSED DOSE AT M/N.MONITOR SHOWS JUDITH HATCH.

-------------------------------------------------------------------------------

Addendum: 07/27/19 at 0447 by MARIAH HERNANDEZ RN

-------------------------------------------------------------------------------

DIGOXIN 0.25MG IVP GIVEN INSTEAD OF 0.5

## 2019-07-27 NOTE — NUR
ICU RN NOTES: TUBE FEEDING





PT'S DAUGHTER AT BEDSIDE AND CONCERNED WITH PT'S NUTRITIONAL STATUS. DIETICIAN CONTACTED 
HOWEVER SHE IS CURRENTLY NOT IN THE HOSPITAL. MD MADE AWARE. ORDER OBTAINED TO BEGIN PT ON 
GLUCERNA 1.2 AT A GOAL RATE OF 45 FOR NOW UNTIL PT IS SEEN BY DIETICIAN

## 2019-07-27 NOTE — NUR
ICU/NOTES

REMAINS IN A-FIB FLUTTER CONTROLLED VENTRICULAR RESPONSE.AWAKE TOLERATING VENT,SAT. OF 
96-97%.SUBNORMAL TEMP OF 96.4DEGREES ,PLACED ON HYPERTHERMIA BLANKET.PT AWAKE, TRACKS.

## 2019-07-27 NOTE — NUR
RT NOTE



PT REMAINS MECHANICALLY VENTILATED VIA 7.5 ETT  20 CM @ LIP. ETT SECURE. CUFF INFLATED VIA 
MLT. VENTILATOR SETTINGS AS PRESCRIBED. ALARMS SET PER  PROTOCOL AND AUDIBLE. VENT PLUGGED 
IN TO RED OUTLET. AMBU BAG AT BED SIDE. NO DISTRESS NOTED. 

-------------------------------------------------------------------------------

Addendum: 07/27/19 at 1820 by ANA SANCHEZ RT

-------------------------------------------------------------------------------

Amended: Links added.

## 2019-07-27 NOTE — NUR
RN ICU INITIAL NOTES



PT RECEIVED INTUBATED AND ON MECHANICAL VENTILATOR. VENT SETTINGS ASSESSED FOR ACCURACY. PT 
TOLERATING SETTINGS WELL. NO SOB OR ACUTE SIGNS OF DISTRESS NOTED. BREATHING IS EVEN AND 
UNLABORED. PT SATING %. SHE IS UNABLE TO FOLLOW SIMPLE COMMANDS, HOWEVER RESPONDS TO 
TACTILE AND PAINFUL STIMULI AND TRACKS WITH EYES. SHE IS NOTED TO BE AFIB ON MONITOR WITH A 
CURRENT HR OF 82. OGT SECURED IN PLACE , GLUCERNA 1.2 @ 20 CC/HR, WELL TOLERATED . F/C NOTED 
TO BE C/D/I AND DRAINING TO GRAVITY. RIGHT UPPER ARM MIDLINE NOTED TO BE PATENT AND INTACT. 
MINIMAL BLOOD RETURN NOTED. PT TOLERATING D5NS AT ORDERED

## 2019-07-27 NOTE — NUR
ICU RN CLOSING NOTES



PT REMAINS IN STABLE CONDITION. ALL NEEDS ANTICIPATED FOR AND MET. ALL DUE MEDS GIVEN AS 
DIRECTED. PRN/ADAMS CATHETER/ AND WOUND CARE RENDERED. PT REPOSITIONED AND TURNED PER 
PROTOCOL. UPPER AND LOWER EXTREMITIES OFF LOADED. VSS POST PLT TRANSFUSION. PT TOLERATING 
TRANSFUSION WELL. MIDLINE REMAINS PATENT AND INTACT. NO ACUTE CHANGES DURING SHIFT. ENDORSED 
TO NIGHTSHIFT RN FOR JOHNNY

## 2019-07-28 VITALS — DIASTOLIC BLOOD PRESSURE: 51 MMHG | SYSTOLIC BLOOD PRESSURE: 126 MMHG

## 2019-07-28 VITALS — SYSTOLIC BLOOD PRESSURE: 134 MMHG | DIASTOLIC BLOOD PRESSURE: 74 MMHG

## 2019-07-28 VITALS — DIASTOLIC BLOOD PRESSURE: 68 MMHG | SYSTOLIC BLOOD PRESSURE: 124 MMHG

## 2019-07-28 VITALS — SYSTOLIC BLOOD PRESSURE: 125 MMHG | DIASTOLIC BLOOD PRESSURE: 77 MMHG

## 2019-07-28 VITALS — SYSTOLIC BLOOD PRESSURE: 105 MMHG | DIASTOLIC BLOOD PRESSURE: 64 MMHG

## 2019-07-28 VITALS — DIASTOLIC BLOOD PRESSURE: 75 MMHG | SYSTOLIC BLOOD PRESSURE: 153 MMHG

## 2019-07-28 VITALS — SYSTOLIC BLOOD PRESSURE: 111 MMHG | DIASTOLIC BLOOD PRESSURE: 55 MMHG

## 2019-07-28 VITALS — SYSTOLIC BLOOD PRESSURE: 124 MMHG | DIASTOLIC BLOOD PRESSURE: 68 MMHG

## 2019-07-28 VITALS — DIASTOLIC BLOOD PRESSURE: 53 MMHG | SYSTOLIC BLOOD PRESSURE: 126 MMHG

## 2019-07-28 VITALS — DIASTOLIC BLOOD PRESSURE: 65 MMHG | SYSTOLIC BLOOD PRESSURE: 126 MMHG

## 2019-07-28 VITALS — DIASTOLIC BLOOD PRESSURE: 66 MMHG | SYSTOLIC BLOOD PRESSURE: 125 MMHG

## 2019-07-28 VITALS — DIASTOLIC BLOOD PRESSURE: 55 MMHG | SYSTOLIC BLOOD PRESSURE: 102 MMHG

## 2019-07-28 VITALS — SYSTOLIC BLOOD PRESSURE: 145 MMHG | DIASTOLIC BLOOD PRESSURE: 71 MMHG

## 2019-07-28 VITALS — SYSTOLIC BLOOD PRESSURE: 127 MMHG | DIASTOLIC BLOOD PRESSURE: 58 MMHG

## 2019-07-28 VITALS — SYSTOLIC BLOOD PRESSURE: 122 MMHG | DIASTOLIC BLOOD PRESSURE: 53 MMHG

## 2019-07-28 VITALS — DIASTOLIC BLOOD PRESSURE: 66 MMHG | SYSTOLIC BLOOD PRESSURE: 123 MMHG

## 2019-07-28 VITALS — DIASTOLIC BLOOD PRESSURE: 74 MMHG | SYSTOLIC BLOOD PRESSURE: 141 MMHG

## 2019-07-28 VITALS — DIASTOLIC BLOOD PRESSURE: 68 MMHG | SYSTOLIC BLOOD PRESSURE: 118 MMHG

## 2019-07-28 VITALS — SYSTOLIC BLOOD PRESSURE: 127 MMHG | DIASTOLIC BLOOD PRESSURE: 70 MMHG

## 2019-07-28 VITALS — SYSTOLIC BLOOD PRESSURE: 132 MMHG | DIASTOLIC BLOOD PRESSURE: 70 MMHG

## 2019-07-28 VITALS — DIASTOLIC BLOOD PRESSURE: 31 MMHG | SYSTOLIC BLOOD PRESSURE: 86 MMHG

## 2019-07-28 VITALS — DIASTOLIC BLOOD PRESSURE: 58 MMHG | SYSTOLIC BLOOD PRESSURE: 129 MMHG

## 2019-07-28 VITALS — SYSTOLIC BLOOD PRESSURE: 97 MMHG | DIASTOLIC BLOOD PRESSURE: 47 MMHG

## 2019-07-28 VITALS — DIASTOLIC BLOOD PRESSURE: 74 MMHG | SYSTOLIC BLOOD PRESSURE: 134 MMHG

## 2019-07-28 VITALS — DIASTOLIC BLOOD PRESSURE: 64 MMHG | SYSTOLIC BLOOD PRESSURE: 120 MMHG

## 2019-07-28 VITALS — DIASTOLIC BLOOD PRESSURE: 70 MMHG | SYSTOLIC BLOOD PRESSURE: 122 MMHG

## 2019-07-28 VITALS — DIASTOLIC BLOOD PRESSURE: 57 MMHG | SYSTOLIC BLOOD PRESSURE: 115 MMHG

## 2019-07-28 VITALS — DIASTOLIC BLOOD PRESSURE: 55 MMHG | SYSTOLIC BLOOD PRESSURE: 106 MMHG

## 2019-07-28 VITALS — SYSTOLIC BLOOD PRESSURE: 124 MMHG | DIASTOLIC BLOOD PRESSURE: 69 MMHG

## 2019-07-28 VITALS — DIASTOLIC BLOOD PRESSURE: 68 MMHG | SYSTOLIC BLOOD PRESSURE: 127 MMHG

## 2019-07-28 VITALS — DIASTOLIC BLOOD PRESSURE: 61 MMHG | SYSTOLIC BLOOD PRESSURE: 124 MMHG

## 2019-07-28 VITALS — SYSTOLIC BLOOD PRESSURE: 119 MMHG | DIASTOLIC BLOOD PRESSURE: 59 MMHG

## 2019-07-28 VITALS — DIASTOLIC BLOOD PRESSURE: 55 MMHG | SYSTOLIC BLOOD PRESSURE: 105 MMHG

## 2019-07-28 VITALS — DIASTOLIC BLOOD PRESSURE: 56 MMHG | SYSTOLIC BLOOD PRESSURE: 118 MMHG

## 2019-07-28 VITALS — SYSTOLIC BLOOD PRESSURE: 112 MMHG | DIASTOLIC BLOOD PRESSURE: 54 MMHG

## 2019-07-28 VITALS — SYSTOLIC BLOOD PRESSURE: 137 MMHG | DIASTOLIC BLOOD PRESSURE: 91 MMHG

## 2019-07-28 VITALS — DIASTOLIC BLOOD PRESSURE: 70 MMHG | SYSTOLIC BLOOD PRESSURE: 127 MMHG

## 2019-07-28 LAB
ALBUMIN SERPL BCP-MCNC: 1.2 G/DL (ref 3.4–5)
ALP SERPL-CCNC: 436 U/L (ref 46–116)
ALT SERPL W P-5'-P-CCNC: 431 U/L (ref 12–78)
AST SERPL W P-5'-P-CCNC: 204 U/L (ref 15–37)
BASE EXCESS BLDA CALC-SCNC: 1.5 MMOL/L
BASOPHILS # BLD AUTO: 0 /CMM (ref 0–0.2)
BASOPHILS NFR BLD AUTO: 0.1 % (ref 0–2)
BILIRUB SERPL-MCNC: 0.5 MG/DL (ref 0.2–1)
BUN SERPL-MCNC: 25 MG/DL (ref 7–18)
CALCIUM SERPL-MCNC: 8.5 MG/DL (ref 8.5–10.1)
CHLORIDE SERPL-SCNC: 112 MMOL/L (ref 98–107)
CO2 SERPL-SCNC: 25 MMOL/L (ref 21–32)
CREAT SERPL-MCNC: 1 MG/DL (ref 0.6–1.3)
DO-HGB MFR BLDA: 179 MMHG
EOSINOPHIL NFR BLD AUTO: 0.2 % (ref 0–6)
GLUCOSE SERPL-MCNC: 190 MG/DL (ref 74–106)
HCT VFR BLD AUTO: 21 % (ref 33–45)
HGB BLD-MCNC: 6.9 G/DL (ref 11.5–14.8)
INHALED O2 CONCENTRATION: 40 %
INTRINSIC PEEP RESPIRATORY: 5 CM H2O
LYMPHOCYTES NFR BLD AUTO: 0.1 /CMM (ref 0.8–4.8)
LYMPHOCYTES NFR BLD AUTO: 1.7 % (ref 20–44)
LYMPHOCYTES NFR BLD MANUAL: 3 % (ref 16–48)
MAGNESIUM SERPL-MCNC: 1.9 MG/DL (ref 1.8–2.4)
MCHC RBC AUTO-ENTMCNC: 34 G/DL (ref 31–36)
MCV RBC AUTO: 93 FL (ref 82–100)
MONOCYTES NFR BLD AUTO: 0.1 /CMM (ref 0.1–1.3)
MONOCYTES NFR BLD AUTO: 1.9 % (ref 2–12)
MONOCYTES NFR BLD MANUAL: 2 % (ref 0–11)
NEUTROPHILS # BLD AUTO: 5.9 /CMM (ref 1.8–8.9)
NEUTROPHILS NFR BLD AUTO: 96.1 % (ref 43–81)
NEUTS BAND NFR BLD MANUAL: 4 % (ref 0–5)
NEUTS SEG NFR BLD MANUAL: 91 % (ref 42–76)
PCO2 TEMP ADJ BLDA: 32.8 MMHG (ref 35–45)
PEEP SETTING VENT: 400 ML
PH TEMP ADJ BLDA: 7.49 [PH] (ref 7.35–7.45)
PHOSPHATE SERPL-MCNC: 2.7 MG/DL (ref 2.5–4.9)
PLATELET # BLD AUTO: 60 /CMM (ref 150–450)
PO2 TEMP ADJ BLDA: 68.5 MMHG (ref 75–100)
POTASSIUM SERPL-SCNC: 3 MMOL/L (ref 3.5–5.1)
PROT SERPL-MCNC: 3.7 G/DL (ref 6.4–8.2)
RBC # BLD AUTO: 2.21 MIL/UL (ref 4–5.2)
SAO2 % BLDA: 92 % (ref 92–98.5)
SODIUM SERPL-SCNC: 146 MMOL/L (ref 136–145)
VENTILATION MODE VENT: (no result)
WBC NRBC COR # BLD AUTO: 6.2 K/UL (ref 4.3–11)

## 2019-07-28 PROCEDURE — 30233N1 TRANSFUSION OF NONAUTOLOGOUS RED BLOOD CELLS INTO PERIPHERAL VEIN, PERCUTANEOUS APPROACH: ICD-10-PCS | Performed by: INTERNAL MEDICINE

## 2019-07-28 RX ADMIN — Medication SCH MG: at 23:10

## 2019-07-28 RX ADMIN — Medication SCH MG: at 03:44

## 2019-07-28 RX ADMIN — Medication SCH ML: at 08:40

## 2019-07-28 RX ADMIN — ACETYLCYSTEINE SCH MG: 100 INHALANT RESPIRATORY (INHALATION) at 08:28

## 2019-07-28 RX ADMIN — INSULIN HUMAN PRN UNIT: 100 INJECTION, SOLUTION PARENTERAL at 12:27

## 2019-07-28 RX ADMIN — MEROPENEM SCH MLS/HR: 1 INJECTION INTRAVENOUS at 01:20

## 2019-07-28 RX ADMIN — INSULIN HUMAN PRN UNIT: 100 INJECTION, SOLUTION PARENTERAL at 06:22

## 2019-07-28 RX ADMIN — INSULIN HUMAN PRN UNIT: 100 INJECTION, SOLUTION PARENTERAL at 17:12

## 2019-07-28 RX ADMIN — Medication SCH EACH: at 00:30

## 2019-07-28 RX ADMIN — Medication SCH MG: at 08:28

## 2019-07-28 RX ADMIN — POTASSIUM CHLORIDE SCH MEQ: 1.5 POWDER, FOR SOLUTION ORAL at 11:32

## 2019-07-28 RX ADMIN — MEROPENEM SCH MLS/HR: 1 INJECTION INTRAVENOUS at 12:25

## 2019-07-28 RX ADMIN — Medication SCH MG: at 15:14

## 2019-07-28 RX ADMIN — POTASSIUM CHLORIDE SCH MEQ: 1.5 POWDER, FOR SOLUTION ORAL at 08:39

## 2019-07-28 RX ADMIN — ACETYLCYSTEINE SCH MG: 100 INHALANT RESPIRATORY (INHALATION) at 15:14

## 2019-07-28 RX ADMIN — Medication SCH MG: at 20:28

## 2019-07-28 RX ADMIN — Medication SCH MG: at 10:51

## 2019-07-28 RX ADMIN — POTASSIUM CHLORIDE SCH MEQ: 1.5 POWDER, FOR SOLUTION ORAL at 09:19

## 2019-07-28 RX ADMIN — Medication SCH EACH: at 12:25

## 2019-07-28 RX ADMIN — FOLIC ACID SCH MG: 1 TABLET ORAL at 08:39

## 2019-07-28 RX ADMIN — SODIUM CHLORIDE SCH MLS/HR: 9 INJECTION, SOLUTION INTRAVENOUS at 20:22

## 2019-07-28 RX ADMIN — ACETYLCYSTEINE SCH MG: 100 INHALANT RESPIRATORY (INHALATION) at 23:10

## 2019-07-28 RX ADMIN — Medication SCH EA: at 08:40

## 2019-07-28 RX ADMIN — PANTOPRAZOLE SODIUM SCH MG: 40 GRANULE, DELAYED RELEASE ORAL at 08:39

## 2019-07-28 RX ADMIN — INSULIN HUMAN PRN UNIT: 100 INJECTION, SOLUTION PARENTERAL at 00:32

## 2019-07-28 RX ADMIN — Medication SCH EACH: at 17:07

## 2019-07-28 RX ADMIN — Medication SCH EACH: at 06:21

## 2019-07-28 NOTE — NUR
RN NOTES



PT RECEIVED ORALLY INTUBATED WITH ETT 7.5  AND 20CM AT LIPLINE, VENT SETTING OF SIMV 4 FIO2  
40% AND PEEP 5 ,TOLERATING SETTINGS WELL. NO SOB OR ACUTE SIGNS OF DISTRESS NOTED. RESPONDS 
TO TACTILE AND PAINFUL STIMULI.SR W/ PAC'S ON TELE MONITOR. IV SITE ON RIGHT UPPER ARM 
MIDLINE NOTED.  PT TOLERATING D5NS AT ORDERED RATE WELL. BED IN LOW LOCKED POSITION, F/C 
DRAINED VIA GRAVITY , BAG OFF FROM THE FLOOR. KEPT PT CLEAN AN DRY.TURNED AND  REPOSITIONED  
FOR SKIN MANAGEMENT.. CONTINUE TO  MONITOR  FOR ANY ACTIVE BLEEDING  WILL CONTINUE TO 
MONITOR.

## 2019-07-28 NOTE — NUR
RN ICU CLOSING NOTE

CL LAB [facility]&H 6.9/21, REFFERED RESULTS TO MONI CHAMBERS AWAITING CALL BACK , ASYMPTOMATIC, NO S/SX 
OF ACTIVE BLEEDING NOTED AT THIS TIME, WILL ENDORSE TO PM RN TO F/U.

## 2019-07-28 NOTE — NUR
RN NOTE



0715: Received patient awake, opens eyes, able to track voice. With ETT to vent, tolerated 
settings well. No respiratory distress noted at this time. Noted with moderate tan 
secretions when suctioned through ETT. OGT intact, feeding tolerated well. Increased rate to 
goal at 45mL/hr, will monitor. Kept HOB elevated. [place] cath intact, noted with rashida 
colored urine drained to BSD. 



0800: S/E by dr. Bauer, with order to give 1u PRBC for Hgb 6.9. No active bleeding noted. K 
3.0, to give 20mEq Klorcon x 3 doses.



0830: Daughter at bedside, updated re: patient's condition. Loc at bedside, placed on 
weaning SIMV mode, will monitor.



0930: S/E by Dr. Harry, daughter at bedside, concerns were answered by MD.



1045: Started on blood transfusion, VSS. Will monitor for any adverse reactions.



1115: ABG resulted, Dr. Harry made aware.



1130: Turned and repositioned q2, kept heels elevated. No any significant changes noted at 
this time. Blood transfusion ongoing, tolerated well.

## 2019-07-29 VITALS — DIASTOLIC BLOOD PRESSURE: 78 MMHG | SYSTOLIC BLOOD PRESSURE: 142 MMHG

## 2019-07-29 VITALS — DIASTOLIC BLOOD PRESSURE: 68 MMHG | SYSTOLIC BLOOD PRESSURE: 157 MMHG

## 2019-07-29 VITALS — DIASTOLIC BLOOD PRESSURE: 70 MMHG | SYSTOLIC BLOOD PRESSURE: 143 MMHG

## 2019-07-29 VITALS — DIASTOLIC BLOOD PRESSURE: 81 MMHG | SYSTOLIC BLOOD PRESSURE: 146 MMHG

## 2019-07-29 VITALS — DIASTOLIC BLOOD PRESSURE: 64 MMHG | SYSTOLIC BLOOD PRESSURE: 127 MMHG

## 2019-07-29 VITALS — DIASTOLIC BLOOD PRESSURE: 97 MMHG | SYSTOLIC BLOOD PRESSURE: 164 MMHG

## 2019-07-29 VITALS — DIASTOLIC BLOOD PRESSURE: 74 MMHG | SYSTOLIC BLOOD PRESSURE: 153 MMHG

## 2019-07-29 VITALS — DIASTOLIC BLOOD PRESSURE: 86 MMHG | SYSTOLIC BLOOD PRESSURE: 153 MMHG

## 2019-07-29 VITALS — SYSTOLIC BLOOD PRESSURE: 138 MMHG | DIASTOLIC BLOOD PRESSURE: 76 MMHG

## 2019-07-29 VITALS — DIASTOLIC BLOOD PRESSURE: 71 MMHG | SYSTOLIC BLOOD PRESSURE: 139 MMHG

## 2019-07-29 VITALS — SYSTOLIC BLOOD PRESSURE: 166 MMHG | DIASTOLIC BLOOD PRESSURE: 90 MMHG

## 2019-07-29 VITALS — SYSTOLIC BLOOD PRESSURE: 159 MMHG | DIASTOLIC BLOOD PRESSURE: 84 MMHG

## 2019-07-29 VITALS — DIASTOLIC BLOOD PRESSURE: 71 MMHG | SYSTOLIC BLOOD PRESSURE: 152 MMHG

## 2019-07-29 VITALS — SYSTOLIC BLOOD PRESSURE: 152 MMHG | DIASTOLIC BLOOD PRESSURE: 75 MMHG

## 2019-07-29 VITALS — SYSTOLIC BLOOD PRESSURE: 126 MMHG | DIASTOLIC BLOOD PRESSURE: 66 MMHG

## 2019-07-29 VITALS — SYSTOLIC BLOOD PRESSURE: 166 MMHG | DIASTOLIC BLOOD PRESSURE: 72 MMHG

## 2019-07-29 VITALS — DIASTOLIC BLOOD PRESSURE: 57 MMHG | SYSTOLIC BLOOD PRESSURE: 134 MMHG

## 2019-07-29 VITALS — DIASTOLIC BLOOD PRESSURE: 67 MMHG | SYSTOLIC BLOOD PRESSURE: 139 MMHG

## 2019-07-29 VITALS — SYSTOLIC BLOOD PRESSURE: 147 MMHG | DIASTOLIC BLOOD PRESSURE: 75 MMHG

## 2019-07-29 VITALS — SYSTOLIC BLOOD PRESSURE: 123 MMHG | DIASTOLIC BLOOD PRESSURE: 74 MMHG

## 2019-07-29 VITALS — DIASTOLIC BLOOD PRESSURE: 78 MMHG | SYSTOLIC BLOOD PRESSURE: 158 MMHG

## 2019-07-29 VITALS — SYSTOLIC BLOOD PRESSURE: 150 MMHG | DIASTOLIC BLOOD PRESSURE: 75 MMHG

## 2019-07-29 VITALS — SYSTOLIC BLOOD PRESSURE: 142 MMHG | DIASTOLIC BLOOD PRESSURE: 75 MMHG

## 2019-07-29 VITALS — DIASTOLIC BLOOD PRESSURE: 66 MMHG | SYSTOLIC BLOOD PRESSURE: 130 MMHG

## 2019-07-29 VITALS — SYSTOLIC BLOOD PRESSURE: 153 MMHG | DIASTOLIC BLOOD PRESSURE: 73 MMHG

## 2019-07-29 VITALS — SYSTOLIC BLOOD PRESSURE: 151 MMHG | DIASTOLIC BLOOD PRESSURE: 76 MMHG

## 2019-07-29 VITALS — SYSTOLIC BLOOD PRESSURE: 123 MMHG | DIASTOLIC BLOOD PRESSURE: 107 MMHG

## 2019-07-29 LAB
ALBUMIN SERPL ELPH-MCNC: 1.4 G/DL (ref 2.9–4.4)
ALBUMIN/GLOB SERPL: 0.9 {RATIO} (ref 0.7–1.7)
ALPHA1 GLOB SERPL ELPH-MCNC: 0.3 G/DL (ref 0–0.4)
ALPHA2 GLOB SERPL ELPH-MCNC: 0.6 G/DL (ref 0.4–1)
B-GLOBULIN SERPL ELPH-MCNC: 0.4 G/DL (ref 0.7–1.3)
BASE EXCESS BLDA CALC-SCNC: 0.7 MMOL/L
BASOPHILS # BLD AUTO: 0 /CMM (ref 0–0.2)
BASOPHILS NFR BLD AUTO: 0.3 % (ref 0–2)
BUN SERPL-MCNC: 32 MG/DL (ref 7–18)
CALCIUM SERPL-MCNC: 9.1 MG/DL (ref 8.5–10.1)
CHLORIDE SERPL-SCNC: 113 MMOL/L (ref 98–107)
CO2 SERPL-SCNC: 25 MMOL/L (ref 21–32)
CREAT SERPL-MCNC: 1 MG/DL (ref 0.6–1.3)
DO-HGB MFR BLDA: 81.1 MMHG
EOSINOPHIL NFR BLD AUTO: 0 % (ref 0–6)
GAMMA GLOB SERPL ELPH-MCNC: 0.3 G/DL (ref 0.4–1.8)
GLOBULIN SER CALC-MCNC: 1.6 G/DL (ref 2.2–3.9)
GLUCOSE SERPL-MCNC: 266 MG/DL (ref 74–106)
HCT VFR BLD AUTO: 29 % (ref 33–45)
HGB BLD-MCNC: 9.9 G/DL (ref 11.5–14.8)
IGA SERPL-MCNC: 97 MG/DL (ref 64–422)
IGM SERPL-MCNC: 83 MG/DL (ref 26–217)
INHALED O2 CONCENTRATION: 40 %
INTRINSIC PEEP RESPIRATORY: 5 CM H2O
LYMPHOCYTES NFR BLD AUTO: 0.1 /CMM (ref 0.8–4.8)
LYMPHOCYTES NFR BLD AUTO: 1 % (ref 20–44)
LYMPHOCYTES NFR BLD MANUAL: 3 % (ref 16–48)
MAGNESIUM SERPL-MCNC: 1.6 MG/DL (ref 1.8–2.4)
MCHC RBC AUTO-ENTMCNC: 35 G/DL (ref 31–36)
MCV RBC AUTO: 93 FL (ref 82–100)
MONOCYTES NFR BLD AUTO: 0.1 /CMM (ref 0.1–1.3)
MONOCYTES NFR BLD AUTO: 1.7 % (ref 2–12)
NEUTROPHILS # BLD AUTO: 7.7 /CMM (ref 1.8–8.9)
NEUTROPHILS NFR BLD AUTO: 97 % (ref 43–81)
NEUTS SEG NFR BLD MANUAL: 97 % (ref 42–76)
PCO2 TEMP ADJ BLDA: 35.8 MMHG (ref 35–45)
PH TEMP ADJ BLDA: 7.45 [PH] (ref 7.35–7.45)
PHOSPHATE SERPL-MCNC: 2.4 MG/DL (ref 2.5–4.9)
PLATELET # BLD AUTO: 33 /CMM (ref 150–450)
PO2 TEMP ADJ BLDA: 162.9 MMHG (ref 75–100)
POTASSIUM SERPL-SCNC: 3.6 MMOL/L (ref 3.5–5.1)
RBC # BLD AUTO: 3.09 MIL/UL (ref 4–5.2)
SAO2 % BLDA: 98.1 % (ref 92–98.5)
SODIUM SERPL-SCNC: 147 MMOL/L (ref 136–145)
VENTILATION MODE VENT: (no result)
WBC NRBC COR # BLD AUTO: 8 K/UL (ref 4.3–11)

## 2019-07-29 RX ADMIN — Medication SCH MG: at 15:03

## 2019-07-29 RX ADMIN — Medication SCH MG: at 11:54

## 2019-07-29 RX ADMIN — INSULIN HUMAN PRN UNIT: 100 INJECTION, SOLUTION PARENTERAL at 12:54

## 2019-07-29 RX ADMIN — MEROPENEM SCH MLS/HR: 1 INJECTION INTRAVENOUS at 12:53

## 2019-07-29 RX ADMIN — SODIUM CHLORIDE SCH MLS/HR: 9 INJECTION, SOLUTION INTRAVENOUS at 12:53

## 2019-07-29 RX ADMIN — Medication SCH MG: at 23:14

## 2019-07-29 RX ADMIN — Medication PRN OZ: at 08:06

## 2019-07-29 RX ADMIN — Medication SCH MG: at 07:29

## 2019-07-29 RX ADMIN — INSULIN HUMAN PRN UNIT: 100 INJECTION, SOLUTION PARENTERAL at 18:32

## 2019-07-29 RX ADMIN — ACETYLCYSTEINE SCH MG: 100 INHALANT RESPIRATORY (INHALATION) at 07:29

## 2019-07-29 RX ADMIN — Medication SCH OZ: at 21:48

## 2019-07-29 RX ADMIN — MAGNESIUM SULFATE IN DEXTROSE SCH MLS/HR: 10 INJECTION, SOLUTION INTRAVENOUS at 09:29

## 2019-07-29 RX ADMIN — FOLIC ACID SCH MG: 1 TABLET ORAL at 08:09

## 2019-07-29 RX ADMIN — Medication SCH EACH: at 18:30

## 2019-07-29 RX ADMIN — Medication SCH MG: at 19:48

## 2019-07-29 RX ADMIN — Medication SCH ML: at 08:09

## 2019-07-29 RX ADMIN — Medication SCH EACH: at 00:44

## 2019-07-29 RX ADMIN — Medication SCH MG: at 03:40

## 2019-07-29 RX ADMIN — Medication SCH EACH: at 13:55

## 2019-07-29 RX ADMIN — ACETYLCYSTEINE SCH MG: 100 INHALANT RESPIRATORY (INHALATION) at 15:03

## 2019-07-29 RX ADMIN — SODIUM CHLORIDE SCH MLS/HR: 9 INJECTION, SOLUTION INTRAVENOUS at 10:40

## 2019-07-29 RX ADMIN — PANTOPRAZOLE SODIUM SCH MG: 40 GRANULE, DELAYED RELEASE ORAL at 08:09

## 2019-07-29 RX ADMIN — ACETYLCYSTEINE SCH MG: 100 INHALANT RESPIRATORY (INHALATION) at 23:14

## 2019-07-29 RX ADMIN — MAGNESIUM SULFATE IN DEXTROSE SCH MLS/HR: 10 INJECTION, SOLUTION INTRAVENOUS at 10:40

## 2019-07-29 RX ADMIN — MEROPENEM SCH MLS/HR: 1 INJECTION INTRAVENOUS at 00:46

## 2019-07-29 RX ADMIN — Medication SCH EACH: at 06:26

## 2019-07-29 RX ADMIN — Medication PRN ML: at 09:29

## 2019-07-29 RX ADMIN — SODIUM CHLORIDE SCH MLS/HR: 9 INJECTION, SOLUTION INTRAVENOUS at 19:40

## 2019-07-29 RX ADMIN — Medication SCH EA: at 08:06

## 2019-07-29 RX ADMIN — INSULIN HUMAN PRN UNIT: 100 INJECTION, SOLUTION PARENTERAL at 00:56

## 2019-07-29 RX ADMIN — INSULIN HUMAN PRN UNIT: 100 INJECTION, SOLUTION PARENTERAL at 06:38

## 2019-07-29 NOTE — NUR
ICU/RN:



Lengthy dw daughter regarding tracheostomy, code status and plan of care. Per daughter - "my 
mom wouldn't want a tracheostomy." However still wishes for her mother to be full code.

## 2019-07-29 NOTE — NUR
RT

PER DR GRIFFIN ORDER PATIENT REMOVED FROM VENTILATOR AND PLACED ON T-PIECE C/A 40% ASHVIN WELL. 
WILL CONT TO MONITOR CLOSELY

## 2019-07-29 NOTE — NUR
ICU/RN:



Pt s/b Dr Rene. Updated on pt status. Orders for platelets noted and carried out. No 
signs of active bleeding noted. Plan of care dw daughter at bedside.

## 2019-07-29 NOTE — NUR
ICU/RN:



AM care, oral care, wound care rendered. Pt tolerated well. Pt tracking, awake, alert at the 
moment. Per daughter "she's much more awake."

## 2019-07-29 NOTE — NUR
RN NOTES



RECEIVED PATIENT AWAKE ON BED WATCHING TV.  NO ACUTE RESPIRATORY DISTRESS TOLERATED COOL 
AEROSOL @ 10 LPM VIA ETT.  SUCTIONED WITH  YELLOWISH  THICK  COLOR SPUTUM. PATIENT IS NON 
VERBAL , TRACKS EYES. SR W/ OCCT'L  PAC'S. IV SITE ON RUE  INTACT AND PATENT  WITH TKO. OGT  
INTACT AND PATENT  RUNNING WITH  GLUCERNA 1.2 RESIDUAL CHECKED WITH 150ML W/ GAS  WHILE 
CHECKING PATENCY.  T/R PATIENT, OFFLOADED EXT WITH PILLOWS.  KEPT HOB ELEVATED.  KEPT PT 
CLEAN AND DRY.

## 2019-07-29 NOTE — NUR
RN NOTES



NO CHANGE OF CONDITION  ETT AND SIMV MODE SETTING TOLERATED WELL THROUGHOUT THE SHIFT.  
AFEBRILE. VSS.  SR WITH PAC'S ON TELE MONITOR. SATURATION  >93%.  REPORTED TO DR. CHAMBERS 
REGARDING CRITICAL LOW PLATELETS 30. NNO. IV SITE, IV ATB GIVEN AS ORDERED.  KEPT PT CLEAN 
AND DRY.  BED BATH DONE.  PICTURE TAKEN FOR SKIN ISSUES, DRESSING  DONE AS ORDERED.  KEPT PT 
CLEAN AND DRY. REPOSITIONED Q2H AND PRN. WILL ENDORSED CONTINUITY OF CARE TO AM NURSE.

## 2019-07-30 VITALS — SYSTOLIC BLOOD PRESSURE: 150 MMHG | DIASTOLIC BLOOD PRESSURE: 93 MMHG

## 2019-07-30 VITALS — SYSTOLIC BLOOD PRESSURE: 136 MMHG | DIASTOLIC BLOOD PRESSURE: 76 MMHG

## 2019-07-30 VITALS — SYSTOLIC BLOOD PRESSURE: 102 MMHG | DIASTOLIC BLOOD PRESSURE: 62 MMHG

## 2019-07-30 VITALS — SYSTOLIC BLOOD PRESSURE: 114 MMHG | DIASTOLIC BLOOD PRESSURE: 63 MMHG

## 2019-07-30 VITALS — SYSTOLIC BLOOD PRESSURE: 149 MMHG | DIASTOLIC BLOOD PRESSURE: 59 MMHG

## 2019-07-30 VITALS — SYSTOLIC BLOOD PRESSURE: 162 MMHG | DIASTOLIC BLOOD PRESSURE: 76 MMHG

## 2019-07-30 VITALS — DIASTOLIC BLOOD PRESSURE: 71 MMHG | SYSTOLIC BLOOD PRESSURE: 123 MMHG

## 2019-07-30 VITALS — SYSTOLIC BLOOD PRESSURE: 175 MMHG | DIASTOLIC BLOOD PRESSURE: 119 MMHG

## 2019-07-30 VITALS — SYSTOLIC BLOOD PRESSURE: 142 MMHG | DIASTOLIC BLOOD PRESSURE: 81 MMHG

## 2019-07-30 VITALS — SYSTOLIC BLOOD PRESSURE: 137 MMHG | DIASTOLIC BLOOD PRESSURE: 72 MMHG

## 2019-07-30 VITALS — SYSTOLIC BLOOD PRESSURE: 127 MMHG | DIASTOLIC BLOOD PRESSURE: 84 MMHG

## 2019-07-30 VITALS — SYSTOLIC BLOOD PRESSURE: 149 MMHG | DIASTOLIC BLOOD PRESSURE: 75 MMHG

## 2019-07-30 VITALS — DIASTOLIC BLOOD PRESSURE: 100 MMHG | SYSTOLIC BLOOD PRESSURE: 139 MMHG

## 2019-07-30 VITALS — SYSTOLIC BLOOD PRESSURE: 124 MMHG | DIASTOLIC BLOOD PRESSURE: 58 MMHG

## 2019-07-30 VITALS — DIASTOLIC BLOOD PRESSURE: 78 MMHG | SYSTOLIC BLOOD PRESSURE: 154 MMHG

## 2019-07-30 VITALS — DIASTOLIC BLOOD PRESSURE: 99 MMHG | SYSTOLIC BLOOD PRESSURE: 155 MMHG

## 2019-07-30 VITALS — DIASTOLIC BLOOD PRESSURE: 75 MMHG | SYSTOLIC BLOOD PRESSURE: 128 MMHG

## 2019-07-30 VITALS — DIASTOLIC BLOOD PRESSURE: 72 MMHG | SYSTOLIC BLOOD PRESSURE: 125 MMHG

## 2019-07-30 VITALS — DIASTOLIC BLOOD PRESSURE: 87 MMHG | SYSTOLIC BLOOD PRESSURE: 143 MMHG

## 2019-07-30 VITALS — SYSTOLIC BLOOD PRESSURE: 148 MMHG | DIASTOLIC BLOOD PRESSURE: 76 MMHG

## 2019-07-30 VITALS — DIASTOLIC BLOOD PRESSURE: 59 MMHG | SYSTOLIC BLOOD PRESSURE: 149 MMHG

## 2019-07-30 VITALS — DIASTOLIC BLOOD PRESSURE: 72 MMHG | SYSTOLIC BLOOD PRESSURE: 117 MMHG

## 2019-07-30 VITALS — DIASTOLIC BLOOD PRESSURE: 77 MMHG | SYSTOLIC BLOOD PRESSURE: 139 MMHG

## 2019-07-30 VITALS — SYSTOLIC BLOOD PRESSURE: 137 MMHG | DIASTOLIC BLOOD PRESSURE: 68 MMHG

## 2019-07-30 VITALS — DIASTOLIC BLOOD PRESSURE: 66 MMHG | SYSTOLIC BLOOD PRESSURE: 128 MMHG

## 2019-07-30 VITALS — SYSTOLIC BLOOD PRESSURE: 149 MMHG | DIASTOLIC BLOOD PRESSURE: 83 MMHG

## 2019-07-30 LAB
ALBUMIN SERPL BCP-MCNC: 1.3 G/DL (ref 3.4–5)
ALP SERPL-CCNC: 661 U/L (ref 46–116)
ALT SERPL W P-5'-P-CCNC: 273 U/L (ref 12–78)
AST SERPL W P-5'-P-CCNC: 64 U/L (ref 15–37)
BASE EXCESS BLDA CALC-SCNC: 0.3 MMOL/L
BASOPHILS # BLD AUTO: 0 /CMM (ref 0–0.2)
BASOPHILS NFR BLD AUTO: 0.2 % (ref 0–2)
BILIRUB SERPL-MCNC: 0.5 MG/DL (ref 0.2–1)
BUN SERPL-MCNC: 35 MG/DL (ref 7–18)
CALCIUM SERPL-MCNC: 9.5 MG/DL (ref 8.5–10.1)
CHLORIDE SERPL-SCNC: 110 MMOL/L (ref 98–107)
CHOLEST SERPL-MCNC: 91 MG/DL (ref ?–200)
CO2 SERPL-SCNC: 25 MMOL/L (ref 21–32)
CREAT SERPL-MCNC: 0.8 MG/DL (ref 0.6–1.3)
DO-HGB MFR BLDA: 318.2 MMHG
EOSINOPHIL NFR BLD AUTO: 0 % (ref 0–6)
GLUCOSE SERPL-MCNC: 183 MG/DL (ref 74–106)
HCT VFR BLD AUTO: 31 % (ref 33–45)
HDLC SERPL-MCNC: 38 MG/DL (ref 40–60)
HGB BLD-MCNC: 10.2 G/DL (ref 11.5–14.8)
INHALED O2 CONCENTRATION: 60 %
INHALED O2 FLOW RATE: 10 L/MIN (ref 0–30)
LDLC SERPL DIRECT ASSAY-MCNC: 43 MG/DL (ref 0–99)
LYMPHOCYTES NFR BLD AUTO: 0.1 /CMM (ref 0.8–4.8)
LYMPHOCYTES NFR BLD AUTO: 1.7 % (ref 20–44)
LYMPHOCYTES NFR BLD MANUAL: 2 % (ref 16–48)
MAGNESIUM SERPL-MCNC: 2 MG/DL (ref 1.8–2.4)
MCHC RBC AUTO-ENTMCNC: 33 G/DL (ref 31–36)
MCV RBC AUTO: 92 FL (ref 82–100)
MONOCYTES NFR BLD AUTO: 0.2 /CMM (ref 0.1–1.3)
MONOCYTES NFR BLD AUTO: 2.9 % (ref 2–12)
MONOCYTES NFR BLD MANUAL: 2 % (ref 0–11)
NEUTROPHILS # BLD AUTO: 6 /CMM (ref 1.8–8.9)
NEUTROPHILS NFR BLD AUTO: 95.2 % (ref 43–81)
NEUTS SEG NFR BLD MANUAL: 96 % (ref 42–76)
PCO2 TEMP ADJ BLDA: 48.6 MMHG (ref 35–45)
PH TEMP ADJ BLDA: 7.35 [PH] (ref 7.35–7.45)
PHOSPHATE SERPL-MCNC: 3.2 MG/DL (ref 2.5–4.9)
PLATELET # BLD AUTO: 46 /CMM (ref 150–450)
PO2 TEMP ADJ BLDA: 56.1 MMHG (ref 75–100)
POTASSIUM SERPL-SCNC: 3.4 MMOL/L (ref 3.5–5.1)
PROT SERPL-MCNC: 4.4 G/DL (ref 6.4–8.2)
RBC # BLD AUTO: 3.32 MIL/UL (ref 4–5.2)
SAO2 % BLDA: 85.3 % (ref 92–98.5)
SODIUM SERPL-SCNC: 145 MMOL/L (ref 136–145)
TRIGL SERPL-MCNC: 81 MG/DL (ref 30–150)
VENTILATION MODE VENT: (no result)
WBC NRBC COR # BLD AUTO: 6.4 K/UL (ref 4.3–11)

## 2019-07-30 RX ADMIN — Medication SCH EA: at 09:39

## 2019-07-30 RX ADMIN — INSULIN HUMAN PRN UNIT: 100 INJECTION, SOLUTION PARENTERAL at 23:30

## 2019-07-30 RX ADMIN — VORICONAZOLE SCH MG: 200 TABLET, FILM COATED ORAL at 20:56

## 2019-07-30 RX ADMIN — SODIUM CHLORIDE PRN MG: 9 INJECTION, SOLUTION INTRAVENOUS at 12:03

## 2019-07-30 RX ADMIN — Medication SCH EACH: at 23:29

## 2019-07-30 RX ADMIN — Medication SCH MG: at 14:45

## 2019-07-30 RX ADMIN — INSULIN HUMAN PRN UNIT: 100 INJECTION, SOLUTION PARENTERAL at 01:14

## 2019-07-30 RX ADMIN — MEROPENEM SCH MLS/HR: 1 INJECTION INTRAVENOUS at 12:25

## 2019-07-30 RX ADMIN — Medication SCH EACH: at 06:22

## 2019-07-30 RX ADMIN — PANTOPRAZOLE SODIUM SCH MG: 40 GRANULE, DELAYED RELEASE ORAL at 09:38

## 2019-07-30 RX ADMIN — Medication SCH OZ: at 20:56

## 2019-07-30 RX ADMIN — Medication SCH OZ: at 09:39

## 2019-07-30 RX ADMIN — Medication SCH EACH: at 17:12

## 2019-07-30 RX ADMIN — Medication SCH MG: at 07:12

## 2019-07-30 RX ADMIN — ACETYLCYSTEINE SCH MG: 100 INHALANT RESPIRATORY (INHALATION) at 07:12

## 2019-07-30 RX ADMIN — Medication SCH EACH: at 11:48

## 2019-07-30 RX ADMIN — ACETYLCYSTEINE SCH MG: 100 INHALANT RESPIRATORY (INHALATION) at 14:45

## 2019-07-30 RX ADMIN — Medication SCH MG: at 19:41

## 2019-07-30 RX ADMIN — Medication SCH ML: at 09:00

## 2019-07-30 RX ADMIN — Medication PRN ML: at 10:22

## 2019-07-30 RX ADMIN — INSULIN HUMAN PRN UNIT: 100 INJECTION, SOLUTION PARENTERAL at 06:23

## 2019-07-30 RX ADMIN — ACETYLCYSTEINE SCH MG: 100 INHALANT RESPIRATORY (INHALATION) at 23:17

## 2019-07-30 RX ADMIN — MEROPENEM SCH MLS/HR: 1 INJECTION INTRAVENOUS at 00:14

## 2019-07-30 RX ADMIN — Medication SCH EACH: at 01:18

## 2019-07-30 RX ADMIN — Medication SCH MG: at 11:06

## 2019-07-30 RX ADMIN — FOLIC ACID SCH MG: 1 TABLET ORAL at 09:38

## 2019-07-30 RX ADMIN — INSULIN HUMAN PRN UNIT: 100 INJECTION, SOLUTION PARENTERAL at 17:13

## 2019-07-30 RX ADMIN — Medication SCH MG: at 23:17

## 2019-07-30 RX ADMIN — Medication SCH MG: at 03:34

## 2019-07-30 NOTE — NUR
RN NOTE

RECEIVED PATIENT IN BED, LETHARGIC, ON SIMV MODE, NO RESPIRATORY DISTRESS, ONGOING TUBE 
FEEDING, TEMPERATURE 99.3F, COOLING MEASURES APPLIED, ALL SAFETY MEASURES TAKEN

## 2019-07-30 NOTE — NUR
PT ON COOL AEROSOL 40%.  NOTICED PT IN DISTRESS, LOW O2 SAT, INCREASED WOB. STAT ABG DONE 
PER DR CHAMBERS. NOTIFIED RN WITH THE ABG RESULT AND DR CHAMBERS. PT PLACED BACK ON THE VENT. WILL 
CONTINUE TO MONITOR.

## 2019-07-30 NOTE — NUR
RN NOTES



NOTED PATIENT IN DISTRESS, WITH LOW O2 SATURATION,TACHYCARDIC 130'S - 140'S WITH .  
CALLED RT TO RECHECKED  PATIENT AND ETT. INFORMED DR. CHAMBERS WITH  ORDER STAT ABG AND GIVE  
MORPHINE  2MG IVP ONCE. NOTIFIED RT.ABG  RESULT PH 7.353, PCO2 48.6 PO2 56.1 AND HCO3 26.4 
 AWARE,  PT IS ON COOL AEROSOL PLACED BACK ON THE VENT SIMV 4  FIO2 40% PEEP 
5.  TOLERATED WELL. SATURATION  BACK %, PRN MEDICINE GIVEN SBP WENT DOWN  'S , 
HR BACK WNL. NSR ON TELE MONITOR. WILL CONTINUE TO MONITOR.

## 2019-07-30 NOTE — NUR
RN NOTE:



RECEIVED PATIENT IN BED, ASLEEP, BUT SPONTANEOUSLY OPEN HER EYES. ON ETT 7.5 ATTACHED TO LIP 
LINE @ 20 CM. ON MECHANICAL VENTILATOR SIMV MODE SATURATING 100% WITH CURRENT VENT SETTING. 
(R) UA MIDLINE NOTED PATENT AND INTACT WITH TRANSPARENT DRESSING IN PLACED. ADAMS CATHETER 
IN PLACED WITH YELLOW URINE DRAINING TO GRAVITY. BED ALARMED AND LOCKED AT ALL TIMES. CALL 
LIGHT WITHIN REACH. NEEDS ANTICIPATED. DR. RIVERO WAS INFORMED ABOUT THE CHANGES OF PATIENT 
CONDITION OVERNIGHT PER PM SHIFT NURSE'S REPORT.

## 2019-07-30 NOTE — NUR
RN NOTE:



DR. OLEARY WAS ALSO INFORMED ABOUT THE MINIMAL AMOUNT OF URINE OUTPUT OF THE PATIENT. MD 
SAID TO CLOSELY MONITOR IT. MD WITH NO NEW ORDER AT THIS TIME.

## 2019-07-30 NOTE — NUR
RN NOTE:



DR. OLEARY WAS PRESENT IN THE UNIT AND MADE HIM AWARE OF THE PATIENT'S CONDITION FROM 
OVERNIGHT AND REQUESTED TO MD IF HE CAN GIVE AN ORDER FOR REGLAN BECAUSE THE PATIENT WAS 
NOTED WITH RESIDUALS ON THE TUBE FEEDING OVERNIGHT. MD AGREED AND GAVE A VERBAL ORDER FOR 
REGLAN, ORDER NOTED AND CARRIED OUT. SERGE, DAUGHTER WAS INFORMED.

## 2019-07-30 NOTE — NUR
RN NOTE:



GLUCERNA 1 CAN WAS HELD DUE TO PATIENT'S CURRENT CONDITION. PATIENT REMAINED INTUBATED AND 
RECEIVED OGT FEEDING OF GLUCERNA 1.2 @45ML/HR.

## 2019-07-30 NOTE — NUR
RN NOTE



On SIMV mode, tolerated at this time. Patient still lethargic. Dr. Harry spoke to daughter 
and discussed re: the POC. Daughter also spoke with Disha ELIZABETH

## 2019-07-31 VITALS — SYSTOLIC BLOOD PRESSURE: 136 MMHG | DIASTOLIC BLOOD PRESSURE: 69 MMHG

## 2019-07-31 VITALS — DIASTOLIC BLOOD PRESSURE: 98 MMHG | SYSTOLIC BLOOD PRESSURE: 165 MMHG

## 2019-07-31 VITALS — SYSTOLIC BLOOD PRESSURE: 141 MMHG | DIASTOLIC BLOOD PRESSURE: 62 MMHG

## 2019-07-31 VITALS — DIASTOLIC BLOOD PRESSURE: 88 MMHG | SYSTOLIC BLOOD PRESSURE: 146 MMHG

## 2019-07-31 VITALS — SYSTOLIC BLOOD PRESSURE: 136 MMHG | DIASTOLIC BLOOD PRESSURE: 66 MMHG

## 2019-07-31 VITALS — SYSTOLIC BLOOD PRESSURE: 149 MMHG | DIASTOLIC BLOOD PRESSURE: 83 MMHG

## 2019-07-31 VITALS — DIASTOLIC BLOOD PRESSURE: 71 MMHG | SYSTOLIC BLOOD PRESSURE: 127 MMHG

## 2019-07-31 VITALS — DIASTOLIC BLOOD PRESSURE: 79 MMHG | SYSTOLIC BLOOD PRESSURE: 140 MMHG

## 2019-07-31 VITALS — SYSTOLIC BLOOD PRESSURE: 124 MMHG | DIASTOLIC BLOOD PRESSURE: 72 MMHG

## 2019-07-31 VITALS — DIASTOLIC BLOOD PRESSURE: 81 MMHG | SYSTOLIC BLOOD PRESSURE: 151 MMHG

## 2019-07-31 VITALS — SYSTOLIC BLOOD PRESSURE: 150 MMHG | DIASTOLIC BLOOD PRESSURE: 91 MMHG

## 2019-07-31 VITALS — SYSTOLIC BLOOD PRESSURE: 158 MMHG | DIASTOLIC BLOOD PRESSURE: 76 MMHG

## 2019-07-31 VITALS — SYSTOLIC BLOOD PRESSURE: 159 MMHG | DIASTOLIC BLOOD PRESSURE: 73 MMHG

## 2019-07-31 VITALS — SYSTOLIC BLOOD PRESSURE: 134 MMHG | DIASTOLIC BLOOD PRESSURE: 77 MMHG

## 2019-07-31 VITALS — DIASTOLIC BLOOD PRESSURE: 78 MMHG | SYSTOLIC BLOOD PRESSURE: 135 MMHG

## 2019-07-31 VITALS — DIASTOLIC BLOOD PRESSURE: 88 MMHG | SYSTOLIC BLOOD PRESSURE: 147 MMHG

## 2019-07-31 VITALS — SYSTOLIC BLOOD PRESSURE: 130 MMHG | DIASTOLIC BLOOD PRESSURE: 71 MMHG

## 2019-07-31 VITALS — DIASTOLIC BLOOD PRESSURE: 89 MMHG | SYSTOLIC BLOOD PRESSURE: 141 MMHG

## 2019-07-31 VITALS — SYSTOLIC BLOOD PRESSURE: 150 MMHG | DIASTOLIC BLOOD PRESSURE: 79 MMHG

## 2019-07-31 VITALS — DIASTOLIC BLOOD PRESSURE: 74 MMHG | SYSTOLIC BLOOD PRESSURE: 132 MMHG

## 2019-07-31 VITALS — DIASTOLIC BLOOD PRESSURE: 79 MMHG | SYSTOLIC BLOOD PRESSURE: 146 MMHG

## 2019-07-31 VITALS — SYSTOLIC BLOOD PRESSURE: 132 MMHG | DIASTOLIC BLOOD PRESSURE: 68 MMHG

## 2019-07-31 VITALS — SYSTOLIC BLOOD PRESSURE: 136 MMHG | DIASTOLIC BLOOD PRESSURE: 80 MMHG

## 2019-07-31 VITALS — DIASTOLIC BLOOD PRESSURE: 73 MMHG | SYSTOLIC BLOOD PRESSURE: 131 MMHG

## 2019-07-31 VITALS — DIASTOLIC BLOOD PRESSURE: 83 MMHG | SYSTOLIC BLOOD PRESSURE: 139 MMHG

## 2019-07-31 LAB
BASOPHILS # BLD AUTO: 0 /CMM (ref 0–0.2)
BASOPHILS NFR BLD AUTO: 0.1 % (ref 0–2)
BUN SERPL-MCNC: 44 MG/DL (ref 7–18)
CALCIUM SERPL-MCNC: 9.6 MG/DL (ref 8.5–10.1)
CHLORIDE SERPL-SCNC: 114 MMOL/L (ref 98–107)
CO2 SERPL-SCNC: 28 MMOL/L (ref 21–32)
CREAT SERPL-MCNC: 0.9 MG/DL (ref 0.6–1.3)
EOSINOPHIL NFR BLD AUTO: 0 % (ref 0–6)
GLUCOSE SERPL-MCNC: 338 MG/DL (ref 74–106)
HCT VFR BLD AUTO: 27 % (ref 33–45)
HGB BLD-MCNC: 9.3 G/DL (ref 11.5–14.8)
LYMPHOCYTES NFR BLD AUTO: 0.1 /CMM (ref 0.8–4.8)
LYMPHOCYTES NFR BLD AUTO: 1.8 % (ref 20–44)
MCHC RBC AUTO-ENTMCNC: 34 G/DL (ref 31–36)
MCV RBC AUTO: 93 FL (ref 82–100)
MONOCYTES NFR BLD AUTO: 0.1 /CMM (ref 0.1–1.3)
MONOCYTES NFR BLD AUTO: 2 % (ref 2–12)
NEUTROPHILS # BLD AUTO: 5.5 /CMM (ref 1.8–8.9)
NEUTROPHILS NFR BLD AUTO: 96.1 % (ref 43–81)
PLATELET # BLD AUTO: 23 /CMM (ref 150–450)
POTASSIUM SERPL-SCNC: 3.4 MMOL/L (ref 3.5–5.1)
RBC # BLD AUTO: 2.92 MIL/UL (ref 4–5.2)
SODIUM SERPL-SCNC: 149 MMOL/L (ref 136–145)
WBC NRBC COR # BLD AUTO: 5.8 K/UL (ref 4.3–11)

## 2019-07-31 RX ADMIN — INSULIN HUMAN PRN UNIT: 100 INJECTION, SOLUTION PARENTERAL at 05:12

## 2019-07-31 RX ADMIN — Medication SCH MG: at 15:31

## 2019-07-31 RX ADMIN — Medication SCH ML: at 08:07

## 2019-07-31 RX ADMIN — Medication SCH OZ: at 08:07

## 2019-07-31 RX ADMIN — FOLIC ACID SCH MG: 1 TABLET ORAL at 08:07

## 2019-07-31 RX ADMIN — ACETYLCYSTEINE SCH MG: 100 INHALANT RESPIRATORY (INHALATION) at 23:22

## 2019-07-31 RX ADMIN — VORICONAZOLE SCH MG: 200 TABLET, FILM COATED ORAL at 22:11

## 2019-07-31 RX ADMIN — ACETYLCYSTEINE SCH MG: 100 INHALANT RESPIRATORY (INHALATION) at 07:30

## 2019-07-31 RX ADMIN — Medication SCH MG: at 23:22

## 2019-07-31 RX ADMIN — Medication SCH OZ: at 13:25

## 2019-07-31 RX ADMIN — Medication SCH MG: at 07:30

## 2019-07-31 RX ADMIN — Medication SCH EA: at 08:39

## 2019-07-31 RX ADMIN — Medication SCH EACH: at 12:07

## 2019-07-31 RX ADMIN — Medication SCH MG: at 11:28

## 2019-07-31 RX ADMIN — Medication PRN ML: at 12:08

## 2019-07-31 RX ADMIN — ACETYLCYSTEINE SCH MG: 100 INHALANT RESPIRATORY (INHALATION) at 15:31

## 2019-07-31 RX ADMIN — INSULIN HUMAN PRN UNIT: 100 INJECTION, SOLUTION PARENTERAL at 12:21

## 2019-07-31 RX ADMIN — PANTOPRAZOLE SODIUM SCH MG: 40 GRANULE, DELAYED RELEASE ORAL at 08:07

## 2019-07-31 RX ADMIN — Medication SCH OZ: at 21:00

## 2019-07-31 RX ADMIN — MEROPENEM SCH MLS/HR: 1 INJECTION INTRAVENOUS at 12:08

## 2019-07-31 RX ADMIN — Medication SCH EACH: at 05:13

## 2019-07-31 RX ADMIN — VORICONAZOLE SCH MG: 200 TABLET, FILM COATED ORAL at 08:07

## 2019-07-31 RX ADMIN — INSULIN HUMAN PRN UNIT: 100 INJECTION, SOLUTION PARENTERAL at 18:10

## 2019-07-31 RX ADMIN — Medication SCH MG: at 19:56

## 2019-07-31 RX ADMIN — Medication SCH MG: at 03:15

## 2019-07-31 RX ADMIN — MEROPENEM SCH MLS/HR: 1 INJECTION INTRAVENOUS at 00:01

## 2019-07-31 RX ADMIN — Medication SCH EACH: at 18:07

## 2019-07-31 NOTE — NUR
RN NOTE

JOEL ISLAS CALLED BACK REGARDING CRITICAL LAB VALUE PLATELETS 23, NO NEW ORDERS GIVEN AT 
THIS TIME, BLEEDING PRECAUTIONS TAKEN

## 2019-07-31 NOTE — NUR
ICU/RN: RECEIVED REPORT FROM YAJAIRA JACKSON. PT INTUBATED AND RESTING IN BED WITH VENT SETTINGS AS 
ORDERED BY MD. SINUS ON TELE. ALL NEEDS WILL BE ATTENDED TO. TUBE FEEDING INFUSING, 35 
RESIDUAL NOTED, WILL CONTINUE TO MONITOR. ADAMS CATH IN PLACE, DRAINING MINIMAL URINE. WILL 
CONTINUE CARE.

## 2019-07-31 NOTE — NUR
ICU RN NOTES

PT JOHNNY REPORT GIVEN BY YAJAIRA MEDLEY IN ICU.RECEIVED PT LYING ON BED WITH ET 7.5/20 AND OG TUBE 
IN PLACE.PT CAN OPEN EYES,OBTUNDED AND DOESN'T FOLLOW ANY COMMANDS.FC IS IN PLACE.G TUBE IS 
IN PLACE WITH GLUCERNA @45CC/HR IS RUNNING.MINIMAL GASTRIC RESIDUAL NOTED.TOLERATING 
WELL.MIDLINE IS ON RIGHT UA,SITE IS CLEAN,DRY AND INTACT.NO INFILTRATION NOTED.BED IS IN LOW 
POSITION.CALL LIGHT IS WITHIN REACH.FAMILY IS AT BEDSIDE.NO COMPLICATIONS NOTED.WILL 
CONTINUE TO MONITOR THE PT CLOSELY.

## 2019-07-31 NOTE — NUR
received patient intubated 7.5/20 with simv 40% fi02 and tolerating well 100% . no s/s 
distress noted. iv site c/d/i/p. burns cath c/d/i/p. patient tracking however no following 
of commands. afebrile. tele nsr with pac's. tube feeding running through ogt per order with 
30ml residual. skin, safety, aspiration precautions in place and will monitor

## 2019-07-31 NOTE — NUR
report given to leeanna francisco for anup. patient stable. vss. per np britta order therahoney to 
sacral/buttock wound daily and prn soiling. until or if daughter is able to bring

## 2019-07-31 NOTE — NUR
aguila is out and per daughter unable to get more. message to britta for wound care order 
update. patient with bm at this time cleansed wound with ns and applied mepilex for 
protection

-------------------------------------------------------------------------------

Addendum: 08/01/19 at 0750 by GALINDO REYNA RN

-------------------------------------------------------------------------------

daughter at bedside for skin check of sacral and buttock wound and thinks "it looks worse" 
britta stanton in charge of wound care notified. no new orders.

## 2019-07-31 NOTE — NUR
RN NOTE

NO ACUTE CHANGES ON MY SHIFT, NO RESPIRATORY DISTRESS, AFEBRILE AT THIS TIME, BLEEDING 
PRECAUTIONS TAKEN, VITAL SIGNS STABLE, TURNED AND REPOSITIONED Q 2 HOURS, WOUND CARE 
PROVIDED AS ORDERED, WILL ENDORSE TO AM SHIFT TO CONTINUE CARE

## 2019-07-31 NOTE — NUR
ICU/RN ENDING NOTES, AM



REPORT ENDORSED TO NIGHT NURSE FOR CONTINUATION OF CARE, ALL NEEDS ATTENDED TO, SAFETY 
MEASURES TAKEN, BED IN LOW POSITION, SIDE RAILS UP, CALL LIGHT WITHIN REACH. PT ON VENT 
SETTINGS AS ORDERED, NO DISTRESS.

## 2019-07-31 NOTE — NUR
ICU RN. INITIAL ASSESSMENT. RECEIVED THE PT REST ON THE BED. ORALLY INTUBATED. PT ON SIMV 
MODE. ETT 7.5.ASIMV 4,LIP 20CM.FIO2 30%. PEEP 5. SAT 98%. NO ACUTE DISTRESS NOTED. CARDIAC 
MONITOR SHOWING NSR. IV RT UPPER ARM MID LINE. TKO RUNNING. HOB ELEVATED. OGT FEEDING 
GLUCERNA 30ML/H. GOAL IS 60ML/H. FC PATENT. HOB ELEVATED. WILL CONTINUE TO MONITOR VITALS.

## 2019-08-01 VITALS — SYSTOLIC BLOOD PRESSURE: 134 MMHG | DIASTOLIC BLOOD PRESSURE: 75 MMHG

## 2019-08-01 VITALS — DIASTOLIC BLOOD PRESSURE: 51 MMHG | SYSTOLIC BLOOD PRESSURE: 161 MMHG

## 2019-08-01 VITALS — DIASTOLIC BLOOD PRESSURE: 83 MMHG | SYSTOLIC BLOOD PRESSURE: 148 MMHG

## 2019-08-01 VITALS — DIASTOLIC BLOOD PRESSURE: 81 MMHG | SYSTOLIC BLOOD PRESSURE: 106 MMHG

## 2019-08-01 VITALS — SYSTOLIC BLOOD PRESSURE: 152 MMHG | DIASTOLIC BLOOD PRESSURE: 63 MMHG

## 2019-08-01 VITALS — SYSTOLIC BLOOD PRESSURE: 149 MMHG | DIASTOLIC BLOOD PRESSURE: 71 MMHG

## 2019-08-01 VITALS — DIASTOLIC BLOOD PRESSURE: 99 MMHG | SYSTOLIC BLOOD PRESSURE: 147 MMHG

## 2019-08-01 VITALS — SYSTOLIC BLOOD PRESSURE: 131 MMHG | DIASTOLIC BLOOD PRESSURE: 87 MMHG

## 2019-08-01 VITALS — SYSTOLIC BLOOD PRESSURE: 149 MMHG | DIASTOLIC BLOOD PRESSURE: 56 MMHG

## 2019-08-01 VITALS — DIASTOLIC BLOOD PRESSURE: 52 MMHG | SYSTOLIC BLOOD PRESSURE: 161 MMHG

## 2019-08-01 VITALS — DIASTOLIC BLOOD PRESSURE: 56 MMHG | SYSTOLIC BLOOD PRESSURE: 139 MMHG

## 2019-08-01 VITALS — DIASTOLIC BLOOD PRESSURE: 75 MMHG | SYSTOLIC BLOOD PRESSURE: 130 MMHG

## 2019-08-01 VITALS — DIASTOLIC BLOOD PRESSURE: 76 MMHG | SYSTOLIC BLOOD PRESSURE: 156 MMHG

## 2019-08-01 VITALS — DIASTOLIC BLOOD PRESSURE: 69 MMHG | SYSTOLIC BLOOD PRESSURE: 156 MMHG

## 2019-08-01 VITALS — SYSTOLIC BLOOD PRESSURE: 147 MMHG | DIASTOLIC BLOOD PRESSURE: 118 MMHG

## 2019-08-01 VITALS — DIASTOLIC BLOOD PRESSURE: 60 MMHG | SYSTOLIC BLOOD PRESSURE: 140 MMHG

## 2019-08-01 VITALS — SYSTOLIC BLOOD PRESSURE: 133 MMHG | DIASTOLIC BLOOD PRESSURE: 81 MMHG

## 2019-08-01 VITALS — SYSTOLIC BLOOD PRESSURE: 149 MMHG | DIASTOLIC BLOOD PRESSURE: 86 MMHG

## 2019-08-01 VITALS — DIASTOLIC BLOOD PRESSURE: 78 MMHG | SYSTOLIC BLOOD PRESSURE: 145 MMHG

## 2019-08-01 VITALS — SYSTOLIC BLOOD PRESSURE: 170 MMHG | DIASTOLIC BLOOD PRESSURE: 95 MMHG

## 2019-08-01 VITALS — DIASTOLIC BLOOD PRESSURE: 82 MMHG | SYSTOLIC BLOOD PRESSURE: 138 MMHG

## 2019-08-01 VITALS — DIASTOLIC BLOOD PRESSURE: 65 MMHG | SYSTOLIC BLOOD PRESSURE: 155 MMHG

## 2019-08-01 VITALS — SYSTOLIC BLOOD PRESSURE: 144 MMHG | DIASTOLIC BLOOD PRESSURE: 79 MMHG

## 2019-08-01 LAB
BASE EXCESS BLDA CALC-SCNC: 2.5 MMOL/L
BASOPHILS # BLD AUTO: 0 /CMM (ref 0–0.2)
BASOPHILS NFR BLD AUTO: 0.1 % (ref 0–2)
BUN SERPL-MCNC: 46 MG/DL (ref 7–18)
CALCIUM SERPL-MCNC: 10.2 MG/DL (ref 8.5–10.1)
CHLORIDE SERPL-SCNC: 117 MMOL/L (ref 98–107)
CO2 SERPL-SCNC: 29 MMOL/L (ref 21–32)
CREAT SERPL-MCNC: 0.8 MG/DL (ref 0.6–1.3)
D DIMER PPP FEU-MCNC: 8.86 MG/L(FEU (ref 0.17–0.5)
DO-HGB MFR BLDA: 61.2 MMHG
EOSINOPHIL NFR BLD AUTO: 0 % (ref 0–6)
GLUCOSE SERPL-MCNC: 304 MG/DL (ref 74–106)
HCT VFR BLD AUTO: 29 % (ref 33–45)
HGB BLD-MCNC: 9.7 G/DL (ref 11.5–14.8)
INHALED O2 CONCENTRATION: 30 %
LYMPHOCYTES NFR BLD AUTO: 0.2 /CMM (ref 0.8–4.8)
LYMPHOCYTES NFR BLD AUTO: 2.6 % (ref 20–44)
LYMPHOCYTES NFR BLD MANUAL: 9 % (ref 16–48)
MCHC RBC AUTO-ENTMCNC: 34 G/DL (ref 31–36)
MCV RBC AUTO: 94 FL (ref 82–100)
MONOCYTES NFR BLD AUTO: 0.1 /CMM (ref 0.1–1.3)
MONOCYTES NFR BLD AUTO: 1.4 % (ref 2–12)
MONOCYTES NFR BLD MANUAL: 2 % (ref 0–11)
NEUTROPHILS # BLD AUTO: 6 /CMM (ref 1.8–8.9)
NEUTROPHILS NFR BLD AUTO: 95.9 % (ref 43–81)
NEUTS BAND NFR BLD MANUAL: 2 % (ref 0–5)
NEUTS SEG NFR BLD MANUAL: 87 % (ref 42–76)
PCO2 TEMP ADJ BLDA: 40.6 MMHG (ref 35–45)
PH TEMP ADJ BLDA: 7.44 [PH] (ref 7.35–7.45)
PLATELET # BLD AUTO: 19 /CMM (ref 150–450)
PLATELET # BLD AUTO: 26 /CMM (ref 150–450)
PO2 TEMP ADJ BLDA: 105 MMHG (ref 75–100)
POTASSIUM SERPL-SCNC: 4.1 MMOL/L (ref 3.5–5.1)
RBC # BLD AUTO: 3.06 MIL/UL (ref 4–5.2)
SAO2 % BLDA: 96.9 % (ref 92–98.5)
SODIUM SERPL-SCNC: 153 MMOL/L (ref 136–145)
VENTILATION MODE VENT: (no result)
WBC NRBC COR # BLD AUTO: 6.3 K/UL (ref 4.3–11)

## 2019-08-01 RX ADMIN — INSULIN HUMAN PRN UNIT: 100 INJECTION, SOLUTION PARENTERAL at 11:38

## 2019-08-01 RX ADMIN — Medication SCH EACH: at 00:40

## 2019-08-01 RX ADMIN — INSULIN HUMAN PRN UNIT: 100 INJECTION, SOLUTION PARENTERAL at 06:58

## 2019-08-01 RX ADMIN — VORICONAZOLE SCH MG: 200 TABLET, FILM COATED ORAL at 08:12

## 2019-08-01 RX ADMIN — Medication SCH MG: at 23:53

## 2019-08-01 RX ADMIN — Medication SCH OZ: at 08:12

## 2019-08-01 RX ADMIN — ACETYLCYSTEINE SCH MG: 100 INHALANT RESPIRATORY (INHALATION) at 15:30

## 2019-08-01 RX ADMIN — Medication SCH EACH: at 18:08

## 2019-08-01 RX ADMIN — Medication SCH MG: at 12:12

## 2019-08-01 RX ADMIN — ACETYLCYSTEINE SCH MG: 100 INHALANT RESPIRATORY (INHALATION) at 23:53

## 2019-08-01 RX ADMIN — Medication SCH EACH: at 11:39

## 2019-08-01 RX ADMIN — Medication SCH MG: at 15:30

## 2019-08-01 RX ADMIN — Medication PRN OZ: at 18:08

## 2019-08-01 RX ADMIN — VORICONAZOLE SCH MG: 200 TABLET, FILM COATED ORAL at 21:49

## 2019-08-01 RX ADMIN — Medication SCH OZ: at 21:50

## 2019-08-01 RX ADMIN — INSULIN HUMAN PRN UNIT: 100 INJECTION, SOLUTION PARENTERAL at 18:10

## 2019-08-01 RX ADMIN — Medication SCH MG: at 07:33

## 2019-08-01 RX ADMIN — Medication SCH EACH: at 06:54

## 2019-08-01 RX ADMIN — INSULIN HUMAN PRN UNIT: 100 INJECTION, SOLUTION PARENTERAL at 00:41

## 2019-08-01 RX ADMIN — Medication SCH MG: at 19:30

## 2019-08-01 RX ADMIN — Medication SCH ML: at 08:13

## 2019-08-01 RX ADMIN — Medication SCH MG: at 03:43

## 2019-08-01 RX ADMIN — PANTOPRAZOLE SODIUM SCH MG: 40 GRANULE, DELAYED RELEASE ORAL at 08:12

## 2019-08-01 RX ADMIN — ACETYLCYSTEINE SCH MG: 100 INHALANT RESPIRATORY (INHALATION) at 07:33

## 2019-08-01 RX ADMIN — FOLIC ACID SCH MG: 1 TABLET ORAL at 08:12

## 2019-08-01 RX ADMIN — Medication SCH APPLIC: at 08:13

## 2019-08-01 NOTE — NUR
ICU/RN:



Dr Harry rounds; updated on pt status, labs reviewed. Pt able to stay awake and alert for 
approximately one hour at a time; tracks, aphasic. ABG's on CPAP reviewed with MD, currently 
tolerating weaning trials. New orders noted and carried out.

## 2019-08-01 NOTE — NUR
icu rn. am care. oral care, bed bath given. linen changed. remaining same vent setting 
tolerated well. sat 98%. no acute distress noted. cardiac monitor showing nsr. iv tko 
running. fc patent, urine draining. hob elevated, turn and reposition q2h. will continue to 
monitor vitals.

## 2019-08-01 NOTE — NUR
ICU/RN:



Frequent wound care rendered; pt with weeping edema on bilateral upper extremities; at high 
risk for skin breakdown.

## 2019-08-01 NOTE — NUR
ICU/RN:



Pt noted with long periods of apnea, followed by tachypnea RR 30's, SBP in 170's, facial 
grimacing and restlessness as evidence by biting and shaking head from side to side. Placed 
back on SIMV mode per MD order. Dr Harry notified, no new orders.

## 2019-08-01 NOTE — NUR
ICU/RN:



Pt received on SIMV mode, no distress noted. Tracks, alert with periods of lethargy. VSS. FC 
draining well to gravity.

## 2019-08-01 NOTE — NUR
ICU/RN:



brad Hines's daughter spoke with Dr Harry at length regarding plan of care. Daughter is 
now considering tracheostomy placement.

## 2019-08-01 NOTE — NUR
ICU NOTES

RECEIVED PT ON VENT VIA ORAL ETT ON SIMV4 RR=12-16/MIN,O2 SAT.% ON 40%FIO2.DROWSY BUT 
OPENS EYES TO TOUCH AND WHEN NAME CALLED.PT NON-VERBAL.ON TUBE FEEDING VIA OROGASTRIC TUBE 
OF GLUCERNA AT 60 ML/HR.OGT CHECKED FOR PATENCY AND NO RESIDUAL OBTAINED.

## 2019-08-01 NOTE — NUR
ICU/RN:



Wound care and hygienic care rendered. Tolerated well. Therahoney and Mepilex applied as 
ordered. Tolerating SIMV mode.

## 2019-08-01 NOTE — NUR
ICU/RN:



Dr Bal at bedside; updated on pt status. POC dw Dr Harry; weaning trial ongoing. C-Diff 
results pending.

## 2019-08-02 VITALS — DIASTOLIC BLOOD PRESSURE: 90 MMHG | SYSTOLIC BLOOD PRESSURE: 134 MMHG

## 2019-08-02 VITALS — DIASTOLIC BLOOD PRESSURE: 75 MMHG | SYSTOLIC BLOOD PRESSURE: 123 MMHG

## 2019-08-02 VITALS — DIASTOLIC BLOOD PRESSURE: 68 MMHG | SYSTOLIC BLOOD PRESSURE: 137 MMHG

## 2019-08-02 VITALS — DIASTOLIC BLOOD PRESSURE: 73 MMHG | SYSTOLIC BLOOD PRESSURE: 123 MMHG

## 2019-08-02 VITALS — DIASTOLIC BLOOD PRESSURE: 68 MMHG | SYSTOLIC BLOOD PRESSURE: 153 MMHG

## 2019-08-02 VITALS — SYSTOLIC BLOOD PRESSURE: 125 MMHG | DIASTOLIC BLOOD PRESSURE: 76 MMHG

## 2019-08-02 VITALS — SYSTOLIC BLOOD PRESSURE: 106 MMHG | DIASTOLIC BLOOD PRESSURE: 87 MMHG

## 2019-08-02 VITALS — SYSTOLIC BLOOD PRESSURE: 137 MMHG | DIASTOLIC BLOOD PRESSURE: 72 MMHG

## 2019-08-02 VITALS — SYSTOLIC BLOOD PRESSURE: 163 MMHG | DIASTOLIC BLOOD PRESSURE: 68 MMHG

## 2019-08-02 VITALS — SYSTOLIC BLOOD PRESSURE: 139 MMHG | DIASTOLIC BLOOD PRESSURE: 87 MMHG

## 2019-08-02 VITALS — SYSTOLIC BLOOD PRESSURE: 141 MMHG | DIASTOLIC BLOOD PRESSURE: 73 MMHG

## 2019-08-02 VITALS — DIASTOLIC BLOOD PRESSURE: 75 MMHG | SYSTOLIC BLOOD PRESSURE: 138 MMHG

## 2019-08-02 VITALS — DIASTOLIC BLOOD PRESSURE: 89 MMHG | SYSTOLIC BLOOD PRESSURE: 135 MMHG

## 2019-08-02 VITALS — DIASTOLIC BLOOD PRESSURE: 68 MMHG | SYSTOLIC BLOOD PRESSURE: 157 MMHG

## 2019-08-02 VITALS — DIASTOLIC BLOOD PRESSURE: 62 MMHG | SYSTOLIC BLOOD PRESSURE: 117 MMHG

## 2019-08-02 VITALS — DIASTOLIC BLOOD PRESSURE: 76 MMHG | SYSTOLIC BLOOD PRESSURE: 141 MMHG

## 2019-08-02 VITALS — DIASTOLIC BLOOD PRESSURE: 74 MMHG | SYSTOLIC BLOOD PRESSURE: 129 MMHG

## 2019-08-02 VITALS — SYSTOLIC BLOOD PRESSURE: 141 MMHG | DIASTOLIC BLOOD PRESSURE: 67 MMHG

## 2019-08-02 VITALS — SYSTOLIC BLOOD PRESSURE: 160 MMHG | DIASTOLIC BLOOD PRESSURE: 85 MMHG

## 2019-08-02 VITALS — DIASTOLIC BLOOD PRESSURE: 75 MMHG | SYSTOLIC BLOOD PRESSURE: 132 MMHG

## 2019-08-02 VITALS — SYSTOLIC BLOOD PRESSURE: 67 MMHG | DIASTOLIC BLOOD PRESSURE: 41 MMHG

## 2019-08-02 VITALS — DIASTOLIC BLOOD PRESSURE: 63 MMHG | SYSTOLIC BLOOD PRESSURE: 156 MMHG

## 2019-08-02 VITALS — DIASTOLIC BLOOD PRESSURE: 75 MMHG | SYSTOLIC BLOOD PRESSURE: 144 MMHG

## 2019-08-02 VITALS — DIASTOLIC BLOOD PRESSURE: 44 MMHG | SYSTOLIC BLOOD PRESSURE: 140 MMHG

## 2019-08-02 VITALS — DIASTOLIC BLOOD PRESSURE: 56 MMHG | SYSTOLIC BLOOD PRESSURE: 103 MMHG

## 2019-08-02 VITALS — DIASTOLIC BLOOD PRESSURE: 69 MMHG | SYSTOLIC BLOOD PRESSURE: 159 MMHG

## 2019-08-02 VITALS — DIASTOLIC BLOOD PRESSURE: 95 MMHG | SYSTOLIC BLOOD PRESSURE: 129 MMHG

## 2019-08-02 VITALS — SYSTOLIC BLOOD PRESSURE: 127 MMHG | DIASTOLIC BLOOD PRESSURE: 71 MMHG

## 2019-08-02 VITALS — SYSTOLIC BLOOD PRESSURE: 146 MMHG | DIASTOLIC BLOOD PRESSURE: 41 MMHG

## 2019-08-02 VITALS — DIASTOLIC BLOOD PRESSURE: 69 MMHG | SYSTOLIC BLOOD PRESSURE: 133 MMHG

## 2019-08-02 VITALS — SYSTOLIC BLOOD PRESSURE: 148 MMHG | DIASTOLIC BLOOD PRESSURE: 73 MMHG

## 2019-08-02 VITALS — DIASTOLIC BLOOD PRESSURE: 69 MMHG | SYSTOLIC BLOOD PRESSURE: 92 MMHG

## 2019-08-02 VITALS — DIASTOLIC BLOOD PRESSURE: 68 MMHG | SYSTOLIC BLOOD PRESSURE: 150 MMHG

## 2019-08-02 LAB
BASOPHILS # BLD AUTO: 0 /CMM (ref 0–0.2)
BASOPHILS NFR BLD AUTO: 0 % (ref 0–2)
BUN SERPL-MCNC: 47 MG/DL (ref 7–18)
CALCIUM SERPL-MCNC: 10 MG/DL (ref 8.5–10.1)
CHLORIDE SERPL-SCNC: 118 MMOL/L (ref 98–107)
CO2 SERPL-SCNC: 31 MMOL/L (ref 21–32)
CREAT SERPL-MCNC: 0.7 MG/DL (ref 0.6–1.3)
EOSINOPHIL NFR BLD AUTO: 0.4 % (ref 0–6)
GLUCOSE SERPL-MCNC: 255 MG/DL (ref 74–106)
HCT VFR BLD AUTO: 27 % (ref 33–45)
HGB BLD-MCNC: 8.9 G/DL (ref 11.5–14.8)
LYMPHOCYTES NFR BLD AUTO: 0.3 /CMM (ref 0.8–4.8)
LYMPHOCYTES NFR BLD AUTO: 2.9 % (ref 20–44)
LYMPHOCYTES NFR BLD MANUAL: 2 % (ref 16–48)
MAGNESIUM SERPL-MCNC: 2 MG/DL (ref 1.8–2.4)
MCHC RBC AUTO-ENTMCNC: 34 G/DL (ref 31–36)
MCV RBC AUTO: 93 FL (ref 82–100)
MONOCYTES NFR BLD AUTO: 0.1 /CMM (ref 0.1–1.3)
MONOCYTES NFR BLD AUTO: 1.5 % (ref 2–12)
MONOCYTES NFR BLD MANUAL: 1 % (ref 0–11)
NEUTROPHILS # BLD AUTO: 8.8 /CMM (ref 1.8–8.9)
NEUTROPHILS NFR BLD AUTO: 95.2 % (ref 43–81)
NEUTS BAND NFR BLD MANUAL: 9 % (ref 0–5)
NEUTS SEG NFR BLD MANUAL: 88 % (ref 42–76)
PHOSPHATE SERPL-MCNC: 2.3 MG/DL (ref 2.5–4.9)
PLATELET # BLD AUTO: 17 /CMM (ref 150–450)
POTASSIUM SERPL-SCNC: 4.5 MMOL/L (ref 3.5–5.1)
RBC # BLD AUTO: 2.85 MIL/UL (ref 4–5.2)
SODIUM SERPL-SCNC: 152 MMOL/L (ref 136–145)
WBC NRBC COR # BLD AUTO: 9.2 K/UL (ref 4.3–11)

## 2019-08-02 RX ADMIN — DEXTROSE MONOHYDRATE PRN MLS/HR: 50 INJECTION, SOLUTION INTRAVENOUS at 11:46

## 2019-08-02 RX ADMIN — Medication SCH MG: at 23:01

## 2019-08-02 RX ADMIN — Medication SCH MG: at 07:10

## 2019-08-02 RX ADMIN — Medication SCH APPLIC: at 08:20

## 2019-08-02 RX ADMIN — Medication SCH MG: at 03:23

## 2019-08-02 RX ADMIN — PANTOPRAZOLE SODIUM SCH MG: 40 GRANULE, DELAYED RELEASE ORAL at 08:19

## 2019-08-02 RX ADMIN — Medication SCH EACH: at 05:57

## 2019-08-02 RX ADMIN — Medication PRN ML: at 04:00

## 2019-08-02 RX ADMIN — Medication PRN ML: at 17:51

## 2019-08-02 RX ADMIN — ACETYLCYSTEINE SCH MG: 100 INHALANT RESPIRATORY (INHALATION) at 23:01

## 2019-08-02 RX ADMIN — Medication SCH EACH: at 11:46

## 2019-08-02 RX ADMIN — Medication SCH MG: at 19:44

## 2019-08-02 RX ADMIN — Medication SCH EACH: at 00:23

## 2019-08-02 RX ADMIN — INSULIN HUMAN PRN UNIT: 100 INJECTION, SOLUTION PARENTERAL at 00:12

## 2019-08-02 RX ADMIN — Medication SCH MG: at 15:43

## 2019-08-02 RX ADMIN — INSULIN HUMAN PRN UNIT: 100 INJECTION, SOLUTION PARENTERAL at 05:50

## 2019-08-02 RX ADMIN — FOLIC ACID SCH MG: 1 TABLET ORAL at 08:19

## 2019-08-02 RX ADMIN — ACETYLCYSTEINE SCH MG: 100 INHALANT RESPIRATORY (INHALATION) at 15:43

## 2019-08-02 RX ADMIN — VORICONAZOLE SCH MG: 200 TABLET, FILM COATED ORAL at 08:19

## 2019-08-02 RX ADMIN — Medication SCH EACH: at 17:51

## 2019-08-02 RX ADMIN — INSULIN HUMAN PRN UNIT: 100 INJECTION, SOLUTION PARENTERAL at 17:55

## 2019-08-02 RX ADMIN — Medication SCH MG: at 11:10

## 2019-08-02 RX ADMIN — Medication SCH OZ: at 21:44

## 2019-08-02 RX ADMIN — ACETYLCYSTEINE SCH MG: 100 INHALANT RESPIRATORY (INHALATION) at 07:10

## 2019-08-02 RX ADMIN — INSULIN HUMAN PRN UNIT: 100 INJECTION, SOLUTION PARENTERAL at 11:49

## 2019-08-02 RX ADMIN — Medication SCH OZ: at 08:20

## 2019-08-02 RX ADMIN — VORICONAZOLE SCH MG: 200 TABLET, FILM COATED ORAL at 21:42

## 2019-08-02 RX ADMIN — Medication SCH ML: at 08:20

## 2019-08-02 RX ADMIN — SODIUM CHLORIDE PRN MLS/HR: 9 INJECTION, SOLUTION INTRAVENOUS at 08:19

## 2019-08-02 NOTE — NUR
ICU RN NOTES: TRACH/PEG DECISION



PT'S DAUGHTER HAS AGREED TO TRACH/PEG HOWEVER STATES THAT SHE WILL SIGN CONSENT FORMS ON 
MONDAY. PRIMARY MD MADE AWARE

## 2019-08-02 NOTE — NUR
ICU RN NOTES: MD ROUNDING (DR RIVERO)



MD AT BEDSIDE AND MADE AWARE OF PT'S CURRENT CONDITION, VITALS, AND RECENT IMAGING RESULTS. 
VERBAL ORDERS OBTAINED TO ADVANCE PT'S ETT 3CM AS INDICATED BY CXR. RT MADE AWARE. PER MD 
"PT WILL REMAIN ON SIMV FOR TODAY AND INITIATE WEANING TRIALS ON CPAP TOMORROW"

## 2019-08-02 NOTE — NUR
ICU RN NOTE



BOTH UNITS OF PLATELETS TRANSFUSED PER MD ORDER. NO ADVERSE REACTIONS NOTED. RN WILL 
CONTINUE TO MONITOR FOR CHANGES.

## 2019-08-02 NOTE — NUR
CALL RECEIVED FROM  BLOOD BANK. PLTS READY. WILL TRANSFUSE 1UNIT AND ENDORSE THE OTHER TO 
NIGHTSHIFT RN

## 2019-08-02 NOTE — NUR
ICU RN CLOSING NOTES



PT REMAINS IN STABLE CONDITION ON SIMV MODE. ALL NEEDS ANTICIPATED FOR AND MET THROUGHOUT 
SHIFT. ALL DUE MEDS GIVEN. PRN/WOUND/AND CATHETER CARE RENDERED AS ORDERED. PT REPOSITION 
AND TURNED PER PROTOCOL. MIDLINE REMAINS PATENT AND INTACT. VSS REMAIN STABLE 15MIN POST PLT 
TRANSFUSION. SHE CONTINUES TOLERATING TRANSFUSION WELL. SAFETY MEASURES REMAIN IN PLACE. 
WILL ENDORSE TO NIGHTSHIFT RN TO COMPLETER PLT TRANSFUSION AND JOHNNY

## 2019-08-02 NOTE — NUR
ICU RN NOTES: MD ROUNDING (DR CARBALLO)



MD AT BEDSIDE AND MADE AWARE OF PT'S CONDITION AND RECENT LABS IN PARTICULAR PT'S NA+ LEVEL. 
MD STATES THAT HE WILL READJUST HER FLUIDS AND OR FREE WATER FLUSHES

## 2019-08-02 NOTE — NUR
ICU RN NOTE

 PATIENT REPORT GIVEN BEDSIDE. PATIENT IN BED NON VERBAL, UNEASILY AROUSED. PATIENT OPENS 
EYES TO RIGOUROUS STIMULI LIKE TURNING AND LIGHT PAIN. PATIENT TURNED AND ORAL CARE GIVEN 
COPIOUS AMOUNTS OF ORAL SECRETIONS NOTED. PATIENT MIDLINE PATENT AND INTACT NO S/S OF 
INFECTION/INFILTRATION. PATIENT RUNNING PLATELETS AT THIS TIME TO GRAVITY. RN CONTINUE TO 
MONITOR ADMINISTRATION. SAFETY PRECAUTIONS IN PLACE. SIDE RAILS UP X2.

## 2019-08-02 NOTE — NUR
ICU RN NOTES: MD ROUNDING (DR OLEARY)



MD AT BEDSIDE AND MADE AWARE OF PT'S CONDITION, VITALS, AND RECENT LABS. PT'S DAUGHTER AT 
BEDSIDE. DISCUSSION INITIATED IN REGARDS TO TRACH AND PEG PROCEDURE. PT'S DAUGHTER HIGHLY 
CONSIDERING PROCEDURE (S) HOWEVER WILL CONFIRM LATER TODAY. CHARGE NURSE MADE AWARE TO 
CONTACT DR. MOTTA OR YT ONCE DECISION IS MADE. VERBAL ORDERS OBTAINED FROM MD TO 
TRANSFUSE 2UNIT OF PLTS IN ANTICIPATION OF PROCEDURE.

## 2019-08-02 NOTE — NUR
ICU RN INITIAL NOTES



PT RECEIVED INTUBATED AND ON MECHANICAL VENTILATOR. VENT SETTINGS ASSESSED FOR ACCURACY. PT 
TOLERATING SIMV WELL. NO SOB OR ACUTE SIGNS OF DISTRESS NOTED. BREATHING IS EVEN AND 
UNLABORED. PT SATING %. SHE IS UNABLE TO FOLLOW SIMPLE COMMANDS, HOWEVER RESPONDS TO 
TACTILE AND PAINFUL STIMULI AND TRACKS WITH EYES. SHE IS NOTED TO BE SR ON MONITOR. F/C 
NOTED TO BE C/D/I AND DRAINING TO GRAVITY. RIGHT UPPER ARM MIDLINE NOTED TO BE PATENT AND 
INTACT. MINIMAL BLOOD RETURN NOTED. OG TUBE PLACEMENT VERIFIED VIA AUSCULTATION. PT 
TOLERATING TUBE FEEDING WELL. NO RESUDIALS NOTED AT THIS TIME. BED IN LOW LOCKED POSITION, 
SIDE RAILS UP X3. WILL CONTINUE TO MONITOR

## 2019-08-03 VITALS — DIASTOLIC BLOOD PRESSURE: 70 MMHG | SYSTOLIC BLOOD PRESSURE: 148 MMHG

## 2019-08-03 VITALS — DIASTOLIC BLOOD PRESSURE: 59 MMHG | SYSTOLIC BLOOD PRESSURE: 155 MMHG

## 2019-08-03 VITALS — SYSTOLIC BLOOD PRESSURE: 149 MMHG | DIASTOLIC BLOOD PRESSURE: 66 MMHG

## 2019-08-03 VITALS — SYSTOLIC BLOOD PRESSURE: 142 MMHG | DIASTOLIC BLOOD PRESSURE: 77 MMHG

## 2019-08-03 VITALS — SYSTOLIC BLOOD PRESSURE: 131 MMHG | DIASTOLIC BLOOD PRESSURE: 70 MMHG

## 2019-08-03 VITALS — SYSTOLIC BLOOD PRESSURE: 139 MMHG | DIASTOLIC BLOOD PRESSURE: 58 MMHG

## 2019-08-03 VITALS — SYSTOLIC BLOOD PRESSURE: 146 MMHG | DIASTOLIC BLOOD PRESSURE: 66 MMHG

## 2019-08-03 VITALS — SYSTOLIC BLOOD PRESSURE: 158 MMHG | DIASTOLIC BLOOD PRESSURE: 69 MMHG

## 2019-08-03 VITALS — DIASTOLIC BLOOD PRESSURE: 60 MMHG | SYSTOLIC BLOOD PRESSURE: 126 MMHG

## 2019-08-03 VITALS — SYSTOLIC BLOOD PRESSURE: 140 MMHG | DIASTOLIC BLOOD PRESSURE: 61 MMHG

## 2019-08-03 VITALS — DIASTOLIC BLOOD PRESSURE: 60 MMHG | SYSTOLIC BLOOD PRESSURE: 158 MMHG

## 2019-08-03 VITALS — DIASTOLIC BLOOD PRESSURE: 61 MMHG | SYSTOLIC BLOOD PRESSURE: 137 MMHG

## 2019-08-03 VITALS — DIASTOLIC BLOOD PRESSURE: 72 MMHG | SYSTOLIC BLOOD PRESSURE: 147 MMHG

## 2019-08-03 VITALS — SYSTOLIC BLOOD PRESSURE: 150 MMHG | DIASTOLIC BLOOD PRESSURE: 56 MMHG

## 2019-08-03 VITALS — SYSTOLIC BLOOD PRESSURE: 158 MMHG | DIASTOLIC BLOOD PRESSURE: 82 MMHG

## 2019-08-03 VITALS — SYSTOLIC BLOOD PRESSURE: 163 MMHG | DIASTOLIC BLOOD PRESSURE: 74 MMHG

## 2019-08-03 VITALS — DIASTOLIC BLOOD PRESSURE: 81 MMHG | SYSTOLIC BLOOD PRESSURE: 153 MMHG

## 2019-08-03 VITALS — SYSTOLIC BLOOD PRESSURE: 84 MMHG | DIASTOLIC BLOOD PRESSURE: 59 MMHG

## 2019-08-03 VITALS — DIASTOLIC BLOOD PRESSURE: 55 MMHG | SYSTOLIC BLOOD PRESSURE: 134 MMHG

## 2019-08-03 VITALS — SYSTOLIC BLOOD PRESSURE: 147 MMHG | DIASTOLIC BLOOD PRESSURE: 57 MMHG

## 2019-08-03 VITALS — SYSTOLIC BLOOD PRESSURE: 173 MMHG | DIASTOLIC BLOOD PRESSURE: 72 MMHG

## 2019-08-03 VITALS — SYSTOLIC BLOOD PRESSURE: 149 MMHG | DIASTOLIC BLOOD PRESSURE: 58 MMHG

## 2019-08-03 VITALS — DIASTOLIC BLOOD PRESSURE: 82 MMHG | SYSTOLIC BLOOD PRESSURE: 158 MMHG

## 2019-08-03 VITALS — SYSTOLIC BLOOD PRESSURE: 177 MMHG | DIASTOLIC BLOOD PRESSURE: 74 MMHG

## 2019-08-03 VITALS — SYSTOLIC BLOOD PRESSURE: 116 MMHG | DIASTOLIC BLOOD PRESSURE: 73 MMHG

## 2019-08-03 VITALS — DIASTOLIC BLOOD PRESSURE: 70 MMHG | SYSTOLIC BLOOD PRESSURE: 132 MMHG

## 2019-08-03 VITALS — SYSTOLIC BLOOD PRESSURE: 142 MMHG | DIASTOLIC BLOOD PRESSURE: 73 MMHG

## 2019-08-03 VITALS — DIASTOLIC BLOOD PRESSURE: 70 MMHG | SYSTOLIC BLOOD PRESSURE: 151 MMHG

## 2019-08-03 VITALS — SYSTOLIC BLOOD PRESSURE: 141 MMHG | DIASTOLIC BLOOD PRESSURE: 71 MMHG

## 2019-08-03 VITALS — SYSTOLIC BLOOD PRESSURE: 162 MMHG | DIASTOLIC BLOOD PRESSURE: 70 MMHG

## 2019-08-03 VITALS — DIASTOLIC BLOOD PRESSURE: 58 MMHG | SYSTOLIC BLOOD PRESSURE: 137 MMHG

## 2019-08-03 VITALS — SYSTOLIC BLOOD PRESSURE: 148 MMHG | DIASTOLIC BLOOD PRESSURE: 70 MMHG

## 2019-08-03 VITALS — DIASTOLIC BLOOD PRESSURE: 70 MMHG | SYSTOLIC BLOOD PRESSURE: 158 MMHG

## 2019-08-03 VITALS — SYSTOLIC BLOOD PRESSURE: 123 MMHG | DIASTOLIC BLOOD PRESSURE: 67 MMHG

## 2019-08-03 VITALS — DIASTOLIC BLOOD PRESSURE: 61 MMHG | SYSTOLIC BLOOD PRESSURE: 138 MMHG

## 2019-08-03 VITALS — DIASTOLIC BLOOD PRESSURE: 58 MMHG | SYSTOLIC BLOOD PRESSURE: 134 MMHG

## 2019-08-03 LAB
BASOPHILS # BLD AUTO: 0 /CMM (ref 0–0.2)
BASOPHILS NFR BLD AUTO: 0.1 % (ref 0–2)
BUN SERPL-MCNC: 49 MG/DL (ref 7–18)
CALCIUM SERPL-MCNC: 9.4 MG/DL (ref 8.5–10.1)
CHLORIDE SERPL-SCNC: 117 MMOL/L (ref 98–107)
CO2 SERPL-SCNC: 32 MMOL/L (ref 21–32)
CREAT SERPL-MCNC: 0.6 MG/DL (ref 0.6–1.3)
EOSINOPHIL NFR BLD AUTO: 0 % (ref 0–6)
GLUCOSE SERPL-MCNC: 321 MG/DL (ref 74–106)
HCT VFR BLD AUTO: 21 % (ref 33–45)
HGB BLD-MCNC: 7.1 G/DL (ref 11.5–14.8)
LYMPHOCYTES NFR BLD AUTO: 0.2 /CMM (ref 0.8–4.8)
LYMPHOCYTES NFR BLD AUTO: 2.6 % (ref 20–44)
LYMPHOCYTES NFR BLD MANUAL: 3 % (ref 16–48)
MCHC RBC AUTO-ENTMCNC: 33 G/DL (ref 31–36)
MCV RBC AUTO: 93 FL (ref 82–100)
MONOCYTES NFR BLD AUTO: 0.1 /CMM (ref 0.1–1.3)
MONOCYTES NFR BLD AUTO: 1.8 % (ref 2–12)
MONOCYTES NFR BLD MANUAL: 3 % (ref 0–11)
NEUTROPHILS # BLD AUTO: 6.4 /CMM (ref 1.8–8.9)
NEUTROPHILS NFR BLD AUTO: 95.5 % (ref 43–81)
NEUTS SEG NFR BLD MANUAL: 94 % (ref 42–76)
PHOSPHATE SERPL-MCNC: 3.5 MG/DL (ref 2.5–4.9)
PLATELET # BLD AUTO: 85 /CMM (ref 150–450)
POTASSIUM SERPL-SCNC: 4.7 MMOL/L (ref 3.5–5.1)
RBC # BLD AUTO: 2.29 MIL/UL (ref 4–5.2)
SODIUM SERPL-SCNC: 152 MMOL/L (ref 136–145)
WBC NRBC COR # BLD AUTO: 6.8 K/UL (ref 4.3–11)

## 2019-08-03 RX ADMIN — Medication SCH EACH: at 11:28

## 2019-08-03 RX ADMIN — Medication SCH MG: at 15:31

## 2019-08-03 RX ADMIN — VORICONAZOLE SCH MG: 200 TABLET, FILM COATED ORAL at 08:11

## 2019-08-03 RX ADMIN — ACETYLCYSTEINE SCH MG: 100 INHALANT RESPIRATORY (INHALATION) at 15:31

## 2019-08-03 RX ADMIN — ACETYLCYSTEINE SCH MG: 100 INHALANT RESPIRATORY (INHALATION) at 22:42

## 2019-08-03 RX ADMIN — Medication SCH EACH: at 00:57

## 2019-08-03 RX ADMIN — Medication SCH MG: at 11:10

## 2019-08-03 RX ADMIN — Medication SCH OZ: at 08:11

## 2019-08-03 RX ADMIN — Medication SCH EACH: at 17:36

## 2019-08-03 RX ADMIN — Medication SCH MG: at 19:25

## 2019-08-03 RX ADMIN — VORICONAZOLE SCH MG: 200 TABLET, FILM COATED ORAL at 20:59

## 2019-08-03 RX ADMIN — INSULIN HUMAN PRN UNIT: 100 INJECTION, SOLUTION PARENTERAL at 05:53

## 2019-08-03 RX ADMIN — INSULIN HUMAN PRN UNIT: 100 INJECTION, SOLUTION PARENTERAL at 11:28

## 2019-08-03 RX ADMIN — INSULIN HUMAN PRN UNIT: 100 INJECTION, SOLUTION PARENTERAL at 01:02

## 2019-08-03 RX ADMIN — Medication SCH APPLIC: at 08:12

## 2019-08-03 RX ADMIN — Medication SCH MG: at 22:42

## 2019-08-03 RX ADMIN — PANTOPRAZOLE SODIUM SCH MG: 40 GRANULE, DELAYED RELEASE ORAL at 08:14

## 2019-08-03 RX ADMIN — ACETYLCYSTEINE SCH MG: 100 INHALANT RESPIRATORY (INHALATION) at 07:42

## 2019-08-03 RX ADMIN — INSULIN HUMAN PRN UNIT: 100 INJECTION, SOLUTION PARENTERAL at 17:35

## 2019-08-03 RX ADMIN — FOLIC ACID SCH MG: 1 TABLET ORAL at 08:14

## 2019-08-03 RX ADMIN — Medication SCH EACH: at 05:50

## 2019-08-03 RX ADMIN — LISINOPRIL SCH MG: 10 TABLET ORAL at 10:30

## 2019-08-03 RX ADMIN — DEXTROSE MONOHYDRATE PRN MLS/HR: 50 INJECTION, SOLUTION INTRAVENOUS at 13:03

## 2019-08-03 RX ADMIN — Medication SCH ML: at 08:12

## 2019-08-03 RX ADMIN — Medication SCH MG: at 07:42

## 2019-08-03 RX ADMIN — Medication SCH OZ: at 21:00

## 2019-08-03 RX ADMIN — Medication SCH MG: at 02:45

## 2019-08-03 NOTE — NUR
PT IS AWAKE BUT UNABLE TO FOLLOW COMMANDS PLACED INTO CPAP 5/ PS 12 @ 30 % AS ORDER.

-------------------------------------------------------------------------------

Addendum: 08/03/19 at 0757 by SARA ADAMS RT

-------------------------------------------------------------------------------

Amended: Links added.

## 2019-08-03 NOTE — NUR
ICU RN NOTE 



PATIENT STABLE AT THIS TIME NO S/S OF DISTRESS. PATIENT CARE RENDERED AS ORDERED. WOUND CARE 
AND BED BATH GIVEN. PATIENT TURNED Q 2 HOURS. PATIENT SHOW NO S/S OF ACUTE DISTRESS. PATIENT 
HR STABLE O2 STATUS STABLE PATIENT TOLERATING RX VENT SETTINGS NO S/S OF RESP DISTRESS. RN 
WILL ENDORESE TO AM SHIFT POC FOR JOHNNY. SAFETY PRECAUTIONS IN PLACE.

## 2019-08-03 NOTE — NUR
received pt from night shift, lethargic, does not follow commands, opens eyes, tracks at 
times, SR, on vent, lungs congested, anasarca, OG to feeding high residual, f/c OK output, 
failed CPAP mode, v/s stable, no pain, pt turned and repositioned.

## 2019-08-03 NOTE — NUR
pt is resting in the bed, lethargic, does not follow commands, SR, v/s stable, no pain, pt 
cleane, changed and repositioned q2hrs, daughter at the bedside.

## 2019-08-03 NOTE — NUR
PLACED BACK TO SIMV /PS DUE TO FREQUENT APNEA ALARMING.

-------------------------------------------------------------------------------

Addendum: 08/03/19 at 0801 by SARA ADAMS RT

-------------------------------------------------------------------------------

Amended: Links added.

## 2019-08-03 NOTE — NUR
RT NOTES



END OF SHIFT. PT ON VENT WITH 7.5 ETT PROPERLY SECURED AT 22 CM VANIA. CHEST RISE BILAT. SX 
Q2 AND PRN FOR MOD THICK PALE YELLOW SECRETIONS. VENT/ ALARMS WELL FUNCTIONING WITH AMB BAG 
AT BEDSIDE. NO ADVERSE EFFECTS POST TX/ SX. NO CHANGES IN PT STATUS.

## 2019-08-04 VITALS — DIASTOLIC BLOOD PRESSURE: 78 MMHG | SYSTOLIC BLOOD PRESSURE: 156 MMHG

## 2019-08-04 VITALS — DIASTOLIC BLOOD PRESSURE: 72 MMHG | SYSTOLIC BLOOD PRESSURE: 161 MMHG

## 2019-08-04 VITALS — DIASTOLIC BLOOD PRESSURE: 77 MMHG | SYSTOLIC BLOOD PRESSURE: 166 MMHG

## 2019-08-04 VITALS — DIASTOLIC BLOOD PRESSURE: 65 MMHG | SYSTOLIC BLOOD PRESSURE: 144 MMHG

## 2019-08-04 VITALS — DIASTOLIC BLOOD PRESSURE: 74 MMHG | SYSTOLIC BLOOD PRESSURE: 145 MMHG

## 2019-08-04 VITALS — DIASTOLIC BLOOD PRESSURE: 79 MMHG | SYSTOLIC BLOOD PRESSURE: 169 MMHG

## 2019-08-04 VITALS — DIASTOLIC BLOOD PRESSURE: 82 MMHG | SYSTOLIC BLOOD PRESSURE: 145 MMHG

## 2019-08-04 VITALS — SYSTOLIC BLOOD PRESSURE: 169 MMHG | DIASTOLIC BLOOD PRESSURE: 104 MMHG

## 2019-08-04 VITALS — DIASTOLIC BLOOD PRESSURE: 76 MMHG | SYSTOLIC BLOOD PRESSURE: 142 MMHG

## 2019-08-04 VITALS — DIASTOLIC BLOOD PRESSURE: 67 MMHG | SYSTOLIC BLOOD PRESSURE: 141 MMHG

## 2019-08-04 VITALS — DIASTOLIC BLOOD PRESSURE: 68 MMHG | SYSTOLIC BLOOD PRESSURE: 132 MMHG

## 2019-08-04 VITALS — SYSTOLIC BLOOD PRESSURE: 158 MMHG | DIASTOLIC BLOOD PRESSURE: 75 MMHG

## 2019-08-04 VITALS — DIASTOLIC BLOOD PRESSURE: 82 MMHG | SYSTOLIC BLOOD PRESSURE: 177 MMHG

## 2019-08-04 VITALS — DIASTOLIC BLOOD PRESSURE: 63 MMHG | SYSTOLIC BLOOD PRESSURE: 137 MMHG

## 2019-08-04 VITALS — SYSTOLIC BLOOD PRESSURE: 121 MMHG | DIASTOLIC BLOOD PRESSURE: 66 MMHG

## 2019-08-04 VITALS — DIASTOLIC BLOOD PRESSURE: 90 MMHG | SYSTOLIC BLOOD PRESSURE: 167 MMHG

## 2019-08-04 VITALS — SYSTOLIC BLOOD PRESSURE: 142 MMHG | DIASTOLIC BLOOD PRESSURE: 76 MMHG

## 2019-08-04 VITALS — SYSTOLIC BLOOD PRESSURE: 176 MMHG | DIASTOLIC BLOOD PRESSURE: 92 MMHG

## 2019-08-04 VITALS — SYSTOLIC BLOOD PRESSURE: 153 MMHG | DIASTOLIC BLOOD PRESSURE: 81 MMHG

## 2019-08-04 VITALS — DIASTOLIC BLOOD PRESSURE: 65 MMHG | SYSTOLIC BLOOD PRESSURE: 141 MMHG

## 2019-08-04 VITALS — DIASTOLIC BLOOD PRESSURE: 67 MMHG | SYSTOLIC BLOOD PRESSURE: 145 MMHG

## 2019-08-04 VITALS — SYSTOLIC BLOOD PRESSURE: 128 MMHG | DIASTOLIC BLOOD PRESSURE: 69 MMHG

## 2019-08-04 VITALS — DIASTOLIC BLOOD PRESSURE: 75 MMHG | SYSTOLIC BLOOD PRESSURE: 163 MMHG

## 2019-08-04 VITALS — SYSTOLIC BLOOD PRESSURE: 140 MMHG | DIASTOLIC BLOOD PRESSURE: 70 MMHG

## 2019-08-04 VITALS — DIASTOLIC BLOOD PRESSURE: 75 MMHG | SYSTOLIC BLOOD PRESSURE: 158 MMHG

## 2019-08-04 VITALS — SYSTOLIC BLOOD PRESSURE: 154 MMHG | DIASTOLIC BLOOD PRESSURE: 73 MMHG

## 2019-08-04 VITALS — DIASTOLIC BLOOD PRESSURE: 71 MMHG | SYSTOLIC BLOOD PRESSURE: 163 MMHG

## 2019-08-04 VITALS — SYSTOLIC BLOOD PRESSURE: 132 MMHG | DIASTOLIC BLOOD PRESSURE: 68 MMHG

## 2019-08-04 VITALS — DIASTOLIC BLOOD PRESSURE: 69 MMHG | SYSTOLIC BLOOD PRESSURE: 151 MMHG

## 2019-08-04 VITALS — SYSTOLIC BLOOD PRESSURE: 174 MMHG | DIASTOLIC BLOOD PRESSURE: 79 MMHG

## 2019-08-04 VITALS — DIASTOLIC BLOOD PRESSURE: 69 MMHG | SYSTOLIC BLOOD PRESSURE: 131 MMHG

## 2019-08-04 VITALS — SYSTOLIC BLOOD PRESSURE: 168 MMHG | DIASTOLIC BLOOD PRESSURE: 77 MMHG

## 2019-08-04 VITALS — DIASTOLIC BLOOD PRESSURE: 70 MMHG | SYSTOLIC BLOOD PRESSURE: 144 MMHG

## 2019-08-04 VITALS — SYSTOLIC BLOOD PRESSURE: 148 MMHG | DIASTOLIC BLOOD PRESSURE: 79 MMHG

## 2019-08-04 VITALS — SYSTOLIC BLOOD PRESSURE: 154 MMHG | DIASTOLIC BLOOD PRESSURE: 79 MMHG

## 2019-08-04 VITALS — SYSTOLIC BLOOD PRESSURE: 121 MMHG | DIASTOLIC BLOOD PRESSURE: 59 MMHG

## 2019-08-04 VITALS — DIASTOLIC BLOOD PRESSURE: 55 MMHG | SYSTOLIC BLOOD PRESSURE: 138 MMHG

## 2019-08-04 VITALS — DIASTOLIC BLOOD PRESSURE: 58 MMHG | SYSTOLIC BLOOD PRESSURE: 110 MMHG

## 2019-08-04 VITALS — SYSTOLIC BLOOD PRESSURE: 131 MMHG | DIASTOLIC BLOOD PRESSURE: 69 MMHG

## 2019-08-04 VITALS — SYSTOLIC BLOOD PRESSURE: 159 MMHG | DIASTOLIC BLOOD PRESSURE: 68 MMHG

## 2019-08-04 VITALS — SYSTOLIC BLOOD PRESSURE: 147 MMHG | DIASTOLIC BLOOD PRESSURE: 77 MMHG

## 2019-08-04 VITALS — SYSTOLIC BLOOD PRESSURE: 134 MMHG | DIASTOLIC BLOOD PRESSURE: 62 MMHG

## 2019-08-04 VITALS — DIASTOLIC BLOOD PRESSURE: 70 MMHG | SYSTOLIC BLOOD PRESSURE: 150 MMHG

## 2019-08-04 VITALS — DIASTOLIC BLOOD PRESSURE: 82 MMHG | SYSTOLIC BLOOD PRESSURE: 119 MMHG

## 2019-08-04 VITALS — SYSTOLIC BLOOD PRESSURE: 150 MMHG | DIASTOLIC BLOOD PRESSURE: 73 MMHG

## 2019-08-04 VITALS — SYSTOLIC BLOOD PRESSURE: 141 MMHG | DIASTOLIC BLOOD PRESSURE: 69 MMHG

## 2019-08-04 LAB
ALBUMIN SERPL BCP-MCNC: 1.1 G/DL (ref 3.4–5)
ALP SERPL-CCNC: 507 U/L (ref 46–116)
ALT SERPL W P-5'-P-CCNC: 95 U/L (ref 12–78)
AST SERPL W P-5'-P-CCNC: 40 U/L (ref 15–37)
BASOPHILS # BLD AUTO: 0 /CMM (ref 0–0.2)
BASOPHILS NFR BLD AUTO: 0.1 % (ref 0–2)
BILIRUB SERPL-MCNC: 0.3 MG/DL (ref 0.2–1)
BUN SERPL-MCNC: 46 MG/DL (ref 7–18)
CALCIUM SERPL-MCNC: 9.2 MG/DL (ref 8.5–10.1)
CHLORIDE SERPL-SCNC: 113 MMOL/L (ref 98–107)
CO2 SERPL-SCNC: 32 MMOL/L (ref 21–32)
CREAT SERPL-MCNC: 0.5 MG/DL (ref 0.6–1.3)
EOSINOPHIL NFR BLD AUTO: 0.1 % (ref 0–6)
GLUCOSE SERPL-MCNC: 235 MG/DL (ref 74–106)
HCT VFR BLD AUTO: 21 % (ref 33–45)
HGB BLD-MCNC: 7 G/DL (ref 11.5–14.8)
LYMPHOCYTES NFR BLD AUTO: 0.1 /CMM (ref 0.8–4.8)
LYMPHOCYTES NFR BLD AUTO: 3.1 % (ref 20–44)
LYMPHOCYTES NFR BLD MANUAL: 4 % (ref 16–48)
MAGNESIUM SERPL-MCNC: 1.5 MG/DL (ref 1.8–2.4)
MCHC RBC AUTO-ENTMCNC: 33 G/DL (ref 31–36)
MCV RBC AUTO: 93 FL (ref 82–100)
MONOCYTES NFR BLD AUTO: 0.1 /CMM (ref 0.1–1.3)
MONOCYTES NFR BLD AUTO: 1.9 % (ref 2–12)
MONOCYTES NFR BLD MANUAL: 2 % (ref 0–11)
NEUTROPHILS # BLD AUTO: 4.5 /CMM (ref 1.8–8.9)
NEUTROPHILS NFR BLD AUTO: 94.8 % (ref 43–81)
NEUTS BAND NFR BLD MANUAL: 8 % (ref 0–5)
NEUTS SEG NFR BLD MANUAL: 86 % (ref 42–76)
PHOSPHATE SERPL-MCNC: 2.3 MG/DL (ref 2.5–4.9)
PLATELET # BLD AUTO: 34 /CMM (ref 150–450)
POTASSIUM SERPL-SCNC: 4.1 MMOL/L (ref 3.5–5.1)
PROT SERPL-MCNC: 4.2 G/DL (ref 6.4–8.2)
RBC # BLD AUTO: 2.29 MIL/UL (ref 4–5.2)
SODIUM SERPL-SCNC: 149 MMOL/L (ref 136–145)
WBC NRBC COR # BLD AUTO: 4.8 K/UL (ref 4.3–11)

## 2019-08-04 RX ADMIN — Medication SCH EACH: at 17:44

## 2019-08-04 RX ADMIN — DEXTROSE MONOHYDRATE PRN MLS/HR: 50 INJECTION, SOLUTION INTRAVENOUS at 03:43

## 2019-08-04 RX ADMIN — Medication SCH EACH: at 00:45

## 2019-08-04 RX ADMIN — Medication SCH OZ: at 21:01

## 2019-08-04 RX ADMIN — FOLIC ACID SCH MG: 1 TABLET ORAL at 09:12

## 2019-08-04 RX ADMIN — Medication SCH MG: at 14:46

## 2019-08-04 RX ADMIN — Medication SCH MG: at 03:46

## 2019-08-04 RX ADMIN — Medication SCH APPLIC: at 09:13

## 2019-08-04 RX ADMIN — ACETYLCYSTEINE SCH MG: 100 INHALANT RESPIRATORY (INHALATION) at 07:42

## 2019-08-04 RX ADMIN — VORICONAZOLE SCH MG: 200 TABLET, FILM COATED ORAL at 20:37

## 2019-08-04 RX ADMIN — Medication SCH MG: at 23:02

## 2019-08-04 RX ADMIN — Medication SCH MG: at 07:42

## 2019-08-04 RX ADMIN — Medication SCH EACH: at 12:09

## 2019-08-04 RX ADMIN — ACETYLCYSTEINE SCH MG: 100 INHALANT RESPIRATORY (INHALATION) at 14:46

## 2019-08-04 RX ADMIN — MAGNESIUM SULFATE IN DEXTROSE SCH MLS/HR: 10 INJECTION, SOLUTION INTRAVENOUS at 12:05

## 2019-08-04 RX ADMIN — INSULIN HUMAN PRN UNIT: 100 INJECTION, SOLUTION PARENTERAL at 23:53

## 2019-08-04 RX ADMIN — ACETYLCYSTEINE SCH MG: 100 INHALANT RESPIRATORY (INHALATION) at 23:02

## 2019-08-04 RX ADMIN — INSULIN HUMAN PRN UNIT: 100 INJECTION, SOLUTION PARENTERAL at 00:43

## 2019-08-04 RX ADMIN — INSULIN HUMAN PRN UNIT: 100 INJECTION, SOLUTION PARENTERAL at 05:41

## 2019-08-04 RX ADMIN — VORICONAZOLE SCH MG: 200 TABLET, FILM COATED ORAL at 09:12

## 2019-08-04 RX ADMIN — Medication SCH MG: at 10:53

## 2019-08-04 RX ADMIN — MAGNESIUM SULFATE IN DEXTROSE SCH MLS/HR: 10 INJECTION, SOLUTION INTRAVENOUS at 13:06

## 2019-08-04 RX ADMIN — Medication SCH EACH: at 05:42

## 2019-08-04 RX ADMIN — Medication SCH EACH: at 23:53

## 2019-08-04 RX ADMIN — Medication SCH OZ: at 09:13

## 2019-08-04 RX ADMIN — INSULIN HUMAN PRN UNIT: 100 INJECTION, SOLUTION PARENTERAL at 17:45

## 2019-08-04 RX ADMIN — Medication PRN ML: at 16:37

## 2019-08-04 RX ADMIN — LISINOPRIL SCH MG: 10 TABLET ORAL at 09:13

## 2019-08-04 RX ADMIN — Medication PRN OZ: at 20:38

## 2019-08-04 RX ADMIN — Medication SCH ML: at 09:00

## 2019-08-04 RX ADMIN — INSULIN HUMAN PRN UNIT: 100 INJECTION, SOLUTION PARENTERAL at 12:12

## 2019-08-04 RX ADMIN — PANTOPRAZOLE SODIUM SCH MG: 40 GRANULE, DELAYED RELEASE ORAL at 09:12

## 2019-08-04 RX ADMIN — Medication SCH MG: at 19:14

## 2019-08-04 NOTE — NUR
RN NOTES



PATIENT IN BED, LYING COMFORTABLY WITH EYES CLOSED. IN NO APPARENT DISTRESS, BREATHING EVEN 
AND UNLABORED. VENT SETTING WELL TOLERATED. PATIENT IS LETHARGIC, OPENS  EYES WITHOUT EYE 
TRACKING.  NO PHYSICAL MANIFESTATION OF PAIN OR DISCOMFORT. NO SIGNIFICANT CHANGE OF 
CONDITION. NO BOWEL MOVEMENT. VITAL SIGNS WNL. KEPT CLEAN AND DRY. WILL ENDORSE TO NEXT 
SHIFT FOR CONTINUITY OF CARE.

## 2019-08-04 NOTE — NUR
Daughter signed consent for trach & PEG placement including anesthesia consent x2, placed in 
the chart.

## 2019-08-04 NOTE — NUR
Dr. Landaverde made aware re: Hgb 7, Hct 21, platelet 34. Per MD, pls transfuse 1 unit of PRBC & 1 
unit of platelet. MD made aware that pt is for poss trach & PEG placement [place].

## 2019-08-04 NOTE — NUR
ICU NOTES

RECEIVED PT. ON VENT VIA ORAL ETT ON SIMV OF 4,RESP 15,% ON 30% FIO2.ON TUBE FEEDING  
OF GLUCERNA AT 60 ML/HR.WILL BE NPO AFTER M/N ,FOR EGD AND TRACH IN AM.OPENS EYES TO TACTILE 
STIMULATION AND WHEN NAME CALLED.TRACKS,EXTREMITIES FLACCID.

## 2019-08-04 NOTE — NUR
ICU NOTES

INCONTINENT OF SMALL AMT BROWN LOOSE STOOL.CLEANSED,COMPLETE BED BATH GIVEN.WOUND PICTURES 
TAKEN ,SEE PROGRESS NOTES.

## 2019-08-04 NOTE — NUR
Dr. Tadeo made aware re: Na 149, Mg 1.5, P 2.3, Alb 1.1... awaiting for response. 

-------------------------------------------------------------------------------

Addendum: 08/04/19 at 1828 by JIMBO PETER RN

-------------------------------------------------------------------------------

1100 Per Dr. Tadeo continue current IVF and give 2gms of Mg IV.

## 2019-08-04 NOTE — NUR
ICU RN CLOSING NOTES:



Pt resting comfortably on bed, not in any distress. On MV via ETT, tolerating SIMV mode, 
sating 98%. SR on telemonitor. DORINA midline access kept patent & intact w/ no s/sx of 
infection/infiltration noted. OGT kept patent & intact, tolerating GTF x 60 cc/hr w/ no high 
residual noted w/in shift. FC draining to BSB w/ adequate UOP. Wound care done as ordered, 
dtr saw pt's wound on sacral. Pt turned & repositioned q2h & PRN. Safety precaution kept in 
place at all times w/ bed in lowest & locked pos. Call light w/in reach. Will endorse to PM 
RN for JOHNNY.

## 2019-08-05 VITALS — SYSTOLIC BLOOD PRESSURE: 125 MMHG | DIASTOLIC BLOOD PRESSURE: 62 MMHG

## 2019-08-05 VITALS — DIASTOLIC BLOOD PRESSURE: 63 MMHG | SYSTOLIC BLOOD PRESSURE: 117 MMHG

## 2019-08-05 VITALS — DIASTOLIC BLOOD PRESSURE: 69 MMHG | SYSTOLIC BLOOD PRESSURE: 132 MMHG

## 2019-08-05 VITALS — SYSTOLIC BLOOD PRESSURE: 157 MMHG | DIASTOLIC BLOOD PRESSURE: 90 MMHG

## 2019-08-05 VITALS — SYSTOLIC BLOOD PRESSURE: 164 MMHG | DIASTOLIC BLOOD PRESSURE: 78 MMHG

## 2019-08-05 VITALS — SYSTOLIC BLOOD PRESSURE: 121 MMHG | DIASTOLIC BLOOD PRESSURE: 63 MMHG

## 2019-08-05 VITALS — DIASTOLIC BLOOD PRESSURE: 60 MMHG | SYSTOLIC BLOOD PRESSURE: 100 MMHG

## 2019-08-05 VITALS — DIASTOLIC BLOOD PRESSURE: 72 MMHG | SYSTOLIC BLOOD PRESSURE: 134 MMHG

## 2019-08-05 VITALS — SYSTOLIC BLOOD PRESSURE: 176 MMHG | DIASTOLIC BLOOD PRESSURE: 76 MMHG

## 2019-08-05 VITALS — DIASTOLIC BLOOD PRESSURE: 67 MMHG | SYSTOLIC BLOOD PRESSURE: 123 MMHG

## 2019-08-05 VITALS — SYSTOLIC BLOOD PRESSURE: 147 MMHG | DIASTOLIC BLOOD PRESSURE: 79 MMHG

## 2019-08-05 VITALS — DIASTOLIC BLOOD PRESSURE: 70 MMHG | SYSTOLIC BLOOD PRESSURE: 117 MMHG

## 2019-08-05 VITALS — DIASTOLIC BLOOD PRESSURE: 69 MMHG | SYSTOLIC BLOOD PRESSURE: 130 MMHG

## 2019-08-05 VITALS — DIASTOLIC BLOOD PRESSURE: 66 MMHG | SYSTOLIC BLOOD PRESSURE: 109 MMHG

## 2019-08-05 VITALS — SYSTOLIC BLOOD PRESSURE: 172 MMHG | DIASTOLIC BLOOD PRESSURE: 78 MMHG

## 2019-08-05 VITALS — SYSTOLIC BLOOD PRESSURE: 111 MMHG | DIASTOLIC BLOOD PRESSURE: 62 MMHG

## 2019-08-05 VITALS — DIASTOLIC BLOOD PRESSURE: 77 MMHG | SYSTOLIC BLOOD PRESSURE: 150 MMHG

## 2019-08-05 VITALS — SYSTOLIC BLOOD PRESSURE: 132 MMHG | DIASTOLIC BLOOD PRESSURE: 71 MMHG

## 2019-08-05 VITALS — SYSTOLIC BLOOD PRESSURE: 132 MMHG | DIASTOLIC BLOOD PRESSURE: 69 MMHG

## 2019-08-05 VITALS — DIASTOLIC BLOOD PRESSURE: 56 MMHG | SYSTOLIC BLOOD PRESSURE: 115 MMHG

## 2019-08-05 VITALS — DIASTOLIC BLOOD PRESSURE: 74 MMHG | SYSTOLIC BLOOD PRESSURE: 142 MMHG

## 2019-08-05 VITALS — DIASTOLIC BLOOD PRESSURE: 77 MMHG | SYSTOLIC BLOOD PRESSURE: 165 MMHG

## 2019-08-05 VITALS — DIASTOLIC BLOOD PRESSURE: 69 MMHG | SYSTOLIC BLOOD PRESSURE: 129 MMHG

## 2019-08-05 VITALS — DIASTOLIC BLOOD PRESSURE: 107 MMHG | SYSTOLIC BLOOD PRESSURE: 168 MMHG

## 2019-08-05 VITALS — DIASTOLIC BLOOD PRESSURE: 72 MMHG | SYSTOLIC BLOOD PRESSURE: 117 MMHG

## 2019-08-05 LAB
ALBUMIN SERPL BCP-MCNC: 1.3 G/DL (ref 3.4–5)
ALP SERPL-CCNC: 564 U/L (ref 46–116)
ALT SERPL W P-5'-P-CCNC: 85 U/L (ref 12–78)
AST SERPL W P-5'-P-CCNC: 45 U/L (ref 15–37)
BASOPHILS # BLD AUTO: 0 /CMM (ref 0–0.2)
BASOPHILS NFR BLD AUTO: 0.1 % (ref 0–2)
BILIRUB SERPL-MCNC: 0.2 MG/DL (ref 0.2–1)
BUN SERPL-MCNC: 46 MG/DL (ref 7–18)
CALCIUM SERPL-MCNC: 9.2 MG/DL (ref 8.5–10.1)
CHLORIDE SERPL-SCNC: 112 MMOL/L (ref 98–107)
CO2 SERPL-SCNC: 33 MMOL/L (ref 21–32)
CREAT SERPL-MCNC: 0.5 MG/DL (ref 0.6–1.3)
EOSINOPHIL NFR BLD AUTO: 0 % (ref 0–6)
GLUCOSE SERPL-MCNC: 285 MG/DL (ref 74–106)
HCT VFR BLD AUTO: 27 % (ref 33–45)
HGB BLD-MCNC: 9 G/DL (ref 11.5–14.8)
LYMPHOCYTES NFR BLD AUTO: 0.1 /CMM (ref 0.8–4.8)
LYMPHOCYTES NFR BLD AUTO: 3.4 % (ref 20–44)
LYMPHOCYTES NFR BLD MANUAL: 4 % (ref 16–48)
MAGNESIUM SERPL-MCNC: 2 MG/DL (ref 1.8–2.4)
MCHC RBC AUTO-ENTMCNC: 34 G/DL (ref 31–36)
MCV RBC AUTO: 90 FL (ref 82–100)
MONOCYTES NFR BLD AUTO: 0.1 /CMM (ref 0.1–1.3)
MONOCYTES NFR BLD AUTO: 2.1 % (ref 2–12)
MONOCYTES NFR BLD MANUAL: 4 % (ref 0–11)
NEUTROPHILS # BLD AUTO: 4 /CMM (ref 1.8–8.9)
NEUTROPHILS NFR BLD AUTO: 94.4 % (ref 43–81)
NEUTS BAND NFR BLD MANUAL: 4 % (ref 0–5)
NEUTS SEG NFR BLD MANUAL: 89 % (ref 42–76)
PHOSPHATE SERPL-MCNC: 2.5 MG/DL (ref 2.5–4.9)
PLATELET # BLD AUTO: 63 /CMM (ref 150–450)
POTASSIUM SERPL-SCNC: 4.1 MMOL/L (ref 3.5–5.1)
PROT SERPL-MCNC: 4.4 G/DL (ref 6.4–8.2)
RBC # BLD AUTO: 2.97 MIL/UL (ref 4–5.2)
SODIUM SERPL-SCNC: 148 MMOL/L (ref 136–145)
WBC NRBC COR # BLD AUTO: 4.2 K/UL (ref 4.3–11)

## 2019-08-05 PROCEDURE — 0DH63UZ INSERTION OF FEEDING DEVICE INTO STOMACH, PERCUTANEOUS APPROACH: ICD-10-PCS | Performed by: INTERNAL MEDICINE

## 2019-08-05 RX ADMIN — FOLIC ACID SCH MG: 1 TABLET ORAL at 09:04

## 2019-08-05 RX ADMIN — ACETYLCYSTEINE SCH MG: 100 INHALANT RESPIRATORY (INHALATION) at 15:01

## 2019-08-05 RX ADMIN — LISINOPRIL SCH MG: 10 TABLET ORAL at 09:05

## 2019-08-05 RX ADMIN — PANTOPRAZOLE SODIUM SCH MG: 40 GRANULE, DELAYED RELEASE ORAL at 09:05

## 2019-08-05 RX ADMIN — Medication SCH MG: at 03:35

## 2019-08-05 RX ADMIN — INSULIN HUMAN PRN UNIT: 100 INJECTION, SOLUTION PARENTERAL at 14:07

## 2019-08-05 RX ADMIN — Medication SCH OZ: at 21:48

## 2019-08-05 RX ADMIN — Medication SCH EACH: at 12:10

## 2019-08-05 RX ADMIN — VORICONAZOLE SCH MG: 200 TABLET, FILM COATED ORAL at 21:48

## 2019-08-05 RX ADMIN — DEXTROSE MONOHYDRATE PRN MLS/HR: 50 INJECTION, SOLUTION INTRAVENOUS at 15:34

## 2019-08-05 RX ADMIN — Medication SCH MG: at 10:53

## 2019-08-05 RX ADMIN — INSULIN HUMAN PRN UNIT: 100 INJECTION, SOLUTION PARENTERAL at 17:48

## 2019-08-05 RX ADMIN — Medication SCH APPLIC: at 09:06

## 2019-08-05 RX ADMIN — Medication SCH MG: at 15:01

## 2019-08-05 RX ADMIN — ACETYLCYSTEINE SCH MG: 100 INHALANT RESPIRATORY (INHALATION) at 07:32

## 2019-08-05 RX ADMIN — VORICONAZOLE SCH MG: 200 TABLET, FILM COATED ORAL at 09:05

## 2019-08-05 RX ADMIN — Medication SCH ML: at 07:51

## 2019-08-05 RX ADMIN — DEXTROSE MONOHYDRATE PRN MLS/HR: 50 INJECTION, SOLUTION INTRAVENOUS at 01:02

## 2019-08-05 RX ADMIN — Medication SCH EACH: at 06:29

## 2019-08-05 RX ADMIN — Medication SCH OZ: at 09:05

## 2019-08-05 RX ADMIN — INSULIN HUMAN PRN UNIT: 100 INJECTION, SOLUTION PARENTERAL at 06:26

## 2019-08-05 RX ADMIN — Medication SCH EACH: at 17:46

## 2019-08-05 RX ADMIN — Medication SCH MG: at 07:32

## 2019-08-05 RX ADMIN — Medication SCH MG: at 19:51

## 2019-08-05 NOTE — NUR
ICU/RN     ROUNDS - DR. OLEARY



UPDATED PT'S CONDITION. PT SEEN & EXAMINED BY DR. OLEARY WITH PT'S DAUGHTER AT BEDSIDE, NO 
NEW ORDERS RECEIVED AT THIS TIME.

## 2019-08-05 NOTE — NUR
ICU/RN: PEG TUBE PLACED. NO S/S OF BLEEDING NOTED. VSS. ORDERS RECEIVED TO RESUME 
MEDICATIONS AND FLUIDS AT 2000. TUBE FEEDING CAN RESUME IN AM, HOWEVER PT SCHEDULED FOR 
TRACH PLACEMENT. WILL CONTINUE TO MONITOR AND ASSESS.

## 2019-08-05 NOTE — NUR
ICU/RN: OR TEAM AT BEDSIDE FOR PEG TUBE PLACEMENT. CONSENT IN CHART. CHECKLIST COMPLETED. 
WILL CONTINUE TO MONITOR.

## 2019-08-05 NOTE — NUR
RT NOTES



PATIENT WITH ET TUBE 7.5 @23CM LIP PATENT, AND SECURED. BILATERAL BREATH SOUNDS WITH EQUAL 
CHEST RISE. ALARMS ON AND AUDIBLE. AMBU BAG BY THE BEDSIDE. VENT PLUGGED IN TO RED OUTLET. 
SX SMALL THICK YELLOW SECRETIONS. NO SOB NOTED AT THIS TIME. WILL CONTINUE TO MONITOR.

-------------------------------------------------------------------------------

Addendum: 08/05/19 at 0408 by CHRISTOPHE RODRIGUEZ RT

-------------------------------------------------------------------------------

Amended: Links added.

## 2019-08-05 NOTE — NUR
ICU/RN     TRANSFER OF CARE



NO ACUTE CHANGE OF CONDITION NOTED SINCE CARED FOR PT IN THE BEGINNING OF SHIFT. PT ENDORSED 
TO ICU NURSE CLINT TO CONTINUE CARE.

## 2019-08-05 NOTE — NUR
ICU/RN     AM SHIFT INITIAL NOTES



RECEIVED PT ASLEEP IN BED, PT OPEN EYES, TRACTS, NO ACUTE DISTRESS OR CHANGE OF CONDITION 
NOTED. PT ON VENTILATOR THROUGH ETT SIMV MODE, WITH TUBE TIP ON LIP 23 CM. RESPIRATIONS EVEN 
AND UNLABORED, LUNG SOUNDS CLEAR, SATURATING @ 100%. ON TELE MONITORING, SINUS RHYTHM, HR 
62. PT COOL TO THE TOUCH, WARMING BLANKET PLACED. PITTING GENERALIZED EDEMA NOTED. MIDLINE 
WITH ON GOING INFUSION OF D5W @ 70CC/HR, PATENT WITH NO S/S OF INFECTION. TUBE FEEDING HELD 
SINCE MIDNIGHT FOR SCHEDULED EGD WITH PEG PLACEMENT TODAY SCHEDULED AT 1300 TODAY.  ADAMS 
CATHETER INTACT WITH CLOUDY LIGHT YELLOW URINE OUTPUT. PT IS COMFORTABLE, SCHEDULED AM MEDS 
TO BE GIVEN. CL WITHIN REACHED AND SAFETY MAINTAINED.  ON GOING MONITORING.

## 2019-08-05 NOTE — NUR
ICU/RN: RECEIVED REPORT FROM HAROON. PT RESTING COMFORTABLY IN BED. IV FLUIDS INFUSING. PT 
PENDING PEG PLACEMENT. CONSENT IN CHART. VSS.

## 2019-08-05 NOTE — NUR
ICU NOTES

NPO AFTER MIDNIGHT,FREE H2O NOT GIVEN THROUGH G-TUBE AND TUBE FEEDING DC'D.MONITOR SHOWS 
SINUS RHYTHM.REPOSITION AND TURNED.

## 2019-08-05 NOTE — NUR
ICU/RN     ROUNDS - DR. RIVERO



PT SEEN & EXAMINED BY DR. RIVERO WITH PT'S DAUGHTER AT BEDSIDE. NO NEW ORDERS RECEIVED. 
MONITORING CONTINUED.

## 2019-08-06 VITALS — SYSTOLIC BLOOD PRESSURE: 111 MMHG | DIASTOLIC BLOOD PRESSURE: 67 MMHG

## 2019-08-06 VITALS — DIASTOLIC BLOOD PRESSURE: 69 MMHG | SYSTOLIC BLOOD PRESSURE: 118 MMHG

## 2019-08-06 VITALS — DIASTOLIC BLOOD PRESSURE: 71 MMHG | SYSTOLIC BLOOD PRESSURE: 151 MMHG

## 2019-08-06 VITALS — SYSTOLIC BLOOD PRESSURE: 114 MMHG | DIASTOLIC BLOOD PRESSURE: 66 MMHG

## 2019-08-06 VITALS — SYSTOLIC BLOOD PRESSURE: 115 MMHG | DIASTOLIC BLOOD PRESSURE: 73 MMHG

## 2019-08-06 VITALS — SYSTOLIC BLOOD PRESSURE: 130 MMHG | DIASTOLIC BLOOD PRESSURE: 69 MMHG

## 2019-08-06 VITALS — SYSTOLIC BLOOD PRESSURE: 124 MMHG | DIASTOLIC BLOOD PRESSURE: 63 MMHG

## 2019-08-06 VITALS — DIASTOLIC BLOOD PRESSURE: 64 MMHG | SYSTOLIC BLOOD PRESSURE: 131 MMHG

## 2019-08-06 VITALS — SYSTOLIC BLOOD PRESSURE: 129 MMHG | DIASTOLIC BLOOD PRESSURE: 69 MMHG

## 2019-08-06 VITALS — DIASTOLIC BLOOD PRESSURE: 69 MMHG | SYSTOLIC BLOOD PRESSURE: 140 MMHG

## 2019-08-06 VITALS — DIASTOLIC BLOOD PRESSURE: 98 MMHG | SYSTOLIC BLOOD PRESSURE: 155 MMHG

## 2019-08-06 VITALS — SYSTOLIC BLOOD PRESSURE: 129 MMHG | DIASTOLIC BLOOD PRESSURE: 77 MMHG

## 2019-08-06 VITALS — DIASTOLIC BLOOD PRESSURE: 63 MMHG | SYSTOLIC BLOOD PRESSURE: 121 MMHG

## 2019-08-06 VITALS — DIASTOLIC BLOOD PRESSURE: 70 MMHG | SYSTOLIC BLOOD PRESSURE: 117 MMHG

## 2019-08-06 VITALS — SYSTOLIC BLOOD PRESSURE: 123 MMHG | DIASTOLIC BLOOD PRESSURE: 69 MMHG

## 2019-08-06 VITALS — DIASTOLIC BLOOD PRESSURE: 67 MMHG | SYSTOLIC BLOOD PRESSURE: 119 MMHG

## 2019-08-06 VITALS — SYSTOLIC BLOOD PRESSURE: 123 MMHG | DIASTOLIC BLOOD PRESSURE: 66 MMHG

## 2019-08-06 VITALS — DIASTOLIC BLOOD PRESSURE: 68 MMHG | SYSTOLIC BLOOD PRESSURE: 137 MMHG

## 2019-08-06 VITALS — DIASTOLIC BLOOD PRESSURE: 66 MMHG | SYSTOLIC BLOOD PRESSURE: 130 MMHG

## 2019-08-06 VITALS — DIASTOLIC BLOOD PRESSURE: 71 MMHG | SYSTOLIC BLOOD PRESSURE: 134 MMHG

## 2019-08-06 VITALS — SYSTOLIC BLOOD PRESSURE: 122 MMHG | DIASTOLIC BLOOD PRESSURE: 65 MMHG

## 2019-08-06 VITALS — SYSTOLIC BLOOD PRESSURE: 134 MMHG | DIASTOLIC BLOOD PRESSURE: 92 MMHG

## 2019-08-06 VITALS — DIASTOLIC BLOOD PRESSURE: 65 MMHG | SYSTOLIC BLOOD PRESSURE: 145 MMHG

## 2019-08-06 VITALS — SYSTOLIC BLOOD PRESSURE: 133 MMHG | DIASTOLIC BLOOD PRESSURE: 65 MMHG

## 2019-08-06 VITALS — DIASTOLIC BLOOD PRESSURE: 65 MMHG | SYSTOLIC BLOOD PRESSURE: 127 MMHG

## 2019-08-06 VITALS — SYSTOLIC BLOOD PRESSURE: 125 MMHG | DIASTOLIC BLOOD PRESSURE: 66 MMHG

## 2019-08-06 VITALS — SYSTOLIC BLOOD PRESSURE: 116 MMHG | DIASTOLIC BLOOD PRESSURE: 58 MMHG

## 2019-08-06 VITALS — DIASTOLIC BLOOD PRESSURE: 75 MMHG | SYSTOLIC BLOOD PRESSURE: 131 MMHG

## 2019-08-06 VITALS — SYSTOLIC BLOOD PRESSURE: 138 MMHG | DIASTOLIC BLOOD PRESSURE: 84 MMHG

## 2019-08-06 VITALS — SYSTOLIC BLOOD PRESSURE: 122 MMHG | DIASTOLIC BLOOD PRESSURE: 63 MMHG

## 2019-08-06 VITALS — SYSTOLIC BLOOD PRESSURE: 155 MMHG | DIASTOLIC BLOOD PRESSURE: 93 MMHG

## 2019-08-06 LAB
BASOPHILS # BLD AUTO: 0 /CMM (ref 0–0.2)
BASOPHILS NFR BLD AUTO: 0.1 % (ref 0–2)
BUN SERPL-MCNC: 44 MG/DL (ref 7–18)
CALCIUM SERPL-MCNC: 8.9 MG/DL (ref 8.5–10.1)
CHLORIDE SERPL-SCNC: 109 MMOL/L (ref 98–107)
CO2 SERPL-SCNC: 31 MMOL/L (ref 21–32)
CREAT SERPL-MCNC: 0.5 MG/DL (ref 0.6–1.3)
EOSINOPHIL NFR BLD AUTO: 0 % (ref 0–6)
GLUCOSE SERPL-MCNC: 185 MG/DL (ref 74–106)
HCT VFR BLD AUTO: 27 % (ref 33–45)
HGB BLD-MCNC: 9 G/DL (ref 11.5–14.8)
LYMPHOCYTES NFR BLD AUTO: 0.2 /CMM (ref 0.8–4.8)
LYMPHOCYTES NFR BLD AUTO: 5.3 % (ref 20–44)
LYMPHOCYTES NFR BLD MANUAL: 6 % (ref 16–48)
MCHC RBC AUTO-ENTMCNC: 34 G/DL (ref 31–36)
MCV RBC AUTO: 90 FL (ref 82–100)
MONOCYTES NFR BLD AUTO: 0.1 /CMM (ref 0.1–1.3)
MONOCYTES NFR BLD AUTO: 2.5 % (ref 2–12)
MONOCYTES NFR BLD MANUAL: 1 % (ref 0–11)
NEUTROPHILS # BLD AUTO: 4 /CMM (ref 1.8–8.9)
NEUTROPHILS NFR BLD AUTO: 92.1 % (ref 43–81)
NEUTS SEG NFR BLD MANUAL: 93 % (ref 42–76)
PLATELET # BLD AUTO: 50 /CMM (ref 150–450)
POTASSIUM SERPL-SCNC: 3.8 MMOL/L (ref 3.5–5.1)
RBC # BLD AUTO: 2.97 MIL/UL (ref 4–5.2)
SODIUM SERPL-SCNC: 144 MMOL/L (ref 136–145)
WBC NRBC COR # BLD AUTO: 4.3 K/UL (ref 4.3–11)

## 2019-08-06 RX ADMIN — Medication SCH MG: at 15:55

## 2019-08-06 RX ADMIN — Medication SCH EACH: at 11:50

## 2019-08-06 RX ADMIN — Medication SCH EACH: at 05:20

## 2019-08-06 RX ADMIN — Medication SCH MG: at 19:32

## 2019-08-06 RX ADMIN — Medication SCH OZ: at 21:38

## 2019-08-06 RX ADMIN — INSULIN HUMAN PRN UNIT: 100 INJECTION, SOLUTION PARENTERAL at 01:00

## 2019-08-06 RX ADMIN — Medication SCH MG: at 04:23

## 2019-08-06 RX ADMIN — Medication SCH APPLIC: at 08:35

## 2019-08-06 RX ADMIN — Medication SCH EACH: at 23:25

## 2019-08-06 RX ADMIN — Medication SCH MG: at 11:16

## 2019-08-06 RX ADMIN — LISINOPRIL SCH MG: 10 TABLET ORAL at 08:34

## 2019-08-06 RX ADMIN — Medication SCH EACH: at 17:28

## 2019-08-06 RX ADMIN — Medication SCH MG: at 08:09

## 2019-08-06 RX ADMIN — DEXTROSE MONOHYDRATE PRN MLS/HR: 50 INJECTION, SOLUTION INTRAVENOUS at 20:31

## 2019-08-06 RX ADMIN — Medication PRN OZ: at 21:31

## 2019-08-06 RX ADMIN — Medication SCH OZ: at 08:35

## 2019-08-06 RX ADMIN — ACETYLCYSTEINE SCH MG: 100 INHALANT RESPIRATORY (INHALATION) at 15:55

## 2019-08-06 RX ADMIN — VORICONAZOLE SCH MG: 200 TABLET, FILM COATED ORAL at 08:36

## 2019-08-06 RX ADMIN — INSULIN HUMAN PRN UNIT: 100 INJECTION, SOLUTION PARENTERAL at 23:27

## 2019-08-06 RX ADMIN — PANTOPRAZOLE SODIUM SCH MG: 40 GRANULE, DELAYED RELEASE ORAL at 08:34

## 2019-08-06 RX ADMIN — VORICONAZOLE SCH MG: 200 TABLET, FILM COATED ORAL at 21:29

## 2019-08-06 RX ADMIN — FOLIC ACID SCH MG: 1 TABLET ORAL at 08:34

## 2019-08-06 RX ADMIN — INSULIN HUMAN PRN UNIT: 100 INJECTION, SOLUTION PARENTERAL at 05:23

## 2019-08-06 RX ADMIN — ACETYLCYSTEINE SCH MG: 100 INHALANT RESPIRATORY (INHALATION) at 22:53

## 2019-08-06 RX ADMIN — Medication PRN ML: at 16:52

## 2019-08-06 RX ADMIN — Medication SCH EACH: at 00:51

## 2019-08-06 RX ADMIN — INSULIN HUMAN PRN UNIT: 100 INJECTION, SOLUTION PARENTERAL at 17:27

## 2019-08-06 RX ADMIN — Medication SCH MG: at 00:27

## 2019-08-06 RX ADMIN — ACETYLCYSTEINE SCH MG: 100 INHALANT RESPIRATORY (INHALATION) at 08:09

## 2019-08-06 RX ADMIN — DEXTROSE MONOHYDRATE PRN MLS/HR: 50 INJECTION, SOLUTION INTRAVENOUS at 06:06

## 2019-08-06 RX ADMIN — ACETYLCYSTEINE SCH MG: 100 INHALANT RESPIRATORY (INHALATION) at 00:27

## 2019-08-06 RX ADMIN — Medication SCH MG: at 22:53

## 2019-08-06 NOTE — NUR
RN NOTES

SURGERY CANCELLED PER OR , DR. DOMINGUEZ NOTIFED, TF STARTED VIA PEG TUBE PER MD . OK TO 
KEEP PT ON D5W AT 70CC/HR FOR NOW. PER DR DOMINGUEZ ORDER .

## 2019-08-06 NOTE — NUR
NOTIFIED DR. GARCIAS THAT PT HAS PLATELETS @ 50K AND IF WE NEEDED TO TRANSFUSE 1 UNIT OF 
PLATELETS. PER DR. GARCIAS PLATELET @ 50K IS OK FOR PROCEDURE, NO NEED FOR TRANSFUSION.

## 2019-08-06 NOTE — NUR
RN NOTES 

RECEIVED PT ON BED, TRIES TO OPEN HER EYES TO VERBAL STIMULI, ON VENTILATOR THROUGH ETT SIMV 
MODE, WITH TUBE TIP ON LIP 23 CM. RESPIRATIONS EVEN AND UNLABORED, LUNG SOUNDS CLEAR, 
SATURATING @ 100%. ON TELE MONITORING, SINUS RHYTHM, HR IN 80'. PITTING GENERALIZED EDEMA 
NOTED. D5W @ 70CC/HR RUNNING VIA R UPPER ARM MIDLINE, SITE CLEAN, DRY AND INTACT,  PEG TUBE 
INTACT, PT IS NPO AT THIS, ADAMS CATHETER INTACT WITH CLOUDY LIGHT YELLOW URINE OUTPUT. PT 
IS COMFORTABLE, SR UP x3, CALL LIGHT  WITHIN EASY REACH AND SAFETY MAINTAINED.  CONTINUE TO 
MONITOR .

## 2019-08-06 NOTE — NUR
RT



Noticeable audible leak heard with decreased VT return. Source of problem appeared to be a 
ruptured  balloon line due to pt bitting.  balloon repair kit used, MLT cuff 
pressure noted. Equal bilateral breathe sounds and chest rise noted. Leaking has ceased and 
adequate VT observed. No SOB or respiratory distress noted. YAJAIRA Hodges and charge nurse Ana 
notified and aware. 

-------------------------------------------------------------------------------

Addendum: 08/06/19 at 1806 by MAYRA JACKSON RT

-------------------------------------------------------------------------------

Amended: Links added.

## 2019-08-06 NOTE — NUR
PT REC'D ORALLY INTUBATED VIA ETT SZ 7.5 SECURED @ 23CM AT THE LIP ON [facility] VENT ON SIMV 
MODE. NO RESP DISTRESS OR SOB NOTED. SX'D FOR THICK SMALL AMT OF PALE YELLOW SECRETIONS. 
ALARMS ARE SET AND AUDIBLE. VENT PLUGGED INTO RED OUTLET. AMBU BAG BEDSIDE. WILL CONTINUE TO 
MONITOR.

-------------------------------------------------------------------------------

Addendum: 08/06/19 at 0441 by DHARA CHAUDHARY RT

-------------------------------------------------------------------------------

Amended: Links added.

## 2019-08-06 NOTE — NUR
ICU RN NOTE



PATIENT IN BED NON VERBAL, BUT OPENS EYES AND TRACKS. PATIENT TOLERATING VENT SETTINGS AND 
RECEIVING FULL TV. NO LEAKS NOTED ON THE  BALLOON. PATIENT BREATHING EVEN AND UNLABORED 
NO S/S OF RESP DISTRESS. PATIENT GIVEN ORAL CARE. MINIMAL RESIDUAL NOTED ON PEG TUBE. 
PATIENT TOLERATING FEEDING WELL. PATIENT HAS DORINA MIDLINE PATENT AND INTACT WITH 75 ML/HR 
D5W. NO S/S OF INFECTION. PATIENT ON CURRENT BEAR HUGGER WITH TEMP 96.7. RN WILL CONTINUE TO 
MONITOR FOR CHANGE NO S/S OF ACUTE DISTRESS NOTED AT THIS TIME. SAFETY AND ASPIRATION 
PRECAUTIONS IN PLACE.

## 2019-08-06 NOTE — NUR
ICU RN NOTE



SPOKE TO DTR SERGE ABOUT POC FOR TONIGHT AND TOMORROW. DTR VERBALIZES UNDERSTANDING. 
PATIENT WILL BE NPO AT MIDNIGHT FOR TRACH PLACEMENT

## 2019-08-06 NOTE — NUR
PT REMAINS IN NO ACUTE DISTRESS IN BED. PT DID NOT HAVE ANY SIGNIFICANT CHANGE IN CONDITION 
DURING SHIFT. PT TOLERATED FREE WATER FLUSH Q6H VIA GTUBE. GTUBE SITE CLEAN DRY INTACT AND 
PATENT. PT TOLERATED VENT SETTING WELL. PT IS SCHEDULED TO HAVE TRACH PLACED TODAY @ 1530. 
ALL NEEDS MET, ALL ORDERS CARRIED OUT. WILL ENDORSE CARE TO AM RN FOR CONTINUITY OF CARE.

## 2019-08-06 NOTE — NUR
RN NOTES 

VSS LAVELLE REGALADO CURRENT VENT SETTING WELL ,TF AT 45CC/HR RUNNING VIA PEG TUBE , 
TOLERATING WELL, NO DISTRESS NOTED, WILL ENDOSE TO NIGHT SHIFT NURSE FOR CONTINUITY OF CARE.

## 2019-08-07 VITALS — SYSTOLIC BLOOD PRESSURE: 123 MMHG | DIASTOLIC BLOOD PRESSURE: 77 MMHG

## 2019-08-07 VITALS — SYSTOLIC BLOOD PRESSURE: 147 MMHG | DIASTOLIC BLOOD PRESSURE: 84 MMHG

## 2019-08-07 VITALS — DIASTOLIC BLOOD PRESSURE: 61 MMHG | SYSTOLIC BLOOD PRESSURE: 134 MMHG

## 2019-08-07 VITALS — DIASTOLIC BLOOD PRESSURE: 86 MMHG | SYSTOLIC BLOOD PRESSURE: 136 MMHG

## 2019-08-07 VITALS — SYSTOLIC BLOOD PRESSURE: 127 MMHG | DIASTOLIC BLOOD PRESSURE: 69 MMHG

## 2019-08-07 VITALS — SYSTOLIC BLOOD PRESSURE: 136 MMHG | DIASTOLIC BLOOD PRESSURE: 74 MMHG

## 2019-08-07 VITALS — SYSTOLIC BLOOD PRESSURE: 121 MMHG | DIASTOLIC BLOOD PRESSURE: 66 MMHG

## 2019-08-07 VITALS — DIASTOLIC BLOOD PRESSURE: 67 MMHG | SYSTOLIC BLOOD PRESSURE: 134 MMHG

## 2019-08-07 VITALS — SYSTOLIC BLOOD PRESSURE: 118 MMHG | DIASTOLIC BLOOD PRESSURE: 64 MMHG

## 2019-08-07 VITALS — DIASTOLIC BLOOD PRESSURE: 66 MMHG | SYSTOLIC BLOOD PRESSURE: 121 MMHG

## 2019-08-07 VITALS — DIASTOLIC BLOOD PRESSURE: 80 MMHG | SYSTOLIC BLOOD PRESSURE: 140 MMHG

## 2019-08-07 VITALS — DIASTOLIC BLOOD PRESSURE: 57 MMHG | SYSTOLIC BLOOD PRESSURE: 129 MMHG

## 2019-08-07 VITALS — SYSTOLIC BLOOD PRESSURE: 116 MMHG | DIASTOLIC BLOOD PRESSURE: 62 MMHG

## 2019-08-07 VITALS — SYSTOLIC BLOOD PRESSURE: 125 MMHG | DIASTOLIC BLOOD PRESSURE: 58 MMHG

## 2019-08-07 VITALS — SYSTOLIC BLOOD PRESSURE: 139 MMHG | DIASTOLIC BLOOD PRESSURE: 64 MMHG

## 2019-08-07 VITALS — SYSTOLIC BLOOD PRESSURE: 123 MMHG | DIASTOLIC BLOOD PRESSURE: 65 MMHG

## 2019-08-07 VITALS — SYSTOLIC BLOOD PRESSURE: 117 MMHG | DIASTOLIC BLOOD PRESSURE: 62 MMHG

## 2019-08-07 VITALS — DIASTOLIC BLOOD PRESSURE: 68 MMHG | SYSTOLIC BLOOD PRESSURE: 125 MMHG

## 2019-08-07 VITALS — SYSTOLIC BLOOD PRESSURE: 137 MMHG | DIASTOLIC BLOOD PRESSURE: 75 MMHG

## 2019-08-07 VITALS — DIASTOLIC BLOOD PRESSURE: 75 MMHG | SYSTOLIC BLOOD PRESSURE: 156 MMHG

## 2019-08-07 VITALS — DIASTOLIC BLOOD PRESSURE: 89 MMHG | SYSTOLIC BLOOD PRESSURE: 108 MMHG

## 2019-08-07 VITALS — SYSTOLIC BLOOD PRESSURE: 138 MMHG | DIASTOLIC BLOOD PRESSURE: 67 MMHG

## 2019-08-07 VITALS — SYSTOLIC BLOOD PRESSURE: 139 MMHG | DIASTOLIC BLOOD PRESSURE: 65 MMHG

## 2019-08-07 VITALS — SYSTOLIC BLOOD PRESSURE: 126 MMHG | DIASTOLIC BLOOD PRESSURE: 68 MMHG

## 2019-08-07 VITALS — DIASTOLIC BLOOD PRESSURE: 58 MMHG | SYSTOLIC BLOOD PRESSURE: 104 MMHG

## 2019-08-07 VITALS — SYSTOLIC BLOOD PRESSURE: 136 MMHG | DIASTOLIC BLOOD PRESSURE: 62 MMHG

## 2019-08-07 VITALS — DIASTOLIC BLOOD PRESSURE: 60 MMHG | SYSTOLIC BLOOD PRESSURE: 130 MMHG

## 2019-08-07 VITALS — SYSTOLIC BLOOD PRESSURE: 131 MMHG | DIASTOLIC BLOOD PRESSURE: 78 MMHG

## 2019-08-07 VITALS — DIASTOLIC BLOOD PRESSURE: 70 MMHG | SYSTOLIC BLOOD PRESSURE: 122 MMHG

## 2019-08-07 VITALS — SYSTOLIC BLOOD PRESSURE: 96 MMHG | DIASTOLIC BLOOD PRESSURE: 56 MMHG

## 2019-08-07 VITALS — DIASTOLIC BLOOD PRESSURE: 73 MMHG | SYSTOLIC BLOOD PRESSURE: 155 MMHG

## 2019-08-07 VITALS — SYSTOLIC BLOOD PRESSURE: 137 MMHG | DIASTOLIC BLOOD PRESSURE: 69 MMHG

## 2019-08-07 VITALS — DIASTOLIC BLOOD PRESSURE: 72 MMHG | SYSTOLIC BLOOD PRESSURE: 128 MMHG

## 2019-08-07 VITALS — SYSTOLIC BLOOD PRESSURE: 148 MMHG | DIASTOLIC BLOOD PRESSURE: 84 MMHG

## 2019-08-07 VITALS — SYSTOLIC BLOOD PRESSURE: 130 MMHG | DIASTOLIC BLOOD PRESSURE: 69 MMHG

## 2019-08-07 LAB
ALBUMIN SERPL BCP-MCNC: 1 G/DL (ref 3.4–5)
ALP SERPL-CCNC: 519 U/L (ref 46–116)
ALT SERPL W P-5'-P-CCNC: 53 U/L (ref 12–78)
AST SERPL W P-5'-P-CCNC: 41 U/L (ref 15–37)
BASOPHILS # BLD AUTO: 0 /CMM (ref 0–0.2)
BASOPHILS NFR BLD AUTO: 0 % (ref 0–2)
BILIRUB SERPL-MCNC: 0.2 MG/DL (ref 0.2–1)
BUN SERPL-MCNC: 45 MG/DL (ref 7–18)
CALCIUM SERPL-MCNC: 9.3 MG/DL (ref 8.5–10.1)
CHLORIDE SERPL-SCNC: 105 MMOL/L (ref 98–107)
CO2 SERPL-SCNC: 31 MMOL/L (ref 21–32)
CREAT SERPL-MCNC: 0.6 MG/DL (ref 0.6–1.3)
EOSINOPHIL NFR BLD AUTO: 0 % (ref 0–6)
GLUCOSE SERPL-MCNC: 197 MG/DL (ref 74–106)
HCT VFR BLD AUTO: 27 % (ref 33–45)
HGB BLD-MCNC: 9.2 G/DL (ref 11.5–14.8)
LYMPHOCYTES NFR BLD AUTO: 0.3 /CMM (ref 0.8–4.8)
LYMPHOCYTES NFR BLD AUTO: 5.8 % (ref 20–44)
LYMPHOCYTES NFR BLD MANUAL: 11 % (ref 16–48)
MAGNESIUM SERPL-MCNC: 1.7 MG/DL (ref 1.8–2.4)
MCHC RBC AUTO-ENTMCNC: 34 G/DL (ref 31–36)
MCV RBC AUTO: 90 FL (ref 82–100)
MONOCYTES NFR BLD AUTO: 0.1 /CMM (ref 0.1–1.3)
MONOCYTES NFR BLD AUTO: 2.7 % (ref 2–12)
MONOCYTES NFR BLD MANUAL: 7 % (ref 0–11)
NEUTROPHILS # BLD AUTO: 4.1 /CMM (ref 1.8–8.9)
NEUTROPHILS NFR BLD AUTO: 91.5 % (ref 43–81)
NEUTS BAND NFR BLD MANUAL: 1 % (ref 0–5)
NEUTS SEG NFR BLD MANUAL: 81 % (ref 42–76)
PHOSPHATE SERPL-MCNC: 2.9 MG/DL (ref 2.5–4.9)
PLATELET # BLD AUTO: 56 /CMM (ref 150–450)
POTASSIUM SERPL-SCNC: 4 MMOL/L (ref 3.5–5.1)
PROT SERPL-MCNC: 4.2 G/DL (ref 6.4–8.2)
RBC # BLD AUTO: 3.05 MIL/UL (ref 4–5.2)
SODIUM SERPL-SCNC: 140 MMOL/L (ref 136–145)
WBC NRBC COR # BLD AUTO: 4.5 K/UL (ref 4.3–11)

## 2019-08-07 RX ADMIN — Medication PRN ML: at 15:57

## 2019-08-07 RX ADMIN — MAGNESIUM SULFATE IN DEXTROSE SCH MLS/HR: 10 INJECTION, SOLUTION INTRAVENOUS at 10:36

## 2019-08-07 RX ADMIN — Medication SCH MG: at 23:25

## 2019-08-07 RX ADMIN — LISINOPRIL SCH MG: 10 TABLET ORAL at 09:00

## 2019-08-07 RX ADMIN — Medication SCH MG: at 03:42

## 2019-08-07 RX ADMIN — Medication SCH OZ: at 21:05

## 2019-08-07 RX ADMIN — Medication SCH EACH: at 18:10

## 2019-08-07 RX ADMIN — PANTOPRAZOLE SODIUM SCH MG: 40 GRANULE, DELAYED RELEASE ORAL at 09:00

## 2019-08-07 RX ADMIN — DEXTROSE MONOHYDRATE PRN MLS/HR: 50 INJECTION, SOLUTION INTRAVENOUS at 15:44

## 2019-08-07 RX ADMIN — Medication SCH APPLIC: at 09:54

## 2019-08-07 RX ADMIN — Medication SCH MG: at 07:53

## 2019-08-07 RX ADMIN — INSULIN HUMAN PRN UNIT: 100 INJECTION, SOLUTION PARENTERAL at 12:39

## 2019-08-07 RX ADMIN — Medication SCH MG: at 11:38

## 2019-08-07 RX ADMIN — FOLIC ACID SCH MG: 1 TABLET ORAL at 09:00

## 2019-08-07 RX ADMIN — VORICONAZOLE SCH MG: 200 TABLET, FILM COATED ORAL at 09:00

## 2019-08-07 RX ADMIN — Medication SCH EACH: at 05:52

## 2019-08-07 RX ADMIN — ACETYLCYSTEINE SCH MG: 100 INHALANT RESPIRATORY (INHALATION) at 15:14

## 2019-08-07 RX ADMIN — ACETYLCYSTEINE SCH MG: 100 INHALANT RESPIRATORY (INHALATION) at 07:53

## 2019-08-07 RX ADMIN — Medication SCH MG: at 15:14

## 2019-08-07 RX ADMIN — Medication SCH EACH: at 12:37

## 2019-08-07 RX ADMIN — Medication SCH MG: at 19:49

## 2019-08-07 RX ADMIN — ACETYLCYSTEINE SCH MG: 100 INHALANT RESPIRATORY (INHALATION) at 23:25

## 2019-08-07 RX ADMIN — INSULIN HUMAN PRN UNIT: 100 INJECTION, SOLUTION PARENTERAL at 18:11

## 2019-08-07 RX ADMIN — MAGNESIUM SULFATE IN DEXTROSE SCH MLS/HR: 10 INJECTION, SOLUTION INTRAVENOUS at 11:58

## 2019-08-07 RX ADMIN — Medication SCH OZ: at 09:52

## 2019-08-07 RX ADMIN — INSULIN HUMAN PRN UNIT: 100 INJECTION, SOLUTION PARENTERAL at 05:53

## 2019-08-07 NOTE — NUR
ICU RN CLOSING NOTES:



Pt resting comfortably on bed, not in any distress. Tolerated SIMV mode via ETT, no 
respiratory distress. Remains SR on telemonitor. IV line access kept patent & intact w/ D5W 
x 70cc/hr infusing well. FC draining to yellowish UOP. GT kept patent & intact, restarted on 
GTF at 4pm x 30 cc/hr infusing well w/ no high residual noted w/in shift. Romero esquivel kept 
on d/t hypothermia. Had 1 liquidy large amount of brownish stool. Safety precaution kept in 
place at all times w/ bed in lowest & locked pos. Call light w/in reach. Will endorse to PM 
RN for JOHNNY.

## 2019-08-07 NOTE — NUR
ICU/RN:



Spoke with Dr Sepulveda; relayed imaging results regarding increased amount of free air 
compared to yesterday s/p peg insertion 8/15/19. New orders noted and carried out.

## 2019-08-07 NOTE — NUR
ICU/RN:



Dr Kimbrough at bedside; discussing POC with daughter. Informed of imaging results. 
Radiology tech at bedside; KUB with gastrografin performed. Daughter updated on pt status. 
Pending call back from Dr Diallo regarding possible tracheostomy today.

## 2019-08-07 NOTE — NUR
Confirmed w/ Dr. Diallo that trach placement will be scheduled on Friday 8/9/19. CN & dtr 
made aware.

## 2019-08-07 NOTE — NUR
ICU RN NOTES:



Rec'd report from Van GATES for JOHNNY. Pt resting comfortably on bed, not in any distress. 
Tolerating SIMV mode via ETT, sating 98%. SR on telemonitor. IV line access patent & intact 
w/ D5W x 70cc/hr infusing well. FC draining to yellowish UOP. GT patent & intact, confirmed 
via KUB w/ gastrograffin but kept clamped for now. Awaiting response from Dr. Diallo re: 
trach placement. Dtr at bedside. Safety precaution in place w/ bed in lowest & locked pos. 
Call light w/in reach. Will cont to monitor & attend pt needs.

## 2019-08-07 NOTE — NUR
Dr. Kimbrough made aware that pt's trach placement will be on Friday, 8/9/19. Per MD, may 
resume GTF.

## 2019-08-07 NOTE — NUR
RECEIVED PT IN NO ACUTE DISTRESS IN BED. PT IS A/O X 1. PT IS ON O2 VIA ETT @ 7.5/23 AT THE 
LIP. PT IS ON MECHANICAL VENT WITH SETTING @ SIMV 4, , FIO2 30%, PEEP 5. PT NOT 
SHOWING ANY S/S OF SOB, DIFFICULTY BREATHING OR PAIN AT THIS TIME. PT HAS GTUBE THAT IS 
CLEAN DRY INTACT AND PATENT WITH FREE WATER FLUSH AND 0 RESIDUAL.. PT IS ON FEEDING 
GLUCERNIA @ 30ML/HR WITH A GOAL OF 60ML/HR. PT IS ON TELE WITH SR ON THE MONITOR. PT HAS F/C 
THAT IS CLEAN DRY INTACT AND PATENT WITH YELLOW URINE DRAINING. PT HAS DORINA MIDLINE THAT IS 
CLEAN DRY INTACT AND PATENT WITH D5W @ 70ML/HR. BED IN LOW LOCK POSITION WITH RAILS UP X 2. 
CALL LIGHT WITHIN REACH AND ALL SAFETY MEASURES ENSURED AND CARRIED OUT. WILL CONTINUE TO 
MONITOR PT.

## 2019-08-07 NOTE — NUR
ICU RN NOTE



PATIENT TOLERATED THE NIGHT WELL ALL VITAL SIGNS STABLE NO S/S OF LEAKAGE ON THE  
BALLOON. PATIENT GETTING ALL TIDAL VOLUME TOLERATING RX VENT SETTINGS. ALL CARE RENDERED AS 
ORDERED. SAFETY PRECAUTIONS IN PLACE. WILL ENDORSE POC TO AM FOR JOHNNY.

## 2019-08-07 NOTE — NUR
ICU/RN:



Report received from previous nurse. Pt tolerating current vent settings; pt awake, alert 
tracking. Able to follow commands to communicate by blinking. PEG clamped for possible 
tracheostomy today, pt NPO. Weeping edema noted through extremities, wound care rendered.

## 2019-08-08 VITALS — DIASTOLIC BLOOD PRESSURE: 70 MMHG | SYSTOLIC BLOOD PRESSURE: 145 MMHG

## 2019-08-08 VITALS — DIASTOLIC BLOOD PRESSURE: 71 MMHG | SYSTOLIC BLOOD PRESSURE: 141 MMHG

## 2019-08-08 VITALS — SYSTOLIC BLOOD PRESSURE: 156 MMHG | DIASTOLIC BLOOD PRESSURE: 102 MMHG

## 2019-08-08 VITALS — SYSTOLIC BLOOD PRESSURE: 143 MMHG | DIASTOLIC BLOOD PRESSURE: 67 MMHG

## 2019-08-08 VITALS — DIASTOLIC BLOOD PRESSURE: 73 MMHG | SYSTOLIC BLOOD PRESSURE: 155 MMHG

## 2019-08-08 VITALS — DIASTOLIC BLOOD PRESSURE: 44 MMHG | SYSTOLIC BLOOD PRESSURE: 155 MMHG

## 2019-08-08 VITALS — SYSTOLIC BLOOD PRESSURE: 161 MMHG | DIASTOLIC BLOOD PRESSURE: 86 MMHG

## 2019-08-08 VITALS — SYSTOLIC BLOOD PRESSURE: 155 MMHG | DIASTOLIC BLOOD PRESSURE: 75 MMHG

## 2019-08-08 VITALS — SYSTOLIC BLOOD PRESSURE: 154 MMHG | DIASTOLIC BLOOD PRESSURE: 76 MMHG

## 2019-08-08 VITALS — SYSTOLIC BLOOD PRESSURE: 132 MMHG | DIASTOLIC BLOOD PRESSURE: 58 MMHG

## 2019-08-08 VITALS — DIASTOLIC BLOOD PRESSURE: 75 MMHG | SYSTOLIC BLOOD PRESSURE: 153 MMHG

## 2019-08-08 VITALS — DIASTOLIC BLOOD PRESSURE: 70 MMHG | SYSTOLIC BLOOD PRESSURE: 164 MMHG

## 2019-08-08 VITALS — DIASTOLIC BLOOD PRESSURE: 52 MMHG | SYSTOLIC BLOOD PRESSURE: 139 MMHG

## 2019-08-08 VITALS — DIASTOLIC BLOOD PRESSURE: 72 MMHG | SYSTOLIC BLOOD PRESSURE: 148 MMHG

## 2019-08-08 VITALS — DIASTOLIC BLOOD PRESSURE: 71 MMHG | SYSTOLIC BLOOD PRESSURE: 133 MMHG

## 2019-08-08 VITALS — SYSTOLIC BLOOD PRESSURE: 155 MMHG | DIASTOLIC BLOOD PRESSURE: 71 MMHG

## 2019-08-08 VITALS — SYSTOLIC BLOOD PRESSURE: 124 MMHG | DIASTOLIC BLOOD PRESSURE: 75 MMHG

## 2019-08-08 VITALS — DIASTOLIC BLOOD PRESSURE: 81 MMHG | SYSTOLIC BLOOD PRESSURE: 143 MMHG

## 2019-08-08 VITALS — SYSTOLIC BLOOD PRESSURE: 162 MMHG | DIASTOLIC BLOOD PRESSURE: 114 MMHG

## 2019-08-08 VITALS — DIASTOLIC BLOOD PRESSURE: 70 MMHG | SYSTOLIC BLOOD PRESSURE: 133 MMHG

## 2019-08-08 VITALS — SYSTOLIC BLOOD PRESSURE: 118 MMHG | DIASTOLIC BLOOD PRESSURE: 86 MMHG

## 2019-08-08 VITALS — DIASTOLIC BLOOD PRESSURE: 79 MMHG | SYSTOLIC BLOOD PRESSURE: 136 MMHG

## 2019-08-08 VITALS — SYSTOLIC BLOOD PRESSURE: 155 MMHG | DIASTOLIC BLOOD PRESSURE: 96 MMHG

## 2019-08-08 VITALS — SYSTOLIC BLOOD PRESSURE: 136 MMHG | DIASTOLIC BLOOD PRESSURE: 73 MMHG

## 2019-08-08 VITALS — DIASTOLIC BLOOD PRESSURE: 74 MMHG | SYSTOLIC BLOOD PRESSURE: 143 MMHG

## 2019-08-08 VITALS — SYSTOLIC BLOOD PRESSURE: 155 MMHG | DIASTOLIC BLOOD PRESSURE: 113 MMHG

## 2019-08-08 VITALS — DIASTOLIC BLOOD PRESSURE: 57 MMHG | SYSTOLIC BLOOD PRESSURE: 119 MMHG

## 2019-08-08 VITALS — DIASTOLIC BLOOD PRESSURE: 69 MMHG | SYSTOLIC BLOOD PRESSURE: 118 MMHG

## 2019-08-08 VITALS — DIASTOLIC BLOOD PRESSURE: 71 MMHG | SYSTOLIC BLOOD PRESSURE: 154 MMHG

## 2019-08-08 VITALS — SYSTOLIC BLOOD PRESSURE: 116 MMHG | DIASTOLIC BLOOD PRESSURE: 75 MMHG

## 2019-08-08 VITALS — DIASTOLIC BLOOD PRESSURE: 60 MMHG | SYSTOLIC BLOOD PRESSURE: 121 MMHG

## 2019-08-08 VITALS — SYSTOLIC BLOOD PRESSURE: 136 MMHG | DIASTOLIC BLOOD PRESSURE: 77 MMHG

## 2019-08-08 VITALS — DIASTOLIC BLOOD PRESSURE: 88 MMHG | SYSTOLIC BLOOD PRESSURE: 138 MMHG

## 2019-08-08 VITALS — DIASTOLIC BLOOD PRESSURE: 104 MMHG | SYSTOLIC BLOOD PRESSURE: 128 MMHG

## 2019-08-08 VITALS — DIASTOLIC BLOOD PRESSURE: 68 MMHG | SYSTOLIC BLOOD PRESSURE: 150 MMHG

## 2019-08-08 VITALS — DIASTOLIC BLOOD PRESSURE: 74 MMHG | SYSTOLIC BLOOD PRESSURE: 115 MMHG

## 2019-08-08 VITALS — DIASTOLIC BLOOD PRESSURE: 97 MMHG | SYSTOLIC BLOOD PRESSURE: 143 MMHG

## 2019-08-08 VITALS — DIASTOLIC BLOOD PRESSURE: 80 MMHG | SYSTOLIC BLOOD PRESSURE: 146 MMHG

## 2019-08-08 VITALS — SYSTOLIC BLOOD PRESSURE: 141 MMHG | DIASTOLIC BLOOD PRESSURE: 71 MMHG

## 2019-08-08 VITALS — SYSTOLIC BLOOD PRESSURE: 135 MMHG | DIASTOLIC BLOOD PRESSURE: 64 MMHG

## 2019-08-08 VITALS — SYSTOLIC BLOOD PRESSURE: 152 MMHG | DIASTOLIC BLOOD PRESSURE: 89 MMHG

## 2019-08-08 LAB
BASOPHILS # BLD AUTO: 0 /CMM (ref 0–0.2)
BASOPHILS NFR BLD AUTO: 0 % (ref 0–2)
BUN SERPL-MCNC: 44 MG/DL (ref 7–18)
CALCIUM SERPL-MCNC: 9.1 MG/DL (ref 8.5–10.1)
CHLORIDE SERPL-SCNC: 103 MMOL/L (ref 98–107)
CO2 SERPL-SCNC: 31 MMOL/L (ref 21–32)
CREAT SERPL-MCNC: 0.6 MG/DL (ref 0.6–1.3)
EOSINOPHIL NFR BLD AUTO: 0 % (ref 0–6)
GLUCOSE SERPL-MCNC: 252 MG/DL (ref 74–106)
HCT VFR BLD AUTO: 26 % (ref 33–45)
HGB BLD-MCNC: 8.8 G/DL (ref 11.5–14.8)
LYMPHOCYTES NFR BLD AUTO: 0.2 /CMM (ref 0.8–4.8)
LYMPHOCYTES NFR BLD AUTO: 4 % (ref 20–44)
LYMPHOCYTES NFR BLD MANUAL: 6 % (ref 16–48)
MAGNESIUM SERPL-MCNC: 2 MG/DL (ref 1.8–2.4)
MCHC RBC AUTO-ENTMCNC: 33 G/DL (ref 31–36)
MCV RBC AUTO: 89 FL (ref 82–100)
MONOCYTES NFR BLD AUTO: 0.1 /CMM (ref 0.1–1.3)
MONOCYTES NFR BLD AUTO: 2.8 % (ref 2–12)
MONOCYTES NFR BLD MANUAL: 3 % (ref 0–11)
NEUTROPHILS # BLD AUTO: 4.4 /CMM (ref 1.8–8.9)
NEUTROPHILS NFR BLD AUTO: 93.2 % (ref 43–81)
NEUTS SEG NFR BLD MANUAL: 91 % (ref 42–76)
PHOSPHATE SERPL-MCNC: 2.7 MG/DL (ref 2.5–4.9)
PLATELET # BLD AUTO: 66 /CMM (ref 150–450)
POTASSIUM SERPL-SCNC: 3.9 MMOL/L (ref 3.5–5.1)
RBC # BLD AUTO: 2.97 MIL/UL (ref 4–5.2)
SODIUM SERPL-SCNC: 137 MMOL/L (ref 136–145)
WBC NRBC COR # BLD AUTO: 4.7 K/UL (ref 4.3–11)

## 2019-08-08 RX ADMIN — Medication SCH MG: at 03:02

## 2019-08-08 RX ADMIN — Medication SCH OZ: at 09:07

## 2019-08-08 RX ADMIN — ACETYLCYSTEINE SCH MG: 100 INHALANT RESPIRATORY (INHALATION) at 15:38

## 2019-08-08 RX ADMIN — LISINOPRIL SCH MG: 10 TABLET ORAL at 09:04

## 2019-08-08 RX ADMIN — Medication SCH MG: at 19:34

## 2019-08-08 RX ADMIN — Medication SCH EACH: at 06:50

## 2019-08-08 RX ADMIN — INSULIN HUMAN PRN UNIT: 100 INJECTION, SOLUTION PARENTERAL at 06:56

## 2019-08-08 RX ADMIN — INSULIN HUMAN PRN UNIT: 100 INJECTION, SOLUTION PARENTERAL at 00:27

## 2019-08-08 RX ADMIN — FOLIC ACID SCH MG: 1 TABLET ORAL at 09:06

## 2019-08-08 RX ADMIN — INSULIN HUMAN PRN UNIT: 100 INJECTION, SOLUTION PARENTERAL at 17:33

## 2019-08-08 RX ADMIN — PANTOPRAZOLE SODIUM SCH MG: 40 GRANULE, DELAYED RELEASE ORAL at 09:04

## 2019-08-08 RX ADMIN — Medication SCH MG: at 15:38

## 2019-08-08 RX ADMIN — Medication SCH MG: at 23:12

## 2019-08-08 RX ADMIN — ACETYLCYSTEINE SCH MG: 100 INHALANT RESPIRATORY (INHALATION) at 07:58

## 2019-08-08 RX ADMIN — Medication SCH EACH: at 13:23

## 2019-08-08 RX ADMIN — Medication SCH EACH: at 17:32

## 2019-08-08 RX ADMIN — ACETYLCYSTEINE SCH MG: 100 INHALANT RESPIRATORY (INHALATION) at 23:12

## 2019-08-08 RX ADMIN — DEXTROSE MONOHYDRATE PRN MLS/HR: 50 INJECTION, SOLUTION INTRAVENOUS at 06:31

## 2019-08-08 RX ADMIN — Medication SCH APPLIC: at 09:08

## 2019-08-08 RX ADMIN — Medication PRN ML: at 17:30

## 2019-08-08 RX ADMIN — INSULIN HUMAN PRN UNIT: 100 INJECTION, SOLUTION PARENTERAL at 13:27

## 2019-08-08 RX ADMIN — SODIUM CHLORIDE PRN MLS/HR: 9 INJECTION, SOLUTION INTRAVENOUS at 18:30

## 2019-08-08 RX ADMIN — Medication SCH MG: at 07:58

## 2019-08-08 RX ADMIN — Medication SCH MG: at 11:54

## 2019-08-08 RX ADMIN — Medication SCH OZ: at 22:45

## 2019-08-08 RX ADMIN — Medication SCH EACH: at 00:28

## 2019-08-08 NOTE — NUR
INITIAL ICU RN NOTE



RCVD PT ABLE TO OPEN EYES, NOT FOLLOW COMMANDS BUT TRACKING WITH EYES. SR ON  MONITOR SEVERE 
EDEMA ON BUE/BLE, TOLERATING ORDERED VENT SETTINGS ON SIMV MODE, NO S/O DISTRESS OBSERVED. 
ETT 7.5/23 AT LIP, PEG PLACEMENT VERIFIED BY ASPIRATION OF GASTRIC CONTENT, TOLERATING TUBE 
FEEDING ABOUT 90 ML GASTRIC RESIDUAL OBTAINED. ADAMS TO GRAVITY DRAINING CLOUDY, YELLOW 
URINE. DORINA MIDLINE C/D/I/PATENT, NO S/O INFILTRATION/PHLEBITIS OBSERVED UPON FLUSHING, IVF 
INFUSING AS ORDERED. WILL CONTINUE TO MONITOR PT FOR SAFETY AND COMFORT. BED IN LOW AND 
LOCKED POSITION, CALL LIGHT WITHIN REACH, HEAD OF BED ELEVATED.

## 2019-08-08 NOTE — NUR
PT REMAINS IN NO ACUTE DISTRESS IN BED. PT DID NOT HAVE ANY SIGNIFICANT CHANGE IN CONDITION 
DURING SHIFT. ALL NEEDS MET, ALL ORDERS CARRIED OUT. WILL ENDORSE CARE TO AM RN FOR 
CONTINUITY OF CARE.

## 2019-08-08 NOTE — NUR
ICU RN NOTE



PT REMAINS STABLE, TOLERATING ORDERED VENT SETTINGS, SR ON MONITOR, NO S/O 
DISTRESS/OBSERVED. ADAMS TO GRAVITY DRAINING CLOUDY, YELLOW URINE. G-TUBE PLACEMENT VERIFIED 
BY ASPIRATION OF GASTRIC CONTENTS. PT'S CARE WILL BE ENDORSED TO NIGHT SHIFT RN FOR 
CONTINUITY OF CARE, BED IN LOW AND LOCKED POSITION, CALL LIGHT WITHIN REAC, HEAD OF BED 
ELEVATED.

## 2019-08-08 NOTE — NUR
ICU RN NOTE



PT TAKEN TO RADIOLOGY FOR CT ABD/PELVIS, TRANSPORTED VIA BED WITH RT AND RN AT BEDSIDE, PER 
PROTOCOL. PT TOLERATED PROCEDURE WELL. PT RETURNED TO ROOM IN STABLE CONDITION.



RESULTS OF CT ABD/PELVIS COMMUNICATED TO DIGNA MEIER FOR DR. CARBALLO WHO RECOMMENDED TO 
RE-START TUBE FEEDINGS. TF WAS RE-STARTED AND PT'S DAUGHTER, SERGE, UPDATED ON SURGICAL 
TEAM'S RECOMMENDATION.

## 2019-08-08 NOTE — NUR
INITIAL ICU RN NOTE



RCVD PT ABLE TO OPEN EYES, TRACKING WITH EYES. SR ON  MONITOR, SEVERE EDEMA ON BUE/BLE,  V 
ON SIMV MODE, NO S/O DISTRESS OBSERVED. ETT 7.5/23 AT LIP, PEG PLACEMENT VERIFIED BY 
ASPIRATION OF GASTRIC CONTENT, TOLERATING TUBE FEEDING ABOUT 60 ML GASTRIC RESIDUAL 
OBTAINED. ADAMS TO GRAVITY DRAINING CLOUDY, YELLOW URINE. DORINA MIDLINE C/D/I/PATENT, NO S/O 
INFILTRATION/PHLEBITIS OBSERVED UPON FLUSHING, IVF INFUSING AS ORDERED. WILL CONTINUE TO 
MONITOR PT FOR SAFETY AND COMFORT. BED IN LOW AND LOCKED POSITION, CALL LIGHT WITHIN REACH, 
HEAD OF BED ELEVATED.

## 2019-08-09 VITALS — SYSTOLIC BLOOD PRESSURE: 111 MMHG | DIASTOLIC BLOOD PRESSURE: 66 MMHG

## 2019-08-09 VITALS — SYSTOLIC BLOOD PRESSURE: 143 MMHG | DIASTOLIC BLOOD PRESSURE: 78 MMHG

## 2019-08-09 VITALS — DIASTOLIC BLOOD PRESSURE: 68 MMHG | SYSTOLIC BLOOD PRESSURE: 113 MMHG

## 2019-08-09 VITALS — SYSTOLIC BLOOD PRESSURE: 126 MMHG | DIASTOLIC BLOOD PRESSURE: 66 MMHG

## 2019-08-09 VITALS — SYSTOLIC BLOOD PRESSURE: 145 MMHG | DIASTOLIC BLOOD PRESSURE: 87 MMHG

## 2019-08-09 VITALS — SYSTOLIC BLOOD PRESSURE: 154 MMHG | DIASTOLIC BLOOD PRESSURE: 94 MMHG

## 2019-08-09 VITALS — SYSTOLIC BLOOD PRESSURE: 122 MMHG | DIASTOLIC BLOOD PRESSURE: 81 MMHG

## 2019-08-09 VITALS — SYSTOLIC BLOOD PRESSURE: 124 MMHG | DIASTOLIC BLOOD PRESSURE: 72 MMHG

## 2019-08-09 VITALS — DIASTOLIC BLOOD PRESSURE: 77 MMHG | SYSTOLIC BLOOD PRESSURE: 139 MMHG

## 2019-08-09 VITALS — DIASTOLIC BLOOD PRESSURE: 94 MMHG | SYSTOLIC BLOOD PRESSURE: 154 MMHG

## 2019-08-09 VITALS — SYSTOLIC BLOOD PRESSURE: 131 MMHG | DIASTOLIC BLOOD PRESSURE: 79 MMHG

## 2019-08-09 VITALS — SYSTOLIC BLOOD PRESSURE: 109 MMHG | DIASTOLIC BLOOD PRESSURE: 51 MMHG

## 2019-08-09 VITALS — DIASTOLIC BLOOD PRESSURE: 77 MMHG | SYSTOLIC BLOOD PRESSURE: 138 MMHG

## 2019-08-09 VITALS — DIASTOLIC BLOOD PRESSURE: 77 MMHG | SYSTOLIC BLOOD PRESSURE: 131 MMHG

## 2019-08-09 VITALS — DIASTOLIC BLOOD PRESSURE: 60 MMHG | SYSTOLIC BLOOD PRESSURE: 113 MMHG

## 2019-08-09 VITALS — DIASTOLIC BLOOD PRESSURE: 72 MMHG | SYSTOLIC BLOOD PRESSURE: 121 MMHG

## 2019-08-09 VITALS — DIASTOLIC BLOOD PRESSURE: 75 MMHG | SYSTOLIC BLOOD PRESSURE: 142 MMHG

## 2019-08-09 VITALS — SYSTOLIC BLOOD PRESSURE: 125 MMHG | DIASTOLIC BLOOD PRESSURE: 76 MMHG

## 2019-08-09 VITALS — DIASTOLIC BLOOD PRESSURE: 68 MMHG | SYSTOLIC BLOOD PRESSURE: 122 MMHG

## 2019-08-09 VITALS — SYSTOLIC BLOOD PRESSURE: 141 MMHG | DIASTOLIC BLOOD PRESSURE: 73 MMHG

## 2019-08-09 VITALS — SYSTOLIC BLOOD PRESSURE: 159 MMHG | DIASTOLIC BLOOD PRESSURE: 86 MMHG

## 2019-08-09 VITALS — SYSTOLIC BLOOD PRESSURE: 149 MMHG | DIASTOLIC BLOOD PRESSURE: 85 MMHG

## 2019-08-09 VITALS — DIASTOLIC BLOOD PRESSURE: 77 MMHG | SYSTOLIC BLOOD PRESSURE: 130 MMHG

## 2019-08-09 VITALS — SYSTOLIC BLOOD PRESSURE: 121 MMHG | DIASTOLIC BLOOD PRESSURE: 76 MMHG

## 2019-08-09 VITALS — DIASTOLIC BLOOD PRESSURE: 74 MMHG | SYSTOLIC BLOOD PRESSURE: 158 MMHG

## 2019-08-09 VITALS — DIASTOLIC BLOOD PRESSURE: 81 MMHG | SYSTOLIC BLOOD PRESSURE: 139 MMHG

## 2019-08-09 VITALS — DIASTOLIC BLOOD PRESSURE: 74 MMHG | SYSTOLIC BLOOD PRESSURE: 130 MMHG

## 2019-08-09 VITALS — SYSTOLIC BLOOD PRESSURE: 109 MMHG | DIASTOLIC BLOOD PRESSURE: 70 MMHG

## 2019-08-09 VITALS — DIASTOLIC BLOOD PRESSURE: 56 MMHG | SYSTOLIC BLOOD PRESSURE: 116 MMHG

## 2019-08-09 VITALS — DIASTOLIC BLOOD PRESSURE: 72 MMHG | SYSTOLIC BLOOD PRESSURE: 134 MMHG

## 2019-08-09 VITALS — DIASTOLIC BLOOD PRESSURE: 53 MMHG | SYSTOLIC BLOOD PRESSURE: 109 MMHG

## 2019-08-09 VITALS — SYSTOLIC BLOOD PRESSURE: 126 MMHG | DIASTOLIC BLOOD PRESSURE: 71 MMHG

## 2019-08-09 VITALS — DIASTOLIC BLOOD PRESSURE: 74 MMHG | SYSTOLIC BLOOD PRESSURE: 133 MMHG

## 2019-08-09 VITALS — SYSTOLIC BLOOD PRESSURE: 131 MMHG | DIASTOLIC BLOOD PRESSURE: 72 MMHG

## 2019-08-09 VITALS — DIASTOLIC BLOOD PRESSURE: 66 MMHG | SYSTOLIC BLOOD PRESSURE: 115 MMHG

## 2019-08-09 VITALS — SYSTOLIC BLOOD PRESSURE: 130 MMHG | DIASTOLIC BLOOD PRESSURE: 69 MMHG

## 2019-08-09 VITALS — DIASTOLIC BLOOD PRESSURE: 70 MMHG | SYSTOLIC BLOOD PRESSURE: 112 MMHG

## 2019-08-09 VITALS — DIASTOLIC BLOOD PRESSURE: 68 MMHG | SYSTOLIC BLOOD PRESSURE: 119 MMHG

## 2019-08-09 VITALS — SYSTOLIC BLOOD PRESSURE: 129 MMHG | DIASTOLIC BLOOD PRESSURE: 76 MMHG

## 2019-08-09 VITALS — DIASTOLIC BLOOD PRESSURE: 75 MMHG | SYSTOLIC BLOOD PRESSURE: 143 MMHG

## 2019-08-09 LAB
BASOPHILS # BLD AUTO: 0 /CMM (ref 0–0.2)
BASOPHILS NFR BLD AUTO: 0.1 % (ref 0–2)
BUN SERPL-MCNC: 46 MG/DL (ref 7–18)
CALCIUM SERPL-MCNC: 9.3 MG/DL (ref 8.5–10.1)
CHLORIDE SERPL-SCNC: 103 MMOL/L (ref 98–107)
CO2 SERPL-SCNC: 31 MMOL/L (ref 21–32)
CREAT SERPL-MCNC: 0.5 MG/DL (ref 0.6–1.3)
EOSINOPHIL NFR BLD AUTO: 0 % (ref 0–6)
GLUCOSE SERPL-MCNC: 189 MG/DL (ref 74–106)
HCT VFR BLD AUTO: 29 % (ref 33–45)
HGB BLD-MCNC: 9.9 G/DL (ref 11.5–14.8)
LYMPHOCYTES NFR BLD AUTO: 0.3 /CMM (ref 0.8–4.8)
LYMPHOCYTES NFR BLD AUTO: 3.9 % (ref 20–44)
LYMPHOCYTES NFR BLD MANUAL: 3 % (ref 16–48)
MAGNESIUM SERPL-MCNC: 2 MG/DL (ref 1.8–2.4)
MCHC RBC AUTO-ENTMCNC: 34 G/DL (ref 31–36)
MCV RBC AUTO: 89 FL (ref 82–100)
MONOCYTES NFR BLD AUTO: 0.2 /CMM (ref 0.1–1.3)
MONOCYTES NFR BLD AUTO: 3 % (ref 2–12)
MONOCYTES NFR BLD MANUAL: 3 % (ref 0–11)
NEUTROPHILS # BLD AUTO: 6.5 /CMM (ref 1.8–8.9)
NEUTROPHILS NFR BLD AUTO: 93 % (ref 43–81)
NEUTS SEG NFR BLD MANUAL: 94 % (ref 42–76)
PHOSPHATE SERPL-MCNC: 2.4 MG/DL (ref 2.5–4.9)
PLATELET # BLD AUTO: 88 /CMM (ref 150–450)
POTASSIUM SERPL-SCNC: 4.1 MMOL/L (ref 3.5–5.1)
RBC # BLD AUTO: 3.22 MIL/UL (ref 4–5.2)
SODIUM SERPL-SCNC: 138 MMOL/L (ref 136–145)
WBC NRBC COR # BLD AUTO: 6.9 K/UL (ref 4.3–11)

## 2019-08-09 PROCEDURE — 0B110F4 BYPASS TRACHEA TO CUTANEOUS WITH TRACHEOSTOMY DEVICE, OPEN APPROACH: ICD-10-PCS | Performed by: THORACIC SURGERY (CARDIOTHORACIC VASCULAR SURGERY)

## 2019-08-09 RX ADMIN — Medication SCH MG: at 15:55

## 2019-08-09 RX ADMIN — ACETYLCYSTEINE SCH MG: 100 INHALANT RESPIRATORY (INHALATION) at 07:48

## 2019-08-09 RX ADMIN — Medication SCH EACH: at 06:13

## 2019-08-09 RX ADMIN — Medication SCH MG: at 11:40

## 2019-08-09 RX ADMIN — INSULIN HUMAN PRN UNIT: 100 INJECTION, SOLUTION PARENTERAL at 12:00

## 2019-08-09 RX ADMIN — ACETAMINOPHEN PRN MG: 160 SOLUTION ORAL at 17:40

## 2019-08-09 RX ADMIN — Medication SCH EACH: at 00:43

## 2019-08-09 RX ADMIN — Medication SCH EACH: at 17:34

## 2019-08-09 RX ADMIN — INSULIN HUMAN PRN UNIT: 100 INJECTION, SOLUTION PARENTERAL at 17:36

## 2019-08-09 RX ADMIN — Medication PRN ML: at 10:34

## 2019-08-09 RX ADMIN — LISINOPRIL SCH MG: 10 TABLET ORAL at 10:35

## 2019-08-09 RX ADMIN — Medication SCH APPLIC: at 10:36

## 2019-08-09 RX ADMIN — NYSTATIN AND TRIAMCINOLONE ACETONIDE SCH APPLIC: 100000; 1 CREAM TOPICAL at 17:35

## 2019-08-09 RX ADMIN — Medication SCH MG: at 23:08

## 2019-08-09 RX ADMIN — PANTOPRAZOLE SODIUM SCH MG: 40 GRANULE, DELAYED RELEASE ORAL at 10:35

## 2019-08-09 RX ADMIN — Medication SCH EACH: at 11:56

## 2019-08-09 RX ADMIN — ACETYLCYSTEINE SCH MG: 100 INHALANT RESPIRATORY (INHALATION) at 15:55

## 2019-08-09 RX ADMIN — Medication SCH OZ: at 10:35

## 2019-08-09 RX ADMIN — Medication SCH OZ: at 21:19

## 2019-08-09 RX ADMIN — Medication SCH MG: at 19:51

## 2019-08-09 RX ADMIN — INSULIN HUMAN PRN UNIT: 100 INJECTION, SOLUTION PARENTERAL at 23:40

## 2019-08-09 RX ADMIN — ACETYLCYSTEINE SCH MG: 100 INHALANT RESPIRATORY (INHALATION) at 23:10

## 2019-08-09 RX ADMIN — FOLIC ACID SCH MG: 1 TABLET ORAL at 10:35

## 2019-08-09 RX ADMIN — Medication SCH MG: at 02:53

## 2019-08-09 RX ADMIN — Medication SCH EACH: at 23:49

## 2019-08-09 RX ADMIN — Medication SCH MG: at 07:48

## 2019-08-09 NOTE — NUR
INITIAL ICU RN NOTE



RCVD PT ABLE TO OPEN EYES AND TRACK RN MOVEMENT's, SR ON MONITOR TOLERATING VENT SETTINGS, 
NO S/O DISTRESS OBSERVED, TUBE FEEDING HELD SINCE MIDNIGHT PER REPORT. G-TUBE CLAMPED. ADAMS 
TO GRAVITY DRAINING CLOUDY, YELLOW URINE. DORINA MIDLINE C/D/I/PATENT, NO S/O 
INFILTRATION/PHLEBITIS OBSERVED UPON FLUSHING. WILL CONTINUE TO MONITOR PT FOR SAFETY AND 
COMFORT. BED IN LOW AND LOCKED POSITION, CALL LIGHT WITHIN REACH, HEAD OF BED ELEVATED.

## 2019-08-09 NOTE — NUR
ICU RN NOTE



PT REMAINS A LITTLE DROWSY, SR/ST ON MONITOR, TYLENOL GIVEN FOR PAIN (TACHYCARDIA), PT WITH 
TRACHEOSTOMY NOW, MINOR BLEEDING OBSERVED AT SURGICAL SITE, ADAMS TO GRAVITY DRAINING 
CLOUDY, YELLOW URINE. TUBE FEEDING RE-STARTED AT 35 ML/HR, LAST RESIDUAL  ML WATER 
FLUSH HELD DUE TO ELEVATED RESIDUAL. PT'S CARE WILL BE ENDORSED TO NIGHT SHIFT RN FOR 
CONTINUITY OF CARE, BED IN LOW AND LOCKED POSITION, CALL LIGHT WITHIN REACH, HEAD OF BED 
ELEVATED, PT'S FAMILY AT BEDSIDE.

## 2019-08-09 NOTE — NUR
RN ICU CLOSING NOTE



ENDORSED PT ABLE TO OPEN EYES, TRACKING WITH EYES. SR ON  MONITOR, SEVERE EDEMA ON BUE/BLE,  
V ON SIMV MODE, NO S/O DISTRESS OBSERVED. ETT 7.5/23 AT LIP, PEG PLACEMENT VERIFIED BY 
ASPIRATION OF GASTRIC CONTENT, TOLERATING TUBE FEEDING ABOUT 5 ML GASTRIC RESIDUAL OBTAINED. 
ADAMS TO GRAVITY DRAINING CLOUDY, YELLOW URINE. DORINA MIDLINE C/D/I/PATENT, SL, NPO AFTER 
MIDNIGHT, FOR POSSIBLE TRACH PLACEMENT, CHECK LIST AND CONSENT FILED. WILL ENDORSE TO AN RN 
FOR JOHNNY. BED IN LOW AND LOCKED POSITION, CALL LIGHT WITHIN REACH, HEAD OF BED ELEVATED.

## 2019-08-09 NOTE — NUR
RN ICU NOTE 



PATIENT IS RESTING WITH HOB ELEVATED, TRACH TO VENT ON SETTINGS AS ORDERED, FAMILY AT 
BEDSIDE, NO CARDIAC OR RESPIRATORY DISTRESS NOTED, SKIN KEPT CLEAN AND DRY DORINA MIDLINE SL, G 
TUBE WITH GLUCERNA AT 35 ML /HR, ADAMS CATHETER DRAINING TO GRAVITY YELLOW URINE, SKIN KEPT 
CLEAN AND DRY, SAFETY MAINTAINED AT ALL TIMES, BED IN LOW LOCKED POSITION,  WILL CONTINUE TO 
MONITOR FOR ANY CHANGES.

## 2019-08-09 NOTE — NUR
ICU RN NOTE



PT RETURNED TO ICU POST-TRACHEOSTOMY PLACEMENT. VITAL SIGNS REMAIN STABLE, SR ON MONITOR, 
TOLERATING VENT SETTINGS, REMAINS SEDATED FROM PROCEDURE. WILL CONTINUE TO MONITOR.

## 2019-08-10 VITALS — SYSTOLIC BLOOD PRESSURE: 116 MMHG | DIASTOLIC BLOOD PRESSURE: 64 MMHG

## 2019-08-10 VITALS — SYSTOLIC BLOOD PRESSURE: 110 MMHG | DIASTOLIC BLOOD PRESSURE: 57 MMHG

## 2019-08-10 VITALS — SYSTOLIC BLOOD PRESSURE: 116 MMHG | DIASTOLIC BLOOD PRESSURE: 63 MMHG

## 2019-08-10 VITALS — DIASTOLIC BLOOD PRESSURE: 74 MMHG | SYSTOLIC BLOOD PRESSURE: 134 MMHG

## 2019-08-10 VITALS — SYSTOLIC BLOOD PRESSURE: 95 MMHG | DIASTOLIC BLOOD PRESSURE: 51 MMHG

## 2019-08-10 VITALS — DIASTOLIC BLOOD PRESSURE: 66 MMHG | SYSTOLIC BLOOD PRESSURE: 130 MMHG

## 2019-08-10 VITALS — DIASTOLIC BLOOD PRESSURE: 51 MMHG | SYSTOLIC BLOOD PRESSURE: 109 MMHG

## 2019-08-10 VITALS — SYSTOLIC BLOOD PRESSURE: 134 MMHG | DIASTOLIC BLOOD PRESSURE: 79 MMHG

## 2019-08-10 VITALS — DIASTOLIC BLOOD PRESSURE: 53 MMHG | SYSTOLIC BLOOD PRESSURE: 99 MMHG

## 2019-08-10 VITALS — DIASTOLIC BLOOD PRESSURE: 78 MMHG | SYSTOLIC BLOOD PRESSURE: 147 MMHG

## 2019-08-10 VITALS — DIASTOLIC BLOOD PRESSURE: 60 MMHG | SYSTOLIC BLOOD PRESSURE: 110 MMHG

## 2019-08-10 VITALS — DIASTOLIC BLOOD PRESSURE: 64 MMHG | SYSTOLIC BLOOD PRESSURE: 114 MMHG

## 2019-08-10 VITALS — DIASTOLIC BLOOD PRESSURE: 114 MMHG | SYSTOLIC BLOOD PRESSURE: 141 MMHG

## 2019-08-10 VITALS — DIASTOLIC BLOOD PRESSURE: 61 MMHG | SYSTOLIC BLOOD PRESSURE: 131 MMHG

## 2019-08-10 VITALS — SYSTOLIC BLOOD PRESSURE: 132 MMHG | DIASTOLIC BLOOD PRESSURE: 94 MMHG

## 2019-08-10 VITALS — DIASTOLIC BLOOD PRESSURE: 81 MMHG | SYSTOLIC BLOOD PRESSURE: 139 MMHG

## 2019-08-10 VITALS — SYSTOLIC BLOOD PRESSURE: 122 MMHG | DIASTOLIC BLOOD PRESSURE: 90 MMHG

## 2019-08-10 VITALS — DIASTOLIC BLOOD PRESSURE: 72 MMHG | SYSTOLIC BLOOD PRESSURE: 140 MMHG

## 2019-08-10 VITALS — DIASTOLIC BLOOD PRESSURE: 75 MMHG | SYSTOLIC BLOOD PRESSURE: 124 MMHG

## 2019-08-10 VITALS — DIASTOLIC BLOOD PRESSURE: 94 MMHG | SYSTOLIC BLOOD PRESSURE: 122 MMHG

## 2019-08-10 VITALS — SYSTOLIC BLOOD PRESSURE: 116 MMHG | DIASTOLIC BLOOD PRESSURE: 58 MMHG

## 2019-08-10 LAB
ALBUMIN SERPL BCP-MCNC: 1 G/DL (ref 3.4–5)
ALP SERPL-CCNC: 464 U/L (ref 46–116)
ALT SERPL W P-5'-P-CCNC: 33 U/L (ref 12–78)
AST SERPL W P-5'-P-CCNC: 23 U/L (ref 15–37)
BASOPHILS # BLD AUTO: 0 /CMM (ref 0–0.2)
BASOPHILS NFR BLD AUTO: 0 % (ref 0–2)
BILIRUB SERPL-MCNC: 0.2 MG/DL (ref 0.2–1)
BUN SERPL-MCNC: 50 MG/DL (ref 7–18)
CALCIUM SERPL-MCNC: 9 MG/DL (ref 8.5–10.1)
CHLORIDE SERPL-SCNC: 104 MMOL/L (ref 98–107)
CO2 SERPL-SCNC: 31 MMOL/L (ref 21–32)
CREAT SERPL-MCNC: 0.7 MG/DL (ref 0.6–1.3)
EOSINOPHIL NFR BLD AUTO: 0 % (ref 0–6)
GLUCOSE SERPL-MCNC: 214 MG/DL (ref 74–106)
HCT VFR BLD AUTO: 28 % (ref 33–45)
HGB BLD-MCNC: 9.4 G/DL (ref 11.5–14.8)
LYMPHOCYTES NFR BLD AUTO: 0.1 /CMM (ref 0.8–4.8)
LYMPHOCYTES NFR BLD AUTO: 2.2 % (ref 20–44)
LYMPHOCYTES NFR BLD MANUAL: 1 % (ref 16–48)
MCHC RBC AUTO-ENTMCNC: 34 G/DL (ref 31–36)
MCV RBC AUTO: 89 FL (ref 82–100)
METAMYELOCYTES NFR BLD MANUAL: 1 % (ref 0–0)
MONOCYTES NFR BLD AUTO: 0.2 /CMM (ref 0.1–1.3)
MONOCYTES NFR BLD AUTO: 3.8 % (ref 2–12)
MONOCYTES NFR BLD MANUAL: 4 % (ref 0–11)
NEUTROPHILS # BLD AUTO: 5.6 /CMM (ref 1.8–8.9)
NEUTROPHILS NFR BLD AUTO: 94 % (ref 43–81)
NEUTS BAND NFR BLD MANUAL: 6 % (ref 0–5)
NEUTS SEG NFR BLD MANUAL: 88 % (ref 42–76)
PHOSPHATE SERPL-MCNC: 3 MG/DL (ref 2.5–4.9)
PLATELET # BLD AUTO: 99 /CMM (ref 150–450)
POTASSIUM SERPL-SCNC: 3.6 MMOL/L (ref 3.5–5.1)
PROT SERPL-MCNC: 4.4 G/DL (ref 6.4–8.2)
RBC # BLD AUTO: 3.1 MIL/UL (ref 4–5.2)
SODIUM SERPL-SCNC: 140 MMOL/L (ref 136–145)
WBC NRBC COR # BLD AUTO: 6 K/UL (ref 4.3–11)

## 2019-08-10 RX ADMIN — Medication SCH MG: at 19:27

## 2019-08-10 RX ADMIN — ACETYLCYSTEINE SCH MG: 100 INHALANT RESPIRATORY (INHALATION) at 07:57

## 2019-08-10 RX ADMIN — Medication SCH OZ: at 20:29

## 2019-08-10 RX ADMIN — Medication SCH MG: at 03:30

## 2019-08-10 RX ADMIN — INSULIN HUMAN PRN UNIT: 100 INJECTION, SOLUTION PARENTERAL at 23:32

## 2019-08-10 RX ADMIN — Medication SCH MG: at 07:57

## 2019-08-10 RX ADMIN — SODIUM CHLORIDE PRN MG: 9 INJECTION, SOLUTION INTRAVENOUS at 08:11

## 2019-08-10 RX ADMIN — NYSTATIN AND TRIAMCINOLONE ACETONIDE SCH APPLIC: 100000; 1 CREAM TOPICAL at 08:07

## 2019-08-10 RX ADMIN — Medication SCH MG: at 15:40

## 2019-08-10 RX ADMIN — Medication PRN OZ: at 20:25

## 2019-08-10 RX ADMIN — Medication SCH MG: at 11:57

## 2019-08-10 RX ADMIN — ENOXAPARIN SODIUM SCH MG: 40 INJECTION SUBCUTANEOUS at 20:26

## 2019-08-10 RX ADMIN — Medication SCH EACH: at 23:40

## 2019-08-10 RX ADMIN — ACETYLCYSTEINE SCH MG: 100 INHALANT RESPIRATORY (INHALATION) at 23:28

## 2019-08-10 RX ADMIN — Medication PRN ML: at 11:10

## 2019-08-10 RX ADMIN — NYSTATIN AND TRIAMCINOLONE ACETONIDE SCH APPLIC: 100000; 1 CREAM TOPICAL at 17:34

## 2019-08-10 RX ADMIN — FOLIC ACID SCH MG: 1 TABLET ORAL at 08:06

## 2019-08-10 RX ADMIN — PANTOPRAZOLE SODIUM SCH MG: 40 GRANULE, DELAYED RELEASE ORAL at 08:06

## 2019-08-10 RX ADMIN — SODIUM CHLORIDE PRN MG: 9 INJECTION, SOLUTION INTRAVENOUS at 17:41

## 2019-08-10 RX ADMIN — INSULIN HUMAN PRN UNIT: 100 INJECTION, SOLUTION PARENTERAL at 12:09

## 2019-08-10 RX ADMIN — LISINOPRIL SCH MG: 10 TABLET ORAL at 08:06

## 2019-08-10 RX ADMIN — Medication SCH EACH: at 12:07

## 2019-08-10 RX ADMIN — INSULIN HUMAN PRN UNIT: 100 INJECTION, SOLUTION PARENTERAL at 05:03

## 2019-08-10 RX ADMIN — Medication SCH MG: at 23:28

## 2019-08-10 RX ADMIN — ACETYLCYSTEINE SCH MG: 100 INHALANT RESPIRATORY (INHALATION) at 15:40

## 2019-08-10 RX ADMIN — Medication SCH APPLIC: at 08:07

## 2019-08-10 RX ADMIN — Medication SCH EACH: at 05:03

## 2019-08-10 RX ADMIN — Medication SCH EACH: at 17:18

## 2019-08-10 RX ADMIN — Medication SCH OZ: at 08:07

## 2019-08-10 NOTE — NUR
RN NOTE:



DR. SWEET, PULMONOLOGIST WAS PRESENT IN THE UNIT, GAVE HIM AN UPDATE REGARDING THE PATIENT'S 
CONDITION AND THE ATTEMPT FOR THE MECHANICAL VENTILATOR MODE CHANGE. PER DR. SWEET, OK TO 
KEEP THE PATIENT ON THE SIMV MODE FOR NOW AND WILL ATTEMPT IN A FEW DAYS FOR THE WEANING. 
DAUGHTER SERGE WAS AT THE BEDSIDE AND WAS INFORMED ABOUT IT AND THE PLAN OF DOWNGRADING 
THE PATIENT TO A DIFFERENT UNIT.

## 2019-08-10 NOTE — NUR
RN NOTE:



RECEIVED A PHONE CALL FROM SERGE (DAUGHTER) AND GAVE HER AN UPDATE REGARDING THE 
PATIENT'S CONDITION INCLUDING THE ATTEMPT TO SWITCH THE MECHANICAL VENTILATOR MODE. PER 
DAUGHTER SHE WILL BE COMING IN ANYTIME TO VISIT THE PATIENT.

## 2019-08-10 NOTE — NUR
RN NOTE:



RECEIVED PATIENT IN BED, ASLEEP BUT AROUSABLE WITH TACTILE STIMULI, OPEN EYES SPONTANEOUSLY 
AND BREATHING EVENLY AND UNLABORED SATURATING 100% IN CURRENT MECHANICAL VENT SETTING. ON 
SIMV MODE AT THE MECHANICAL VENTILATOR AND PER DR. RIVERO'S ORDER FROM YESTERDAY, THE PATIENT 
WILL BE SWITCH TO A CPAP MODE. RT JL WAS AWARE AND VENTILATOR CHANGES WILL BE DONE AT 
0800 TODAY. ON CARDIAC MONITOR SR HR= 80. AFEBRILE. SKIN WARM TO TOUCH. GT SITE NOTED INTACT 
AND PATENT WITH DRY DRESSING. GASTRIC RESIDUAL WAS CHECKED AND IT WAS NOTED WITH 150 ML. 
PATIENT WILL BE GIVEN REGLAN IV PRN. (R) UA MIDLINE DOUBLE LUMEN NOTED PATENT AND INTACT 
WITH TRANSPARENT DRESSING CLEAN AND DRY. PATIENT WAS NOTED WITH GENERALIZED EDEMA. ADAMS 
CATHETER WAS IN PLACED DRAINING YELLOW URINE TO GRAVITY. ALL EXTREMITIES WERE ELEVATED. HOB 
ELEVATED. BED ALARMED AND LOCKED AT ALL TIMES. CALL LIGHT WITHIN REACH NEEDS ANTICIPATED.

## 2019-08-10 NOTE — NUR
RN NOTE:



PATIENT WAS TRANSFERRED TO [facility] VIA ACLS PROTOCOL. DAUGHTER SERGE WAS AWARE OF 
THE TRANSFER. PATIENT HAS NO BELONGINGS UPON TRANSFER TO THE UNIT. RECEIVING NURSE WAS [facility] RN.

## 2019-08-10 NOTE — NUR
RN NOTE:



CALLED AND SPOKE WITH [facility] G[facility] RN AND GAVE HER A REPORT REGARDING THE TRANSFER OF THE 
PATIENT TO ROOM 104. EXPECTED TIME OF TRANSFER WILL BE BEFORE 1:30 PM. DAUGHTER SERGE 
PRESENT AT THE BEDSIDE AND WAS INFORMED OF THE TRANSFER.

## 2019-08-10 NOTE — NUR
RN NOTE:



DR. HOFFMAN MADE ROUNDS AND INFORMED HIM ABOUT THE PATIENT'S CONDITION. PATIENT DID NOT 
TOLERATE THE SWITCH OF THE VENTILATOR SETTING AND WAS RETURNED TO THE PREVIOUS SIMV MODE. NO 
NEW ORDER WAS GIVEN BY DR. DOMINGUEZ.

## 2019-08-10 NOTE — NUR
RN TELE NOTE 



PATIENT IS RESTING WITH HOB ELEVATED, TRACH TO VENT ON SETTINGS AS ORDERED, DAUGHTER AT 
BEDSIDE, NO CARDIAC OR RESPIRATORY DISTRESS NOTED, SR ON MONITOR, SKIN KEPT CLEAN AND DRY, 
DORINA MIDLINE SL, G TUBE WITH GLUCERNA AT 45 ML /HR, ADAMS CATHETER DRAINING TO GRAVITY YELLOW 
CLOUDY URINE, SKIN KEPT CLEAN AND DRY, SAFETY MAINTAINED AT ALL TIMES, BED IN LOW LOCKED 
POSITION,  WILL CONTINUE TO MONITOR FOR ANY CHANGES.

## 2019-08-10 NOTE — NUR
RN NOTE:



RT JL SWITCHED THE MECHANICAL VENTILATOR'S MODE FROM SIMV TO CPAP PER DR. RIVERO'S ORDER, 
BUT PATIENT DID NOT TOLERATE IT. PATIENT WAS NOT ABLE TO PERFORM SPONTANEOUS BREATHING. 
RIGHT AWAY PATIENT WAS SWITCHED BACK TO SIMV MODE AGAIN. WILL INFORM THE PULMONOLOGIST TODAY 
AND THE ROUNDING MD.

## 2019-08-10 NOTE — NUR
tele rn end of shift notes

pt in bed, asleep. vs stable. no significant changes. pt tolerated all treatments. will 
endorse to pm nurse for anup.

## 2019-08-10 NOTE — NUR
tele rn notes

received pt from icu in room 104. pt on vent; no sob noted. tolerating settings. opens eyes 
spontaneously, tracks. on tele sr 70's afebrile with stable vs. pt noted with blood tinged 
drainage from tracheostomy site. no drainage on peg site. gtf infusing at 35ml/hr. DORINA 
midline flushing well but without blood return. pt has f/c in place draining cloudy yellow 
urine. bed in locked/lowest position. call light in reach.

## 2019-08-11 VITALS — DIASTOLIC BLOOD PRESSURE: 69 MMHG | SYSTOLIC BLOOD PRESSURE: 130 MMHG

## 2019-08-11 VITALS — SYSTOLIC BLOOD PRESSURE: 123 MMHG | DIASTOLIC BLOOD PRESSURE: 63 MMHG

## 2019-08-11 VITALS — DIASTOLIC BLOOD PRESSURE: 72 MMHG | SYSTOLIC BLOOD PRESSURE: 120 MMHG

## 2019-08-11 VITALS — DIASTOLIC BLOOD PRESSURE: 56 MMHG | SYSTOLIC BLOOD PRESSURE: 101 MMHG

## 2019-08-11 VITALS — DIASTOLIC BLOOD PRESSURE: 65 MMHG | SYSTOLIC BLOOD PRESSURE: 130 MMHG

## 2019-08-11 VITALS — DIASTOLIC BLOOD PRESSURE: 64 MMHG | SYSTOLIC BLOOD PRESSURE: 119 MMHG

## 2019-08-11 RX ADMIN — Medication SCH MG: at 08:11

## 2019-08-11 RX ADMIN — Medication SCH MG: at 19:38

## 2019-08-11 RX ADMIN — LISINOPRIL SCH MG: 10 TABLET ORAL at 09:11

## 2019-08-11 RX ADMIN — Medication SCH MG: at 23:59

## 2019-08-11 RX ADMIN — NYSTATIN AND TRIAMCINOLONE ACETONIDE SCH APPLIC: 100000; 1 CREAM TOPICAL at 09:15

## 2019-08-11 RX ADMIN — Medication SCH EACH: at 18:27

## 2019-08-11 RX ADMIN — Medication SCH EACH: at 06:07

## 2019-08-11 RX ADMIN — INSULIN HUMAN PRN UNIT: 100 INJECTION, SOLUTION PARENTERAL at 05:33

## 2019-08-11 RX ADMIN — INSULIN GLARGINE SCH UNIT: 100 INJECTION, SOLUTION SUBCUTANEOUS at 21:23

## 2019-08-11 RX ADMIN — ACETYLCYSTEINE SCH MG: 100 INHALANT RESPIRATORY (INHALATION) at 15:33

## 2019-08-11 RX ADMIN — Medication SCH MG: at 03:03

## 2019-08-11 RX ADMIN — NYSTATIN AND TRIAMCINOLONE ACETONIDE SCH APPLIC: 100000; 1 CREAM TOPICAL at 18:20

## 2019-08-11 RX ADMIN — INSULIN HUMAN PRN UNIT: 100 INJECTION, SOLUTION PARENTERAL at 12:04

## 2019-08-11 RX ADMIN — ACETYLCYSTEINE SCH MG: 100 INHALANT RESPIRATORY (INHALATION) at 23:59

## 2019-08-11 RX ADMIN — Medication SCH MG: at 11:32

## 2019-08-11 RX ADMIN — ACETAMINOPHEN PRN MG: 160 SOLUTION ORAL at 09:11

## 2019-08-11 RX ADMIN — FOLIC ACID SCH MG: 1 TABLET ORAL at 09:11

## 2019-08-11 RX ADMIN — Medication SCH APPLIC: at 09:16

## 2019-08-11 RX ADMIN — Medication SCH MG: at 15:34

## 2019-08-11 RX ADMIN — Medication SCH OZ: at 21:21

## 2019-08-11 RX ADMIN — Medication SCH EACH: at 12:02

## 2019-08-11 RX ADMIN — Medication PRN ML: at 12:10

## 2019-08-11 RX ADMIN — Medication SCH OZ: at 09:16

## 2019-08-11 RX ADMIN — ACETYLCYSTEINE SCH MG: 100 INHALANT RESPIRATORY (INHALATION) at 08:11

## 2019-08-11 RX ADMIN — INSULIN HUMAN PRN UNIT: 100 INJECTION, SOLUTION PARENTERAL at 18:25

## 2019-08-11 RX ADMIN — PANTOPRAZOLE SODIUM SCH MG: 40 GRANULE, DELAYED RELEASE ORAL at 09:11

## 2019-08-11 RX ADMIN — ENOXAPARIN SODIUM SCH MG: 40 INJECTION SUBCUTANEOUS at 21:22

## 2019-08-11 NOTE — NUR
tele rn opening notes

received pt in bed, asleep but arouses easily. on mech vent to trach; no labored breathing 
noted. on tele sr 80/s. vs stable. with mauro hugger. lung ames clear to auscultation. 
active bowel sounds noted. generalized edema, pitting. f/c draining cloudy yellow urine. 
glucerna at 45ml/hr. gtube with 90cc residual. sloane midline flushed/patent/clean. bed in 
locked/lowest position. call light in reach. will cont to monitor.

## 2019-08-11 NOTE — NUR
tele rn end of shift notes

pt stable on vent. no s/sx of resp distress noted. daughter at bedside and updated with poc. 
endorsed to pm nurse for anup.

## 2019-08-11 NOTE — NUR
RN TELE NOTE 



PT IS RESTING WITH HOB ELEVATED, EYE TRACKING, TRACH TO VENT ON SETTINGS AS ORDERED, NO 
CARDIAC OR RESPIRATORY DISTRESS NOTED, SR ON MONITOR, SKIN KEPT CLEAN AND DRY, DORINA MIDLINE 
SL, G TUBE WITH GLUCERNA AT 45 ML /HR, ADAMS CATHETER DRAINING TO GRAVITY YELLOW CLOUDY 
URINE, SKIN KEPT CLEAN AND DRY, SAFETY MAINTAINED AT ALL TIMES, BED IN LOW LOCKED POSITION,  
WILL CONTINUE TO MONITOR FOR ANY CHANGES.

## 2019-08-12 VITALS — SYSTOLIC BLOOD PRESSURE: 101 MMHG | DIASTOLIC BLOOD PRESSURE: 66 MMHG

## 2019-08-12 VITALS — DIASTOLIC BLOOD PRESSURE: 49 MMHG | SYSTOLIC BLOOD PRESSURE: 137 MMHG

## 2019-08-12 VITALS — SYSTOLIC BLOOD PRESSURE: 143 MMHG | DIASTOLIC BLOOD PRESSURE: 90 MMHG

## 2019-08-12 VITALS — DIASTOLIC BLOOD PRESSURE: 59 MMHG | SYSTOLIC BLOOD PRESSURE: 104 MMHG

## 2019-08-12 VITALS — DIASTOLIC BLOOD PRESSURE: 42 MMHG | SYSTOLIC BLOOD PRESSURE: 138 MMHG

## 2019-08-12 VITALS — SYSTOLIC BLOOD PRESSURE: 121 MMHG | DIASTOLIC BLOOD PRESSURE: 63 MMHG

## 2019-08-12 LAB
ALBUMIN SERPL BCP-MCNC: 1 G/DL (ref 3.4–5)
ALP SERPL-CCNC: 478 U/L (ref 46–116)
ALT SERPL W P-5'-P-CCNC: 32 U/L (ref 12–78)
AST SERPL W P-5'-P-CCNC: 33 U/L (ref 15–37)
BASOPHILS # BLD AUTO: 0 /CMM (ref 0–0.2)
BASOPHILS NFR BLD AUTO: 0 % (ref 0–2)
BILIRUB SERPL-MCNC: 0.3 MG/DL (ref 0.2–1)
BUN SERPL-MCNC: 55 MG/DL (ref 7–18)
CALCIUM SERPL-MCNC: 9.4 MG/DL (ref 8.5–10.1)
CHLORIDE SERPL-SCNC: 107 MMOL/L (ref 98–107)
CO2 SERPL-SCNC: 26 MMOL/L (ref 21–32)
CREAT SERPL-MCNC: 0.7 MG/DL (ref 0.6–1.3)
EOSINOPHIL NFR BLD AUTO: 0 % (ref 0–6)
GLUCOSE SERPL-MCNC: 217 MG/DL (ref 74–106)
HCT VFR BLD AUTO: 33 % (ref 33–45)
HGB BLD-MCNC: 10.3 G/DL (ref 11.5–14.8)
LYMPHOCYTES NFR BLD AUTO: 0.5 /CMM (ref 0.8–4.8)
LYMPHOCYTES NFR BLD AUTO: 5.1 % (ref 20–44)
MAGNESIUM SERPL-MCNC: 2 MG/DL (ref 1.8–2.4)
MCHC RBC AUTO-ENTMCNC: 32 G/DL (ref 31–36)
MCV RBC AUTO: 94 FL (ref 82–100)
MONOCYTES NFR BLD AUTO: 0.7 /CMM (ref 0.1–1.3)
MONOCYTES NFR BLD AUTO: 6.5 % (ref 2–12)
NEUTROPHILS # BLD AUTO: 9.4 /CMM (ref 1.8–8.9)
NEUTROPHILS NFR BLD AUTO: 88.4 % (ref 43–81)
PHOSPHATE SERPL-MCNC: 2.5 MG/DL (ref 2.5–4.9)
PLATELET # BLD AUTO: 142 /CMM (ref 150–450)
POTASSIUM SERPL-SCNC: 4.7 MMOL/L (ref 3.5–5.1)
PROT SERPL-MCNC: 4.6 G/DL (ref 6.4–8.2)
RBC # BLD AUTO: 3.46 MIL/UL (ref 4–5.2)
SODIUM SERPL-SCNC: 143 MMOL/L (ref 136–145)
WBC NRBC COR # BLD AUTO: 10.7 K/UL (ref 4.3–11)

## 2019-08-12 RX ADMIN — ACETAMINOPHEN PRN MG: 160 SOLUTION ORAL at 23:48

## 2019-08-12 RX ADMIN — ACETYLCYSTEINE SCH MG: 100 INHALANT RESPIRATORY (INHALATION) at 07:44

## 2019-08-12 RX ADMIN — INSULIN HUMAN PRN UNIT: 100 INJECTION, SOLUTION PARENTERAL at 05:38

## 2019-08-12 RX ADMIN — INSULIN HUMAN PRN UNIT: 100 INJECTION, SOLUTION PARENTERAL at 12:22

## 2019-08-12 RX ADMIN — FOLIC ACID SCH MG: 1 TABLET ORAL at 08:54

## 2019-08-12 RX ADMIN — Medication SCH EACH: at 05:37

## 2019-08-12 RX ADMIN — Medication SCH OZ: at 20:19

## 2019-08-12 RX ADMIN — NYSTATIN AND TRIAMCINOLONE ACETONIDE SCH APPLIC: 100000; 1 CREAM TOPICAL at 08:55

## 2019-08-12 RX ADMIN — Medication SCH EACH: at 00:01

## 2019-08-12 RX ADMIN — ENOXAPARIN SODIUM SCH MG: 60 INJECTION SUBCUTANEOUS at 20:19

## 2019-08-12 RX ADMIN — PANTOPRAZOLE SODIUM SCH MG: 40 GRANULE, DELAYED RELEASE ORAL at 08:54

## 2019-08-12 RX ADMIN — Medication SCH EACH: at 23:49

## 2019-08-12 RX ADMIN — Medication SCH MG: at 11:44

## 2019-08-12 RX ADMIN — SODIUM CHLORIDE PRN MG: 9 INJECTION, SOLUTION INTRAVENOUS at 18:33

## 2019-08-12 RX ADMIN — Medication PRN ML: at 15:33

## 2019-08-12 RX ADMIN — Medication SCH APPLIC: at 08:56

## 2019-08-12 RX ADMIN — Medication SCH MG: at 19:37

## 2019-08-12 RX ADMIN — NYSTATIN AND TRIAMCINOLONE ACETONIDE SCH APPLIC: 100000; 1 CREAM TOPICAL at 16:51

## 2019-08-12 RX ADMIN — LISINOPRIL SCH MG: 10 TABLET ORAL at 08:55

## 2019-08-12 RX ADMIN — INSULIN GLARGINE SCH UNIT: 100 INJECTION, SOLUTION SUBCUTANEOUS at 21:36

## 2019-08-12 RX ADMIN — Medication SCH MG: at 04:08

## 2019-08-12 RX ADMIN — ACETYLCYSTEINE SCH MG: 100 INHALANT RESPIRATORY (INHALATION) at 22:38

## 2019-08-12 RX ADMIN — INSULIN HUMAN PRN UNIT: 100 INJECTION, SOLUTION PARENTERAL at 00:01

## 2019-08-12 RX ADMIN — Medication SCH MG: at 15:48

## 2019-08-12 RX ADMIN — Medication SCH OZ: at 08:56

## 2019-08-12 RX ADMIN — ACETYLCYSTEINE SCH MG: 100 INHALANT RESPIRATORY (INHALATION) at 15:48

## 2019-08-12 RX ADMIN — Medication SCH EACH: at 17:56

## 2019-08-12 RX ADMIN — Medication SCH EACH: at 12:03

## 2019-08-12 RX ADMIN — Medication SCH MG: at 07:44

## 2019-08-12 RX ADMIN — INSULIN HUMAN PRN UNIT: 100 INJECTION, SOLUTION PARENTERAL at 23:58

## 2019-08-12 NOTE — NUR
RN NOTE:



PATIENT'S GASTRIC RESIDUAL WAS NOTED 100ML. REGLAN PRN WAS ADMINISTERED. PATIENT'S DAUGHTER 
SERGE WAS PRESENT AT THE BEDSIDE AND SHE WAS GIVEN UPDATES REGARDING THE PATIENT'S 
CONDITION AND NEW ORDERS FROM THE DOCTOR.

## 2019-08-12 NOTE — NUR
RN NOTE:



RECEIVED PATIENT IN BED, ASLEEP, BUT AROUSABLE WITH VERBAL CUES AND TACTILE STIMULI. 
NONVERBAL. ON MECHANICAL VENTILATOR SATURATING 100% AND STILL ON SIMV MODE. HOB ELEVATED. 
BED ALARMED AND LOCKED AT ALL TIMES. GT FEEDING OF GLUCERNA 1.2 @ 45ML/HR AND PATIENT WAS 
NOTED WITH 60ML OF GASTRIC RESIDUAL. (R) UA MIDLINE NOTED PATENT AND INTACT WITH TRANSPARENT 
DRESSING CLEAN AND DRY. ADAMS CATHETER IN PLACED WITH YELLOW URINE DRAINING TO GRAVITY. 
PATIENT ON [employer] 1ST STEP MATTRESS FOR THE SACRAL WOUND MANAGEMENT. CALL LIGHT WITHIN REACH. 
NEEDS ANTICIPATED.

## 2019-08-12 NOTE — NUR
RN NOTE:



PATIENT WAS NOTED WITH A SMALL SKIN TEAR MEASURING 0.2 X 0.4 CM ON THE MID-CLAVICULAR AREA 
UNDER THE TRACH SITE. WILL INFORM THE DAUGHTER SERGE ABOUT IT. SKIN TEAR WAS COVERED 
WITH MEPILEX FOR SKIN MANAGEMENT. TRACH CARE AND MOUTH CARE WAS PROVIDED.

## 2019-08-12 NOTE — NUR
RN TELE NOTE 



PATIENT IS RESTING WITH HOB ELEVATED, DAUGHTER AT BEDSIDE, EYE TRACKING, TRACH TO VENT ON 
SETTINGS AS ORDERED, NO CARDIAC OR RESPIRATORY DISTRESS NOTED, SR/AFIB ON MONITOR, SKIN KEPT 
CLEAN AND DRY, DORINA MIDLINE SL, G TUBE WITH GLUCERNA AT 45 ML /HR, ADAMS CATHETER DRAINING TO 
GRAVITY YELLOW CLOUDY URINE, SKIN KEPT CLEAN AND DRY, BILATERAL HEELS ELEVATED, SAFETY 
MAINTAINED AT ALL TIMES, BED IN LOW LOCKED POSITION,  WILL CONTINUE TO MONITOR FOR ANY 
CHANGES.

## 2019-08-12 NOTE — NUR
RT



Upon doing trach care there was noticeable skin tear that seems to be have caused by trach 
tube. YAJAIRA Nieto notified and placed mepilex on wound. 

-------------------------------------------------------------------------------

Addendum: 08/12/19 at 1613 by MAYRA JACKSON RT

-------------------------------------------------------------------------------

Amended: Links added.

## 2019-08-13 VITALS — SYSTOLIC BLOOD PRESSURE: 155 MMHG | DIASTOLIC BLOOD PRESSURE: 68 MMHG

## 2019-08-13 VITALS — SYSTOLIC BLOOD PRESSURE: 145 MMHG | DIASTOLIC BLOOD PRESSURE: 83 MMHG

## 2019-08-13 VITALS — DIASTOLIC BLOOD PRESSURE: 64 MMHG | SYSTOLIC BLOOD PRESSURE: 147 MMHG

## 2019-08-13 VITALS — SYSTOLIC BLOOD PRESSURE: 152 MMHG | DIASTOLIC BLOOD PRESSURE: 75 MMHG

## 2019-08-13 VITALS — DIASTOLIC BLOOD PRESSURE: 65 MMHG | SYSTOLIC BLOOD PRESSURE: 140 MMHG

## 2019-08-13 VITALS — DIASTOLIC BLOOD PRESSURE: 83 MMHG | SYSTOLIC BLOOD PRESSURE: 141 MMHG

## 2019-08-13 LAB
BASE EXCESS BLDA CALC-SCNC: 7 MMOL/L
BASOPHILS # BLD AUTO: 0 /CMM (ref 0–0.2)
BASOPHILS NFR BLD AUTO: 0.1 % (ref 0–2)
BUN SERPL-MCNC: 64 MG/DL (ref 7–18)
CALCIUM SERPL-MCNC: 9 MG/DL (ref 8.5–10.1)
CHLORIDE SERPL-SCNC: 107 MMOL/L (ref 98–107)
CO2 SERPL-SCNC: 30 MMOL/L (ref 21–32)
CREAT SERPL-MCNC: 0.8 MG/DL (ref 0.6–1.3)
DO-HGB MFR BLDA: 63.8 MMHG
EOSINOPHIL NFR BLD AUTO: 0 % (ref 0–6)
GLUCOSE SERPL-MCNC: 306 MG/DL (ref 74–106)
HCT VFR BLD AUTO: 27 % (ref 33–45)
HGB BLD-MCNC: 8.9 G/DL (ref 11.5–14.8)
INHALED O2 CONCENTRATION: 30 %
INTRINSIC PEEP RESPIRATORY: 5 CM H2O
LYMPHOCYTES NFR BLD AUTO: 0.2 /CMM (ref 0.8–4.8)
LYMPHOCYTES NFR BLD AUTO: 2.2 % (ref 20–44)
LYMPHOCYTES NFR BLD MANUAL: 1 % (ref 16–48)
MAGNESIUM SERPL-MCNC: 1.9 MG/DL (ref 1.8–2.4)
MCHC RBC AUTO-ENTMCNC: 33 G/DL (ref 31–36)
MCV RBC AUTO: 89 FL (ref 82–100)
MONOCYTES NFR BLD AUTO: 0.5 /CMM (ref 0.1–1.3)
MONOCYTES NFR BLD AUTO: 5 % (ref 2–12)
MONOCYTES NFR BLD MANUAL: 10 % (ref 0–11)
NEUTROPHILS # BLD AUTO: 9.6 /CMM (ref 1.8–8.9)
NEUTROPHILS NFR BLD AUTO: 92.7 % (ref 43–81)
NEUTS BAND NFR BLD MANUAL: 3 % (ref 0–5)
NEUTS SEG NFR BLD MANUAL: 86 % (ref 42–76)
PCO2 TEMP ADJ BLDA: 39.2 MMHG (ref 35–45)
PEEP SETTING VENT: 400 ML
PH TEMP ADJ BLDA: 7.51 [PH] (ref 7.35–7.45)
PHOSPHATE SERPL-MCNC: 2 MG/DL (ref 2.5–4.9)
PLATELET # BLD AUTO: 174 /CMM (ref 150–450)
PO2 TEMP ADJ BLDA: 104 MMHG (ref 75–100)
POTASSIUM SERPL-SCNC: 3.6 MMOL/L (ref 3.5–5.1)
RBC # BLD AUTO: 3.02 MIL/UL (ref 4–5.2)
SAO2 % BLDA: 97 % (ref 92–98.5)
SODIUM SERPL-SCNC: 145 MMOL/L (ref 136–145)
VENTILATION MODE VENT: (no result)
WBC NRBC COR # BLD AUTO: 10.4 K/UL (ref 4.3–11)

## 2019-08-13 RX ADMIN — Medication SCH EACH: at 05:42

## 2019-08-13 RX ADMIN — Medication SCH MG: at 23:56

## 2019-08-13 RX ADMIN — Medication SCH MG: at 19:51

## 2019-08-13 RX ADMIN — INSULIN HUMAN PRN UNIT: 100 INJECTION, SOLUTION PARENTERAL at 17:17

## 2019-08-13 RX ADMIN — Medication PRN ML: at 21:01

## 2019-08-13 RX ADMIN — Medication SCH OZ: at 21:01

## 2019-08-13 RX ADMIN — ENOXAPARIN SODIUM SCH MG: 60 INJECTION SUBCUTANEOUS at 08:51

## 2019-08-13 RX ADMIN — FOLIC ACID SCH MG: 1 TABLET ORAL at 08:44

## 2019-08-13 RX ADMIN — Medication SCH APPLIC: at 09:04

## 2019-08-13 RX ADMIN — ENOXAPARIN SODIUM SCH MG: 60 INJECTION SUBCUTANEOUS at 21:01

## 2019-08-13 RX ADMIN — SULFAMETHOXAZOLE AND TRIMETHOPRIM SCH UDTAB: 800; 160 TABLET ORAL at 21:01

## 2019-08-13 RX ADMIN — NYSTATIN AND TRIAMCINOLONE ACETONIDE SCH APPLIC: 100000; 1 CREAM TOPICAL at 16:21

## 2019-08-13 RX ADMIN — Medication SCH EACH: at 17:14

## 2019-08-13 RX ADMIN — ACETYLCYSTEINE SCH MG: 100 INHALANT RESPIRATORY (INHALATION) at 07:22

## 2019-08-13 RX ADMIN — ACETAMINOPHEN PRN MG: 160 SOLUTION ORAL at 12:02

## 2019-08-13 RX ADMIN — INSULIN HUMAN PRN UNIT: 100 INJECTION, SOLUTION PARENTERAL at 22:59

## 2019-08-13 RX ADMIN — INSULIN HUMAN PRN UNIT: 100 INJECTION, SOLUTION PARENTERAL at 05:42

## 2019-08-13 RX ADMIN — INSULIN HUMAN PRN UNIT: 100 INJECTION, SOLUTION PARENTERAL at 12:14

## 2019-08-13 RX ADMIN — ACETYLCYSTEINE SCH MG: 100 INHALANT RESPIRATORY (INHALATION) at 23:56

## 2019-08-13 RX ADMIN — Medication SCH MG: at 15:26

## 2019-08-13 RX ADMIN — Medication SCH MG: at 03:31

## 2019-08-13 RX ADMIN — SODIUM CHLORIDE PRN MG: 9 INJECTION, SOLUTION INTRAVENOUS at 23:02

## 2019-08-13 RX ADMIN — LISINOPRIL SCH MG: 10 TABLET ORAL at 08:44

## 2019-08-13 RX ADMIN — Medication SCH OZ: at 08:45

## 2019-08-13 RX ADMIN — Medication SCH EACH: at 23:00

## 2019-08-13 RX ADMIN — NYSTATIN AND TRIAMCINOLONE ACETONIDE SCH APPLIC: 100000; 1 CREAM TOPICAL at 08:45

## 2019-08-13 RX ADMIN — ACETYLCYSTEINE SCH MG: 100 INHALANT RESPIRATORY (INHALATION) at 15:26

## 2019-08-13 RX ADMIN — Medication SCH EACH: at 12:02

## 2019-08-13 RX ADMIN — PANTOPRAZOLE SODIUM SCH MG: 40 GRANULE, DELAYED RELEASE ORAL at 08:44

## 2019-08-13 RX ADMIN — Medication SCH MG: at 07:21

## 2019-08-13 RX ADMIN — Medication SCH MG: at 00:44

## 2019-08-13 RX ADMIN — INSULIN GLARGINE SCH UNIT: 100 INJECTION, SOLUTION SUBCUTANEOUS at 22:58

## 2019-08-13 RX ADMIN — Medication SCH MG: at 12:06

## 2019-08-13 NOTE — NUR
PT. PLACED INTO SIMV 4, PS 15 @ 30% FIO2.



PT IS AWAKE BUT UNABLE TO FOLLOW COMMANDS SPO2 99 - 100%, RR 18 BPM, HR 93 - 98 bpm. 

NO SOB NOTED.


-------------------------------------------------------------------------------

Addendum: 08/13/19 at 0753 by SARA ADAMS RT

-------------------------------------------------------------------------------

Amended: Links added.

## 2019-08-13 NOTE — NUR
TELE  RN NOTE

PATIENT ON SIMV,GOT NEW ORDER FOR ABG FROM .RELAYED RESULT TO .NNO.PATIENT 
ABLE TO TRACK AND ABLE TO RECOGNIZE DAUGHTER.WILL CONTINUE TO MONITOR.

## 2019-08-13 NOTE — NUR
TELE RN CLOSING NOTE

ENDORSED TO PM NURSE FOR JOHNNY.PATIENT IN STABLE CONDITION.TOLERATING SIMV WELL.DAUGHTER AT 
BEDSIDE.SHE IS THANK FULL ABOUT GOOD CARE PROVIDED TO THE PATIENT.

## 2019-08-13 NOTE — NUR
TELE RN OPENING  NOTE 



RECEIVED REPORT FROM  PM NURSE.PATIENT IN BED WITH HOB ELEVATED,OPEN EYES AND TRACKS. TRACH 
TO VENT , TOLERATING SETTINGS AS ORDERED. NO CARDIAC OR RESPIRATORY DISTRESS NOTED, SR ON 
MONITOR HR 95. DORINA MIDLINE SL, G TUBE WITH GLUCERNA AT 45 ML /HR, ADAMS CATHETER DRAINING TO 
GRAVITY YELLOW CLOUDY URINE.BILATERAL HEELS ELEVATED.GENERALIZED EDEMA WITH WEEPING EDEMA IN 
R ARM.SAFETY MEASURES IN PLACE. BED IN LOW LOCKED POSITION.SRX3.BED ALARM ON.  WILL CONTINUE 
TO MONITOR .

## 2019-08-13 NOTE — NUR
TELE RN NOTES



[facility]PIRATORY DISTRESS NOTED. ON TELE MONITOR 
SR/AFIB 81. ON GTUBE FEEDING NO RESIDUAL NOTED. IV ACCESS ON DORINA MIDLINE, SALINE LOCK. ON 
ADAMS CATH DRAINING YELLOW URINE. HEAD OF BED ELEVATED. SIDE RAILS UP. CALL LIGHT WITHIN 
REACH. BED ALARM ON. WILL CONTINUE TO MONITOR PT CLOSELY.

## 2019-08-13 NOTE — NUR
TELE RN NOTE

SEEN BY DIGNA MALDONADO UPDATED ABOUT PATIENT CONDITION WITH LABS,GOT NEW ORDERS.D/C PLANNING.

## 2019-08-14 VITALS — DIASTOLIC BLOOD PRESSURE: 65 MMHG | SYSTOLIC BLOOD PRESSURE: 98 MMHG

## 2019-08-14 VITALS — DIASTOLIC BLOOD PRESSURE: 67 MMHG | SYSTOLIC BLOOD PRESSURE: 100 MMHG

## 2019-08-14 VITALS — SYSTOLIC BLOOD PRESSURE: 105 MMHG | DIASTOLIC BLOOD PRESSURE: 65 MMHG

## 2019-08-14 VITALS — DIASTOLIC BLOOD PRESSURE: 66 MMHG | SYSTOLIC BLOOD PRESSURE: 125 MMHG

## 2019-08-14 VITALS — SYSTOLIC BLOOD PRESSURE: 123 MMHG | DIASTOLIC BLOOD PRESSURE: 56 MMHG

## 2019-08-14 LAB
BASOPHILS # BLD AUTO: 0 /CMM (ref 0–0.2)
BASOPHILS NFR BLD AUTO: 0.1 % (ref 0–2)
BUN SERPL-MCNC: 66 MG/DL (ref 7–18)
CALCIUM SERPL-MCNC: 8.9 MG/DL (ref 8.5–10.1)
CHLORIDE SERPL-SCNC: 108 MMOL/L (ref 98–107)
CO2 SERPL-SCNC: 36 MMOL/L (ref 21–32)
CREAT SERPL-MCNC: 0.7 MG/DL (ref 0.6–1.3)
EOSINOPHIL NFR BLD AUTO: 0 % (ref 0–6)
GLUCOSE SERPL-MCNC: 259 MG/DL (ref 74–106)
HCT VFR BLD AUTO: 27 % (ref 33–45)
HEMOCCULT STL QL: NEGATIVE
HGB BLD-MCNC: 8.8 G/DL (ref 11.5–14.8)
LYMPHOCYTES NFR BLD AUTO: 0.4 /CMM (ref 0.8–4.8)
LYMPHOCYTES NFR BLD AUTO: 4.9 % (ref 20–44)
MCHC RBC AUTO-ENTMCNC: 33 G/DL (ref 31–36)
MCV RBC AUTO: 90 FL (ref 82–100)
MONOCYTES NFR BLD AUTO: 0.2 /CMM (ref 0.1–1.3)
MONOCYTES NFR BLD AUTO: 2.3 % (ref 2–12)
NEUTROPHILS # BLD AUTO: 6.7 /CMM (ref 1.8–8.9)
NEUTROPHILS NFR BLD AUTO: 92.7 % (ref 43–81)
PHOSPHATE SERPL-MCNC: 2.6 MG/DL (ref 2.5–4.9)
PLATELET # BLD AUTO: 183 /CMM (ref 150–450)
POTASSIUM SERPL-SCNC: 3.4 MMOL/L (ref 3.5–5.1)
RBC # BLD AUTO: 2.98 MIL/UL (ref 4–5.2)
SODIUM SERPL-SCNC: 148 MMOL/L (ref 136–145)
WBC NRBC COR # BLD AUTO: 7.3 K/UL (ref 4.3–11)

## 2019-08-14 RX ADMIN — POTASSIUM CHLORIDE SCH MEQ: 1.5 POWDER, FOR SOLUTION ORAL at 14:04

## 2019-08-14 RX ADMIN — FOLIC ACID SCH MG: 1 TABLET ORAL at 08:15

## 2019-08-14 RX ADMIN — Medication SCH MG: at 04:10

## 2019-08-14 RX ADMIN — LISINOPRIL SCH MG: 10 TABLET ORAL at 08:15

## 2019-08-14 RX ADMIN — ACETAMINOPHEN PRN MG: 160 SOLUTION ORAL at 12:31

## 2019-08-14 RX ADMIN — SODIUM CHLORIDE PRN MG: 9 INJECTION, SOLUTION INTRAVENOUS at 05:43

## 2019-08-14 RX ADMIN — Medication SCH MG: at 11:14

## 2019-08-14 RX ADMIN — SULFAMETHOXAZOLE AND TRIMETHOPRIM SCH UDTAB: 800; 160 TABLET ORAL at 08:15

## 2019-08-14 RX ADMIN — PANTOPRAZOLE SODIUM SCH MG: 40 GRANULE, DELAYED RELEASE ORAL at 08:15

## 2019-08-14 RX ADMIN — ENOXAPARIN SODIUM SCH MG: 60 INJECTION SUBCUTANEOUS at 08:24

## 2019-08-14 RX ADMIN — ACETYLCYSTEINE SCH MG: 100 INHALANT RESPIRATORY (INHALATION) at 15:30

## 2019-08-14 RX ADMIN — Medication SCH MG: at 15:30

## 2019-08-14 RX ADMIN — Medication SCH OZ: at 08:16

## 2019-08-14 RX ADMIN — NYSTATIN AND TRIAMCINOLONE ACETONIDE SCH APPLIC: 100000; 1 CREAM TOPICAL at 08:16

## 2019-08-14 RX ADMIN — Medication SCH EACH: at 11:17

## 2019-08-14 RX ADMIN — Medication SCH EACH: at 05:35

## 2019-08-14 RX ADMIN — Medication SCH MG: at 07:42

## 2019-08-14 RX ADMIN — Medication SCH APPLIC: at 08:16

## 2019-08-14 RX ADMIN — ACETYLCYSTEINE SCH MG: 100 INHALANT RESPIRATORY (INHALATION) at 07:42

## 2019-08-14 RX ADMIN — POTASSIUM CHLORIDE SCH MEQ: 1.5 POWDER, FOR SOLUTION ORAL at 12:31

## 2019-08-14 NOTE — NUR
TELE RN NOTE

SEEN BY NP JOEL UPDATED ABOUT PATIENT CONDITION WITH LABS,MADE AWARE THAT PATIENT HAS 
RESIDUAL >100CC.HOLD FEEDING.TO MAKE GOAL OF 45 NOW AS TOLERATED AND CONTINUE WITH LOW RATED 
AS PATIENT TOLERANCE.GOT ORDER TO DISCHARGE TO [facility].

## 2019-08-14 NOTE — NUR
TELE RN OPENING  NOTE 



RECEIVED REPORT FROM  PM NURSE.PATIENT IN BED WITH HOB ELEVATED.SLEEPING. TRACH TO VENT , 
TOLERATING SETTINGS AS ORDERED. NO CARDIAC OR RESPIRATORY DISTRESS NOTED, ST ON MONITOR HR 
104. DORINA MIDLINE SL, G TUBE WITH GLUCERNA AT 45 ML /HR STARTED, ADAMS CATHETER DRAINING TO 
GRAVITY YELLOW CLOUDY URINE.BILATERAL HEELS ELEVATED.GENERALIZED EDEMA WITH WEEPING EDEMA IN 
R ARM.SAFETY MEASURES IN PLACE. BED IN LOW LOCKED POSITION.SRX3.BED ALARM ON.  WILL CONTINUE 
TO MONITOR .

## 2019-08-14 NOTE — NUR
TELE RN DISCHARGE NOTE

PATIENT DC TO [facility] IN STABLE CONDITION.NO SOB NO DISTRESS NOTED,TOLERATING 
VENT SETTINGS.EXIT CARE GIVEN TO RN AT FACILITY AND DAUGHTER.VERBALIZED UNDERSTANDING.SIGNED 
DISCHARGE DOCUMENTS.DAUGHTER TOOK ALL BELONGINGS.NO BELONGINGS WITH PATIENT.ADAMS AND IV 
LINE INTACT.REPORT GIVEN TO PARAMEDICS.PICKED UP BY EMT.

## 2019-08-14 NOTE — NUR
TELE RN NOTES



NO ACUTE CHANGES NOTED DURING THE SHIFT. PROVIDED COMFORT AND SAFETY. WOUND CARE DONE. WILL 
ENDORSE TO THE AM NURSE FOR CONTINUITY OF CARE.

## 2019-08-14 NOTE — NUR
WOUND CARE CONSULT: RECEIVED WOUND CONSULT FOR TRACH SITE. NO SKIN TEAR NOTED BUT SKIN IS 
FRAGILE AND SUTURES NOTED. DEFER TO DR RENARD CARBALLO, SURGEON ALREADY ON CASE. WILL SEE PRN.

## 2019-08-14 NOTE — NUR
TELE RN NOTE

REPORT GIVEN TO NANCY GATES AT [facility].INSTRUCTIONS GIVEN REGARDING DVT AND 
TREATMENT AND FOLLOW UP AND ABOUT PRESCRIPTIONS.TO KEEP MIDLINE IN AS PER REQUEST.

## 2020-01-30 NOTE — NUR
RN MS CLOSING NOTES 

PT REMAINS IN BED, SLEEPING, EASILY AROUSED TO NAME CALL, BREATHING EVEN AND UNLABORED ON 
ROOM AIR. IN NO APPARENT PAIN OR DISCOMFORT AT THIS TIME, IV ACCESS ON THE R HAND 22G SL 
PATENT AND FLUSHING. BG , INSULIN GIVEN.  BED IN LOWEST LOCKED POSITION, CALL LIGHT 
WITHIN REACH AT ALL TIMES. WILL ENDORSE TO DAY NURSE FOR JOHNNY. Referral for DME pending review and changed to managed care.    Routed to care team to notify.

## 2020-06-21 NOTE — NUR
Family states that there is not family members that need to be updated at this time. Apologized to patient for wait time for radiology due to high number of patient's that came to ED within a short amount of time   ICU/RN: ENDING NOTES,AM



REPORT ENDORSED TO NIGHT NURSE FOR JOHNNY. PT INTUBATED ON SETTINGS AS ORDERED. NO DISTRESS. 
VSS. PEG TUBE PLACED, TRACH PENDING FOR TOMORROW. PT WILL RESUME NPO. CONSENT IN CHART. 
CHECKLIST NEEDS TO BE COMPLETED. ALL NEEDS ATTENDED TO, SAFETY MEASURES TAKEN, BED IN LOW 
POSITION, SIDE RAILS UP, CALL LIGHT WITHIN REACH.

## 2021-09-09 NOTE — NUR
Phq-9 sent via [employer].   Sindhu Morgan RN     WOUND CARE CONSULT: PT PRESENTS WITH SACRAL ULCER WITH SURROUNDING DEEP TISSUE INJURY, 
BUTTOCK AND PERINEAL RASH WITH SKIN STAINING AND RT HEEL INTACT DEEP TISSUE INJURY AND LEFT 
HEEL SCAR, PRESENT ON ADMISSION. PT IS VERY THIN WITH CURRENT NINI SCORE OF 11. PT ON 
PILAR ISOFLEX LOW AIRLOSS BED. ALL SKIN PROTECTION AND WOUND CARE RECOMMENDATIONS 
DISCUSSED WITH NURSING STAFF. RECOMMEND SURGICAL CONSULT. DR RENARD CARBALLO NOTIFIED OF CONSULT 
REQUEST. MD IN AGREEMENT WITH PLAN OF CARE. WILL SEE PRN. 

-------------------------------------------------------------------------------

Addendum: 05/29/19 at 1055 by ANGELINE CASON WNDNU

-------------------------------------------------------------------------------

Amended: Links added.

## 2023-04-07 NOTE — NUR
MS RN DISCHARGE NOTES

Patient is cleared for discharge by provider. Discharge instructions provided to Flora 
(Daughter) along with discharge papers - reviewed and signed by daughter. Daughter 
verbalized understanding. Belongings checked by CNA, reviewed and form signed and confirmed 
by daughter.  All due meds given and tolerated. No facial grimacing or moaning noted or 
reported. No SOB/labored breathing noted or reported. No signs and symptoms of distress 
noted or reported. Antibiotics prescription provided to daughter. Kept patient clean, dry 
and  comfortable. Wound treatment rendered. IV access removed: cath tip intact with no 
bleeding noted. Questions and concerns addressed. Mepilex provided as requested. Approved by 
 and NP. 07-Apr-2023 08:22
